# Patient Record
Sex: FEMALE | Race: WHITE | Employment: OTHER | URBAN - METROPOLITAN AREA
[De-identification: names, ages, dates, MRNs, and addresses within clinical notes are randomized per-mention and may not be internally consistent; named-entity substitution may affect disease eponyms.]

---

## 2017-01-05 RX ORDER — AMLODIPINE BESYLATE 5 MG/1
TABLET ORAL
Qty: 90 TAB | Refills: 0 | Status: SHIPPED | OUTPATIENT
Start: 2017-01-05 | End: 2017-01-12 | Stop reason: SDUPTHER

## 2017-01-05 RX ORDER — LEVOTHYROXINE SODIUM 88 UG/1
TABLET ORAL
Qty: 1 TAB | Refills: 0 | Status: SHIPPED | OUTPATIENT
Start: 2017-01-05 | End: 2017-01-12 | Stop reason: SDUPTHER

## 2017-01-05 RX ORDER — METOPROLOL SUCCINATE 25 MG/1
TABLET, EXTENDED RELEASE ORAL
Qty: 90 TAB | Refills: 0 | Status: SHIPPED | OUTPATIENT
Start: 2017-01-05 | End: 2017-01-12 | Stop reason: SDUPTHER

## 2017-01-12 ENCOUNTER — OFFICE VISIT (OUTPATIENT)
Dept: FAMILY MEDICINE CLINIC | Age: 82
End: 2017-01-12

## 2017-01-12 ENCOUNTER — HOSPITAL ENCOUNTER (OUTPATIENT)
Dept: LAB | Age: 82
Discharge: HOME | End: 2017-01-12
Payer: MEDICARE

## 2017-01-12 VITALS
DIASTOLIC BLOOD PRESSURE: 91 MMHG | HEIGHT: 62 IN | BODY MASS INDEX: 26.02 KG/M2 | OXYGEN SATURATION: 94 % | SYSTOLIC BLOOD PRESSURE: 169 MMHG | HEART RATE: 72 BPM | WEIGHT: 141.38 LBS | RESPIRATION RATE: 18 BRPM | TEMPERATURE: 98.5 F

## 2017-01-12 DIAGNOSIS — E03.2 HYPOTHYROIDISM, IATROGENIC: ICD-10-CM

## 2017-01-12 DIAGNOSIS — K92.2 GASTROINTESTINAL HEMORRHAGE, UNSPECIFIED GASTROINTESTINAL HEMORRHAGE TYPE: ICD-10-CM

## 2017-01-12 DIAGNOSIS — Z79.4 TYPE 2 DIABETES MELLITUS WITHOUT COMPLICATION, WITH LONG-TERM CURRENT USE OF INSULIN (HCC): ICD-10-CM

## 2017-01-12 DIAGNOSIS — Z00.00 ROUTINE GENERAL MEDICAL EXAMINATION AT A HEALTH CARE FACILITY: Primary | ICD-10-CM

## 2017-01-12 DIAGNOSIS — I25.10 CORONARY ARTERY DISEASE INVOLVING NATIVE CORONARY ARTERY OF NATIVE HEART WITHOUT ANGINA PECTORIS: ICD-10-CM

## 2017-01-12 DIAGNOSIS — E11.9 TYPE 2 DIABETES MELLITUS WITHOUT COMPLICATION, WITH LONG-TERM CURRENT USE OF INSULIN (HCC): ICD-10-CM

## 2017-01-12 DIAGNOSIS — E78.00 HYPERCHOLESTEREMIA: ICD-10-CM

## 2017-01-12 DIAGNOSIS — Z71.89 ADVANCE CARE PLANNING: ICD-10-CM

## 2017-01-12 PROCEDURE — 82043 UR ALBUMIN QUANTITATIVE: CPT

## 2017-01-12 PROCEDURE — 85025 COMPLETE CBC W/AUTO DIFF WBC: CPT

## 2017-01-12 PROCEDURE — 83036 HEMOGLOBIN GLYCOSYLATED A1C: CPT

## 2017-01-12 PROCEDURE — 80061 LIPID PANEL: CPT

## 2017-01-12 PROCEDURE — 84443 ASSAY THYROID STIM HORMONE: CPT

## 2017-01-12 PROCEDURE — 80053 COMPREHEN METABOLIC PANEL: CPT

## 2017-01-12 RX ORDER — LOSARTAN POTASSIUM 25 MG/1
25 TABLET ORAL
Qty: 90 TAB | Refills: 3 | Status: SHIPPED | OUTPATIENT
Start: 2017-01-12 | End: 2017-03-27 | Stop reason: SDUPTHER

## 2017-01-12 RX ORDER — METOPROLOL SUCCINATE 25 MG/1
TABLET, EXTENDED RELEASE ORAL
Qty: 90 TAB | Refills: 0 | Status: SHIPPED | OUTPATIENT
Start: 2017-01-12 | End: 2017-03-22 | Stop reason: SDUPTHER

## 2017-01-12 RX ORDER — LEVOTHYROXINE SODIUM 88 UG/1
TABLET ORAL
Qty: 1 TAB | Refills: 0 | Status: SHIPPED | OUTPATIENT
Start: 2017-01-12 | End: 2017-01-23 | Stop reason: SDUPTHER

## 2017-01-12 RX ORDER — AMLODIPINE BESYLATE 5 MG/1
TABLET ORAL
Qty: 90 TAB | Refills: 0 | Status: SHIPPED | OUTPATIENT
Start: 2017-01-12 | End: 2017-03-27 | Stop reason: SDUPTHER

## 2017-01-12 RX ORDER — OMEPRAZOLE 20 MG/1
20 CAPSULE, DELAYED RELEASE ORAL 2 TIMES DAILY
Qty: 90 CAP | Refills: 3 | Status: SHIPPED | OUTPATIENT
Start: 2017-01-12 | End: 2017-03-27 | Stop reason: SDUPTHER

## 2017-01-12 RX ORDER — ROSUVASTATIN CALCIUM 10 MG/1
10 TABLET, COATED ORAL
Qty: 90 TAB | Refills: 3 | Status: SHIPPED | OUTPATIENT
Start: 2017-01-12 | End: 2017-03-27 | Stop reason: SDUPTHER

## 2017-01-12 NOTE — PROGRESS NOTES
1. Have you been to the ER, urgent care clinic since your last visit? Hospitalized since your last visit? No    2. Have you seen or consulted any other health care providers outside of the [address] [place] [address] since your last visit? Include any pap smears or colon screening. No   Chief Complaint   Patient presents with    Diabetes     3 mos fuv for diabetes and hypertension  this am   [facility]     labs       Lavon Lynn  1/12/2017  Provider:   Jm:  Diabetes Report Card   1) Have you seen the eye doctor in past year?yes    2) How would you  rate your Diabetic Diet?fair     3) How well do you take care of your feet? yes   4) Do you keep your Primary Care Follow Up Appts? yes    5) Do you know your A1C goal?yes    6) Do you take your medications daily? yes    7) Do you check your blood sugars? yes    8) Have you gained weight?no       9) Do you follow an exercise program?no    10) Can you do better?yes      Lab Results   Component Value Date/Time    Cholesterol, total 158 08/12/2016 09:29 AM    HDL Cholesterol 71 08/12/2016 09:29 AM    LDL, calculated 70 08/12/2016 09:29 AM    Triglyceride 87 08/12/2016 09:29 AM     Lab Results   Component Value Date/Time    Hemoglobin A1c 5.7 09/06/2016 03:51 PM    Hemoglobin A1c 6.2 08/12/2016 09:29 AM    Hemoglobin A1c 5.5 05/17/2016 10:59 AM    Glucose 126 09/06/2016 03:51 PM    LDL, calculated 70 08/12/2016 09:29 AM    Creatinine 0.50 09/06/2016 03:51 PM        Due for med cpx  Chief Complaint   Patient presents with    Diabetes     3 mos fuv for diabetes and hypertension  this am    Labs     labs     she is a 80y.o. year old female who presents for evalution. Reviewed PmHx, RxHx, FmHx, SocHx, AllgHx and updated and dated in the chart.     Patient Active Problem List    Diagnosis    Advance care planning    DDD (degenerative disc disease), lumbar    Gastric ulcer    Coronary artery disease involving native coronary artery of native heart without angina pectoris    GI bleeding     workup in [address] [place] [address] 12/15 was unremarkable (Dr. Kyung Charles) - says she had EGD, colonoscopy, and small bowel capsule endoscopy      HTN (hypertension)    Diabetes ([address]    Hypothyroidism, iatrogenic     radioiodine      Hypercholesteremia       Review of Systems - negative except as listed above in the HPI    Objective:     Vitals:    01/12/17 1051   BP: (!) 169/91   Pulse: 72   Resp: 18   Temp: 98.5 °F (36.9 °C)   TempSrc: Oral   SpO2: 94%   Weight: 141 lb 6 oz (64.1 kg)   Height: 5' 2\" (1.575 m)     Physical Examination: General appearance - alert, well appearing, and in no distress  Chest - clear to auscultation, no wheezes, rales or rhonchi, symmetric air entry  Heart - normal rate, regular rhythm, normal S1, S2, no murmurs, rubs, clicks or gallops  Abdomen - soft, nontender, nondistended, no masses or organomegaly    Assessment/ Plan:   Kim Cordero was seen today for diabetes and labs. Diagnoses and all orders for this visit:    Routine general medical examination at a health care facility  -see below    Type 2 diabetes mellitus without complication, with long-term current use of insulin (HCC)  -     rosuvastatin (CRESTOR) 10 mg tablet; Take 1 Tab by mouth nightly. -     losartan (COZAAR) 25 mg tablet; Take 1 Tab by mouth nightly. -     LIPID PANEL  -     METABOLIC PANEL, COMPREHENSIVE  -     CBC WITH AUTOMATED DIFF  -     MICROALBUMIN, UR, RAND W/ MICROALBUMIN/CREA RATIO  -     HEMOGLOBIN A1C WITH EAG  -  Lab Results   Component Value Date/Time    Hemoglobin A1c 5.7 09/06/2016 03:51 PM       Hypercholesteremia  -     rosuvastatin (CRESTOR) 10 mg tablet; Take 1 Tab by mouth nightly. -     losartan (COZAAR) 25 mg tablet; Take 1 Tab by mouth nightly.     Hypothyroidism, iatrogenic  -     TSH 3RD GENERATION    Coronary artery disease involving native coronary artery of native heart without angina pectoris    Gastrointestinal hemorrhage, unspecified gastrointestinal hemorrhage type  -no more sxs  -check CBC  -feels better    Advance care planning  -has LW    Other orders  -     levothyroxine (SYNTHROID) 88 mcg tablet; TAKE 1 TABLET DAILY BEFORE BREAKFAST  -     metoprolol succinate (TOPROL-XL) 25 mg XL tablet; TAKE 1 TABLET DAILY  -     amLODIPine (NORVASC) 5 mg tablet; TAKE 1 TABLET DAILY  -     omeprazole (PRILOSEC) 20 mg capsule; Take 1 Cap by mouth two (2) times a day. -Patient is in good health  -Discussed with patient cancer risk factors and screens needed  -Patient needs a colonoscopy no  -Labs from previous visits were discussed with patient yes  -Discussed with patient diet and exercise=yes  -Discussed with patient testicular (male)/breast self exam (female)= yes  Follow-up Disposition:  Return in about 3 months (around 4/12/2017). I have discussed the diagnosis with the patient and the intended plan as seen in the above orders. The patient understands and agrees with the plan. The patient has received an after-visit summary and questions were answered concerning future plans. Medication Side Effects and Warnings were discussed with patient  Patient Labs were reviewed and or requested  Patient Past Records were reviewed and or requested     There are no Patient Instructions on file for this visit.         Santa Ramos M.D.              ,ed

## 2017-01-12 NOTE — MR AVS SNAPSHOT
Visit Information Date & Time Provider Department Dept. Phone Encounter #  
 1/12/2017 10:30 AM Benson Contreras MD [address] [facility] [phone] [id number] Follow-up Instructions Return in about 3 months (around 4/12/2017). Your Appointments 2/22/2017 11:40 AM  
ESTABLISHED PATIENT with Eva Ramos MD  
[facility] ([address]) Appt Note: 6 mo fu appt  [address] [id number] [phone]  
  
   
 [address] [address] Upcoming Health Maintenance Date Due  
 FOOT EXAM Q1 2/15/1945 MICROALBUMIN Q1 2/15/1945 EYE EXAM RETINAL OR DILATED Q1 2/15/1945 DTaP/Tdap/Td series (1 - Tdap) 2/15/1956 ZOSTER VACCINE AGE 60> 2/15/1995 GLAUCOMA SCREENING Q2Y 2/15/2000 Pneumococcal 65+ Low/Medium Risk (1 of 2 - PCV13) 2/15/2000 MEDICARE YEARLY EXAM 2/15/2000 HEMOGLOBIN A1C Q6M 3/6/2017 LIPID PANEL Q1 8/12/2017 Allergies as of 1/12/2017  Review Complete On: 1/12/2017 By: Benson Contreras MD  
  
 Severity Noted Reaction Type Reactions Celebrex [Celecoxib]  07/13/2015    Hives Current Immunizations  Reviewed on 10/13/2016 Name Date Influenza High Dose Vaccine PF 10/13/2016 Not reviewed this visit You Were Diagnosed With   
  
 Codes Comments Routine general medical examination at a health care facility    -  Primary ICD-10-CM: Z00.00 ICD-9-CM: V70.0 Type 2 diabetes mellitus without complication, with long-term current use of insulin (HCC)     ICD-10-CM: E11.9, Z79.4 ICD-9-CM: 250.00, V58.67 Hypercholesteremia     ICD-10-CM: E78.00 ICD-9-CM: 272.0 Hypothyroidism, iatrogenic     ICD-10-CM: E03.2 ICD-9-CM: 514. 3 Coronary artery disease involving native coronary artery of native heart without angina pectoris     ICD-10-CM: I25.10 ICD-9-CM: 414.01  Gastrointestinal hemorrhage, unspecified gastrointestinal hemorrhage type ICD-10-CM: K92.2 ICD-9-CM: 578.9 Advance care planning     ICD-10-CM: Z71.89 ICD-9-CM: V65.49 Vitals BP Pulse Temp Resp Height(growth percentile) Weight(growth percentile) (!) 169/91 (BP 1 Location: Left arm, BP Patient Position: Sitting) 72 98.5 °F (36.9 °C) (Oral) 18 5' 2\" (1.575 m) 141 lb 6 oz (64.1 kg) SpO2 BMI OB Status Smoking Status 94% 25.86 kg/m2 Menopause Never Smoker Vitals History BMI and BSA Data Body Mass Index Body Surface Area  
 25.86 kg/m 2 1.67 m 2 Preferred Pharmacy Pharmacy Name Phone [address] [facility] [phone] Your Updated Medication List  
  
   
This list is accurate as of: 1/12/17 11:02 AM.  Always use your most recent med list. amLODIPine 5 mg tablet Commonly known as:  Greg Cezar TAKE 1 TABLET DAILY  
  
 aspirin 81 mg chewable tablet Take 1 Tab by mouth daily. iron polysaccharides 150 mg iron capsule Commonly known as:  Roselind Spruce Take 1 Cap by mouth two (2) times a day. KRILL OIL (OMEGA 3 AND 6) 1,500-165-67.5 mg Cap Generic drug:  krill-om3-dha-epa-om6-lip-astx Take  by mouth.  
  
 levothyroxine 88 mcg tablet Commonly known as:  SYNTHROID  
TAKE 1 TABLET DAILY BEFORE BREAKFAST  
  
 losartan 25 mg tablet Commonly known as:  COZAAR Take 1 Tab by mouth nightly. metoprolol succinate 25 mg XL tablet Commonly known as:  TOPROL-XL  
TAKE 1 TABLET DAILY MULTIPLE VITAMIN PO Take  by mouth. naproxen sodium 220 mg Cap Take  by mouth every other day. omeprazole 20 mg capsule Commonly known as:  PRILOSEC Take 1 Cap by mouth two (2) times a day. rosuvastatin 10 mg tablet Commonly known as:  CRESTOR Take 1 Tab by mouth nightly. VITAMIN D3 2,000 unit Tab Generic drug:  cholecalciferol (vitamin D3) Take  by mouth. Prescriptions Sent to Pharmacy Refills levothyroxine (SYNTHROID) 88 mcg tablet 0 Sig: TAKE 1 TABLET DAILY BEFORE BREAKFAST Class: Normal  
 Pharmacy: [address], [address] Ph #: [phone]  
 metoprolol succinate (TOPROL-XL) 25 mg XL tablet 0 Sig: TAKE 1 TABLET DAILY Class: Normal  
 Pharmacy: [address], [address] Ph #: [phone]  
 amLODIPine (NORVASC) 5 mg tablet 0 Sig: TAKE 1 TABLET DAILY Class: Normal  
 Pharmacy: [address], [address] Ph #: [phone]  
 rosuvastatin (CRESTOR) 10 mg tablet 3 Sig: Take 1 Tab by mouth nightly. Class: Normal  
 Pharmacy: [address], [address] Ph #: [phone] Route: Oral  
 losartan (COZAAR) 25 mg tablet 3 Sig: Take 1 Tab by mouth nightly. Class: Normal  
 Pharmacy: [address], [address] Ph #: [phone] Route: Oral  
 omeprazole (PRILOSEC) 20 mg capsule 3 Sig: Take 1 Cap by mouth two (2) times a day. Class: Normal  
 Pharmacy: [address], [address] Ph #: [phone] Route: Oral  
  
We Performed the Following CBC WITH AUTOMATED DIFF [94100 CPT(R)] HEMOGLOBIN A1C WITH EAG [41138 CPT(R)] LIPID PANEL [03051 CPT(R)] METABOLIC PANEL, COMPREHENSIVE [01337 CPT(R)] MICROALBUMIN, UR, RAND W/ MICROALBUMIN/CREA RATIO A1048042 CPT(R)] TSH 3RD GENERATION [14261 CPT(R)] Follow-up Instructions Return in about 3 months (around 4/12/2017). Introducing [facility] & HEALTH SERVICES! [employer] introduces [employer] patient portal. Now you can access parts of your medical record, email your doctor's office, and request medication refills online. 1. In your internet browser, go to [email or web address] 2. Click on the First Time User? Click Here link in the Sign In box. You will see the New Member Sign Up page. 3. Enter your [employer] Access Code exactly as it appears below. You will not need to use this code after youve completed the sign-up process. If you do not sign up before the expiration date, you must request a new code. · [employer] Access Code: [email or web address] Expires: 4/12/2017 11:02 AM 
 
4. Enter the last four digits of your Social Security Number (xxxx) and Date of Birth (mm/dd/yyyy) as indicated and click Submit. You will be taken to the next sign-up page. 5. Create a [employer] ID. This will be your [employer] login ID and cannot be changed, so think of one that is secure and easy to remember. 6. Create a [employer] password. You can change your password at any time. 7. Enter your Password Reset Question and Answer. This can be used at a later time if you forget your password. 8. Enter your e-mail address. You will receive e-mail notification when new information is available in [address] 9. Click Sign Up. You can now view and download portions of your medical record. 10. Click the Download Summary menu link to download a portable copy of your medical information. If you have questions, please visit the Frequently Asked Questions section of the [employer] website. Remember, [employer] is NOT to be used for urgent needs. For medical emergencies, dial 911. Now available from your iPhone and Android! Please provide this summary of care documentation to your next provider. Your primary care clinician is listed as EUSEBIO FERRERA. If you have any questions after today's visit, please call [phone].

## 2017-01-13 LAB
ALBUMIN SERPL-MCNC: 4.2 G/DL (ref 3.5–4.7)
ALBUMIN/CREAT UR: 30.1 MG/G CREAT (ref 0–30)
ALBUMIN/GLOB SERPL: 1.9 {RATIO} (ref 1.1–2.5)
ALP SERPL-CCNC: 87 IU/L (ref 39–117)
ALT SERPL-CCNC: 12 IU/L (ref 0–32)
AST SERPL-CCNC: 18 IU/L (ref 0–40)
BASOPHILS # BLD AUTO: 0 X10E3/UL (ref 0–0.2)
BASOPHILS NFR BLD AUTO: 1 %
BILIRUB SERPL-MCNC: 0.3 MG/DL (ref 0–1.2)
BUN SERPL-MCNC: 12 MG/DL (ref 8–27)
BUN/CREAT SERPL: 21 (ref 11–26)
CALCIUM SERPL-MCNC: 9.3 MG/DL (ref 8.7–10.3)
CHLORIDE SERPL-SCNC: 97 MMOL/L (ref 96–106)
CHOLEST SERPL-MCNC: 262 MG/DL (ref 100–199)
CO2 SERPL-SCNC: 22 MMOL/L (ref 18–29)
CREAT SERPL-MCNC: 0.57 MG/DL (ref 0.57–1)
CREAT UR-MCNC: 67.4 MG/DL
EOSINOPHIL # BLD AUTO: 0.1 X10E3/UL (ref 0–0.4)
EOSINOPHIL NFR BLD AUTO: 3 %
ERYTHROCYTE [DISTWIDTH] IN BLOOD BY AUTOMATED COUNT: 17.5 % (ref 12.3–15.4)
EST. AVERAGE GLUCOSE BLD GHB EST-MCNC: 128 MG/DL
GLOBULIN SER CALC-MCNC: 2.2 G/DL (ref 1.5–4.5)
GLUCOSE SERPL-MCNC: 122 MG/DL (ref 65–99)
HBA1C MFR BLD: 6.1 % (ref 4.8–5.6)
HCT VFR BLD AUTO: 38.1 % (ref 34–46.6)
HDLC SERPL-MCNC: 70 MG/DL
HGB BLD-MCNC: 12.5 G/DL (ref 11.1–15.9)
IMM GRANULOCYTES # BLD: 0 X10E3/UL (ref 0–0.1)
IMM GRANULOCYTES NFR BLD: 0 %
INTERPRETATION, 910389: NORMAL
LDLC SERPL CALC-MCNC: 169 MG/DL (ref 0–99)
LYMPHOCYTES # BLD AUTO: 0.8 X10E3/UL (ref 0.7–3.1)
LYMPHOCYTES NFR BLD AUTO: 20 %
MCH RBC QN AUTO: 30.6 PG (ref 26.6–33)
MCHC RBC AUTO-ENTMCNC: 32.8 G/DL (ref 31.5–35.7)
MCV RBC AUTO: 93 FL (ref 79–97)
MICROALBUMIN UR-MCNC: 20.3 UG/ML
MONOCYTES # BLD AUTO: 0.6 X10E3/UL (ref 0.1–0.9)
MONOCYTES NFR BLD AUTO: 15 %
NEUTROPHILS # BLD AUTO: 2.3 X10E3/UL (ref 1.4–7)
NEUTROPHILS NFR BLD AUTO: 61 %
PLATELET # BLD AUTO: 404 X10E3/UL (ref 150–379)
POTASSIUM SERPL-SCNC: 3.9 MMOL/L (ref 3.5–5.2)
PROT SERPL-MCNC: 6.4 G/DL (ref 6–8.5)
RBC # BLD AUTO: 4.08 X10E6/UL (ref 3.77–5.28)
SODIUM SERPL-SCNC: 138 MMOL/L (ref 134–144)
TRIGL SERPL-MCNC: 116 MG/DL (ref 0–149)
TSH SERPL DL<=0.005 MIU/L-ACNC: 4.34 UIU/ML (ref 0.45–4.5)
VLDLC SERPL CALC-MCNC: 23 MG/DL (ref 5–40)
WBC # BLD AUTO: 3.8 X10E3/UL (ref 3.4–10.8)

## 2017-01-16 NOTE — PROGRESS NOTES
I spoke to patient. She states she expected her cholesterol to be elevated because she ran out of crestor 2-3 weeks before her appt. Dr. Piter Hutchison sent crestor to [facility] and she will restart it as soon as she gets it.

## 2017-01-23 RX ORDER — LEVOTHYROXINE SODIUM 88 UG/1
TABLET ORAL
Qty: 1 TAB | Refills: 0 | Status: SHIPPED | OUTPATIENT
Start: 2017-01-23 | End: 2017-01-25 | Stop reason: SDUPTHER

## 2017-01-25 RX ORDER — LEVOTHYROXINE SODIUM 88 UG/1
TABLET ORAL
Qty: 90 TAB | Refills: 1 | Status: SHIPPED | OUTPATIENT
Start: 2017-01-25 | End: 2017-03-27 | Stop reason: SDUPTHER

## 2017-03-22 ENCOUNTER — OFFICE VISIT (OUTPATIENT)
Dept: CARDIOLOGY CLINIC | Age: 82
End: 2017-03-22

## 2017-03-22 VITALS
HEIGHT: 62 IN | HEART RATE: 75 BPM | RESPIRATION RATE: 19 BRPM | DIASTOLIC BLOOD PRESSURE: 88 MMHG | WEIGHT: 141.4 LBS | OXYGEN SATURATION: 97 % | SYSTOLIC BLOOD PRESSURE: 178 MMHG | BODY MASS INDEX: 26.02 KG/M2

## 2017-03-22 DIAGNOSIS — I25.10 CORONARY ARTERY DISEASE INVOLVING NATIVE CORONARY ARTERY OF NATIVE HEART WITHOUT ANGINA PECTORIS: ICD-10-CM

## 2017-03-22 DIAGNOSIS — I10 ESSENTIAL HYPERTENSION: Primary | ICD-10-CM

## 2017-03-22 DIAGNOSIS — K92.2 GASTROINTESTINAL HEMORRHAGE, UNSPECIFIED GASTROINTESTINAL HEMORRHAGE TYPE: ICD-10-CM

## 2017-03-22 DIAGNOSIS — E78.00 HYPERCHOLESTEREMIA: ICD-10-CM

## 2017-03-22 RX ORDER — METOPROLOL SUCCINATE 50 MG/1
50 TABLET, EXTENDED RELEASE ORAL DAILY
Qty: 90 TAB | Refills: 3 | Status: SHIPPED | OUTPATIENT
Start: 2017-03-22 | End: 2017-03-27 | Stop reason: SDUPTHER

## 2017-03-22 NOTE — MR AVS SNAPSHOT
Visit Information Date & Time Provider Department Dept. Phone Encounter #  
 3/22/2017  1:00 PM Stefano Severance, MD [facility] Sue Mooney [phone] [id number] Your Appointments 4/12/2017 10:20 AM  
ESTABLISHED PATIENT with Chaz Iqbal MD  
[address] [facility]) Appt Note: 3mo fuv  
 [address] [id number] [phone]  
  
   
 [address] [address] Upcoming Health Maintenance Date Due  
 FOOT EXAM Q1 2/15/1945 EYE EXAM RETINAL OR DILATED Q1 2/15/1945 DTaP/Tdap/Td series (1 - Tdap) 2/15/1956 ZOSTER VACCINE AGE 60> 2/15/1995 GLAUCOMA SCREENING Q2Y 2/15/2000 Pneumococcal 65+ Low/Medium Risk (1 of 2 - PCV13) 2/15/2000 HEMOGLOBIN A1C Q6M 7/12/2017 MICROALBUMIN Q1 1/12/2018 LIPID PANEL Q1 1/12/2018 MEDICARE YEARLY EXAM 1/13/2018 Allergies as of 3/22/2017  Review Complete On: 3/22/2017 By: Stefano Severance, MD  
  
 Severity Noted Reaction Type Reactions Celebrex [Celecoxib]  07/13/2015    Hives Current Immunizations  Reviewed on 10/13/2016 Name Date Influenza High Dose Vaccine PF 10/13/2016 Not reviewed this visit Vitals BP Pulse Resp Height(growth percentile) Weight(growth percentile) SpO2  
 178/88 (BP 1 Location: Right arm, BP Patient Position: Sitting) 75 19 5' 2\" (1.575 m) 141 lb 6.4 oz (64.1 kg) 97% BMI OB Status Smoking Status 25.86 kg/m2 Menopause Never Smoker Vitals History BMI and BSA Data Body Mass Index Body Surface Area  
 25.86 kg/m 2 1.67 m 2 Preferred Pharmacy Pharmacy Name Phone San[facility] Otto [facility] [phone] Your Updated Medication List  
  
   
This list is accurate as of: 3/22/17  1:42 PM.  Always use your most recent med list. amLODIPine 5 mg tablet Commonly known as:  Gemini Blade TAKE 1 TABLET DAILY aspirin 81 mg chewable tablet Take 1 Tab by mouth daily. iron polysaccharides 150 mg iron capsule Commonly known as:  Audreycandice Arambulau Take 1 Cap by mouth two (2) times a day. KRILL OIL (OMEGA 3 AND 6) 1,500-165-67.5 mg Cap Generic drug:  krill-om3-dha-epa-om6-lip-astx Take  by mouth.  
  
 levothyroxine 88 mcg tablet Commonly known as:  SYNTHROID  
TAKE 1 TABLET DAILY BEFORE BREAKFAST  
  
 losartan 25 mg tablet Commonly known as:  COZAAR Take 1 Tab by mouth nightly. metoprolol succinate 50 mg XL tablet Commonly known as:  TOPROL-XL Take 1 Tab by mouth daily. MULTIPLE VITAMIN PO Take  by mouth. naproxen sodium 220 mg Cap Take  by mouth every other day. omeprazole 20 mg capsule Commonly known as:  PRILOSEC Take 1 Cap by mouth two (2) times a day. rosuvastatin 10 mg tablet Commonly known as:  CRESTOR Take 1 Tab by mouth nightly. VITAMIN D3 2,000 unit Tab Generic drug:  cholecalciferol (vitamin D3) Take  by mouth. Prescriptions Sent to Pharmacy Refills  
 metoprolol succinate (TOPROL-XL) 50 mg XL tablet 3 Sig: Take 1 Tab by mouth daily. Class: Normal  
 Pharmacy: [address], [address] [place] [address] #: [phone] Route: Oral  
  
Introducing [facility] & HEALTH SERVICES! [employer] introduces [employer] patient portal. Now you can access parts of your medical record, email your doctor's office, and request medication refills online. 1. In your internet browser, go to [email or web address] 2. Click on the First Time User? Click Here link in the Sign In box. You will see the New Member Sign Up page. 3. Enter your [employer] Access Code exactly as it appears below. You will not need to use this code after youve completed the sign-up process. If you do not sign up before the expiration date, you must request a new code.  
 
· [employer] Access Code: [email or web address] 
 Expires: 4/12/2017 12:02 PM 
 
4. Enter the last four digits of your Social Security Number (xxxx) and Date of Birth (mm/dd/yyyy) as indicated and click Submit. You will be taken to the next sign-up page. 5. Create a [employer] ID. This will be your [employer] login ID and cannot be changed, so think of one that is secure and easy to remember. 6. Create a [employer] password. You can change your password at any time. 7. Enter your Password Reset Question and Answer. This can be used at a later time if you forget your password. 8. Enter your e-mail address. You will receive e-mail notification when new information is available in [address] 9. Click Sign Up. You can now view and download portions of your medical record. 10. Click the Download Summary menu link to download a portable copy of your medical information. If you have questions, please visit the Frequently Asked Questions section of the [employer] website. Remember, [employer] is NOT to be used for urgent needs. For medical emergencies, dial 911. Now available from your iPhone and Android! Please provide this summary of care documentation to your next provider. Your primary care clinician is listed as EUSEBIO FERRERA. If you have any questions after today's visit, please call [phone].

## 2017-03-22 NOTE — PROGRESS NOTES
Trista Jesus MD. [facility]              Patient: Lavon Lynn  : 1935      Today's Date: 3/22/2017            HISTORY OF PRESENT ILLNESS:     History of Present Illness:  Ms. Atkinson is here for follow-up. She says she was in the hospital in [place] for dizziness, vomiting and anemia. She is always tired. Sleeps poorly. Is exercising regularly. BP high at home (-170's)            PAST MEDICAL HISTORY:     Past Medical History:   Diagnosis Date    Diabetes ([address]     GI bleeding     workup in [address] [place] [address] 12/15 was unremarkable (Dr. Supa Ayon) - says she had EGD, colonoscopy, and small bowel capsule endoscopy    HTN (hypertension)     Hypercholesteremia     Hypothyroidism, iatrogenic     radioiodine    MI (myocardial infarction) ([address]     During surgery in  - she's had multiple caths and stress tests             MEDICATIONS:     Current Outpatient Prescriptions   Medication Sig Dispense Refill    levothyroxine (SYNTHROID) 88 mcg tablet TAKE 1 TABLET DAILY BEFORE BREAKFAST 90 Tab 1    metoprolol succinate (TOPROL-XL) 25 mg XL tablet TAKE 1 TABLET DAILY 90 Tab 0    amLODIPine (NORVASC) 5 mg tablet TAKE 1 TABLET DAILY 90 Tab 0    rosuvastatin (CRESTOR) 10 mg tablet Take 1 Tab by mouth nightly. 90 Tab 3    losartan (COZAAR) 25 mg tablet Take 1 Tab by mouth nightly. 90 Tab 3    omeprazole (PRILOSEC) 20 mg capsule Take 1 Cap by mouth two (2) times a day. 90 Cap 3    iron polysaccharides (NU-IRON) 150 mg iron capsule Take 1 Cap by mouth two (2) times a day. 60 Cap 3    naproxen sodium 220 mg cap Take  by mouth every other day.  cholecalciferol, vitamin D3, (VITAMIN D3) 2,000 unit tab Take  by mouth.  krill-om3-dha-epa-om6-lip-astx (KRILL OIL, OMEGA 3 & 6,) 1,500-165-67.5 mg cap Take  by mouth.  MULTIVITAMIN (MULTIPLE VITAMIN PO) Take  by mouth.  aspirin 81 mg chewable tablet Take 1 Tab by mouth daily.  30 Tab 0       Allergies   Allergen Reactions    Celebrex [Celecoxib] Hives             SOCIAL HISTORY:     Social History   Substance Use Topics    Smoking status: Never Smoker    Smokeless tobacco: Never Used    Alcohol use 0.0 oz/week     0 Standard drinks or equivalent per week           FAMILY HISTORY:     Family History   Problem Relation Age of Onset    Heart Failure Mother             REVIEW OF SYMPTOMS:          Review of Symptoms:  Constitutional: Negative for fever, chills  HEENT: Negative for vision changes.    Respiratory: occ cough  Cardiovascular: Negative for orthopnea, syncope, and PND.    Gastrointestinal: Negative for abdominal pain, diarrhea, or melena  Genitourinary: Negative for dysuria  Musculoskeletal: Negative for myalgias.    Skin: Negative for rash  Heme: History of GI bleeding (12/15)    Neurological: Negative for speech change and focal weakness.    + constipation              PHYSICAL EXAM:         Physical Exam:  Visit Vitals    /88 (BP 1 Location: Right arm, BP Patient Position: Sitting)    Pulse 75    Resp 19    Ht 5' 2\" (1.575 m)    Wt 141 lb 6.4 oz (64.1 kg)    SpO2 97%    BMI 25.86 kg/m2       Patient appears generally well, mood and affect are appropriate and pleasant. HEENT:  Hearing intact, non-icteric, normocephalic, atraumatic.    Neck Exam: Supple, No JVD or carotid bruits.    Lung Exam: Clear to auscultation, even breath sounds.    Cardiac Exam: Regular rate and rhythm with no sig murmur  Abdomen: Soft, non-tender  Extremities: Moves all ext well. No lower extremity edema.   Psych: Appropriate affect  Neuro - Grossly intact              LABS / OTHER STUDIES:          Labs 7/9/15 - K 3.8, glc 131, Cr 0.6, Na 139, Hgb 13.3, plt 371, A1c 6.3, TSH 5.6, Vit D 27, chol 191, , HDL 64, LDL 94, LFT's OK  labs 12/10/15 - Hgb 6.3, plt 492, Cr 0.6, K 4.0, TSH 2.7, chol 210, HDL 72, , TG 75  Hgb 11.1 in March 2016.    Labs 8/13/16 - CMP OK (glc 113), CBC OK (Hgb 11.4), chol 158, TG 87, HDL 71, LDL 70, A1c 6.2, TSH 1.7    Lab Results   Component Value Date/Time    Sodium 138 01/12/2017 11:05 AM    Potassium 3.9 01/12/2017 11:05 AM    Chloride 97 01/12/2017 11:05 AM    CO2 22 01/12/2017 11:05 AM    Glucose 122 01/12/2017 11:05 AM    BUN 12 01/12/2017 11:05 AM    Creatinine 0.57 01/12/2017 11:05 AM    BUN/Creatinine ratio 21 01/12/2017 11:05 AM    GFR est  01/12/2017 11:05 AM    GFR est non-AA 87 01/12/2017 11:05 AM    Calcium 9.3 01/12/2017 11:05 AM    Bilirubin, total 0.3 01/12/2017 11:05 AM    AST (SGOT) 18 01/12/2017 11:05 AM    Alk. phosphatase 87 01/12/2017 11:05 AM    Protein, total 6.4 01/12/2017 11:05 AM    Albumin 4.2 01/12/2017 11:05 AM    A-G Ratio 1.9 01/12/2017 11:05 AM    ALT (SGPT) 12 01/12/2017 11:05 AM       Lab Results   Component Value Date/Time    WBC 3.8 01/12/2017 11:05 AM    HGB 12.5 01/12/2017 11:05 AM    HCT 38.1 01/12/2017 11:05 AM    PLATELET 243 65/39/9405 11:05 AM    MCV 93 01/12/2017 11:05 AM       Lab Results   Component Value Date/Time    Cholesterol, total 262 01/12/2017 11:05 AM    HDL Cholesterol 70 01/12/2017 11:05 AM    LDL, calculated 169 01/12/2017 11:05 AM    VLDL, calculated 23 01/12/2017 11:05 AM    Triglyceride 116 01/12/2017 11:05 AM              CARDIAC DIAGNOSTICS:          Cardiac Evaluation Includes:  Exercise Cardiolite 7/23/15 - walked 4:39 (6.3 METS), normal stress EKG. Normal MPI. LVEF 71%. Echo 7/23/15 - LVEF 55-60%, grade 1 diastology      EKG 7/9/15 - NSR, 1st degree AVB, non-specific T wave abn           ASSESSMENT AND PLAN:          Assessment and Plan:  1) Atypical chest pain and history of CAD  - Exercise Cardiolite 7/23/15 - walked 4:39 (6.3 METS), normal stress EKG. Normal MPI. LVEF 71%.   - Echo 7/23/15 - LVEF 55-60%, grade 1 diastology  - Will continue ASA 81 mg for now as long as bleeding issues resolved, however if she has more problems with GI bleeding, then would have to stop the ASA      2) Dizziness and fatigue  - She had dizziness lying down and she does not have typical orthostatic complaints - her orthostatic vitals were normal 7/13/15  - We cut back losartan and dizziness was better some.    - She says BP at home runs ~ 130/80 and sometimes lower   - continue current meds   - Due to leg weakness, I held Crestor 4 weeks but this did not improve her symptoms any     - she is exercising and feeling better       3) HTN   - BP high at home (-170's)  - Will increase Toprol XL to 50 mg and see how she does with that. May increase norvasc next.       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 Liya Massey note  - Will continue ASA 81 mg for now as long as bleeding issues resolved, however if she has more problems with GI bleeding, then would have to stop the ASA  - She sees Dr. Juanita Alanis and gets a CBC regularly   - labs 12/10/15 - Hgb 6.3  - Hgb is stable on iron. Hgb 12.5 in Jan 2017  - if more significant GI bleeding, then will stop ASA      6) See me back in 2 months. Patient expressed understanding of the plan - questions were answered. She is going to see sister in [place] soon.   Janes Castro MD, [address], [address]  [address] [place] [address] [address], [address]  Ph: [phone] Ph [phone]

## 2017-03-22 NOTE — PROGRESS NOTES
Visit Vitals    /88 (BP 1 Location: Right arm, BP Patient Position: Sitting)    Pulse 75    Resp 19    Ht 5' 2\" (1.575 m)    Wt 141 lb 6.4 oz (64.1 kg)    SpO2 97%    BMI 25.86 kg/m2

## 2017-03-27 ENCOUNTER — TELEPHONE (OUTPATIENT)
Dept: FAMILY MEDICINE CLINIC | Age: 82
End: 2017-03-27

## 2017-03-27 DIAGNOSIS — E78.00 HYPERCHOLESTEREMIA: ICD-10-CM

## 2017-03-27 DIAGNOSIS — E11.9 TYPE 2 DIABETES MELLITUS WITHOUT COMPLICATION, WITH LONG-TERM CURRENT USE OF INSULIN (HCC): ICD-10-CM

## 2017-03-27 DIAGNOSIS — Z79.4 TYPE 2 DIABETES MELLITUS WITHOUT COMPLICATION, WITH LONG-TERM CURRENT USE OF INSULIN (HCC): ICD-10-CM

## 2017-03-27 RX ORDER — LOSARTAN POTASSIUM 25 MG/1
25 TABLET ORAL
Qty: 90 TAB | Refills: 3 | Status: SHIPPED | OUTPATIENT
Start: 2017-03-27 | End: 2017-10-24 | Stop reason: SDUPTHER

## 2017-03-27 RX ORDER — ROSUVASTATIN CALCIUM 10 MG/1
10 TABLET, COATED ORAL
Qty: 90 TAB | Refills: 3 | Status: SHIPPED | OUTPATIENT
Start: 2017-03-27 | End: 2017-10-24 | Stop reason: SDUPTHER

## 2017-03-27 RX ORDER — AMLODIPINE BESYLATE 5 MG/1
TABLET ORAL
Qty: 90 TAB | Refills: 0 | Status: SHIPPED | OUTPATIENT
Start: 2017-03-27 | End: 2017-09-09 | Stop reason: SDUPTHER

## 2017-03-27 RX ORDER — METOPROLOL SUCCINATE 50 MG/1
50 TABLET, EXTENDED RELEASE ORAL DAILY
Qty: 90 TAB | Refills: 3 | Status: SHIPPED | OUTPATIENT
Start: 2017-03-27 | End: 2017-10-24 | Stop reason: SDUPTHER

## 2017-03-27 RX ORDER — OMEPRAZOLE 20 MG/1
20 CAPSULE, DELAYED RELEASE ORAL 2 TIMES DAILY
Qty: 90 CAP | Refills: 3 | Status: SHIPPED | OUTPATIENT
Start: 2017-03-27 | End: 2017-10-24 | Stop reason: SDUPTHER

## 2017-03-27 RX ORDER — LEVOTHYROXINE SODIUM 88 UG/1
TABLET ORAL
Qty: 90 TAB | Refills: 1 | Status: SHIPPED | OUTPATIENT
Start: 2017-03-27 | End: 2017-09-29 | Stop reason: SDUPTHER

## 2017-03-27 NOTE — TELEPHONE ENCOUNTER
Patient came into the office requesting refills:    Crestor, 10 mg  Synthroid 88 mcg  Norvasc 5 mg  Cozaar 25 mg  Prilosec 20 mg  Valtrex       [facility]

## 2017-04-24 ENCOUNTER — HOSPITAL ENCOUNTER (OUTPATIENT)
Dept: LAB | Age: 82
Discharge: HOME | End: 2017-04-24
Payer: MEDICARE

## 2017-04-24 ENCOUNTER — OFFICE VISIT (OUTPATIENT)
Dept: FAMILY MEDICINE CLINIC | Age: 82
End: 2017-04-24

## 2017-04-24 VITALS
RESPIRATION RATE: 16 BRPM | HEIGHT: 62 IN | WEIGHT: 141 LBS | HEART RATE: 66 BPM | DIASTOLIC BLOOD PRESSURE: 90 MMHG | OXYGEN SATURATION: 98 % | TEMPERATURE: 98 F | BODY MASS INDEX: 25.95 KG/M2 | SYSTOLIC BLOOD PRESSURE: 170 MMHG

## 2017-04-24 DIAGNOSIS — E03.2 HYPOTHYROIDISM, IATROGENIC: ICD-10-CM

## 2017-04-24 DIAGNOSIS — E78.00 HYPERCHOLESTEREMIA: ICD-10-CM

## 2017-04-24 DIAGNOSIS — E11.9 TYPE 2 DIABETES MELLITUS WITHOUT COMPLICATION, WITHOUT LONG-TERM CURRENT USE OF INSULIN (HCC): Primary | ICD-10-CM

## 2017-04-24 DIAGNOSIS — I10 ESSENTIAL HYPERTENSION: ICD-10-CM

## 2017-04-24 PROCEDURE — 84443 ASSAY THYROID STIM HORMONE: CPT

## 2017-04-24 PROCEDURE — 83036 HEMOGLOBIN GLYCOSYLATED A1C: CPT

## 2017-04-24 PROCEDURE — 80061 LIPID PANEL: CPT

## 2017-04-24 PROCEDURE — 80053 COMPREHEN METABOLIC PANEL: CPT

## 2017-04-24 RX ORDER — METOPROLOL SUCCINATE 25 MG/1
TABLET, EXTENDED RELEASE ORAL
COMMUNITY
Start: 2017-03-13 | End: 2018-04-24 | Stop reason: ALTCHOICE

## 2017-04-24 RX ORDER — VALACYCLOVIR HYDROCHLORIDE 1 G/1
TABLET, FILM COATED ORAL
COMMUNITY
Start: 2017-04-17 | End: 2018-04-24 | Stop reason: ALTCHOICE

## 2017-04-24 RX ORDER — LATANOPROST 50 UG/ML
SOLUTION/ DROPS OPHTHALMIC
COMMUNITY
Start: 2017-03-17 | End: 2022-01-01

## 2017-04-24 NOTE — MR AVS SNAPSHOT
Visit Information Date & Time Provider Department Dept. Phone Encounter #  
 4/24/2017  9:50 AM Brandy Crook MD [address] [facility] [phone] [id number] Follow-up Instructions Return in about 6 months (around 10/24/2017) for DM. Your Appointments 5/10/2017 11:20 AM  
ESTABLISHED PATIENT with Pedro Saunders MD  
[facility] (Arcadio Cunningham) Appt Note: 2 mo fu appt [address] [address]  
[phone]  
  
   
 [address]  [address] [address] Upcoming Health Maintenance Date Due  
 FOOT EXAM Q1 2/15/1945 EYE EXAM RETINAL OR DILATED Q1 2/15/1945 DTaP/Tdap/Td series (1 - Tdap) 2/15/1956 ZOSTER VACCINE AGE 60> 2/15/1995 GLAUCOMA SCREENING Q2Y 2/15/2000 Pneumococcal 65+ Low/Medium Risk (1 of 2 - PCV13) 2/15/2000 HEMOGLOBIN A1C Q6M 7/12/2017 MICROALBUMIN Q1 1/12/2018 LIPID PANEL Q1 1/12/2018 MEDICARE YEARLY EXAM 1/13/2018 Allergies as of 4/24/2017  Review Complete On: 4/24/2017 By: Brandy Crook MD  
  
 Severity Noted Reaction Type Reactions Celebrex [Celecoxib]  07/13/2015    Hives Current Immunizations  Reviewed on 10/13/2016 Name Date Influenza High Dose Vaccine PF 10/13/2016 Not reviewed this visit You Were Diagnosed With   
  
 Codes Comments Type 2 diabetes mellitus without complication, without long-term current use of insulin (HCC)    -  Primary ICD-10-CM: E11.9 ICD-9-CM: 250.00 Essential hypertension     ICD-10-CM: I10 
ICD-9-CM: 401.9 Hypercholesteremia     ICD-10-CM: E78.00 ICD-9-CM: 272.0 Hypothyroidism, iatrogenic     ICD-10-CM: E03.2 ICD-9-CM: 774. 3 Vitals BP Pulse Temp Resp Height(growth percentile) Weight(growth percentile) 170/90 (BP 1 Location: Right arm, BP Patient Position: Sitting) 66 98 °F (36.7 °C) (Oral) 16 5' 2\" (1.575 m) 141 lb (64 kg) SpO2 BMI OB Status Smoking Status 98% 25.79 kg/m2 Menopause Never Smoker BMI and BSA Data Body Mass Index Body Surface Area 25.79 kg/m 2 1.67 m 2 Preferred Pharmacy Pharmacy Name Phone [address] [facility] [phone] Your Updated Medication List  
  
   
This list is accurate as of: 4/24/17 10:31 AM.  Always use your most recent med list. amLODIPine 5 mg tablet Commonly known as:  Ping Million TAKE 1 TABLET DAILY  
  
 aspirin 81 mg chewable tablet Take 1 Tab by mouth daily. iron polysaccharides 150 mg iron capsule Commonly known as:  Elroy Thorne Take 1 Cap by mouth two (2) times a day. KRILL OIL (OMEGA 3 AND 6) 1,500-165-67.5 mg Cap Generic drug:  krill-om3-dha-epa-om6-lip-astx Take  by mouth.  
  
 latanoprost 0.005 % ophthalmic solution Commonly known as:  XALATAN  
  
 levothyroxine 88 mcg tablet Commonly known as:  SYNTHROID  
TAKE 1 TABLET DAILY BEFORE BREAKFAST  
  
 losartan 25 mg tablet Commonly known as:  COZAAR Take 1 Tab by mouth nightly. * TOPROL XL 25 mg XL tablet Generic drug:  metoprolol succinate * metoprolol succinate 50 mg XL tablet Commonly known as:  TOPROL-XL Take 1 Tab by mouth daily. MULTIPLE VITAMIN PO Take  by mouth. naproxen sodium 220 mg Cap Take  by mouth every other day. omeprazole 20 mg capsule Commonly known as:  PRILOSEC Take 1 Cap by mouth two (2) times a day. rosuvastatin 10 mg tablet Commonly known as:  CRESTOR Take 1 Tab by mouth nightly. valACYclovir 1 gram tablet Commonly known as:  VALTREX  
  
 VITAMIN D3 2,000 unit Tab Generic drug:  cholecalciferol (vitamin D3) Take  by mouth. * Notice: This list has 2 medication(s) that are the same as other medications prescribed for you. Read the directions carefully, and ask your doctor or other care provider to review them with you. We Performed the Following HEMOGLOBIN A1C WITH EAG [04945 CPT(R)]  DIABETES FOOT EXAM [HM7 Custom] LIPID PANEL [21583 CPT(R)] METABOLIC PANEL, COMPREHENSIVE [85854 CPT(R)] REFERRAL TO OPHTHALMOLOGY [REF57 Custom] TSH 3RD GENERATION [62432 CPT(R)] Follow-up Instructions Return in about 6 months (around 10/24/2017) for DM. Referral Information Referral ID Referred By Referred To  
  
 [id number] EUSEBIO FERRERA Not Available Visits Status Start Date End Date 1 New Request 4/24/17 4/24/18 If your referral has a status of pending review or denied, additional information will be sent to support the outcome of this decision. Introducing [facility]! [employer] introduces [employer] patient portal. Now you can access parts of your medical record, email your doctor's office, and request medication refills online. 1. In your internet browser, go to [email or web address] 2. Click on the First Time User? Click Here link in the Sign In box. You will see the New Member Sign Up page. 3. Enter your [employer] Access Code exactly as it appears below. You will not need to use this code after youve completed the sign-up process. If you do not sign up before the expiration date, you must request a new code. · [employer] Access Code: [email or web address] Expires: 7/23/2017 10:31 AM 
 
4. Enter the last four digits of your Social Security Number (xxxx) and Date of Birth (mm/dd/yyyy) as indicated and click Submit. You will be taken to the next sign-up page. 5. Create a [employer] ID. This will be your [employer] login ID and cannot be changed, so think of one that is secure and easy to remember. 6. Create a [employer] password. You can change your password at any time. 7. Enter your Password Reset Question and Answer. This can be used at a later time if you forget your password. 8. Enter your e-mail address.  You will receive e-mail notification when new information is available in [employer]. 9. Click Sign Up. You can now view and download portions of your medical record. 10. Click the Download Summary menu link to download a portable copy of your medical information. If you have questions, please visit the Frequently Asked Questions section of the [employer] website. Remember, [employer] is NOT to be used for urgent needs. For medical emergencies, dial 911. Now available from your iPhone and Android! Please provide this summary of care documentation to your next provider. Your primary care clinician is listed as EUSEBIO FERRERA. If you have any questions after today's visit, please call [phone].

## 2017-04-24 NOTE — PROGRESS NOTES
1. Have you been to the ER, urgent care clinic since your last visit? Hospitalized since your last visit? No    2. Have you seen or consulted any other health care providers outside of the [address] [place] [address] since your last visit? Include any pap smears or colon screening. No   Chief Complaint   Patient presents with    Follow-up     3 month    Rash     right shoulder     Pt present to the office for 3 mon f/u, rash - right shoulder, DM, HTN      Lab Results   Component Value Date/Time    Microalb/Creat ratio (ug/mg creat.) 30.1 01/12/2017 11:05 AM      Chief Complaint   Patient presents with    Follow-up     3 month    Rash     right shoulder     she is a 80y.o. year old female who presents for evaluation. See Diabetic Report Card listed above. Patient Active Problem List    Diagnosis    Type 2 diabetes mellitus without complication, without long-term current use of insulin ([facility]    Advance care planning    DDD (degenerative disc disease), lumbar    Gastric ulcer    Coronary artery disease involving native coronary artery of native heart without angina pectoris    GI bleeding     workup in [address] [place] [address] 12/15 was unremarkable (Dr. Brisa Smith) - says she had EGD, colonoscopy, and small bowel capsule endoscopy      HTN (hypertension)    Hypothyroidism, iatrogenic     radioiodine      Hypercholesteremia       Nurses notes were copied in to this note. Reviewed PmHx, RxHx, FmHx, SocHx, AllgHx--dated and updated in the chart.     Review of Systems - negative except as listed above in the HPI    Objective:     Vitals:    04/24/17 1001   BP: 170/90   Pulse: 66   Resp: 16   Temp: 98 °F (36.7 °C)   TempSrc: Oral   SpO2: 98%   Weight: 141 lb (64 kg)   Height: 5' 2\" (1.575 m)     Physical Examination: General appearance - alert, well appearing, and in no distress  Neck - supple, no significant adenopathy  Chest - clear to auscultation, no wheezes, rales or rhonchi, symmetric air entry  Heart - normal rate, regular rhythm, normal S1, S2, no murmurs, rubs, clicks or gallops  Abdomen - soft, nontender, nondistended, no masses or organomegaly  Extremities - peripheral pulses normal, no pedal edema, no clubbing or cyanosis      Assessment/ Plan:   Jesusita Kovacs was seen today for follow-up and rash. Diagnoses and all orders for this visit:    Type 2 diabetes mellitus without complication, without long-term current use of insulin ([facility]  -     REFERRAL TO OPHTHALMOLOGY  -     LIPID PANEL  -     METABOLIC PANEL, COMPREHENSIVE  -     HEMOGLOBIN A1C WITH [facility]  -      DIABETES FOOT EXAM  -at goal    Essential hypertension  -     LIPID PANEL  -     METABOLIC PANEL, COMPREHENSIVE  -not at goal  -inc rx to 100mg Toprol    Hypercholesteremia  -     LIPID PANEL  -     METABOLIC PANEL, COMPREHENSIVE    Hypothyroidism, iatrogenic  -     TSH 3RD GENERATION       Follow-up Disposition:  Return in about 6 months (around 10/24/2017) for DM. Lab Results   Component Value Date/Time    Cholesterol, total 262 01/12/2017 11:05 AM    HDL Cholesterol 70 01/12/2017 11:05 AM    LDL, calculated 169 01/12/2017 11:05 AM    Triglyceride 116 01/12/2017 11:05 AM     Lab Results   Component Value Date/Time    Hemoglobin A1c 6.1 01/12/2017 11:05 AM    Hemoglobin A1c 5.7 09/06/2016 03:51 PM    Hemoglobin A1c 6.2 08/12/2016 09:29 AM    Microalb/Creat ratio (ug/mg creat.) 30.1 01/12/2017 11:05 AM    LDL, calculated 169 01/12/2017 11:05 AM    Creatinine 0.57 01/12/2017 11:05 AM          Discussed with patient goal of Diabetes to include:  HgA1C <7, LDL cholesterol <70, Blood pressure <130/80. Discussed with patient diet and weight management and to get regular exercise. Recommend yearly eye exams and daily foot care. The patient understands and agrees with the plan. I have discussed the diagnosis with the patient and the intended plan as seen in the above orders.   The patient has received an after-visit summary and questions were answered concerning future plans. Medication Side Effects and Warnings were discussed with patient  Patient Labs were reviewed and or requested  Patient Past Records were reviewed and or requested    Ashley Box M.D. [facility] [facility]    There are no Patient Instructions on file for this visit.

## 2017-04-24 NOTE — LETTER
4/25/2017 4:50 PM 
 
Ms. Benton 83 [address] [facility] [address] [place] [address] Dear Vinay Duggan: Please find your most recent results below. Resulted Orders LIPID PANEL Result Value Ref Range Cholesterol, total 167 100 - 199 mg/dL Triglyceride 98 0 - 149 mg/dL HDL Cholesterol 71 >39 mg/dL VLDL, calculated 20 5 - 40 mg/dL LDL, calculated 76 0 - 99 mg/dL Narrative Performed at:  [address]  [id number] : Ilsa Cuevas MD, Phone:  [phone] METABOLIC PANEL, COMPREHENSIVE Result Value Ref Range Glucose 137 (H) 65 - 99 mg/dL BUN 12 8 - 27 mg/dL Creatinine 0.52 (L) 0.57 - 1.00 mg/dL GFR est non-AA 89 >59 mL/min/1.73 GFR est  >59 mL/min/1.73  
 BUN/Creatinine ratio 23 12 - 28 Sodium 137 134 - 144 mmol/L Potassium 3.9 3.5 - 5.2 mmol/L Chloride 97 96 - 106 mmol/L  
 CO2 23 18 - 29 mmol/L Calcium 9.3 8.7 - 10.3 mg/dL Protein, total 6.6 6.0 - 8.5 g/dL Albumin 4.3 3.5 - 4.7 g/dL GLOBULIN, TOTAL 2.3 1.5 - 4.5 g/dL A-G Ratio 1.9 1.2 - 2.2 Bilirubin, total 0.3 0.0 - 1.2 mg/dL Alk. phosphatase 88 39 - 117 IU/L  
 AST (SGOT) 21 0 - 40 IU/L  
 ALT (SGPT) 20 0 - 32 IU/L Narrative Performed at:  [address]  [id number] : Ilsa Cuevas MD, Phone:  [phone] HEMOGLOBIN A1C WITH EAG Result Value Ref Range Hemoglobin A1c 6.2 (H) 4.8 - 5.6 % Comment:  
            Pre-diabetes: 5.7 - 6.4 Diabetes: >6.4 Glycemic control for adults with diabetes: <7.0 Estimated average glucose 131 mg/dL Narrative Performed at:  [address]  [id number] : Ilsa Cuevas MD, Phone:  [phone] TSH 3RD GENERATION Result Value Ref Range TSH 2.870 0.450 - 4.500 uIU/mL Narrative Performed at:  [place] [address] [address]  [id number] : Edyta Khan MD, Phone:  [phone] CVD REPORT Result Value Ref Range INTERPRETATION Note Comment:  
   Supplement report is available. Narrative Performed at:  [address] [place] [address]  [id number] : Aime Hallman PhD, Phone:  [phone] DIABETES PATIENT EDUCATION Result Value Ref Range PDF Image Not applicable Narrative Performed at:  [address] [place] [address]  [id number] : Aime Hallman PhD, Phone:  [phone] RECOMMENDATIONS: 
After reviewing your medical history and your lab results, they appear at goal for you!    
 
Let us make 2017 a great year! Dr. Danny Donato M.D. Good Help to those in Need! !!  
    
   
 
 
 
Please call me if you have any questions: [phone] Sincerely, Shi Nick MD

## 2017-04-25 LAB
ALBUMIN SERPL-MCNC: 4.3 G/DL (ref 3.5–4.7)
ALBUMIN/GLOB SERPL: 1.9 {RATIO} (ref 1.2–2.2)
ALP SERPL-CCNC: 88 IU/L (ref 39–117)
ALT SERPL-CCNC: 20 IU/L (ref 0–32)
AST SERPL-CCNC: 21 IU/L (ref 0–40)
BILIRUB SERPL-MCNC: 0.3 MG/DL (ref 0–1.2)
BUN SERPL-MCNC: 12 MG/DL (ref 8–27)
BUN/CREAT SERPL: 23 (ref 12–28)
CALCIUM SERPL-MCNC: 9.3 MG/DL (ref 8.7–10.3)
CHLORIDE SERPL-SCNC: 97 MMOL/L (ref 96–106)
CHOLEST SERPL-MCNC: 167 MG/DL (ref 100–199)
CO2 SERPL-SCNC: 23 MMOL/L (ref 18–29)
CREAT SERPL-MCNC: 0.52 MG/DL (ref 0.57–1)
EST. AVERAGE GLUCOSE BLD GHB EST-MCNC: 131 MG/DL
GLOBULIN SER CALC-MCNC: 2.3 G/DL (ref 1.5–4.5)
GLUCOSE SERPL-MCNC: 137 MG/DL (ref 65–99)
HBA1C MFR BLD: 6.2 % (ref 4.8–5.6)
HDLC SERPL-MCNC: 71 MG/DL
INTERPRETATION, 910389: NORMAL
LDLC SERPL CALC-MCNC: 76 MG/DL (ref 0–99)
Lab: NORMAL
POTASSIUM SERPL-SCNC: 3.9 MMOL/L (ref 3.5–5.2)
PROT SERPL-MCNC: 6.6 G/DL (ref 6–8.5)
SODIUM SERPL-SCNC: 137 MMOL/L (ref 134–144)
TRIGL SERPL-MCNC: 98 MG/DL (ref 0–149)
TSH SERPL DL<=0.005 MIU/L-ACNC: 2.87 UIU/ML (ref 0.45–4.5)
VLDLC SERPL CALC-MCNC: 20 MG/DL (ref 5–40)

## 2017-04-25 NOTE — PROGRESS NOTES
A letter was sent, to the address on file, with lab results and Dr. Dickey Session recommendations for the pt.

## 2017-04-25 NOTE — PROGRESS NOTES
After reviewing your medical history and your lab results, they appear at goal for you! Let us make 2017 a great year! Dr. Yuli Ramos M.D.       Good Help to those in Need!!!

## 2017-06-27 ENCOUNTER — TELEPHONE (OUTPATIENT)
Dept: FAMILY MEDICINE CLINIC | Age: 82
End: 2017-06-27

## 2017-06-27 NOTE — TELEPHONE ENCOUNTER
Pt walked in. [facility] only sent metoprolol and amlodipine to her. They did not send her crestor 10mg, levothyroxine 88mcg, losartan 25mg, nor omeprazole 20mg. Pt called [facility] today and she could hardly hear them and the person from customer service got irritated and hung up on her. Pt states that she goes out of town soon and she needs her meds. Pt only has 4-5 days of meds left. Please call pt at [phone] once [facility] has been called.

## 2017-06-28 NOTE — TELEPHONE ENCOUNTER
[facility]ed [facility] Crestor & Losartan ship on 7/3 and Levothyroxine & Omeprazole on 7/1- pt informed voiced understanding.

## 2017-09-10 RX ORDER — AMLODIPINE BESYLATE 5 MG/1
TABLET ORAL
Qty: 90 TAB | Refills: 0 | Status: SHIPPED | OUTPATIENT
Start: 2017-09-10 | End: 2017-10-24 | Stop reason: SDUPTHER

## 2017-09-29 RX ORDER — LEVOTHYROXINE SODIUM 88 UG/1
TABLET ORAL
Qty: 90 TAB | Refills: 1 | Status: SHIPPED | OUTPATIENT
Start: 2017-09-29 | End: 2017-10-24 | Stop reason: SDUPTHER

## 2017-10-24 ENCOUNTER — OFFICE VISIT (OUTPATIENT)
Dept: FAMILY MEDICINE CLINIC | Age: 82
End: 2017-10-24

## 2017-10-24 ENCOUNTER — HOSPITAL ENCOUNTER (OUTPATIENT)
Dept: LAB | Age: 82
Discharge: HOME | End: 2017-10-24
Payer: MEDICARE

## 2017-10-24 VITALS
WEIGHT: 140 LBS | RESPIRATION RATE: 18 BRPM | HEIGHT: 62 IN | SYSTOLIC BLOOD PRESSURE: 178 MMHG | BODY MASS INDEX: 25.76 KG/M2 | DIASTOLIC BLOOD PRESSURE: 83 MMHG | TEMPERATURE: 98.9 F | OXYGEN SATURATION: 98 % | HEART RATE: 67 BPM

## 2017-10-24 DIAGNOSIS — E03.2 HYPOTHYROIDISM, IATROGENIC: ICD-10-CM

## 2017-10-24 DIAGNOSIS — E78.00 HYPERCHOLESTEREMIA: ICD-10-CM

## 2017-10-24 DIAGNOSIS — Z23 ENCOUNTER FOR IMMUNIZATION: ICD-10-CM

## 2017-10-24 DIAGNOSIS — E11.9 TYPE 2 DIABETES MELLITUS WITHOUT COMPLICATION, WITH LONG-TERM CURRENT USE OF INSULIN (HCC): Primary | ICD-10-CM

## 2017-10-24 DIAGNOSIS — K92.2 GASTROINTESTINAL HEMORRHAGE, UNSPECIFIED GASTROINTESTINAL HEMORRHAGE TYPE: ICD-10-CM

## 2017-10-24 DIAGNOSIS — I10 ESSENTIAL HYPERTENSION: ICD-10-CM

## 2017-10-24 DIAGNOSIS — Z79.4 TYPE 2 DIABETES MELLITUS WITHOUT COMPLICATION, WITH LONG-TERM CURRENT USE OF INSULIN (HCC): Primary | ICD-10-CM

## 2017-10-24 PROCEDURE — 80053 COMPREHEN METABOLIC PANEL: CPT

## 2017-10-24 PROCEDURE — 84443 ASSAY THYROID STIM HORMONE: CPT

## 2017-10-24 PROCEDURE — 83036 HEMOGLOBIN GLYCOSYLATED A1C: CPT

## 2017-10-24 PROCEDURE — 80061 LIPID PANEL: CPT

## 2017-10-24 PROCEDURE — 85025 COMPLETE CBC W/AUTO DIFF WBC: CPT

## 2017-10-24 RX ORDER — ROSUVASTATIN CALCIUM 10 MG/1
10 TABLET, COATED ORAL
Qty: 90 TAB | Refills: 3 | Status: SHIPPED | OUTPATIENT
Start: 2017-10-24 | End: 2018-05-29

## 2017-10-24 RX ORDER — AMLODIPINE BESYLATE 5 MG/1
TABLET ORAL
Qty: 90 TAB | Refills: 1 | Status: SHIPPED | OUTPATIENT
Start: 2017-10-24 | End: 2018-04-24 | Stop reason: SDUPTHER

## 2017-10-24 RX ORDER — OMEPRAZOLE 20 MG/1
20 CAPSULE, DELAYED RELEASE ORAL 2 TIMES DAILY
Qty: 90 CAP | Refills: 3 | Status: SHIPPED | OUTPATIENT
Start: 2017-10-24 | End: 2017-11-08

## 2017-10-24 RX ORDER — LEVOTHYROXINE SODIUM 88 UG/1
TABLET ORAL
Qty: 90 TAB | Refills: 1 | Status: SHIPPED | OUTPATIENT
Start: 2017-10-24 | End: 2018-04-24 | Stop reason: SDUPTHER

## 2017-10-24 RX ORDER — LOSARTAN POTASSIUM 25 MG/1
25 TABLET ORAL
Qty: 90 TAB | Refills: 3 | Status: SHIPPED | OUTPATIENT
Start: 2017-10-24 | End: 2018-04-24 | Stop reason: SDUPTHER

## 2017-10-24 RX ORDER — METOPROLOL SUCCINATE 50 MG/1
50 TABLET, EXTENDED RELEASE ORAL DAILY
Qty: 90 TAB | Refills: 3 | Status: SHIPPED | OUTPATIENT
Start: 2017-10-24 | End: 2018-04-24 | Stop reason: SDUPTHER

## 2017-10-24 NOTE — PROGRESS NOTES
Chief Complaint   Patient presents with    Follow-up    Arthritis     Pt presents to the office for f/u labs - arthritis, med refill    1. Have you been to the ER, urgent care clinic since your last visit? Hospitalized since your last visit? No    2. Have you seen or consulted any other health care providers outside of the [address] [place] [address] since your last visit? Include any pap smears or colon screening. No      Pain level is 8/10 in low back and legs and knees for months and getting worse    Wants flu shot    Has DDD and two surgeries    Wants a walker to help her walk, cannot take pain rx and does not want to take it    Lab Results   Component Value Date/Time    Microalb/Creat ratio (ug/mg creat.) 30.1 01/12/2017 11:05 AM      Chief Complaint   Patient presents with    Follow-up    Arthritis     she is a 80y.o. year old female who presents for evaluation. See Diabetic Report Card listed above. Patient Active Problem List    Diagnosis    Type 2 diabetes mellitus without complication, without long-term current use of insulin ([facility] [address]    Advance care planning    DDD (degenerative disc disease), lumbar    Gastric ulcer    Coronary artery disease involving native coronary artery of native heart without angina pectoris    GI bleeding     workup in [address] [place] [address] 12/15 was unremarkable (Dr. Chau Del Cid) - says she had EGD, colonoscopy, and small bowel capsule endoscopy      HTN (hypertension)    Hypothyroidism, iatrogenic     radioiodine      Hypercholesteremia       Reviewed PmHx, RxHx, FmHx, SocHx, AllgHx--dated and updated in the chart.     Review of Systems - negative except as listed above in the HPI    Objective:     Vitals:    10/24/17 0956   BP: 178/83   Pulse: 67   Resp: 18   Temp: 98.9 °F (37.2 °C)   TempSrc: Oral   SpO2: 98%   Weight: 140 lb (63.5 kg)   Height: 5' 2\" (1.575 m)     Physical Examination: General appearance - alert, well appearing, and in no distress  Neck - supple, no significant adenopathy  Chest - clear to auscultation, no wheezes, rales or rhonchi, symmetric air entry  Heart - normal rate, regular rhythm, normal S1, S2, no murmurs, rubs, clicks or gallops  Abdomen - soft, nontender, nondistended, no masses or organomegaly  Foot Exam:  Feet were examined, no sensory or circulatory issues, no ulcers noted  Assessment/ Plan:   Diagnoses and all orders for this visit:    1. Type 2 diabetes mellitus without complication, with long-term current use of insulin (HCC)  -     losartan (COZAAR) 25 mg tablet; Take 1 Tab by mouth nightly. -     rosuvastatin (CRESTOR) 10 mg tablet; Take 1 Tab by mouth nightly. -     LIPID PANEL  -     METABOLIC PANEL, COMPREHENSIVE  -     HEMOGLOBIN A1C WITH EAG  -     REFERRAL TO OPHTHALMOLOGY  -at goal    2. Hypercholesteremia  -     losartan (COZAAR) 25 mg tablet; Take 1 Tab by mouth nightly. -     rosuvastatin (CRESTOR) 10 mg tablet; Take 1 Tab by mouth nightly. -     LIPID PANEL  -     METABOLIC PANEL, COMPREHENSIVE  -hold chol rx to see if better    3. Hypothyroidism, iatrogenic  -     TSH 3RD GENERATION    4. Essential hypertension  -     LIPID PANEL  -     METABOLIC PANEL, COMPREHENSIVE  -ok for age and pt    11. Gastrointestinal hemorrhage, unspecified gastrointestinal hemorrhage type  -     CBC WITH AUTOMATED DIFF    6. Encounter for immunization  -     Influenza virus vaccine ([address]) 72 years and older ([address])  -     Administration fee () for Medicare insured patients    Other orders  -     amLODIPine (NORVASC) 5 mg tablet; TAKE 1 TABLET DAILY  -     levothyroxine (SYNTHROID) 88 mcg tablet; TAKE 1 TABLET DAILY BEFORE BREAKFAST  -     metoprolol succinate (TOPROL-XL) 50 mg XL tablet; Take 1 Tab by mouth daily. -     omeprazole (PRILOSEC) 20 mg capsule; Take 1 Cap by mouth two (2) times a day. -     OTHER; Walker with seat  DX: DDD lumbar       Follow-up Disposition:  Return in about 6 months (around 4/24/2018).   Lab Results   Component Value Date/Time    Cholesterol, total 167 04/24/2017 10:30 AM    HDL Cholesterol 71 04/24/2017 10:30 AM    LDL, calculated 76 04/24/2017 10:30 AM    Triglyceride 98 04/24/2017 10:30 AM     Lab Results   Component Value Date/Time    Hemoglobin A1c 6.2 04/24/2017 10:30 AM    Hemoglobin A1c 6.1 01/12/2017 11:05 AM    Hemoglobin A1c 5.7 09/06/2016 03:51 PM    Microalb/Creat ratio (ug/mg creat.) 30.1 01/12/2017 11:05 AM    LDL, calculated 76 04/24/2017 10:30 AM    Creatinine 0.52 04/24/2017 10:30 AM          Discussed with patient goal of Diabetes to include:  HgA1C <7, LDL cholesterol <70, Blood pressure <130/80. Discussed with patient diet and weight management and to get regular exercise. Recommend yearly eye exams and daily foot care. The patient understands and agrees with the plan. I have discussed the diagnosis with the patient and the intended plan as seen in the above orders. The patient has received an after-visit summary and questions were answered concerning future plans. Medication Side Effects and Warnings were discussed with patient  Patient Labs were reviewed and or requested  Patient Past Records were reviewed and or requested    Katey Ramos M.D. [facility]    There are no Patient Instructions on file for this visit.

## 2017-10-24 NOTE — LETTER
10/25/2017 11:25 AM 
 
Ms. Benton 83 [address] [address] Dear Allegra Fairview: Please find your most recent results below. Resulted Orders LIPID PANEL Result Value Ref Range Cholesterol, total 163 100 - 199 mg/dL Triglyceride 105 0 - 149 mg/dL HDL Cholesterol 74 >39 mg/dL VLDL, calculated 21 5 - 40 mg/dL LDL, calculated 68 0 - 99 mg/dL Narrative Performed at:  [address]  [id number] : Lin Mcmahan MD, Phone:  [phone] METABOLIC PANEL, COMPREHENSIVE Result Value Ref Range Glucose 146 (H) 65 - 99 mg/dL BUN 9 8 - 27 mg/dL Creatinine 0.53 (L) 0.57 - 1.00 mg/dL GFR est non-AA 89 >59 mL/min/1.73 GFR est  >59 mL/min/1.73  
 BUN/Creatinine ratio 17 12 - 28 Sodium 140 134 - 144 mmol/L Potassium 3.9 3.5 - 5.2 mmol/L Chloride 101 96 - 106 mmol/L  
 CO2 23 18 - 29 mmol/L Calcium 9.5 8.7 - 10.3 mg/dL Protein, total 6.7 6.0 - 8.5 g/dL Albumin 4.1 3.5 - 4.7 g/dL GLOBULIN, TOTAL 2.6 1.5 - 4.5 g/dL A-G Ratio 1.6 1.2 - 2.2 Bilirubin, total 0.3 0.0 - 1.2 mg/dL Alk. phosphatase 88 39 - 117 IU/L  
 AST (SGOT) 19 0 - 40 IU/L  
 ALT (SGPT) 16 0 - 32 IU/L Narrative Performed at:  [address]  [id number] : Lin Mcmahan MD, Phone:  [phone] HEMOGLOBIN A1C WITH EAG Result Value Ref Range Hemoglobin A1c 5.6 4.8 - 5.6 % Comment:  
            Pre-diabetes: 5.7 - 6.4 Diabetes: >6.4 Glycemic control for adults with diabetes: <7.0 Estimated average glucose 114 mg/dL Narrative Performed at:  [address]  [id number] : Lin Mcmahan MD, Phone:  [phone] TSH 3RD GENERATION Result Value Ref Range TSH 3.530 0.450 - 4.500 uIU/mL Narrative Performed at:  [place] [address] [address]  [id number] : Bladimir Serrato MD, Phone:  [phone] CBC WITH AUTOMATED DIFF Result Value Ref Range WBC 3.3 (L) 3.4 - 10.8 x10E3/uL  
 RBC 3.77 3.77 - 5.28 x10E6/uL HGB 11.6 11.1 - 15.9 g/dL HCT 35.3 34.0 - 46.6 % MCV 94 79 - 97 fL  
 MCH 30.8 26.6 - 33.0 pg  
 MCHC 32.9 31.5 - 35.7 g/dL  
 RDW 13.1 12.3 - 15.4 % PLATELET 946 (H) 576 - 379 x10E3/uL NEUTROPHILS 58 Not Estab. % Lymphocytes 23 Not Estab. % MONOCYTES 13 Not Estab. % EOSINOPHILS 5 Not Estab. % BASOPHILS 1 Not Estab. %  
 ABS. NEUTROPHILS 1.9 1.4 - 7.0 x10E3/uL Abs Lymphocytes 0.7 0.7 - 3.1 x10E3/uL  
 ABS. MONOCYTES 0.4 0.1 - 0.9 x10E3/uL  
 ABS. EOSINOPHILS 0.2 0.0 - 0.4 x10E3/uL  
 ABS. BASOPHILS 0.0 0.0 - 0.2 x10E3/uL IMMATURE GRANULOCYTES 0 Not Estab. %  
 ABS. IMM. GRANS. 0.0 0.0 - 0.1 x10E3/uL Narrative Performed at:  [address]  [id number] : Bladimir Serrato MD, Phone:  [phone] CVD REPORT Result Value Ref Range INTERPRETATION Note Comment:  
   Supplemental report is available. Narrative Performed at:  [facility] [place] [address]  [id number] : Lydia Mckee PhD, Phone:  [phone] DIABETES PATIENT EDUCATION Result Value Ref Range PDF Image Not applicable Narrative Performed at:  [address] [place] [address]  [id number] : Lydia Mckee PhD, Phone:  [phone] RECOMMENDATIONS: 
   
    
  After reviewing your medical history and your lab results, they appear at goal for you!    
 
Let us make 2017 a great year! Dr. Kristin Rodriguez M.D. Good Help to those in Need Please call me if you have any questions: [phone] Sincerely, Maximo Nesbitt MD

## 2017-10-24 NOTE — MR AVS SNAPSHOT
Visit Information Date & Time Provider Department Dept. Phone Encounter #  
 10/24/2017  9:50 AM Asuncion Bush MD [facility] [phone] [id number] Follow-up Instructions Return in about 6 months (around 4/24/2018). Upcoming Health Maintenance Date Due  
 EYE EXAM RETINAL OR DILATED Q1 2/15/1945 DTaP/Tdap/Td series (1 - Tdap) 2/15/1956 ZOSTER VACCINE AGE 60> 12/15/1994 GLAUCOMA SCREENING Q2Y 2/15/2000 Pneumococcal 65+ Low/Medium Risk (1 of 2 - PCV13) 2/15/2000 INFLUENZA AGE 9 TO ADULT 8/1/2017 HEMOGLOBIN A1C Q6M 10/24/2017 MICROALBUMIN Q1 1/12/2018 MEDICARE YEARLY EXAM 1/13/2018 FOOT EXAM Q1 4/24/2018 LIPID PANEL Q1 4/24/2018 Allergies as of 10/24/2017  Review Complete On: 10/24/2017 By: Asuncion Bush MD  
  
 Severity Noted Reaction Type Reactions Celebrex [Celecoxib]  07/13/2015    Hives Current Immunizations  Reviewed on 10/13/2016 Name Date Influenza High Dose Vaccine PF  Incomplete, 10/13/2016 Not reviewed this visit You Were Diagnosed With   
  
 Codes Comments Type 2 diabetes mellitus without complication, with long-term current use of insulin (HCC)    -  Primary ICD-10-CM: E11.9, Z79.4 ICD-9-CM: 250.00, V58.67 Hypercholesteremia     ICD-10-CM: E78.00 ICD-9-CM: 272.0 Hypothyroidism, iatrogenic     ICD-10-CM: E03.2 ICD-9-CM: 244.3 Essential hypertension     ICD-10-CM: I10 
ICD-9-CM: 401.9 Gastrointestinal hemorrhage, unspecified gastrointestinal hemorrhage type     ICD-10-CM: K92.2 ICD-9-CM: 578.9 Encounter for immunization     ICD-10-CM: E32 ICD-9-CM: V03.89 Vitals BP Pulse Temp Resp Height(growth percentile) Weight(growth percentile) 178/83 67 98.9 °F (37.2 °C) (Oral) 18 5' 2\" (1.575 m) 140 lb (63.5 kg) SpO2 BMI OB Status Smoking Status 98% 25.61 kg/m2 Menopause Never Smoker BMI and BSA Data Body Mass Index Body Surface Area 25.61 kg/m 2 1.67 m 2 Preferred Pharmacy Pharmacy Name Phone [address] [facility] [phone] Your Updated Medication List  
  
   
This list is accurate as of: 10/24/17 10:17 AM.  Always use your most recent med list. amLODIPine 5 mg tablet Commonly known as:  Juan Pablo Mcclain TAKE 1 TABLET DAILY  
  
 aspirin 81 mg chewable tablet Take 1 Tab by mouth daily. iron polysaccharides 150 mg iron capsule Commonly known as:  Sherlalito Bard Take 1 Cap by mouth two (2) times a day. KRILL OIL (OMEGA 3 AND 6) 1,500-165-67.5 mg Cap Generic drug:  krill-om3-dha-epa-om6-lip-astx Take  by mouth.  
  
 latanoprost 0.005 % ophthalmic solution Commonly known as:  XALATAN  
  
 levothyroxine 88 mcg tablet Commonly known as:  SYNTHROID  
TAKE 1 TABLET DAILY BEFORE BREAKFAST  
  
 losartan 25 mg tablet Commonly known as:  COZAAR Take 1 Tab by mouth nightly. MULTIPLE VITAMIN PO Take  by mouth. naproxen sodium 220 mg Cap Take  by mouth every other day. omeprazole 20 mg capsule Commonly known as:  PRILOSEC Take 1 Cap by mouth two (2) times a day. OTHER Chuy Euceda with seat DX: DDD lumbar  
  
 rosuvastatin 10 mg tablet Commonly known as:  CRESTOR Take 1 Tab by mouth nightly. * TOPROL XL 25 mg XL tablet Generic drug:  metoprolol succinate * metoprolol succinate 50 mg XL tablet Commonly known as:  TOPROL-XL Take 1 Tab by mouth daily. valACYclovir 1 gram tablet Commonly known as:  VALTREX  
  
 VITAMIN D3 2,000 unit Tab Generic drug:  cholecalciferol (vitamin D3) Take  by mouth. * Notice: This list has 2 medication(s) that are the same as other medications prescribed for you. Read the directions carefully, and ask your doctor or other care provider to review them with you. Prescriptions Printed Refills OTHER 0 Sig: Chuy Euceda with seat DX: DDD lumbar Class: Print Prescriptions Sent to Pharmacy Refills  
 amLODIPine (NORVASC) 5 mg tablet 1 Sig: TAKE 1 TABLET DAILY Class: Normal  
 Pharmacy: [address], [address] Ph #: [phone]  
 levothyroxine (SYNTHROID) 88 mcg tablet 1 Sig: TAKE 1 TABLET DAILY BEFORE BREAKFAST Class: Normal  
 Pharmacy: [address], [address] Ph #: [phone]  
 losartan (COZAAR) 25 mg tablet 3 Sig: Take 1 Tab by mouth nightly. Class: Normal  
 Pharmacy: [address], [address] Ph #: [phone] Route: Oral  
 metoprolol succinate (TOPROL-XL) 50 mg XL tablet 3 Sig: Take 1 Tab by mouth daily. Class: Normal  
 Pharmacy: [address], [address] Ph #: [phone] Route: Oral  
 omeprazole (PRILOSEC) 20 mg capsule 3 Sig: Take 1 Cap by mouth two (2) times a day. Class: Normal  
 Pharmacy: [address], [address] Ph #: [phone] Route: Oral  
 rosuvastatin (CRESTOR) 10 mg tablet 3 Sig: Take 1 Tab by mouth nightly. Class: Normal  
 Pharmacy: [address], [address] Ph #: [phone] Route: Oral  
  
We Performed the Following ADMIN INFLUENZA VIRUS VAC [ HCP] CBC WITH AUTOMATED DIFF [26863 CPT(R)] HEMOGLOBIN A1C WITH EAG [83403 CPT(R)] INFLUENZA VIRUS VACCINE, HIGH DOSE SEASONAL, PRESERVATIVE FREE [57011 CPT(R)] LIPID PANEL [67974 CPT(R)] METABOLIC PANEL, COMPREHENSIVE [13408 CPT(R)] REFERRAL TO OPHTHALMOLOGY [REF57 Custom] TSH 3RD GENERATION [19458 CPT(R)] Follow-up Instructions Return in about 6 months (around 4/24/2018). Referral Information Referral ID Referred By Referred To  
  
 [id number] EUSEBIO FERRERA Not Available Visits Status Start Date End Date 1 New Request 10/24/17 10/24/18 If your referral has a status of pending review or denied, additional information will be sent to support the outcome of this decision. Introducing Raphael Monae! Srinivasan Flores introduces RunAlong patient portal. Now you can access parts of your medical record, email your doctor's office, and request medication refills online. 1. In your internet browser, go to [email or web address] 2. Click on the First Time User? Click Here link in the Sign In box. You will see the New Member Sign Up page. 3. Enter your RunAlong Access Code exactly as it appears below. You will not need to use this code after youve completed the sign-up process. If you do not sign up before the expiration date, you must request a new code. · RunAlong Access Code: [email or web address] Expires: 1/22/2018 10:17 AM 
 
4. Enter the last four digits of your Social Security Number (xxxx) and Date of Birth (mm/dd/yyyy) as indicated and click Submit. You will be taken to the next sign-up page. 5. Create a RunAlong ID. This will be your RunAlong login ID and cannot be changed, so think of one that is secure and easy to remember. 6. Create a RunAlong password. You can change your password at any time. 7. Enter your Password Reset Question and Answer. This can be used at a later time if you forget your password. 8. Enter your e-mail address. You will receive e-mail notification when new information is available in [address] 9. Click Sign Up. You can now view and download portions of your medical record. 10. Click the Download Summary menu link to download a portable copy of your medical information. If you have questions, please visit the Frequently Asked Questions section of the RunAlong website. Remember, RunAlong is NOT to be used for urgent needs. For medical emergencies, dial 911. Now available from your iPhone and Android! Please provide this summary of care documentation to your next provider. Your primary care clinician is listed as EUSEBIO FERRERA. If you have any questions after today's visit, please call [phone].

## 2017-10-25 LAB
ALBUMIN SERPL-MCNC: 4.1 G/DL (ref 3.5–4.7)
ALBUMIN/GLOB SERPL: 1.6 {RATIO} (ref 1.2–2.2)
ALP SERPL-CCNC: 88 IU/L (ref 39–117)
ALT SERPL-CCNC: 16 IU/L (ref 0–32)
AST SERPL-CCNC: 19 IU/L (ref 0–40)
BASOPHILS # BLD AUTO: 0 X10E3/UL (ref 0–0.2)
BASOPHILS NFR BLD AUTO: 1 %
BILIRUB SERPL-MCNC: 0.3 MG/DL (ref 0–1.2)
BUN SERPL-MCNC: 9 MG/DL (ref 8–27)
BUN/CREAT SERPL: 17 (ref 12–28)
CALCIUM SERPL-MCNC: 9.5 MG/DL (ref 8.7–10.3)
CHLORIDE SERPL-SCNC: 101 MMOL/L (ref 96–106)
CHOLEST SERPL-MCNC: 163 MG/DL (ref 100–199)
CO2 SERPL-SCNC: 23 MMOL/L (ref 18–29)
CREAT SERPL-MCNC: 0.53 MG/DL (ref 0.57–1)
EOSINOPHIL # BLD AUTO: 0.2 X10E3/UL (ref 0–0.4)
EOSINOPHIL NFR BLD AUTO: 5 %
ERYTHROCYTE [DISTWIDTH] IN BLOOD BY AUTOMATED COUNT: 13.1 % (ref 12.3–15.4)
EST. AVERAGE GLUCOSE BLD GHB EST-MCNC: 114 MG/DL
GFR SERPLBLD CREATININE-BSD FMLA CKD-EPI: 102 ML/MIN/1.73
GFR SERPLBLD CREATININE-BSD FMLA CKD-EPI: 89 ML/MIN/1.73
GLOBULIN SER CALC-MCNC: 2.6 G/DL (ref 1.5–4.5)
GLUCOSE SERPL-MCNC: 146 MG/DL (ref 65–99)
HBA1C MFR BLD: 5.6 % (ref 4.8–5.6)
HCT VFR BLD AUTO: 35.3 % (ref 34–46.6)
HDLC SERPL-MCNC: 74 MG/DL
HGB BLD-MCNC: 11.6 G/DL (ref 11.1–15.9)
IMM GRANULOCYTES # BLD: 0 X10E3/UL (ref 0–0.1)
IMM GRANULOCYTES NFR BLD: 0 %
INTERPRETATION, 910389: NORMAL
LDLC SERPL CALC-MCNC: 68 MG/DL (ref 0–99)
LYMPHOCYTES # BLD AUTO: 0.7 X10E3/UL (ref 0.7–3.1)
LYMPHOCYTES NFR BLD AUTO: 23 %
Lab: NORMAL
MCH RBC QN AUTO: 30.8 PG (ref 26.6–33)
MCHC RBC AUTO-ENTMCNC: 32.9 G/DL (ref 31.5–35.7)
MCV RBC AUTO: 94 FL (ref 79–97)
MONOCYTES # BLD AUTO: 0.4 X10E3/UL (ref 0.1–0.9)
MONOCYTES NFR BLD AUTO: 13 %
NEUTROPHILS # BLD AUTO: 1.9 X10E3/UL (ref 1.4–7)
NEUTROPHILS NFR BLD AUTO: 58 %
PLATELET # BLD AUTO: 397 X10E3/UL (ref 150–379)
POTASSIUM SERPL-SCNC: 3.9 MMOL/L (ref 3.5–5.2)
PROT SERPL-MCNC: 6.7 G/DL (ref 6–8.5)
RBC # BLD AUTO: 3.77 X10E6/UL (ref 3.77–5.28)
SODIUM SERPL-SCNC: 140 MMOL/L (ref 134–144)
TRIGL SERPL-MCNC: 105 MG/DL (ref 0–149)
TSH SERPL DL<=0.005 MIU/L-ACNC: 3.53 UIU/ML (ref 0.45–4.5)
VLDLC SERPL CALC-MCNC: 21 MG/DL (ref 5–40)
WBC # BLD AUTO: 3.3 X10E3/UL (ref 3.4–10.8)

## 2017-10-25 NOTE — PROGRESS NOTES
A letter was sent to the patient at the address on file, with lab results and Dr. Alexey Oviedo recommendations for the patient.

## 2017-10-25 NOTE — PROGRESS NOTES
After reviewing your medical history and your lab results, they appear at goal for you! Let us make 2017 a great year! Dr. Christina Narayanan M.D.       Good Help to those in Need!!!

## 2017-10-30 ENCOUNTER — DOCUMENTATION ONLY (OUTPATIENT)
Dept: FAMILY MEDICINE CLINIC | Age: 82
End: 2017-10-30

## 2017-11-08 ENCOUNTER — OFFICE VISIT (OUTPATIENT)
Dept: FAMILY MEDICINE CLINIC | Age: 82
End: 2017-11-08

## 2017-11-08 DIAGNOSIS — I10 ESSENTIAL HYPERTENSION: ICD-10-CM

## 2017-11-08 DIAGNOSIS — R29.898 WEAKNESS OF BOTH LEGS: Primary | ICD-10-CM

## 2017-11-08 RX ORDER — PANTOPRAZOLE SODIUM 40 MG/1
40 TABLET, DELAYED RELEASE ORAL DAILY
Qty: 90 TAB | Refills: 3 | Status: SHIPPED | OUTPATIENT
Start: 2017-11-08 | End: 2018-04-24 | Stop reason: SDUPTHER

## 2017-11-08 NOTE — MR AVS SNAPSHOT
Visit Information Date & Time Provider Department Dept. Phone Encounter #  
 11/8/2017  1:45 PM Boston Sauceda MD [address] [facility] [phone] [id number] Follow-up Instructions Return if symptoms worsen or fail to improve. Your Appointments 4/24/2018 10:20 AM  
ESTABLISHED PATIENT with Boston Sauceda MD  
[address] [facility] Amanda Grajeda) Appt Note: [address] [address]  
[phone]  
  
   
 [address] [address] Upcoming Health Maintenance Date Due  
 EYE EXAM RETINAL OR DILATED Q1 2/15/1945 DTaP/Tdap/Td series (1 - Tdap) 2/15/1956 ZOSTER VACCINE AGE 60> 12/15/1994 GLAUCOMA SCREENING Q2Y 2/15/2000 Pneumococcal 65+ Low/Medium Risk (1 of 2 - PCV13) 2/15/2000 MICROALBUMIN Q1 1/12/2018 MEDICARE YEARLY EXAM 1/13/2018 FOOT EXAM Q1 4/24/2018 HEMOGLOBIN A1C Q6M 4/24/2018 LIPID PANEL Q1 10/24/2018 Allergies as of 11/8/2017  Review Complete On: 11/8/2017 By: Boston Sauceda MD  
  
 Severity Noted Reaction Type Reactions Celebrex [Celecoxib]  07/13/2015    Hives Current Immunizations  Reviewed on 10/13/2016 Name Date Influenza High Dose Vaccine PF 10/24/2017, 10/13/2016 Not reviewed this visit You Were Diagnosed With   
  
 Codes Comments Weakness of both legs    -  Primary ICD-10-CM: R29.898 ICD-9-CM: 729.89 Essential hypertension     ICD-10-CM: I10 
ICD-9-CM: 401.9 Vitals OB Status Smoking Status Menopause Never Smoker Preferred Pharmacy Pharmacy Name Phone [address] [facility] [phone] Your Updated Medication List  
  
   
This list is accurate as of: 11/8/17  2:22 PM.  Always use your most recent med list. amLODIPine 5 mg tablet Commonly known as:  Nuria Citron TAKE 1 TABLET DAILY  
  
 aspirin 81 mg chewable tablet Take 1 Tab by mouth daily. iron polysaccharides 150 mg iron capsule Commonly known as:  Mahad Moors Take 1 Cap by mouth two (2) times a day. KRILL OIL (OMEGA 3 AND 6) 1,500-165-67.5 mg Cap Generic drug:  krill-om3-dha-epa-om6-lip-astx Take  by mouth.  
  
 latanoprost 0.005 % ophthalmic solution Commonly known as:  XALATAN  
  
 levothyroxine 88 mcg tablet Commonly known as:  SYNTHROID  
TAKE 1 TABLET DAILY BEFORE BREAKFAST  
  
 losartan 25 mg tablet Commonly known as:  COZAAR Take 1 Tab by mouth nightly. MULTIPLE VITAMIN PO Take  by mouth. naproxen sodium 220 mg Cap Take  by mouth every other day. OTHER Walker with seat DX: DDD lumbar  
  
 pantoprazole 40 mg tablet Commonly known as:  PROTONIX Take 1 Tab by mouth daily. rosuvastatin 10 mg tablet Commonly known as:  CRESTOR Take 1 Tab by mouth nightly. * TOPROL XL 25 mg XL tablet Generic drug:  metoprolol succinate * metoprolol succinate 50 mg XL tablet Commonly known as:  TOPROL-XL Take 1 Tab by mouth daily. valACYclovir 1 gram tablet Commonly known as:  VALTREX  
  
 VITAMIN D3 2,000 unit Tab Generic drug:  cholecalciferol (vitamin D3) Take  by mouth. * Notice: This list has 2 medication(s) that are the same as other medications prescribed for you. Read the directions carefully, and ask your doctor or other care provider to review them with you. Prescriptions Sent to Pharmacy Refills  
 pantoprazole (PROTONIX) 40 mg tablet 3 Sig: Take 1 Tab by mouth daily. Class: Normal  
 Pharmacy: [address], [address] [place] [address] #: [phone] Route: Oral  
  
Follow-up Instructions Return if symptoms worsen or fail to improve. Introducing [facility]!    
 Fernanda Nesbitt introduces [employer] patient portal. Now you can access parts of your medical record, email your doctor's office, and request medication refills online. 1. In your internet browser, go to [email or web address] 2. Click on the First Time User? Click Here link in the Sign In box. You will see the New Member Sign Up page. 3. Enter your [employer] Access Code exactly as it appears below. You will not need to use this code after youve completed the sign-up process. If you do not sign up before the expiration date, you must request a new code. · [employer] Access Code: [email or web address] Expires: 1/22/2018  9:17 AM 
 
4. Enter the last four digits of your Social Security Number (xxxx) and Date of Birth (mm/dd/yyyy) as indicated and click Submit. You will be taken to the next sign-up page. 5. Create a [employer] ID. This will be your [employer] login ID and cannot be changed, so think of one that is secure and easy to remember. 6. Create a [employer] password. You can change your password at any time. 7. Enter your Password Reset Question and Answer. This can be used at a later time if you forget your password. 8. Enter your e-mail address. You will receive e-mail notification when new information is available in [address] 9. Click Sign Up. You can now view and download portions of your medical record. 10. Click the Download Summary menu link to download a portable copy of your medical information. If you have questions, please visit the Frequently Asked Questions section of the [employer] website. Remember, [employer] is NOT to be used for urgent needs. For medical emergencies, dial 911. Now available from your iPhone and Android! Please provide this summary of care documentation to your next provider. Your primary care clinician is listed as EUSEBIO FERRERA. If you have any questions after today's visit, please call [phone].

## 2017-11-08 NOTE — PROGRESS NOTES
Chief Complaint   Patient presents with    Other     face to face for walker     Pt presents to the office for Face to face for a walker. Pt has weak lower extremities and has fallen a few times with injury. 1. Have you been to the ER, urgent care clinic since your last visit? Hospitalized since your last visit? No    2. Have you seen or consulted any other health care providers outside of the [address] [place] [address] since your last visit? Include any pap smears or colon screening. No      Falls and feels better with walker      Chief Complaint   Patient presents with    Other     face to face for walker     She is a 80 y.o. female who presents for evalution. Reviewed PmHx, RxHx, FmHx, SocHx, AllgHx and updated and dated in the chart. Patient Active Problem List    Diagnosis    Type 2 diabetes mellitus without complication, without long-term current use of insulin ([facility] [address]    Advance care planning    DDD (degenerative disc disease), lumbar    Gastric ulcer    Coronary artery disease involving native coronary artery of native heart without angina pectoris    GI bleeding     workup in [address] [place] [address] 12/15 was unremarkable (Dr. Juanita Alanis) - says she had EGD, colonoscopy, and small bowel capsule endoscopy      HTN (hypertension)    Hypothyroidism, iatrogenic     radioiodine      Hypercholesteremia       Review of Systems - negative except as listed above in the HPI    Objective: There were no vitals filed for this visit. Physical Examination: General appearance - alert, well appearing, and in no distress    Assessment/ Plan:   Diagnoses and all orders for this visit:    1. Weakness of both legs  -walker working    2. Essential hypertension  -at goal at home      Other orders  -     pantoprazole (PROTONIX) 40 mg tablet; Take 1 Tab by mouth daily. Follow-up Disposition:  Return if symptoms worsen or fail to improve.     I have discussed the diagnosis with the patient and the intended plan as seen in the above orders. The patient understands and agrees with the plan. The patient has received an after-visit summary and questions were answered concerning future plans. Medication Side Effects and Warnings were discussed with patient  Patient Labs were reviewed and or requested:  Patient Past Records were reviewed and or requested    Donald Marquez M.D. There are no Patient Instructions on file for this visit.

## 2018-04-24 ENCOUNTER — OFFICE VISIT (OUTPATIENT)
Dept: FAMILY MEDICINE CLINIC | Age: 83
End: 2018-04-24

## 2018-04-24 ENCOUNTER — HOSPITAL ENCOUNTER (OUTPATIENT)
Dept: LAB | Age: 83
Discharge: HOME | End: 2018-04-24
Payer: MEDICARE

## 2018-04-24 VITALS
HEART RATE: 70 BPM | HEIGHT: 62 IN | BODY MASS INDEX: 24.48 KG/M2 | RESPIRATION RATE: 18 BRPM | OXYGEN SATURATION: 98 % | TEMPERATURE: 98.2 F | WEIGHT: 133 LBS | SYSTOLIC BLOOD PRESSURE: 127 MMHG | DIASTOLIC BLOOD PRESSURE: 77 MMHG

## 2018-04-24 DIAGNOSIS — Z00.00 ROUTINE GENERAL MEDICAL EXAMINATION AT A HEALTH CARE FACILITY: Primary | ICD-10-CM

## 2018-04-24 DIAGNOSIS — Z71.89 ADVANCE CARE PLANNING: ICD-10-CM

## 2018-04-24 DIAGNOSIS — E11.9 TYPE 2 DIABETES MELLITUS WITHOUT COMPLICATION, WITH LONG-TERM CURRENT USE OF INSULIN (HCC): ICD-10-CM

## 2018-04-24 DIAGNOSIS — Z79.4 TYPE 2 DIABETES MELLITUS WITHOUT COMPLICATION, WITH LONG-TERM CURRENT USE OF INSULIN (HCC): ICD-10-CM

## 2018-04-24 DIAGNOSIS — K92.2 GASTROINTESTINAL HEMORRHAGE, UNSPECIFIED GASTROINTESTINAL HEMORRHAGE TYPE: ICD-10-CM

## 2018-04-24 DIAGNOSIS — E03.2 HYPOTHYROIDISM, IATROGENIC: ICD-10-CM

## 2018-04-24 DIAGNOSIS — E78.00 HYPERCHOLESTEREMIA: ICD-10-CM

## 2018-04-24 DIAGNOSIS — I10 ESSENTIAL HYPERTENSION: ICD-10-CM

## 2018-04-24 PROCEDURE — 85025 COMPLETE CBC W/AUTO DIFF WBC: CPT

## 2018-04-24 PROCEDURE — 83550 IRON BINDING TEST: CPT

## 2018-04-24 PROCEDURE — 84443 ASSAY THYROID STIM HORMONE: CPT

## 2018-04-24 PROCEDURE — 80061 LIPID PANEL: CPT

## 2018-04-24 PROCEDURE — 82043 UR ALBUMIN QUANTITATIVE: CPT

## 2018-04-24 PROCEDURE — 80053 COMPREHEN METABOLIC PANEL: CPT

## 2018-04-24 PROCEDURE — 83036 HEMOGLOBIN GLYCOSYLATED A1C: CPT

## 2018-04-24 RX ORDER — PANTOPRAZOLE SODIUM 40 MG/1
40 TABLET, DELAYED RELEASE ORAL DAILY
Qty: 90 TAB | Refills: 3 | Status: SHIPPED | OUTPATIENT
Start: 2018-04-24 | End: 2022-01-01

## 2018-04-24 RX ORDER — LOSARTAN POTASSIUM 25 MG/1
25 TABLET ORAL
Qty: 90 TAB | Refills: 3 | Status: SHIPPED | OUTPATIENT
Start: 2018-04-24 | End: 2019-05-27 | Stop reason: SDUPTHER

## 2018-04-24 RX ORDER — METOPROLOL SUCCINATE 50 MG/1
50 TABLET, EXTENDED RELEASE ORAL DAILY
Qty: 90 TAB | Refills: 3 | Status: SHIPPED | OUTPATIENT
Start: 2018-04-24 | End: 2019-05-12 | Stop reason: SDUPTHER

## 2018-04-24 RX ORDER — AMLODIPINE BESYLATE 5 MG/1
TABLET ORAL
Qty: 90 TAB | Refills: 3 | Status: SHIPPED | OUTPATIENT
Start: 2018-04-24 | End: 2019-05-12 | Stop reason: SDUPTHER

## 2018-04-24 RX ORDER — LEVOTHYROXINE SODIUM 88 UG/1
TABLET ORAL
Qty: 90 TAB | Refills: 3 | Status: SHIPPED | OUTPATIENT
Start: 2018-04-24 | End: 2018-09-06 | Stop reason: SDUPTHER

## 2018-04-24 NOTE — MR AVS SNAPSHOT
[address] [address] 
[phone] Patient: Ericka Flores MRN: [id number] Visit Information Date & Time Provider Department Dept. Phone Encounter #  
 4/24/2018 10:20 AM Chantale Etienne MD [facility] [phone] [id number] Follow-up Instructions Return in about 6 months (around 10/24/2018) for dm. Upcoming Health Maintenance Date Due  
 EYE EXAM RETINAL OR DILATED Q1 2/15/1945 DTaP/Tdap/Td series (1 - Tdap) 2/15/1956 ZOSTER VACCINE AGE 60> 12/15/1994 GLAUCOMA SCREENING Q2Y 2/15/2000 Pneumococcal 65+ Low/Medium Risk (1 of 2 - PCV13) 2/15/2000 MICROALBUMIN Q1 1/12/2018 MEDICARE YEARLY EXAM 3/14/2018 FOOT EXAM Q1 4/24/2018 HEMOGLOBIN A1C Q6M 4/24/2018 LIPID PANEL Q1 10/24/2018 Allergies as of 4/24/2018  Review Complete On: 4/24/2018 By: Chantale Etienne MD  
  
 Severity Noted Reaction Type Reactions Celebrex [Celecoxib]  07/13/2015    Hives Current Immunizations  Reviewed on 10/13/2016 Name Date Influenza High Dose Vaccine PF 10/24/2017, 10/13/2016 Not reviewed this visit You Were Diagnosed With   
  
 Codes Comments Routine general medical examination at a health care facility    -  Primary ICD-10-CM: Z00.00 ICD-9-CM: V70.0 Type 2 diabetes mellitus without complication, with long-term current use of insulin (HCC)     ICD-10-CM: E11.9, Z79.4 ICD-9-CM: 250.00, V58.67 Hypercholesteremia     ICD-10-CM: E78.00 ICD-9-CM: 272.0 Essential hypertension     ICD-10-CM: I10 
ICD-9-CM: 401.9 Gastrointestinal hemorrhage, unspecified gastrointestinal hemorrhage type     ICD-10-CM: K92.2 ICD-9-CM: 578.9 Hypothyroidism, iatrogenic     ICD-10-CM: E03.2 ICD-9-CM: 249. 3 Advance care planning     ICD-10-CM: Z71.89 ICD-9-CM: V65.49 Vitals BP Pulse Temp Resp Height(growth percentile) Weight(growth percentile) 127/77 70 98.2 °F (36.8 °C) 18 5' 2\" (1.575 m) 133 lb (60.3 kg) SpO2 BMI OB Status Smoking Status 98% 24.33 kg/m2 Menopause Never Smoker Vitals History BMI and BSA Data Body Mass Index Body Surface Area  
 24.33 kg/m 2 1.62 m 2 Preferred Pharmacy Pharmacy Name Phone Rashad Walh, [facility] [phone] Your Updated Medication List  
  
   
This list is accurate as of 4/24/18 10:54 AM.  Always use your most recent med list. amLODIPine 5 mg tablet Commonly known as:  Matthieu Inks TAKE 1 TABLET DAILY  
  
 aspirin 81 mg chewable tablet Take 1 Tab by mouth daily. iron polysaccharides 150 mg iron capsule Commonly known as:  Pawel Diane Take 1 Cap by mouth two (2) times a day. KRILL OIL (OMEGA 3 AND 6) 1,500-165-67.5 mg Cap Generic drug:  krill-om3-dha-epa-om6-lip-astx Take  by mouth.  
  
 latanoprost 0.005 % ophthalmic solution Commonly known as:  XALATAN  
  
 levothyroxine 88 mcg tablet Commonly known as:  SYNTHROID  
TAKE 1 TABLET DAILY BEFORE BREAKFAST  
  
 losartan 25 mg tablet Commonly known as:  COZAAR Take 1 Tab by mouth nightly. metoprolol succinate 50 mg XL tablet Commonly known as:  TOPROL-XL Take 1 Tab by mouth daily. MULTIPLE VITAMIN PO Take  by mouth. naproxen sodium 220 mg Cap Take  by mouth every other day. OTHER Walker with seat DX: DDD lumbar  
  
 pantoprazole 40 mg tablet Commonly known as:  PROTONIX Take 1 Tab by mouth daily. rosuvastatin 10 mg tablet Commonly known as:  CRESTOR Take 1 Tab by mouth nightly. VITAMIN D3 2,000 unit Tab Generic drug:  cholecalciferol (vitamin D3) Take  by mouth. Prescriptions Sent to Pharmacy Refills  
 pantoprazole (PROTONIX) 40 mg tablet 3 Sig: Take 1 Tab by mouth daily.   
 Class: Normal  
 Pharmacy: [address], [address] Ph #: [phone] Route: Oral  
 amLODIPine (NORVASC) 5 mg tablet 3 Sig: TAKE 1 TABLET DAILY Class: Normal  
 Pharmacy: [address] Ph #: [phone]  
 levothyroxine (SYNTHROID) 88 mcg tablet 3 Sig: TAKE 1 TABLET DAILY BEFORE BREAKFAST Class: Normal  
 Pharmacy: [address] Ph #: [phone]  
 losartan (COZAAR) 25 mg tablet 3 Sig: Take 1 Tab by mouth nightly. Class: Normal  
 Pharmacy: [address], [address] Ph #: [phone] Route: Oral  
 metoprolol succinate (TOPROL-XL) 50 mg XL tablet 3 Sig: Take 1 Tab by mouth daily. Class: Normal  
 Pharmacy: [address], [address] Ph #: [phone] Route: Oral  
  
We Performed the Following CBC WITH AUTOMATED DIFF [14879 CPT(R)] HEMOGLOBIN A1C WITH EAG [15011 CPT(R)] LIPID PANEL [96389 CPT(R)] METABOLIC PANEL, COMPREHENSIVE [96334 CPT(R)] MICROALBUMIN, UR, RAND W/ MICROALB/CREAT RATIO [id number] CPT(R)] TSH 3RD GENERATION [19753 CPT(R)] Follow-up Instructions Return in about 6 months (around 10/24/2018) for dm. Introducing [facility]! [employer] introduces [employer] patient portal. Now you can access parts of your medical record, email your doctor's office, and request medication refills online. 1. In your internet browser, go to [email or web address] 2. Click on the First Time User? Click Here link in the Sign In box. You will see the New Member Sign Up page. 3. Enter your [employer] Access Code exactly as it appears below. You will not need to use this code after youve completed the sign-up process. If you do not sign up before the expiration date, you must request a new code. · [employer] Access Code: [email or web address] Expires: 7/23/2018 10:54 AM 
 
4. Enter the last four digits of your Social Security Number (xxxx) and Date of Birth (mm/dd/yyyy) as indicated and click Submit. You will be taken to the next sign-up page. 5. Create a [employer] ID. This will be your [employer] login ID and cannot be changed, so think of one that is secure and easy to remember. 6. Create a [employer] password. You can change your password at any time. 7. Enter your Password Reset Question and Answer. This can be used at a later time if you forget your password. 8. Enter your e-mail address. You will receive e-mail notification when new information is available in [address] 9. Click Sign Up. You can now view and download portions of your medical record. 10. Click the Download Summary menu link to download a portable copy of your medical information. If you have questions, please visit the Frequently Asked Questions section of the [employer] website. Remember, [employer] is NOT to be used for urgent needs. For medical emergencies, dial 911. Now available from your iPhone and Android! Please provide this summary of care documentation to your next provider. Your primary care clinician is listed as EUSEBIO FERRERA. If you have any questions after today's visit, please call [phone].

## 2018-04-24 NOTE — PROGRESS NOTES
Patient here for 6 month f/u, fasting labs. She states Dr. Felicita Bailey took her off crestor. She is fasting for labs. She is fatigue all the time, and used to take iron pills. She stopped iron due to constipation. 1. Have you been to the ER, urgent care clinic since your last visit? Hospitalized since your last visit? No    2. Have you seen or consulted any other health care providers outside of the [address] [place] [address] since your last visit? Include any pap smears or colon screening. No       Medicare Wellness Exam:    Chief Complaint   Patient presents with    Annual Wellness Visit    Labs     6 month labns. off cholesterol meds.  Fatigue     stopped takimng iron a while ago due to constipation. she is a 80y.o. year old female who presents for evaluation for their Medicare Wellness Visit. [place] is completed and assessed=yes  Depression Screen is completed and assessed=yes  Medication list reviewed and adjusted for accuracy=yes  Immunizations reviewed and updated=yes  Health/Preventative Screenings reviewed and updated=yes  ADL Functions reviewed=yes    Patient Active Problem List    Diagnosis    Type 2 diabetes mellitus without complication, without long-term current use of insulin ([facility] [address]    Advance care planning    DDD (degenerative disc disease), lumbar    Gastric ulcer    Coronary artery disease involving native coronary artery of native heart without angina pectoris    GI bleeding     workup in [address] [place] [address] 12/15 was unremarkable (Dr. Marbella García) - says she had EGD, colonoscopy, and small bowel capsule endoscopy      HTN (hypertension)    Hypothyroidism, iatrogenic     radioiodine      Hypercholesteremia       Reviewed PmHx, RxHx, FmHx, SocHx, AllgHx and updated and dated in the chart.     Review of Systems - negative except as listed above in the HPI    Objective:     Vitals:    04/24/18 1039   BP: 127/77   Pulse: 70   Resp: 18   Temp: 98.2 °F (36.8 °C)   SpO2: 98%   Weight: 133 lb (60.3 kg) Height: 5' 2\" (1.575 m)     Physical Examination: General appearance - alert, well appearing, and in no distress  Neck - supple, no significant adenopathy  Chest - clear to auscultation, no wheezes, rales or rhonchi, symmetric air entry  Heart - normal rate, regular rhythm, normal S1, S2, no murmurs, rubs, clicks or gallops  Abdomen - soft, nontender, nondistended, no masses or organomegaly    Assessment/ Plan:   Diagnoses and all orders for this visit:    1. Routine general medical examination at a health care facility  -see below    2. Type 2 diabetes mellitus without complication, with long-term current use of insulin (HCC)  -     losartan (COZAAR) 25 mg tablet; Take 1 Tab by mouth nightly. -     LIPID PANEL  -     METABOLIC PANEL, COMPREHENSIVE  -     HEMOGLOBIN A1C WITH EAG  -     CBC WITH AUTOMATED DIFF  -     MICROALBUMIN, UR, RAND W/ MICROALB/CREAT RATIO  Lab Results   Component Value Date/Time    Hemoglobin A1c 5.6 10/24/2017 10:25 AM     -at goal    3. Hypercholesteremia  -     LIPID PANEL  -     METABOLIC PANEL, COMPREHENSIVE    4. Essential hypertension  -     amLODIPine (NORVASC) 5 mg tablet; TAKE 1 TABLET DAILY  -     losartan (COZAAR) 25 mg tablet; Take 1 Tab by mouth nightly. -     metoprolol succinate (TOPROL-XL) 50 mg XL tablet; Take 1 Tab by mouth daily.  -     LIPID PANEL  -     METABOLIC PANEL, COMPREHENSIVE  -at goal    5. Gastrointestinal hemorrhage, unspecified gastrointestinal hemorrhage type  -check CBC  -off iron tabs  -no sxs    6. Hypothyroidism, iatrogenic  -     TSH 3RD GENERATION    7. Advance care planning  I discussed with patient living will and gave a legal form for patient to fill out and bring back to the office. Other orders  -     pantoprazole (PROTONIX) 40 mg tablet; Take 1 Tab by mouth daily.   -     levothyroxine (SYNTHROID) 88 mcg tablet; TAKE 1 TABLET DAILY BEFORE BREAKFAST         -Pain evaluation performed in office  -Cognitive Screen performed in office  -Depression Screen, Fall risks (by up and go test)  and ADL functionality were addressed  -Medication list updated and reviewed for any changes   -A comprehensive review of medical issues and a plan was formulated  -End of life planning was addressed with pt   -Health Screenings for preventions were addressed and a plan was formulated  -Shingles Vaccine was recommended  -Discussed with patient cancer risk factors and appropriate screenings for age  -Patient evaluated for colonoscopy and referred if needed per screeing criteria  -Labs from previous visits were discussed with patient   -Discussed with patient diet and exercise and formulated a plan as needed  -An Advanced care plan was developed with the patient.  -Alcohol screening performed and was negative    -Follow-up Disposition:  Return in about 6 months (around 10/24/2018) for dm. I have discussed the diagnosis with the patient and the intended plan as seen in the above orders. The patient understands and agrees with the plan. The patient has received an after-visit summary and questions were answered concerning future plans. Medication Side Effects and Warnings were discussed with patien  Patient Labs were reviewed and or requested  Patient Past Records were reviewed and or requested    There are no Patient Instructions on file for this visit.       Dalila Kelly M.D.

## 2018-04-25 LAB
ALBUMIN SERPL-MCNC: 4.1 G/DL (ref 3.5–4.7)
ALBUMIN/CREAT UR: 40.1 MG/G CREAT (ref 0–30)
ALBUMIN/GLOB SERPL: 1.7 {RATIO} (ref 1.2–2.2)
ALP SERPL-CCNC: 68 IU/L (ref 39–117)
ALT SERPL-CCNC: 13 IU/L (ref 0–32)
AST SERPL-CCNC: 20 IU/L (ref 0–40)
BASOPHILS # BLD AUTO: 0.1 X10E3/UL (ref 0–0.2)
BASOPHILS NFR BLD AUTO: 1 %
BILIRUB SERPL-MCNC: <0.2 MG/DL (ref 0–1.2)
BUN SERPL-MCNC: 14 MG/DL (ref 8–27)
BUN/CREAT SERPL: 25 (ref 12–28)
CALCIUM SERPL-MCNC: 9.5 MG/DL (ref 8.7–10.3)
CHLORIDE SERPL-SCNC: 98 MMOL/L (ref 96–106)
CHOLEST SERPL-MCNC: 232 MG/DL (ref 100–199)
CO2 SERPL-SCNC: 23 MMOL/L (ref 18–29)
CREAT SERPL-MCNC: 0.56 MG/DL (ref 0.57–1)
CREAT UR-MCNC: 190.5 MG/DL
EOSINOPHIL # BLD AUTO: 0.1 X10E3/UL (ref 0–0.4)
EOSINOPHIL NFR BLD AUTO: 3 %
ERYTHROCYTE [DISTWIDTH] IN BLOOD BY AUTOMATED COUNT: 14.5 % (ref 12.3–15.4)
EST. AVERAGE GLUCOSE BLD GHB EST-MCNC: 111 MG/DL
GFR SERPLBLD CREATININE-BSD FMLA CKD-EPI: 100 ML/MIN/1.73
GFR SERPLBLD CREATININE-BSD FMLA CKD-EPI: 87 ML/MIN/1.73
GLOBULIN SER CALC-MCNC: 2.4 G/DL (ref 1.5–4.5)
GLUCOSE SERPL-MCNC: 106 MG/DL (ref 65–99)
HBA1C MFR BLD: 5.5 % (ref 4.8–5.6)
HCT VFR BLD AUTO: 30.1 % (ref 34–46.6)
HDLC SERPL-MCNC: 67 MG/DL
HGB BLD-MCNC: 8.8 G/DL (ref 11.1–15.9)
IMM GRANULOCYTES # BLD: 0 X10E3/UL (ref 0–0.1)
IMM GRANULOCYTES NFR BLD: 0 %
INTERPRETATION, 910389: NORMAL
IRON SATN MFR SERPL: 5 % (ref 15–55)
IRON SERPL-MCNC: 19 UG/DL (ref 27–139)
LDLC SERPL CALC-MCNC: 143 MG/DL (ref 0–99)
LYMPHOCYTES # BLD AUTO: 0.8 X10E3/UL (ref 0.7–3.1)
LYMPHOCYTES NFR BLD AUTO: 22 %
Lab: NORMAL
MCH RBC QN AUTO: 24.5 PG (ref 26.6–33)
MCHC RBC AUTO-ENTMCNC: 29.2 G/DL (ref 31.5–35.7)
MCV RBC AUTO: 84 FL (ref 79–97)
MICROALBUMIN UR-MCNC: 76.3 UG/ML
MONOCYTES # BLD AUTO: 0.4 X10E3/UL (ref 0.1–0.9)
MONOCYTES NFR BLD AUTO: 12 %
NEUTROPHILS # BLD AUTO: 2.3 X10E3/UL (ref 1.4–7)
NEUTROPHILS NFR BLD AUTO: 62 %
PLATELET # BLD AUTO: 502 X10E3/UL (ref 150–379)
POTASSIUM SERPL-SCNC: 4.3 MMOL/L (ref 3.5–5.2)
PROT SERPL-MCNC: 6.5 G/DL (ref 6–8.5)
RBC # BLD AUTO: 3.59 X10E6/UL (ref 3.77–5.28)
SODIUM SERPL-SCNC: 138 MMOL/L (ref 134–144)
TIBC SERPL-MCNC: 409 UG/DL (ref 250–450)
TRIGL SERPL-MCNC: 112 MG/DL (ref 0–149)
TSH SERPL DL<=0.005 MIU/L-ACNC: 2.02 UIU/ML (ref 0.45–4.5)
UIBC SERPL-MCNC: 390 UG/DL (ref 118–369)
VLDLC SERPL CALC-MCNC: 22 MG/DL (ref 5–40)
WBC # BLD AUTO: 3.6 X10E3/UL (ref 3.4–10.8)

## 2018-04-26 NOTE — PROGRESS NOTES
Patient called and notified of lab results. Informed her to start taking iron again and sched appt in 1 month.  Appt made for 5/29/2018

## 2018-05-29 ENCOUNTER — HOSPITAL ENCOUNTER (OUTPATIENT)
Dept: LAB | Age: 83
Discharge: HOME | End: 2018-05-29
Payer: MEDICARE

## 2018-05-29 ENCOUNTER — OFFICE VISIT (OUTPATIENT)
Dept: FAMILY MEDICINE CLINIC | Age: 83
End: 2018-05-29

## 2018-05-29 VITALS
OXYGEN SATURATION: 98 % | RESPIRATION RATE: 17 BRPM | SYSTOLIC BLOOD PRESSURE: 152 MMHG | TEMPERATURE: 98.3 F | HEIGHT: 62 IN | HEART RATE: 65 BPM | BODY MASS INDEX: 24.61 KG/M2 | WEIGHT: 133.7 LBS | DIASTOLIC BLOOD PRESSURE: 73 MMHG

## 2018-05-29 DIAGNOSIS — E78.00 HYPERCHOLESTEREMIA: ICD-10-CM

## 2018-05-29 DIAGNOSIS — D50.8 OTHER IRON DEFICIENCY ANEMIA: Primary | ICD-10-CM

## 2018-05-29 DIAGNOSIS — I10 ESSENTIAL HYPERTENSION: ICD-10-CM

## 2018-05-29 DIAGNOSIS — E11.9 TYPE 2 DIABETES MELLITUS WITHOUT COMPLICATION, WITHOUT LONG-TERM CURRENT USE OF INSULIN (HCC): ICD-10-CM

## 2018-05-29 DIAGNOSIS — I25.10 CORONARY ARTERY DISEASE INVOLVING NATIVE CORONARY ARTERY OF NATIVE HEART WITHOUT ANGINA PECTORIS: ICD-10-CM

## 2018-05-29 DIAGNOSIS — E03.2 HYPOTHYROIDISM, IATROGENIC: ICD-10-CM

## 2018-05-29 PROCEDURE — 83550 IRON BINDING TEST: CPT

## 2018-05-29 PROCEDURE — 80053 COMPREHEN METABOLIC PANEL: CPT

## 2018-05-29 PROCEDURE — 80061 LIPID PANEL: CPT

## 2018-05-29 PROCEDURE — 82728 ASSAY OF FERRITIN: CPT

## 2018-05-29 PROCEDURE — 85025 COMPLETE CBC W/AUTO DIFF WBC: CPT

## 2018-05-29 NOTE — MR AVS SNAPSHOT
[address] [address] 
[phone] Patient: Lilibeth Portillo MRN: [id number] Visit Information Date & Time Provider Department Dept. Phone Encounter #  
 5/29/2018 10:00 AM Perez Eaton MD [address] [place] [address] [phone] [id number] Follow-up Instructions Return in about 3 months (around 8/29/2018). Your Appointments 10/24/2018 10:20 AM  
ESTABLISHED PATIENT with Perez Eaton MD  
[facility]) Appt Note: 6mo fuv  
 [address] [place] [address] [address] [address]  
[phone]  
  
   
 [address] [place] [address] [address] [address] Upcoming Health Maintenance Date Due  
 EYE EXAM RETINAL OR DILATED Q1 2/15/1945 DTaP/Tdap/Td series (1 - Tdap) 2/15/1956 ZOSTER VACCINE AGE 60> 12/15/1994 GLAUCOMA SCREENING Q2Y 2/15/2000 Pneumococcal 65+ Low/Medium Risk (1 of 2 - PCV13) 2/15/2000 FOOT EXAM Q1 4/24/2018 Influenza Age 5 to Adult 8/1/2018 HEMOGLOBIN A1C Q6M 10/24/2018 MICROALBUMIN Q1 4/24/2019 LIPID PANEL Q1 4/24/2019 MEDICARE YEARLY EXAM 4/25/2019 Allergies as of 5/29/2018  Review Complete On: 5/29/2018 By: Perez Eaton MD  
  
 Severity Noted Reaction Type Reactions Celebrex [Celecoxib]  07/13/2015    Hives Crestor [Rosuvastatin]  05/29/2018    Myalgia Current Immunizations  Reviewed on 10/13/2016 Name Date Influenza High Dose Vaccine PF 10/24/2017, 10/13/2016 Not reviewed this visit You Were Diagnosed With   
  
 Codes Comments Other iron deficiency anemia    -  Primary ICD-10-CM: D50.8 ICD-9-CM: 280.8 Essential hypertension     ICD-10-CM: I10 
ICD-9-CM: 401.9 Hypercholesteremia     ICD-10-CM: E78.00 ICD-9-CM: 272.0 Coronary artery disease involving native coronary artery of native heart without angina pectoris     ICD-10-CM: I25.10 ICD-9-CM: 414.01   
 Type 2 diabetes mellitus without complication, without long-term current use of insulin (HCC)     ICD-10-CM: E11.9 ICD-9-CM: 250.00 Hypothyroidism, iatrogenic     ICD-10-CM: E03.2 ICD-9-CM: 777. 3 Vitals BP Pulse Temp Resp Height(growth percentile) Weight(growth percentile) 152/73 65 98.3 °F (36.8 °C) (Oral) 17 5' 2\" (1.575 m) 133 lb 11.2 oz (60.6 kg) SpO2 BMI OB Status Smoking Status 98% 24.45 kg/m2 Menopause Never Smoker Vitals History BMI and BSA Data Body Mass Index Body Surface Area  
 24.45 kg/m 2 1.63 m 2 Preferred Pharmacy Pharmacy Name Phone Rashad Wahl, [facility] [phone] Your Updated Medication List  
  
   
This list is accurate as of 5/29/18 10:30 AM.  Always use your most recent med list. amLODIPine 5 mg tablet Commonly known as:  Skip Newcomer TAKE 1 TABLET DAILY  
  
 aspirin 81 mg chewable tablet Take 1 Tab by mouth daily. iron polysaccharides 150 mg iron capsule Commonly known as:  Yolanda Michelle Take 1 Cap by mouth two (2) times a day. KRILL OIL (OMEGA 3 AND 6) 1,500-165-67.5 mg Cap Generic drug:  krill-om3-dha-epa-om6-lip-astx Take  by mouth.  
  
 latanoprost 0.005 % ophthalmic solution Commonly known as:  XALATAN  
  
 levothyroxine 88 mcg tablet Commonly known as:  SYNTHROID  
TAKE 1 TABLET DAILY BEFORE BREAKFAST  
  
 losartan 25 mg tablet Commonly known as:  COZAAR Take 1 Tab by mouth nightly. metoprolol succinate 50 mg XL tablet Commonly known as:  TOPROL-XL Take 1 Tab by mouth daily. MULTIPLE VITAMIN PO Take  by mouth. naproxen sodium 220 mg Cap Take  by mouth every other day. OTHER Walker with seat DX: DDD lumbar  
  
 pantoprazole 40 mg tablet Commonly known as:  PROTONIX Take 1 Tab by mouth daily. PRESERVISION AREDS 2 PO Take  by mouth. VITAMIN D3 2,000 unit Tab Generic drug:  cholecalciferol (vitamin D3) Take  by mouth. We Performed the Following CBC WITH AUTOMATED DIFF [15301 CPT(R)] FERRITIN [95030 CPT(R)] IRON PROFILE [id number] CPT(R)] LIPID PANEL [03407 CPT(R)] METABOLIC PANEL, COMPREHENSIVE [26577 CPT(R)] Follow-up Instructions Return in about 3 months (around 8/29/2018). Introducing [facility]! [employer] introduces [employer] patient portal. Now you can access parts of your medical record, email your doctor's office, and request medication refills online. 1. In your internet browser, go to [email or web address] 2. Click on the First Time User? Click Here link in the Sign In box. You will see the New Member Sign Up page. 3. Enter your [employer] Access Code exactly as it appears below. You will not need to use this code after youve completed the sign-up process. If you do not sign up before the expiration date, you must request a new code. · [employer] Access Code: [email or web address] Expires: 7/23/2018 10:54 AM 
 
4. Enter the last four digits of your Social Security Number (xxxx) and Date of Birth (mm/dd/yyyy) as indicated and click Submit. You will be taken to the next sign-up page. 5. Create a [employer] ID. This will be your [employer] login ID and cannot be changed, so think of one that is secure and easy to remember. 6. Create a [employer] password. You can change your password at any time. 7. Enter your Password Reset Question and Answer. This can be used at a later time if you forget your password. 8. Enter your e-mail address. You will receive e-mail notification when new information is available in [address] 9. Click Sign Up. You can now view and download portions of your medical record. 10. Click the Download Summary menu link to download a portable copy of your medical information.  
 
If you have questions, please visit the Frequently Asked Questions section of the [employer]. Remember, [employer] is NOT to be used for urgent needs. For medical emergencies, dial 911. Now available from your iPhone and Android! Please provide this summary of care documentation to your next provider. Your primary care clinician is listed as EUSEBIO FERRERA. If you have any questions after today's visit, please call [phone].

## 2018-05-29 NOTE — PROGRESS NOTES
Chief Complaint   Patient presents with    Anemia     Taking Iron     Labs     Fasting today- meds only    Hypertension     152/73    Thyroid Problem     1. Have you been to the ER, urgent care clinic since your last visit? Hospitalized since your last visit? No    2. Have you seen or consulted any other health care providers outside of the [address] [place] [address] since your last visit? Include any pap smears or colon screening. No   Results for orders placed or performed in visit on 04/24/18   LIPID PANEL   Result Value Ref Range    Cholesterol, total 232 (H) 100 - 199 mg/dL    Triglyceride 112 0 - 149 mg/dL    HDL Cholesterol 67 >39 mg/dL    VLDL, calculated 22 5 - 40 mg/dL    LDL, calculated 143 (H) 0 - 99 mg/dL   METABOLIC PANEL, COMPREHENSIVE   Result Value Ref Range    Glucose 106 (H) 65 - 99 mg/dL    BUN 14 8 - 27 mg/dL    Creatinine 0.56 (L) 0.57 - 1.00 mg/dL    GFR est non-AA 87 >59 mL/min/1.73    GFR est  >59 mL/min/1.73    BUN/Creatinine ratio 25 12 - 28    Sodium 138 134 - 144 mmol/L    Potassium 4.3 3.5 - 5.2 mmol/L    Chloride 98 96 - 106 mmol/L    CO2 23 18 - 29 mmol/L    Calcium 9.5 8.7 - 10.3 mg/dL    Protein, total 6.5 6.0 - 8.5 g/dL    Albumin 4.1 3.5 - 4.7 g/dL    GLOBULIN, TOTAL 2.4 1.5 - 4.5 g/dL    A-G Ratio 1.7 1.2 - 2.2    Bilirubin, total <0.2 0.0 - 1.2 mg/dL    Alk.  phosphatase 68 39 - 117 IU/L    AST (SGOT) 20 0 - 40 IU/L    ALT (SGPT) 13 0 - 32 IU/L   HEMOGLOBIN A1C WITH EAG   Result Value Ref Range    Hemoglobin A1c 5.5 4.8 - 5.6 %    Estimated average glucose 111 mg/dL   CBC WITH AUTOMATED DIFF   Result Value Ref Range    WBC 3.6 3.4 - 10.8 x10E3/uL    RBC 3.59 (L) 3.77 - 5.28 x10E6/uL    HGB 8.8 (L) 11.1 - 15.9 g/dL    HCT 30.1 (L) 34.0 - 46.6 %    MCV 84 79 - 97 fL    MCH 24.5 (L) 26.6 - 33.0 pg    MCHC 29.2 (L) 31.5 - 35.7 g/dL    RDW 14.5 12.3 - 15.4 %    PLATELET 361 (H) 596 - 379 x10E3/uL    NEUTROPHILS 62 Not Estab. %    Lymphocytes 22 Not Estab. %    MONOCYTES 12 Not Estab. %    EOSINOPHILS 3 Not Estab. %    BASOPHILS 1 Not Estab. %    ABS. NEUTROPHILS 2.3 1.4 - 7.0 x10E3/uL    Abs Lymphocytes 0.8 0.7 - 3.1 x10E3/uL    ABS. MONOCYTES 0.4 0.1 - 0.9 x10E3/uL    ABS. EOSINOPHILS 0.1 0.0 - 0.4 x10E3/uL    ABS. BASOPHILS 0.1 0.0 - 0.2 x10E3/uL    IMMATURE GRANULOCYTES 0 Not Estab. %    ABS. IMM. GRANS. 0.0 0.0 - 0.1 x10E3/uL   TSH 3RD GENERATION   Result Value Ref Range    TSH 2.020 0.450 - 4.500 uIU/mL   MICROALBUMIN, UR, RAND W/ MICROALB/CREAT RATIO   Result Value Ref Range    Creatinine, urine 190.5 Not Estab. mg/dL    Microalbumin, urine 76.3 Not Estab. ug/mL    Microalb/Creat ratio (ug/mg creat.) 40.1 (H) 0.0 - 30.0 mg/g creat   IRON PROFILE   Result Value Ref Range    TIBC 409 250 - 450 ug/dL    UIBC 390 (H) 118 - 369 ug/dL    Iron 19 (L) 27 - 139 ug/dL    Iron % saturation 5 (LL) 15 - 55 %   CVD REPORT   Result Value Ref Range    INTERPRETATION Note    DIABETES PATIENT EDUCATION   Result Value Ref Range    PDF Image Not applicable          Chief Complaint   Patient presents with    Anemia     Taking Iron     Labs     Fasting today- meds only    Hypertension     152/73    Thyroid Problem     She is a 80 y.o. female who presents for evalution. Reviewed PmHx, RxHx, FmHx, SocHx, AllgHx and updated and dated in the chart.     Patient Active Problem List    Diagnosis    Type 2 diabetes mellitus without complication, without long-term current use of insulin ([facility] [address]    Advance care planning    DDD (degenerative disc disease), lumbar    Gastric ulcer    Coronary artery disease involving native coronary artery of native heart without angina pectoris    GI bleeding     workup in [address] [place] [address] 12/15 was unremarkable (Dr. Blanca Garland) - says she had EGD, colonoscopy, and small bowel capsule endoscopy      HTN (hypertension)    Hypothyroidism, iatrogenic     radioiodine      Hypercholesteremia       Review of Systems - negative except as listed above in the HPI    Objective:     Vitals: 05/29/18 1001   BP: 152/73   Pulse: 65   Resp: 17   Temp: 98.3 °F (36.8 °C)   TempSrc: Oral   SpO2: 98%   Weight: 133 lb 11.2 oz (60.6 kg)   Height: 5' 2\" (1.575 m)     Physical Examination: General appearance - alert, well appearing, and in no distress  Chest - clear to auscultation, no wheezes, rales or rhonchi, symmetric air entry  Heart - normal rate, regular rhythm, normal S1, S2, no murmurs, rubs, clicks or gallops  Abdomen - soft, nontender, nondistended, no masses or organomegaly    Assessment/ Plan:   Diagnoses and all orders for this visit:    1. Other iron deficiency anemia  -     CBC WITH AUTOMATED DIFF  -     IRON PROFILE  -     FERRITIN  -taking bid  -feels sl better    2. Essential hypertension  -     LIPID PANEL  -     METABOLIC PANEL, COMPREHENSIVE  -at goal for age    1. Hypercholesteremia  -     LIPID PANEL  -     METABOLIC PANEL, COMPREHENSIVE  -off chol rx due to myalgias    4. Coronary artery disease involving native coronary artery of native heart without angina pectoris  -     LIPID PANEL  -     METABOLIC PANEL, COMPREHENSIVE    5. Type 2 diabetes mellitus without complication, without long-term current use of insulin (HCC)  Lab Results   Component Value Date/Time    Hemoglobin A1c 5.5 04/24/2018 10:57 AM     -great    6. Hypothyroidism, iatrogenic  Lab Results   Component Value Date/Time    TSH 2.020 04/24/2018 10:57 AM     -at goal     Follow-up Disposition:  Return in about 3 months (around 8/29/2018). I have discussed the diagnosis with the patient and the intended plan as seen in the above orders. The patient understands and agrees with the plan. The patient has received an after-visit summary and questions were answered concerning future plans. Medication Side Effects and Warnings were discussed with patient  Patient Labs were reviewed and or requested:  Patient Past Records were reviewed and or requested    Reji Madsen M.D.     There are no Patient Instructions on file for this visit.

## 2018-05-30 LAB
ALBUMIN SERPL-MCNC: 4.2 G/DL (ref 3.5–4.7)
ALBUMIN/GLOB SERPL: 2 {RATIO} (ref 1.2–2.2)
ALP SERPL-CCNC: 72 IU/L (ref 39–117)
ALT SERPL-CCNC: 11 IU/L (ref 0–32)
AST SERPL-CCNC: 19 IU/L (ref 0–40)
BASOPHILS # BLD AUTO: 0 X10E3/UL (ref 0–0.2)
BASOPHILS NFR BLD AUTO: 1 %
BILIRUB SERPL-MCNC: 0.3 MG/DL (ref 0–1.2)
BUN SERPL-MCNC: 14 MG/DL (ref 8–27)
BUN/CREAT SERPL: 30 (ref 12–28)
CALCIUM SERPL-MCNC: 9.4 MG/DL (ref 8.7–10.3)
CHLORIDE SERPL-SCNC: 101 MMOL/L (ref 96–106)
CHOLEST SERPL-MCNC: 235 MG/DL (ref 100–199)
CO2 SERPL-SCNC: 24 MMOL/L (ref 18–29)
CREAT SERPL-MCNC: 0.47 MG/DL (ref 0.57–1)
EOSINOPHIL # BLD AUTO: 0.2 X10E3/UL (ref 0–0.4)
EOSINOPHIL NFR BLD AUTO: 6 %
FERRITIN SERPL-MCNC: 25 NG/ML (ref 15–150)
GFR SERPLBLD CREATININE-BSD FMLA CKD-EPI: 106 ML/MIN/1.73
GFR SERPLBLD CREATININE-BSD FMLA CKD-EPI: 92 ML/MIN/1.73
GLOBULIN SER CALC-MCNC: 2.1 G/DL (ref 1.5–4.5)
GLUCOSE SERPL-MCNC: 115 MG/DL (ref 65–99)
HCT VFR BLD AUTO: 34.1 % (ref 34–46.6)
HDLC SERPL-MCNC: 66 MG/DL
HGB BLD-MCNC: 10.6 G/DL (ref 11.1–15.9)
IMM GRANULOCYTES # BLD: 0 X10E3/UL (ref 0–0.1)
IMM GRANULOCYTES NFR BLD: 0 %
INTERPRETATION, 910389: NORMAL
IRON SATN MFR SERPL: 67 % (ref 15–55)
IRON SERPL-MCNC: 235 UG/DL (ref 27–139)
LDLC SERPL CALC-MCNC: 146 MG/DL (ref 0–99)
LYMPHOCYTES # BLD AUTO: 0.6 X10E3/UL (ref 0.7–3.1)
LYMPHOCYTES NFR BLD AUTO: 20 %
MCH RBC QN AUTO: 28.3 PG (ref 26.6–33)
MCHC RBC AUTO-ENTMCNC: 31.1 G/DL (ref 31.5–35.7)
MCV RBC AUTO: 91 FL (ref 79–97)
MONOCYTES # BLD AUTO: 0.5 X10E3/UL (ref 0.1–0.9)
MONOCYTES NFR BLD AUTO: 15 %
MORPHOLOGY BLD-IMP: ABNORMAL
NEUTROPHILS # BLD AUTO: 1.9 X10E3/UL (ref 1.4–7)
NEUTROPHILS NFR BLD AUTO: 58 %
PLATELET # BLD AUTO: 379 X10E3/UL (ref 150–379)
POTASSIUM SERPL-SCNC: 4.4 MMOL/L (ref 3.5–5.2)
PROT SERPL-MCNC: 6.3 G/DL (ref 6–8.5)
RBC # BLD AUTO: 3.75 X10E6/UL (ref 3.77–5.28)
SODIUM SERPL-SCNC: 139 MMOL/L (ref 134–144)
TIBC SERPL-MCNC: 353 UG/DL (ref 250–450)
TRIGL SERPL-MCNC: 114 MG/DL (ref 0–149)
UIBC SERPL-MCNC: 118 UG/DL (ref 118–369)
VLDLC SERPL CALC-MCNC: 23 MG/DL (ref 5–40)
WBC # BLD AUTO: 3.3 X10E3/UL (ref 3.4–10.8)

## 2018-06-01 RX ORDER — ROSUVASTATIN CALCIUM 10 MG/1
10 TABLET, COATED ORAL
COMMUNITY
End: 2018-08-29 | Stop reason: SDUPTHER

## 2018-06-01 NOTE — PROGRESS NOTES
Patient called and verified x 3. Reviewed lab results. She agrees to restarting statin again. She has a brand new bottle of crestor 10 mg tabs 90 day supply from last refill before Dr. Saqib Hanson took her off of it, She will restart that if its ok with Dr. Saqib Hanson.

## 2018-08-29 ENCOUNTER — OFFICE VISIT (OUTPATIENT)
Dept: FAMILY MEDICINE CLINIC | Age: 83
End: 2018-08-29

## 2018-08-29 ENCOUNTER — HOSPITAL ENCOUNTER (OUTPATIENT)
Dept: LAB | Age: 83
Discharge: HOME | End: 2018-08-29
Payer: MEDICARE

## 2018-08-29 VITALS
RESPIRATION RATE: 15 BRPM | DIASTOLIC BLOOD PRESSURE: 84 MMHG | BODY MASS INDEX: 24.29 KG/M2 | WEIGHT: 132 LBS | OXYGEN SATURATION: 98 % | SYSTOLIC BLOOD PRESSURE: 144 MMHG | HEART RATE: 67 BPM | TEMPERATURE: 97.9 F | HEIGHT: 62 IN

## 2018-08-29 DIAGNOSIS — E11.9 TYPE 2 DIABETES MELLITUS WITHOUT COMPLICATION, WITHOUT LONG-TERM CURRENT USE OF INSULIN (HCC): Primary | ICD-10-CM

## 2018-08-29 DIAGNOSIS — E78.00 HYPERCHOLESTEREMIA: ICD-10-CM

## 2018-08-29 DIAGNOSIS — D64.9 ANEMIA, UNSPECIFIED TYPE: ICD-10-CM

## 2018-08-29 DIAGNOSIS — I10 ESSENTIAL HYPERTENSION: ICD-10-CM

## 2018-08-29 DIAGNOSIS — E03.2 HYPOTHYROIDISM, IATROGENIC: ICD-10-CM

## 2018-08-29 PROCEDURE — 85025 COMPLETE CBC W/AUTO DIFF WBC: CPT

## 2018-08-29 PROCEDURE — 83036 HEMOGLOBIN GLYCOSYLATED A1C: CPT

## 2018-08-29 PROCEDURE — 84443 ASSAY THYROID STIM HORMONE: CPT

## 2018-08-29 PROCEDURE — 80061 LIPID PANEL: CPT

## 2018-08-29 PROCEDURE — 80053 COMPREHEN METABOLIC PANEL: CPT

## 2018-08-29 RX ORDER — VALACYCLOVIR HYDROCHLORIDE 1 G/1
1000 TABLET, FILM COATED ORAL DAILY
Qty: 30 TAB | Refills: 3 | Status: SHIPPED | OUTPATIENT
Start: 2018-08-29 | End: 2019-08-05 | Stop reason: SDUPTHER

## 2018-08-29 RX ORDER — VALACYCLOVIR HYDROCHLORIDE 1 G/1
TABLET, FILM COATED ORAL
COMMUNITY
End: 2018-08-29 | Stop reason: SDUPTHER

## 2018-08-29 RX ORDER — ROSUVASTATIN CALCIUM 10 MG/1
10 TABLET, COATED ORAL
Qty: 30 TAB | Refills: 5 | Status: SHIPPED | OUTPATIENT
Start: 2018-08-29 | End: 2019-09-16 | Stop reason: SDUPTHER

## 2018-08-29 RX ORDER — IRON POLYSACCHARIDE COMPLEX 150 MG
150 CAPSULE ORAL 2 TIMES DAILY
Qty: 60 CAP | Refills: 5 | Status: SHIPPED | OUTPATIENT
Start: 2018-08-29 | End: 2019-05-31 | Stop reason: SDUPTHER

## 2018-08-29 NOTE — PROGRESS NOTES
Chief Complaint   Patient presents with    Hypertension     BP:156/91 Recheck:144/84    Medication Refill     valacyclovir 60 tabs    Results     1. Have you been to the ER, urgent care clinic since your last visit? Hospitalized since your last visit? Yes Where: [facility]: Ear cleanout    2. Have you seen or consulted any other health care providers outside of the [address] [place] [address] since your last visit? Include any pap smears or colon screening. No      Lab Results   Component Value Date/Time    Microalb/Creat ratio (ug/mg creat.) 40.1 (H) 04/24/2018 10:57 AM      Chief Complaint   Patient presents with    Hypertension     BP:156/91 Recheck:144/84    Medication Refill     valacyclovir 60 tabs    Results     she is a 80y.o. year old female who presents for evaluation. See Diabetic Report Card listed above. Patient Active Problem List    Diagnosis    Anemia    Type 2 diabetes mellitus without complication, without long-term current use of insulin ([facility] [address]    Advance care planning    DDD (degenerative disc disease), lumbar    Gastric ulcer    Coronary artery disease involving native coronary artery of native heart without angina pectoris    GI bleeding     workup in [address] [place] [address] 12/15 was unremarkable (Dr. Taryn Welsh) - says she had EGD, colonoscopy, and small bowel capsule endoscopy      HTN (hypertension)    Hypothyroidism, iatrogenic     radioiodine      Hypercholesteremia       Reviewed PmHx, RxHx, FmHx, SocHx, AllgHx--dated and updated in the chart.     Review of Systems - negative except as listed above in the HPI    Objective:     Vitals:    08/29/18 1011 08/29/18 1022   BP: (!) 156/91 144/84   Pulse: 62 67   Resp: 15    Temp: 97.9 °F (36.6 °C)    TempSrc: Oral    SpO2: 98% 98%   Weight: 132 lb (59.9 kg)    Height: 5' 2\" (1.575 m)      Physical Examination: General appearance - alert, well appearing, and in no distress  Ears - bilateral TM's and external ear canals normal  Neck - supple, no significant adenopathy  Chest - clear to auscultation, no wheezes, rales or rhonchi, symmetric air entry  Heart - normal rate, regular rhythm, normal S1, S2, no murmurs, rubs, clicks or gallops    Assessment/ Plan:   Diagnoses and all orders for this visit:    1. Type 2 diabetes mellitus without complication, without long-term current use of insulin (HCC)  -     LIPID PANEL  -     METABOLIC PANEL, COMPREHENSIVE  -     HEMOGLOBIN A1C WITH EAG  -at goal    2. Essential hypertension  -     LIPID PANEL  -     METABOLIC PANEL, COMPREHENSIVE  -at goal for age    1. Hypothyroidism, iatrogenic  -     TSH 3RD GENERATION    4. Hypercholesteremia  -     LIPID PANEL  -     METABOLIC PANEL, COMPREHENSIVE    5. Anemia, unspecified type  -     CBC WITH AUTOMATED DIFF    Other orders  -     valACYclovir (VALTREX) 1 gram tablet; Take 1 Tab by mouth daily. -     rosuvastatin (CRESTOR) 10 mg tablet; Take 1 Tab by mouth nightly. -     iron polysaccharides (NU-IRON) 150 mg iron capsule; Take 1 Cap by mouth two (2) times a day. Follow-up Disposition:  Return in about 6 months (around 2/28/2019). Lab Results   Component Value Date/Time    Cholesterol, total 235 (H) 05/29/2018 10:33 AM    HDL Cholesterol 66 05/29/2018 10:33 AM    LDL, calculated 146 (H) 05/29/2018 10:33 AM    Triglyceride 114 05/29/2018 10:33 AM     Lab Results   Component Value Date/Time    Hemoglobin A1c 5.5 04/24/2018 10:57 AM    Hemoglobin A1c 5.6 10/24/2017 10:25 AM    Hemoglobin A1c 6.2 (H) 04/24/2017 10:30 AM    Microalb/Creat ratio (ug/mg creat.) 40.1 (H) 04/24/2018 10:57 AM    LDL, calculated 146 (H) 05/29/2018 10:33 AM    Creatinine 0.47 (L) 05/29/2018 10:33 AM          Discussed with patient goal of Diabetes to include:  HgA1C <7, LDL cholesterol <100, Blood pressure <140/80. Discussed with patient diet and weight management and to get regular exercise. Recommend yearly eye exams and daily foot care.   The patient understands and agrees with the plan.    I have discussed the diagnosis with the patient and the intended plan as seen in the above orders. The patient has received an after-visit summary and questions were answered concerning future plans. Medication Side Effects and Warnings were discussed with patient  Patient Labs were reviewed and or requested  Patient Past Records were reviewed and or requested    Kavon Araujo M.D. [address] [facility]    There are no Patient Instructions on file for this visit.

## 2018-08-29 NOTE — MR AVS SNAPSHOT
[address] [address] 
[phone] Patient: Nubia García MRN: [id number] FRP:[id number] Visit Information Date & Time Provider Department Dept. Phone Encounter #  
 8/29/2018 10:00 AM Melania Weiner MD [address] [facility] [phone] [id number] Follow-up Instructions Return in about 6 months (around 2/28/2019). Upcoming Health Maintenance Date Due DTaP/Tdap/Td series (1 - Tdap) 2/15/1956 ZOSTER VACCINE AGE 60> 12/15/1994 Pneumococcal 65+ Low/Medium Risk (1 of 2 - PCV13) 2/15/2000 FOOT EXAM Q1 4/24/2018 Influenza Age 5 to Adult 8/1/2018 HEMOGLOBIN A1C Q6M 10/24/2018 MICROALBUMIN Q1 4/24/2019 MEDICARE YEARLY EXAM 4/25/2019 LIPID PANEL Q1 5/29/2019 EYE EXAM RETINAL OR DILATED Q1 7/9/2019 GLAUCOMA SCREENING Q2Y 7/9/2020 Allergies as of 8/29/2018  Review Complete On: 8/29/2018 By: Melania Weiner MD  
  
 Severity Noted Reaction Type Reactions Celebrex [Celecoxib]  07/13/2015    Hives Crestor [Rosuvastatin]  05/29/2018    Myalgia Current Immunizations  Reviewed on 10/13/2016 Name Date Influenza High Dose Vaccine PF 10/24/2017, 10/13/2016 Not reviewed this visit You Were Diagnosed With   
  
 Codes Comments Type 2 diabetes mellitus without complication, without long-term current use of insulin (HCC)    -  Primary ICD-10-CM: E11.9 ICD-9-CM: 250.00 Essential hypertension     ICD-10-CM: I10 
ICD-9-CM: 401.9 Hypothyroidism, iatrogenic     ICD-10-CM: E03.2 ICD-9-CM: 244.3 Hypercholesteremia     ICD-10-CM: E78.00 ICD-9-CM: 272.0 Anemia, unspecified type     ICD-10-CM: D64.9 ICD-9-CM: 904. 9 Vitals BP Pulse Temp Resp Height(growth percentile) Weight(growth percentile) 144/84 (BP 1 Location: Right arm, BP Patient Position: Sitting) 67 97.9 °F (36.6 °C) (Oral) 15 5' 2\" (1.575 m) 132 lb (59.9 kg) SpO2 BMI OB Status Smoking Status 98% 24.14 kg/m2 Menopause Never Smoker Vitals History BMI and BSA Data Body Mass Index Body Surface Area  
 24.14 kg/m 2 1.62 m 2 Preferred Pharmacy Pharmacy Name Phone Rashad Wahl, [facility] [phone] Your Updated Medication List  
  
   
This list is accurate as of 8/29/18 10:32 AM.  Always use your most recent med list. amLODIPine 5 mg tablet Commonly known as:  Jass Chafe TAKE 1 TABLET DAILY  
  
 aspirin 81 mg chewable tablet Take 1 Tab by mouth daily. iron polysaccharides 150 mg iron capsule Commonly known as:  Stephen Sands Take 1 Cap by mouth two (2) times a day. KRILL OIL (OMEGA 3 AND 6) 1,500-165-67.5 mg Cap Generic drug:  [id number]-om6-lip-astx Take  by mouth.  
  
 latanoprost 0.005 % ophthalmic solution Commonly known as:  XALATAN  
  
 levothyroxine 88 mcg tablet Commonly known as:  SYNTHROID  
TAKE 1 TABLET DAILY BEFORE BREAKFAST  
  
 losartan 25 mg tablet Commonly known as:  COZAAR Take 1 Tab by mouth nightly. metoprolol succinate 50 mg XL tablet Commonly known as:  TOPROL-XL Take 1 Tab by mouth daily. MULTIPLE VITAMIN PO Take  by mouth. naproxen sodium 220 mg Cap Take  by mouth every other day. OTHER Walker with seat DX: DDD lumbar  
  
 pantoprazole 40 mg tablet Commonly known as:  PROTONIX Take 1 Tab by mouth daily. PRESERVISION AREDS 2 PO Take  by mouth. rosuvastatin 10 mg tablet Commonly known as:  CRESTOR Take 1 Tab by mouth nightly. valACYclovir 1 gram tablet Commonly known as:  VALTREX Take 1 Tab by mouth daily. VITAMIN D3 2,000 unit Tab Generic drug:  cholecalciferol (vitamin D3) Take  by mouth. Prescriptions Sent to Pharmacy Refills  
 valACYclovir (VALTREX) 1 gram tablet 3 Sig: Take 1 Tab by mouth daily. Class: Normal  
 Pharmacy: [address] [facility] Ph #: [phone] Route: Oral  
 rosuvastatin (CRESTOR) 10 mg tablet 5 Sig: Take 1 Tab by mouth nightly. Class: Normal  
 Pharmacy: [address] [facility] Ph #: [phone] Route: Oral  
 iron polysaccharides (NU-IRON) 150 mg iron capsule 5 Sig: Take 1 Cap by mouth two (2) times a day. Class: Normal  
 Pharmacy: [address], [place] [facility] Ph #: [phone] Route: Oral  
  
We Performed the Following CBC WITH AUTOMATED DIFF [26749 CPT(R)] HEMOGLOBIN A1C WITH EAG [55735 CPT(R)] LIPID PANEL [65982 CPT(R)] METABOLIC PANEL, COMPREHENSIVE [59306 CPT(R)] TSH 3RD GENERATION [89198 CPT(R)] Follow-up Instructions Return in about 6 months (around 2/28/2019). Introducing [facility]! Esther Encarnacion introduces [employer] patient portal. Now you can access parts of your medical record, email your doctor's office, and request medication refills online. 1. In your internet browser, go to [email or web address] 2. Click on the First Time User? Click Here link in the Sign In box. You will see the New Member Sign Up page. 3. Enter your Loudcaster Access Code exactly as it appears below. You will not need to use this code after youve completed the sign-up process. If you do not sign up before the expiration date, you must request a new code. · Loudcaster Access Code: [email or web address] Expires: 11/27/2018 10:32 AM 
 
4. Enter the last four digits of your Social Security Number (xxxx) and Date of Birth (mm/dd/yyyy) as indicated and click Submit. You will be taken to the next sign-up page. 5. Create a Tangoet ID. This will be your Tangoet login ID and cannot be changed, so think of one that is secure and easy to remember. 6. Create a ENDOTRONIX password. You can change your password at any time. 7. Enter your Password Reset Question and Answer. This can be used at a later time if you forget your password. 8. Enter your e-mail address. You will receive e-mail notification when new information is available in [address] 9. Click Sign Up. You can now view and download portions of your medical record. 10. Click the Download Summary menu link to download a portable copy of your medical information. If you have questions, please visit the Frequently Asked Questions section of the ENDOTRONIX website. Remember, ENDOTRONIX is NOT to be used for urgent needs. For medical emergencies, dial 911. Now available from your iPhone and Android! Please provide this summary of care documentation to your next provider. Your primary care clinician is listed as EUSEBIO FERRERA. If you have any questions after today's visit, please call [phone].

## 2018-08-30 LAB
ALBUMIN SERPL-MCNC: 4.4 G/DL (ref 3.5–4.7)
ALBUMIN/GLOB SERPL: 1.6 {RATIO} (ref 1.2–2.2)
ALP SERPL-CCNC: 78 IU/L (ref 39–117)
ALT SERPL-CCNC: 16 IU/L (ref 0–32)
AST SERPL-CCNC: 24 IU/L (ref 0–40)
BASOPHILS # BLD AUTO: 0 X10E3/UL (ref 0–0.2)
BASOPHILS NFR BLD AUTO: 1 %
BILIRUB SERPL-MCNC: 0.5 MG/DL (ref 0–1.2)
BUN SERPL-MCNC: 10 MG/DL (ref 8–27)
BUN/CREAT SERPL: 18 (ref 12–28)
CALCIUM SERPL-MCNC: 9.7 MG/DL (ref 8.7–10.3)
CHLORIDE SERPL-SCNC: 98 MMOL/L (ref 96–106)
CHOLEST SERPL-MCNC: 155 MG/DL (ref 100–199)
CO2 SERPL-SCNC: 24 MMOL/L (ref 20–29)
CREAT SERPL-MCNC: 0.55 MG/DL (ref 0.57–1)
EOSINOPHIL # BLD AUTO: 0.1 X10E3/UL (ref 0–0.4)
EOSINOPHIL NFR BLD AUTO: 4 %
ERYTHROCYTE [DISTWIDTH] IN BLOOD BY AUTOMATED COUNT: 14 % (ref 12.3–15.4)
EST. AVERAGE GLUCOSE BLD GHB EST-MCNC: 123 MG/DL
GLOBULIN SER CALC-MCNC: 2.8 G/DL (ref 1.5–4.5)
GLUCOSE SERPL-MCNC: 120 MG/DL (ref 65–99)
HBA1C MFR BLD: 5.9 % (ref 4.8–5.6)
HCT VFR BLD AUTO: 39 % (ref 34–46.6)
HDLC SERPL-MCNC: 74 MG/DL
HGB BLD-MCNC: 12.8 G/DL (ref 11.1–15.9)
IMM GRANULOCYTES # BLD: 0 X10E3/UL (ref 0–0.1)
IMM GRANULOCYTES NFR BLD: 0 %
INTERPRETATION, 910389: NORMAL
LDLC SERPL CALC-MCNC: 67 MG/DL (ref 0–99)
LYMPHOCYTES # BLD AUTO: 0.8 X10E3/UL (ref 0.7–3.1)
LYMPHOCYTES NFR BLD AUTO: 24 %
Lab: NORMAL
MCH RBC QN AUTO: 31.7 PG (ref 26.6–33)
MCHC RBC AUTO-ENTMCNC: 32.8 G/DL (ref 31.5–35.7)
MCV RBC AUTO: 97 FL (ref 79–97)
MONOCYTES # BLD AUTO: 0.5 X10E3/UL (ref 0.1–0.9)
MONOCYTES NFR BLD AUTO: 15 %
NEUTROPHILS # BLD AUTO: 1.8 X10E3/UL (ref 1.4–7)
NEUTROPHILS NFR BLD AUTO: 56 %
PLATELET # BLD AUTO: 370 X10E3/UL (ref 150–379)
POTASSIUM SERPL-SCNC: 4.5 MMOL/L (ref 3.5–5.2)
PROT SERPL-MCNC: 7.2 G/DL (ref 6–8.5)
RBC # BLD AUTO: 4.04 X10E6/UL (ref 3.77–5.28)
SODIUM SERPL-SCNC: 140 MMOL/L (ref 134–144)
TRIGL SERPL-MCNC: 70 MG/DL (ref 0–149)
TSH SERPL DL<=0.005 MIU/L-ACNC: 7.57 UIU/ML (ref 0.45–4.5)
VLDLC SERPL CALC-MCNC: 14 MG/DL (ref 5–40)
WBC # BLD AUTO: 3.2 X10E3/UL (ref 3.4–10.8)

## 2018-08-30 NOTE — PROGRESS NOTES
Called and LVM for pt requesting call back to office to discuss labs and confirm compliance with taking her Levothyroxine 88 mcg. Nikky Joyce

## 2018-09-06 RX ORDER — LEVOTHYROXINE SODIUM 100 UG/1
TABLET ORAL
Qty: 90 TAB | Refills: 1 | Status: SHIPPED | OUTPATIENT
Start: 2018-09-06 | End: 2019-02-19 | Stop reason: SDUPTHER

## 2019-02-19 RX ORDER — LEVOTHYROXINE SODIUM 100 UG/1
TABLET ORAL
Qty: 90 TAB | Refills: 0 | Status: SHIPPED | OUTPATIENT
Start: 2019-02-19 | End: 2019-05-20 | Stop reason: SDUPTHER

## 2019-03-05 ENCOUNTER — TELEPHONE (OUTPATIENT)
Dept: FAMILY MEDICINE CLINIC | Age: 84
End: 2019-03-05

## 2019-03-05 NOTE — TELEPHONE ENCOUNTER
Lindwood Paget, Occupational Therapist, with [facility] by [facility] called stated that she will be seeing patient one time a week for four weeks for occupational therapy.     She may be reached at [phone]

## 2019-03-08 ENCOUNTER — DOCUMENTATION ONLY (OUTPATIENT)
Dept: FAMILY MEDICINE CLINIC | Age: 84
End: 2019-03-08

## 2019-03-11 ENCOUNTER — DOCUMENTATION ONLY (OUTPATIENT)
Dept: FAMILY MEDICINE CLINIC | Age: 84
End: 2019-03-11

## 2019-03-11 NOTE — PROGRESS NOTES
[facility] forms signed & faxed to [phone],, Copy placed in scan folder to be scanned to chart.

## 2019-03-14 ENCOUNTER — TELEPHONE (OUTPATIENT)
Dept: FAMILY MEDICINE CLINIC | Age: 84
End: 2019-03-14

## 2019-03-14 NOTE — TELEPHONE ENCOUNTER
Received call from [facility]. Olga Lidia Hoang states patient's BP elevated during home visit today. BP:173/89 P:62 R;18,O2 98%  Took BP meds this 8:00 am per Pt. No distress noted. Olga Lidia Hoang states she will remind patient of upcomming appointment next week with Dr Cira Mars and recheck BP prior to leaving today.

## 2019-03-18 ENCOUNTER — HOSPITAL ENCOUNTER (OUTPATIENT)
Dept: LAB | Age: 84
Discharge: HOME | End: 2019-03-18
Payer: MEDICARE

## 2019-03-18 ENCOUNTER — OFFICE VISIT (OUTPATIENT)
Dept: FAMILY MEDICINE CLINIC | Age: 84
End: 2019-03-18

## 2019-03-18 VITALS
OXYGEN SATURATION: 99 % | HEIGHT: 62 IN | TEMPERATURE: 97.6 F | DIASTOLIC BLOOD PRESSURE: 82 MMHG | BODY MASS INDEX: 22.21 KG/M2 | RESPIRATION RATE: 16 BRPM | HEART RATE: 60 BPM | SYSTOLIC BLOOD PRESSURE: 172 MMHG | WEIGHT: 120.7 LBS

## 2019-03-18 DIAGNOSIS — D64.9 ANEMIA, UNSPECIFIED TYPE: ICD-10-CM

## 2019-03-18 DIAGNOSIS — E78.00 HYPERCHOLESTEREMIA: ICD-10-CM

## 2019-03-18 DIAGNOSIS — E03.2 HYPOTHYROIDISM, IATROGENIC: ICD-10-CM

## 2019-03-18 DIAGNOSIS — I10 ESSENTIAL HYPERTENSION: ICD-10-CM

## 2019-03-18 DIAGNOSIS — E11.9 TYPE 2 DIABETES MELLITUS WITHOUT COMPLICATION, WITHOUT LONG-TERM CURRENT USE OF INSULIN (HCC): Primary | ICD-10-CM

## 2019-03-18 PROCEDURE — 80061 LIPID PANEL: CPT

## 2019-03-18 PROCEDURE — 83036 HEMOGLOBIN GLYCOSYLATED A1C: CPT

## 2019-03-18 PROCEDURE — 80053 COMPREHEN METABOLIC PANEL: CPT

## 2019-03-18 PROCEDURE — 85025 COMPLETE CBC W/AUTO DIFF WBC: CPT

## 2019-03-18 PROCEDURE — 84443 ASSAY THYROID STIM HORMONE: CPT

## 2019-03-18 NOTE — PROGRESS NOTES
Chief Complaint   Patient presents with   [facility] Follow Up     hernia surgery - [facility] ED 2/27/19    Labs     1. Have you been to the ER, urgent care clinic since your last visit? Hospitalized since your last visit? Yes Where: hernia surgery - [facility] ED 2/27/19    2. Have you seen or consulted any other health care providers outside of the [address] [place] [address] since your last visit? Include any pap smears or colon screening. No     Lab Results   Component Value Date/Time    Microalb/Creat ratio (ug/mg creat.) 40.1 (H) 04/24/2018 10:57 AM      Chief Complaint   Patient presents with   [facility] Follow Up     hernia surgery - [facility] ED 2/27/19    Labs     she is a 80y.o. year old female who presents for evaluation. See Diabetic Report Card listed above. Patient Active Problem List    Diagnosis    Anemia    Type 2 diabetes mellitus without complication, without long-term current use of insulin ([facility] [address]    Advance care planning    DDD (degenerative disc disease), lumbar    Gastric ulcer    Coronary artery disease involving native coronary artery of native heart without angina pectoris    GI bleeding     workup in [address] [place] [address] 12/15 was unremarkable (Dr. Laura Cole) - says she had EGD, colonoscopy, and small bowel capsule endoscopy      HTN (hypertension)    Hypothyroidism, iatrogenic     radioiodine      Hypercholesteremia       Reviewed PmHx, RxHx, FmHx, SocHx, AllgHx--dated and updated in the chart.     Review of Systems - negative except as listed above in the HPI    Objective:     Vitals:    03/18/19 1010 03/18/19 1024   BP: 180/77 172/82   Pulse: 60    Resp: 16    Temp: 97.6 °F (36.4 °C)    TempSrc: Oral    SpO2: 97% 99%   Weight: 120 lb 11.2 oz (54.7 kg)    Height: 5' 2\" (1.575 m)      Physical Examination: General appearance - alert, well appearing, and in no distress  Mouth - mucous membranes moist, pharynx normal without lesions  Neck - supple, no significant adenopathy  Chest - clear to auscultation, no wheezes, rales or rhonchi, symmetric air entry  Heart - normal rate, regular rhythm, normal S1, S2, no murmurs, rubs, clicks or gallops  Abdomen - soft, nontender, nondistended, no masses or organomegaly  Extremities - peripheral pulses normal, no pedal edema, no clubbing or cyanosis      Assessment/ Plan:   Diagnoses and all orders for this visit:    1. Type 2 diabetes mellitus without complication, without long-term current use of insulin (HCC)  -     METABOLIC PANEL, COMPREHENSIVE  -     CBC WITH AUTOMATED DIFF  -     TSH 3RD GENERATION  -     HEMOGLOBIN A1C WITH EAG  -     LIPID PANEL  -at goal  -after surgery    2. Essential hypertension  -     METABOLIC PANEL, COMPREHENSIVE  -     LIPID PANEL  -better at home    3. Anemia, unspecified type  -     CBC WITH AUTOMATED DIFF    4. Hypothyroidism, iatrogenic  -     TSH 3RD GENERATION    5. Hypercholesteremia  -     METABOLIC PANEL, COMPREHENSIVE  -     LIPID PANEL       Follow-up Disposition:  Return in about 6 months (around 9/18/2019). Lab Results   Component Value Date/Time    Cholesterol, total 155 08/29/2018 10:37 AM    HDL Cholesterol 74 08/29/2018 10:37 AM    LDL, calculated 67 08/29/2018 10:37 AM    Triglyceride 70 08/29/2018 10:37 AM     Lab Results   Component Value Date/Time    Hemoglobin A1c 5.9 (H) 08/29/2018 10:37 AM    Hemoglobin A1c 5.5 04/24/2018 10:57 AM    Hemoglobin A1c 5.6 10/24/2017 10:25 AM    Microalb/Creat ratio (ug/mg creat.) 40.1 (H) 04/24/2018 10:57 AM    LDL, calculated 67 08/29/2018 10:37 AM    Creatinine 0.55 (L) 08/29/2018 10:37 AM          Discussed with patient goal of Diabetes to include:  HgA1C <7, LDL cholesterol <100, Blood pressure <140/80. Discussed with patient diet and weight management and to get regular exercise. Recommend yearly eye exams and daily foot care. The patient understands and agrees with the plan.     I have discussed the diagnosis with the patient and the intended plan as seen in the above orders. The patient has received an after-visit summary and questions were answered concerning future plans. Medication Side Effects and Warnings were discussed with patient  Patient Labs were reviewed and or requested  Patient Past Records were reviewed and or requested    Polly Helm M.D. [facility]    There are no Patient Instructions on file for this visit.

## 2019-03-19 LAB
ALBUMIN SERPL-MCNC: 4.3 G/DL (ref 3.5–4.7)
ALBUMIN/GLOB SERPL: 2 {RATIO} (ref 1.2–2.2)
ALP SERPL-CCNC: 68 IU/L (ref 39–117)
ALT SERPL-CCNC: 17 IU/L (ref 0–32)
AST SERPL-CCNC: 22 IU/L (ref 0–40)
BASOPHILS # BLD AUTO: 0 X10E3/UL (ref 0–0.2)
BASOPHILS NFR BLD AUTO: 1 %
BILIRUB SERPL-MCNC: 0.3 MG/DL (ref 0–1.2)
BUN SERPL-MCNC: 10 MG/DL (ref 8–27)
BUN/CREAT SERPL: 23 (ref 12–28)
CALCIUM SERPL-MCNC: 9.3 MG/DL (ref 8.7–10.3)
CHLORIDE SERPL-SCNC: 100 MMOL/L (ref 96–106)
CHOLEST SERPL-MCNC: 133 MG/DL (ref 100–199)
CO2 SERPL-SCNC: 23 MMOL/L (ref 20–29)
CREAT SERPL-MCNC: 0.43 MG/DL (ref 0.57–1)
EOSINOPHIL # BLD AUTO: 0.2 X10E3/UL (ref 0–0.4)
EOSINOPHIL NFR BLD AUTO: 4 %
ERYTHROCYTE [DISTWIDTH] IN BLOOD BY AUTOMATED COUNT: 14 % (ref 12.3–15.4)
EST. AVERAGE GLUCOSE BLD GHB EST-MCNC: 114 MG/DL
GLOBULIN SER CALC-MCNC: 2.2 G/DL (ref 1.5–4.5)
GLUCOSE SERPL-MCNC: 115 MG/DL (ref 65–99)
HBA1C MFR BLD: 5.6 % (ref 4.8–5.6)
HCT VFR BLD AUTO: 38.5 % (ref 34–46.6)
HDLC SERPL-MCNC: 58 MG/DL
HGB BLD-MCNC: 12.5 G/DL (ref 11.1–15.9)
IMM GRANULOCYTES # BLD AUTO: 0 X10E3/UL (ref 0–0.1)
IMM GRANULOCYTES NFR BLD AUTO: 0 %
INTERPRETATION, 910389: NORMAL
LDLC SERPL CALC-MCNC: 60 MG/DL (ref 0–99)
LYMPHOCYTES # BLD AUTO: 0.7 X10E3/UL (ref 0.7–3.1)
LYMPHOCYTES NFR BLD AUTO: 19 %
Lab: NORMAL
MCH RBC QN AUTO: 32.4 PG (ref 26.6–33)
MCHC RBC AUTO-ENTMCNC: 32.5 G/DL (ref 31.5–35.7)
MCV RBC AUTO: 100 FL (ref 79–97)
MONOCYTES # BLD AUTO: 0.4 X10E3/UL (ref 0.1–0.9)
MONOCYTES NFR BLD AUTO: 10 %
NEUTROPHILS # BLD AUTO: 2.5 X10E3/UL (ref 1.4–7)
NEUTROPHILS NFR BLD AUTO: 66 %
PLATELET # BLD AUTO: 420 X10E3/UL (ref 150–379)
POTASSIUM SERPL-SCNC: 3.7 MMOL/L (ref 3.5–5.2)
PROT SERPL-MCNC: 6.5 G/DL (ref 6–8.5)
RBC # BLD AUTO: 3.86 X10E6/UL (ref 3.77–5.28)
SODIUM SERPL-SCNC: 140 MMOL/L (ref 134–144)
TRIGL SERPL-MCNC: 74 MG/DL (ref 0–149)
TSH SERPL DL<=0.005 MIU/L-ACNC: 0.43 UIU/ML (ref 0.45–4.5)
VLDLC SERPL CALC-MCNC: 15 MG/DL (ref 5–40)
WBC # BLD AUTO: 3.8 X10E3/UL (ref 3.4–10.8)

## 2019-03-19 NOTE — PROGRESS NOTES
After reviewing your labs, I believe they are within normal 
limits for your age and let us stay with our current plan of action. If you have any questions, feel free to email thru [facility], or give us  
a call back. Perry Smith M.D. Good Help to Those in Need \"You maybe whatever you resolve to be\"

## 2019-03-19 NOTE — PROGRESS NOTES
A letter was sent to the patient at the address on file, with lab results and Dr. Debbie Lara recommendations for the patient.

## 2019-04-26 ENCOUNTER — DOCUMENTATION ONLY (OUTPATIENT)
Dept: FAMILY MEDICINE CLINIC | Age: 84
End: 2019-04-26

## 2019-04-26 NOTE — PROGRESS NOTES
[facility] dropped off medical form to be completed please. Left in Demetra's bin up front. Thanks.

## 2019-04-29 ENCOUNTER — DOCUMENTATION ONLY (OUTPATIENT)
Dept: FAMILY MEDICINE CLINIC | Age: 84
End: 2019-04-29

## 2019-04-29 NOTE — PROGRESS NOTES
[address] [facility] dropped off medical form to be completed please. Left in Demetra's bin up front. Thanks.

## 2019-04-29 NOTE — PROGRESS NOTES
[address] [facility] [address] orders completed by Dr. Adriana Goodwin and put up front in Return bin.

## 2019-05-12 DIAGNOSIS — I10 ESSENTIAL HYPERTENSION: ICD-10-CM

## 2019-05-13 RX ORDER — METOPROLOL SUCCINATE 50 MG/1
TABLET, EXTENDED RELEASE ORAL
Qty: 90 TAB | Refills: 3 | Status: SHIPPED | OUTPATIENT
Start: 2019-05-13 | End: 2020-05-08

## 2019-05-13 RX ORDER — AMLODIPINE BESYLATE 5 MG/1
TABLET ORAL
Qty: 90 TAB | Refills: 3 | Status: SHIPPED | OUTPATIENT
Start: 2019-05-13 | End: 2020-07-28 | Stop reason: SDUPTHER

## 2019-05-20 RX ORDER — LEVOTHYROXINE SODIUM 100 UG/1
TABLET ORAL
Qty: 90 TAB | Refills: 0 | Status: SHIPPED | OUTPATIENT
Start: 2019-05-20 | End: 2019-08-18 | Stop reason: SDUPTHER

## 2019-05-27 DIAGNOSIS — Z79.4 TYPE 2 DIABETES MELLITUS WITHOUT COMPLICATION, WITH LONG-TERM CURRENT USE OF INSULIN (HCC): ICD-10-CM

## 2019-05-27 DIAGNOSIS — I10 ESSENTIAL HYPERTENSION: ICD-10-CM

## 2019-05-27 DIAGNOSIS — E11.9 TYPE 2 DIABETES MELLITUS WITHOUT COMPLICATION, WITH LONG-TERM CURRENT USE OF INSULIN (HCC): ICD-10-CM

## 2019-05-27 RX ORDER — LOSARTAN POTASSIUM 25 MG/1
TABLET ORAL
Qty: 90 TAB | Refills: 3 | Status: SHIPPED | OUTPATIENT
Start: 2019-05-27 | End: 2020-07-28 | Stop reason: SDUPTHER

## 2019-06-03 RX ORDER — IRON POLYSACCHARIDE COMPLEX 150 MG
CAPSULE ORAL
Qty: 60 CAP | Refills: 5 | Status: SHIPPED | OUTPATIENT
Start: 2019-06-03 | End: 2022-01-01 | Stop reason: SDUPTHER

## 2019-08-05 RX ORDER — VALACYCLOVIR HYDROCHLORIDE 1 G/1
TABLET, FILM COATED ORAL
Qty: 30 TAB | Refills: 3 | Status: SHIPPED | OUTPATIENT
Start: 2019-08-05 | End: 2020-12-21 | Stop reason: SDUPTHER

## 2019-08-19 RX ORDER — LEVOTHYROXINE SODIUM 100 UG/1
TABLET ORAL
Qty: 90 TAB | Refills: 4 | Status: SHIPPED | OUTPATIENT
Start: 2019-08-19 | End: 2020-07-28 | Stop reason: SDUPTHER

## 2019-09-16 ENCOUNTER — OFFICE VISIT (OUTPATIENT)
Dept: FAMILY MEDICINE CLINIC | Age: 84
End: 2019-09-16

## 2019-09-16 ENCOUNTER — HOSPITAL ENCOUNTER (OUTPATIENT)
Dept: LAB | Age: 84
Discharge: HOME | End: 2019-09-16
Payer: MEDICARE

## 2019-09-16 VITALS
HEIGHT: 62 IN | HEART RATE: 64 BPM | WEIGHT: 128 LBS | SYSTOLIC BLOOD PRESSURE: 144 MMHG | RESPIRATION RATE: 18 BRPM | TEMPERATURE: 98.9 F | OXYGEN SATURATION: 97 % | BODY MASS INDEX: 23.55 KG/M2 | DIASTOLIC BLOOD PRESSURE: 88 MMHG

## 2019-09-16 DIAGNOSIS — E03.2 HYPOTHYROIDISM, IATROGENIC: ICD-10-CM

## 2019-09-16 DIAGNOSIS — I10 ESSENTIAL HYPERTENSION: ICD-10-CM

## 2019-09-16 DIAGNOSIS — E78.00 HYPERCHOLESTEREMIA: ICD-10-CM

## 2019-09-16 DIAGNOSIS — D64.9 ANEMIA, UNSPECIFIED TYPE: ICD-10-CM

## 2019-09-16 DIAGNOSIS — E11.9 TYPE 2 DIABETES MELLITUS WITHOUT COMPLICATION, WITHOUT LONG-TERM CURRENT USE OF INSULIN (HCC): Primary | ICD-10-CM

## 2019-09-16 DIAGNOSIS — K92.2 GASTROINTESTINAL HEMORRHAGE, UNSPECIFIED GASTROINTESTINAL HEMORRHAGE TYPE: ICD-10-CM

## 2019-09-16 PROCEDURE — 83036 HEMOGLOBIN GLYCOSYLATED A1C: CPT

## 2019-09-16 PROCEDURE — 80061 LIPID PANEL: CPT

## 2019-09-16 PROCEDURE — 85025 COMPLETE CBC W/AUTO DIFF WBC: CPT

## 2019-09-16 PROCEDURE — 80053 COMPREHEN METABOLIC PANEL: CPT

## 2019-09-16 PROCEDURE — 84443 ASSAY THYROID STIM HORMONE: CPT

## 2019-09-16 RX ORDER — ROSUVASTATIN CALCIUM 10 MG/1
10 TABLET, COATED ORAL
Qty: 90 TAB | Refills: 3 | Status: SHIPPED | OUTPATIENT
Start: 2019-09-16 | End: 2020-07-28 | Stop reason: SDUPTHER

## 2019-09-16 NOTE — PROGRESS NOTES
Patient here for med refill and fasting labs. 1. Have you been to the ER, urgent care clinic since your last visit? Hospitalized since your last visit? No    2. Have you seen or consulted any other health care providers outside of the [address] [place] [address] since your last visit? Include any pap smears or colon screening. No       Lab Results   Component Value Date/Time    Microalb/Creat ratio (ug/mg creat.) 40.1 (H) 04/24/2018 10:57 AM      Chief Complaint   Patient presents with    Labs     6 month fasting labs     she is a 80y.o. year old female who presents for evaluation. See Diabetic Report Card listed above. Patient Active Problem List    Diagnosis    Anemia    Type 2 diabetes mellitus without complication, without long-term current use of insulin ([facility]    Advance care planning    DDD (degenerative disc disease), lumbar    Gastric ulcer    Coronary artery disease involving native coronary artery of native heart without angina pectoris    GI bleeding     workup in [address] [place] [address] 12/15 was unremarkable (Dr. Sven Collet) - says she had EGD, colonoscopy, and small bowel capsule endoscopy      HTN (hypertension)    Hypothyroidism, iatrogenic     radioiodine      Hypercholesteremia       Reviewed PmHx, RxHx, FmHx, SocHx, AllgHx--dated and updated in the chart.     Review of Systems - negative except as listed above in the HPI    Objective:     Vitals:    09/16/19 1017 09/16/19 1046   BP: 200/88 144/88   Pulse: 64    Resp: 18    Temp: 98.9 °F (37.2 °C)    SpO2: 97%    Weight: 128 lb (58.1 kg)    Height: 5' 2\" (1.575 m)      Physical Examination: General appearance - alert, well appearing, and in no distress  Neck - supple, no significant adenopathy  Chest - clear to auscultation, no wheezes, rales or rhonchi, symmetric air entry  Heart - normal rate, regular rhythm, normal S1, S2, no murmurs, rubs, clicks or gallops  Abdomen - soft, nontender, nondistended, no masses or organomegaly  Extremities - peripheral pulses normal, no pedal edema, no clubbing or cyanosis    Assessment/ Plan:   Diagnoses and all orders for this visit:    1. Type 2 diabetes mellitus without complication, without long-term current use of insulin (HCC)  -     LIPID PANEL  -     METABOLIC PANEL, COMPREHENSIVE  -     CBC WITH AUTOMATED DIFF  -     HEMOGLOBIN A1C WITH EAG  -at goal    2. Essential hypertension  -     LIPID PANEL  -     METABOLIC PANEL, COMPREHENSIVE  -at goal for age    1. Hypercholesteremia  -     rosuvastatin (CRESTOR) 10 mg tablet; Take 1 Tab by mouth nightly. -     LIPID PANEL  -     METABOLIC PANEL, COMPREHENSIVE    4. Anemia, unspecified type  -     CBC WITH AUTOMATED DIFF    5. Hypothyroidism, iatrogenic  -     TSH 3RD GENERATION    6. Gastrointestinal hemorrhage, unspecified gastrointestinal hemorrhage type  -     CBC WITH AUTOMATED DIFF       Follow-up and Dispositions    · Return in about 6 months (around 3/16/2020). Lab Results   Component Value Date/Time    Cholesterol, total 133 03/18/2019 10:46 AM    HDL Cholesterol 58 03/18/2019 10:46 AM    LDL, calculated 60 03/18/2019 10:46 AM    Triglyceride 74 03/18/2019 10:46 AM     Lab Results   Component Value Date/Time    Hemoglobin A1c 5.6 03/18/2019 10:46 AM    Hemoglobin A1c 5.9 (H) 08/29/2018 10:37 AM    Hemoglobin A1c 5.5 04/24/2018 10:57 AM    Microalb/Creat ratio (ug/mg creat.) 40.1 (H) 04/24/2018 10:57 AM    LDL, calculated 60 03/18/2019 10:46 AM    Creatinine 0.43 (L) 03/18/2019 10:46 AM          Discussed with patient goal of Diabetes to include:  HgA1C <7, LDL cholesterol <100, Blood pressure <140/80. Discussed with patient diet and weight management and to get regular exercise. Recommend yearly eye exams and daily foot care. The patient understands and agrees with the plan. I have discussed the diagnosis with the patient and the intended plan as seen in the above orders.   The patient has received an after-visit summary and questions were answered concerning future plans. Medication Side Effects and Warnings were discussed with patient  Patient Labs were reviewed and or requested  Patient Past Records were reviewed and or requested    Sheela Rizo M.D. [facility]    There are no Patient Instructions on file for this visit.

## 2019-09-17 LAB
ALBUMIN SERPL-MCNC: 4.5 G/DL (ref 3.5–4.7)
ALBUMIN/GLOB SERPL: 2 {RATIO} (ref 1.2–2.2)
ALP SERPL-CCNC: 94 IU/L (ref 39–117)
ALT SERPL-CCNC: 15 IU/L (ref 0–32)
AST SERPL-CCNC: 21 IU/L (ref 0–40)
BASOPHILS # BLD AUTO: 0 X10E3/UL (ref 0–0.2)
BASOPHILS NFR BLD AUTO: 1 %
BILIRUB SERPL-MCNC: 0.5 MG/DL (ref 0–1.2)
BUN SERPL-MCNC: 8 MG/DL (ref 8–27)
BUN/CREAT SERPL: 20 (ref 12–28)
CALCIUM SERPL-MCNC: 9.8 MG/DL (ref 8.7–10.3)
CHLORIDE SERPL-SCNC: 96 MMOL/L (ref 96–106)
CHOLEST SERPL-MCNC: 144 MG/DL (ref 100–199)
CO2 SERPL-SCNC: 23 MMOL/L (ref 20–29)
CREAT SERPL-MCNC: 0.41 MG/DL (ref 0.57–1)
EOSINOPHIL # BLD AUTO: 0.1 X10E3/UL (ref 0–0.4)
EOSINOPHIL NFR BLD AUTO: 2 %
ERYTHROCYTE [DISTWIDTH] IN BLOOD BY AUTOMATED COUNT: 12.2 % (ref 12.3–15.4)
EST. AVERAGE GLUCOSE BLD GHB EST-MCNC: 117 MG/DL
GLOBULIN SER CALC-MCNC: 2.3 G/DL (ref 1.5–4.5)
GLUCOSE SERPL-MCNC: 119 MG/DL (ref 65–99)
HBA1C MFR BLD: 5.7 % (ref 4.8–5.6)
HCT VFR BLD AUTO: 38.7 % (ref 34–46.6)
HDLC SERPL-MCNC: 67 MG/DL
HGB BLD-MCNC: 12.9 G/DL (ref 11.1–15.9)
IMM GRANULOCYTES # BLD AUTO: 0 X10E3/UL (ref 0–0.1)
IMM GRANULOCYTES NFR BLD AUTO: 0 %
INTERPRETATION, 910389: NORMAL
LDLC SERPL CALC-MCNC: 64 MG/DL (ref 0–99)
LYMPHOCYTES # BLD AUTO: 0.7 X10E3/UL (ref 0.7–3.1)
LYMPHOCYTES NFR BLD AUTO: 18 %
Lab: NORMAL
MCH RBC QN AUTO: 32 PG (ref 26.6–33)
MCHC RBC AUTO-ENTMCNC: 33.3 G/DL (ref 31.5–35.7)
MCV RBC AUTO: 96 FL (ref 79–97)
MONOCYTES # BLD AUTO: 0.5 X10E3/UL (ref 0.1–0.9)
MONOCYTES NFR BLD AUTO: 13 %
NEUTROPHILS # BLD AUTO: 2.4 X10E3/UL (ref 1.4–7)
NEUTROPHILS NFR BLD AUTO: 66 %
PLATELET # BLD AUTO: 383 X10E3/UL (ref 150–450)
POTASSIUM SERPL-SCNC: 4.3 MMOL/L (ref 3.5–5.2)
PROT SERPL-MCNC: 6.8 G/DL (ref 6–8.5)
RBC # BLD AUTO: 4.03 X10E6/UL (ref 3.77–5.28)
SODIUM SERPL-SCNC: 137 MMOL/L (ref 134–144)
TRIGL SERPL-MCNC: 63 MG/DL (ref 0–149)
TSH SERPL DL<=0.005 MIU/L-ACNC: 0.54 UIU/ML (ref 0.45–4.5)
VLDLC SERPL CALC-MCNC: 13 MG/DL (ref 5–40)
WBC # BLD AUTO: 3.7 X10E3/UL (ref 3.4–10.8)

## 2019-09-17 NOTE — PROGRESS NOTES
After reviewing your labs, I believe they are within normal  limits for your age. Keep working hard on diet and taking your medications that are prescribed. If you have any acute care needs and are having trouble getting an appointment. .. please send me a   [facility] message or have the  send me a message. Have a blessed day and  be kind  to others! If you have any questions, feel free to email thru [facility], or give us   a call back at [phone]. Rose Yanes M.D.   Good Help to Those in Need  \"You maybe whatever you resolve to be\"

## 2020-05-07 DIAGNOSIS — I10 ESSENTIAL HYPERTENSION: ICD-10-CM

## 2020-05-08 RX ORDER — METOPROLOL SUCCINATE 50 MG/1
TABLET, EXTENDED RELEASE ORAL
Qty: 90 TAB | Refills: 3 | Status: SHIPPED | OUTPATIENT
Start: 2020-05-08 | End: 2020-07-28 | Stop reason: SDUPTHER

## 2020-06-12 ENCOUNTER — HOSPITAL ENCOUNTER (EMERGENCY)
Age: 85
Discharge: LEFT BEFORE DISCHARGE | End: 2020-06-12
Payer: MEDICARE

## 2020-06-12 ENCOUNTER — TELEPHONE (OUTPATIENT)
Dept: FAMILY MEDICINE CLINIC | Age: 85
End: 2020-06-12

## 2020-06-12 VITALS
HEART RATE: 89 BPM | BODY MASS INDEX: 23.92 KG/M2 | RESPIRATION RATE: 16 BRPM | DIASTOLIC BLOOD PRESSURE: 80 MMHG | SYSTOLIC BLOOD PRESSURE: 187 MMHG | HEIGHT: 62 IN | WEIGHT: 130 LBS | OXYGEN SATURATION: 97 % | TEMPERATURE: 98.5 F

## 2020-06-12 LAB
GLUCOSE BLD STRIP.AUTO-MCNC: 111 MG/DL (ref 65–100)
SERVICE CMNT-IMP: ABNORMAL

## 2020-06-12 PROCEDURE — 82962 GLUCOSE BLOOD TEST: CPT

## 2020-06-12 PROCEDURE — 75810000275 HC EMERGENCY DEPT VISIT NO LEVEL OF CARE

## 2020-06-12 NOTE — ED NOTES
Patients family/caregiver reports the patient feels better so she is going to take her home. Patient reports she will return if she develops any worsening symptoms and that she will follow up with her PCP.

## 2020-06-12 NOTE — TELEPHONE ENCOUNTER
Patient is saying sugar is running high. She is having night sweats but doesn't have a fever.  Patient's daughter's/Amalia phone: [phone]

## 2020-06-12 NOTE — ED TRIAGE NOTES
Patient reports she checked her blood sugar last night and got a reading of 212. Reports she checked it again this morning and it was 180. Patient reports she has never had a glucose reading that high and she was worried so she presents to the ED for evaluation. Patient is well appearing and in no distress.

## 2020-06-12 NOTE — TELEPHONE ENCOUNTER
PT daughter called again- states she is not in [place], but her mother states her BS is high & her BP is 180/100's- advised to have some take pt to [facility]- voiced understanding & states will try to get pt sister to take pt.

## 2020-07-28 ENCOUNTER — HOSPITAL ENCOUNTER (OUTPATIENT)
Dept: LAB | Age: 85
Discharge: HOME | End: 2020-07-28

## 2020-07-28 ENCOUNTER — OFFICE VISIT (OUTPATIENT)
Dept: FAMILY MEDICINE CLINIC | Age: 85
End: 2020-07-28

## 2020-07-28 VITALS
HEART RATE: 69 BPM | TEMPERATURE: 98.7 F | DIASTOLIC BLOOD PRESSURE: 98 MMHG | RESPIRATION RATE: 16 BRPM | WEIGHT: 124.2 LBS | OXYGEN SATURATION: 96 % | BODY MASS INDEX: 22.86 KG/M2 | HEIGHT: 62 IN | SYSTOLIC BLOOD PRESSURE: 191 MMHG

## 2020-07-28 DIAGNOSIS — E11.9 TYPE 2 DIABETES MELLITUS WITHOUT COMPLICATION, WITHOUT LONG-TERM CURRENT USE OF INSULIN (HCC): ICD-10-CM

## 2020-07-28 DIAGNOSIS — D64.9 ANEMIA, UNSPECIFIED TYPE: ICD-10-CM

## 2020-07-28 DIAGNOSIS — H40.9 GLAUCOMA OF BOTH EYES, UNSPECIFIED GLAUCOMA TYPE: ICD-10-CM

## 2020-07-28 DIAGNOSIS — Z71.89 ADVANCED DIRECTIVES, COUNSELING/DISCUSSION: ICD-10-CM

## 2020-07-28 DIAGNOSIS — E03.2 HYPOTHYROIDISM, IATROGENIC: ICD-10-CM

## 2020-07-28 DIAGNOSIS — E78.00 HYPERCHOLESTEREMIA: ICD-10-CM

## 2020-07-28 DIAGNOSIS — Z00.00 MEDICARE ANNUAL WELLNESS VISIT, SUBSEQUENT: Primary | ICD-10-CM

## 2020-07-28 DIAGNOSIS — I10 ESSENTIAL HYPERTENSION: ICD-10-CM

## 2020-07-28 DIAGNOSIS — Z13.31 SCREENING FOR DEPRESSION: ICD-10-CM

## 2020-07-28 DIAGNOSIS — Z13.39 SCREENING FOR ALCOHOLISM: ICD-10-CM

## 2020-07-28 LAB
ALBUMIN SERPL-MCNC: 3.9 G/DL (ref 3.5–5)
ALBUMIN/GLOB SERPL: 1.2 {RATIO} (ref 1.1–2.2)
ALP SERPL-CCNC: 85 U/L (ref 45–117)
ALT SERPL-CCNC: 21 U/L (ref 12–78)
ANION GAP SERPL CALC-SCNC: 8 MMOL/L (ref 5–15)
AST SERPL-CCNC: 17 U/L (ref 15–37)
BILIRUB SERPL-MCNC: 0.4 MG/DL (ref 0.2–1)
BUN SERPL-MCNC: 11 MG/DL (ref 6–20)
BUN/CREAT SERPL: 24 (ref 12–20)
CALCIUM SERPL-MCNC: 9 MG/DL (ref 8.5–10.1)
CHLORIDE SERPL-SCNC: 105 MMOL/L (ref 97–108)
CHOLEST SERPL-MCNC: 153 MG/DL
CO2 SERPL-SCNC: 25 MMOL/L (ref 21–32)
CREAT SERPL-MCNC: 0.46 MG/DL (ref 0.55–1.02)
CREAT UR-MCNC: 69.6 MG/DL
ERYTHROCYTE [DISTWIDTH] IN BLOOD BY AUTOMATED COUNT: 14.4 % (ref 11.5–14.5)
EST. AVERAGE GLUCOSE BLD GHB EST-MCNC: 120 MG/DL
GLOBULIN SER CALC-MCNC: 3.2 G/DL (ref 2–4)
GLUCOSE SERPL-MCNC: 113 MG/DL (ref 65–100)
HBA1C MFR BLD: 5.8 % (ref 4–5.6)
HCT VFR BLD AUTO: 38.7 % (ref 35–47)
HDLC SERPL-MCNC: 72 MG/DL
HDLC SERPL: 2.1 {RATIO} (ref 0–5)
HGB BLD-MCNC: 12 G/DL (ref 11.5–16)
LDLC SERPL CALC-MCNC: 67 MG/DL (ref 0–100)
LIPID PROFILE,FLP: NORMAL
MCH RBC QN AUTO: 31.1 PG (ref 26–34)
MCHC RBC AUTO-ENTMCNC: 31 G/DL (ref 30–36.5)
MCV RBC AUTO: 100.3 FL (ref 80–99)
MICROALBUMIN UR-MCNC: 6.18 MG/DL
MICROALBUMIN/CREAT UR-RTO: 89 MG/G (ref 0–30)
NRBC # BLD: 0 K/UL (ref 0–0.01)
NRBC BLD-RTO: 0 PER 100 WBC
PLATELET # BLD AUTO: 328 K/UL (ref 150–400)
PMV BLD AUTO: 9.5 FL (ref 8.9–12.9)
POTASSIUM SERPL-SCNC: 3.8 MMOL/L (ref 3.5–5.1)
PROT SERPL-MCNC: 7.1 G/DL (ref 6.4–8.2)
RBC # BLD AUTO: 3.86 M/UL (ref 3.8–5.2)
SODIUM SERPL-SCNC: 138 MMOL/L (ref 136–145)
TRIGL SERPL-MCNC: 70 MG/DL (ref ?–150)
TSH SERPL DL<=0.05 MIU/L-ACNC: 0.24 UIU/ML (ref 0.36–3.74)
VLDLC SERPL CALC-MCNC: 14 MG/DL
WBC # BLD AUTO: 4.5 K/UL (ref 3.6–11)

## 2020-07-28 RX ORDER — METOPROLOL SUCCINATE 50 MG/1
50 TABLET, EXTENDED RELEASE ORAL DAILY
Qty: 30 TAB | Refills: 1 | Status: SHIPPED | OUTPATIENT
Start: 2020-07-28 | End: 2020-10-30

## 2020-07-28 RX ORDER — LEVOTHYROXINE SODIUM 100 UG/1
100 TABLET ORAL
Qty: 90 TAB | Refills: 1 | Status: SHIPPED | OUTPATIENT
Start: 2020-07-28 | End: 2020-09-06

## 2020-07-28 RX ORDER — AMLODIPINE BESYLATE 5 MG/1
5 TABLET ORAL DAILY
Qty: 90 TAB | Refills: 1 | Status: SHIPPED | OUTPATIENT
Start: 2020-07-28 | End: 2020-07-28 | Stop reason: SDUPTHER

## 2020-07-28 RX ORDER — METOPROLOL SUCCINATE 50 MG/1
50 TABLET, EXTENDED RELEASE ORAL DAILY
Qty: 90 TAB | Refills: 1 | Status: SHIPPED | OUTPATIENT
Start: 2020-07-28 | End: 2020-07-28 | Stop reason: SDUPTHER

## 2020-07-28 RX ORDER — AMLODIPINE BESYLATE 5 MG/1
5 TABLET ORAL DAILY
Qty: 30 TAB | Refills: 1 | Status: SHIPPED | OUTPATIENT
Start: 2020-07-28 | End: 2020-10-30

## 2020-07-28 RX ORDER — LOSARTAN POTASSIUM 25 MG/1
25 TABLET ORAL DAILY
Qty: 90 TAB | Refills: 1 | Status: SHIPPED | OUTPATIENT
Start: 2020-07-28 | End: 2020-07-28 | Stop reason: SDUPTHER

## 2020-07-28 RX ORDER — LOSARTAN POTASSIUM 25 MG/1
25 TABLET ORAL DAILY
Qty: 30 TAB | Refills: 1 | Status: SHIPPED | OUTPATIENT
Start: 2020-07-28 | End: 2020-10-30

## 2020-07-28 RX ORDER — ROSUVASTATIN CALCIUM 10 MG/1
10 TABLET, COATED ORAL
Qty: 90 TAB | Refills: 1 | Status: SHIPPED | OUTPATIENT
Start: 2020-07-28 | End: 2020-12-01 | Stop reason: SDUPTHER

## 2020-07-28 NOTE — ACP (ADVANCE CARE PLANNING)
Advance Care Planning     Advance Care Planning (ACP) Provider Conversation Snapshot     Date of ACP Conversation:   Persons included in Conversation:  patient  Length of ACP Conversation in minutes:  <16 minutes (Non-Billable)     Authorized Decision Maker (if patient is incapable of making informed decisions): This person is: Other Legally Authorized Decision Maker (e.g. Next of Kin)                                                       For Patients with Decision Making Capacity:   Values/Goals: Exploration of values, goals, and preferences if recovery is not expected, even with continued medical treatment in the event of:  Imminent death  Severe, permanent brain injury  \"In these circumstances, what matters most to you? \"  Care focused more on comfort or quality of life. Conversation Outcomes / Follow-Up Plan:   Recommended completion of Advance Directive form after review of ACP materials and conversation with prospective healthcare agent   Recommended communicating the plan and making copies for the healthcare agent, personal physician, and others as appropriate (e.g., health system)  Recommended review of completed ACP document annually or upon change in health status      Information and blank forms given for review and completion. Will f/u on status at next visit.     Cas Arora NP  07/28/20

## 2020-07-28 NOTE — PROGRESS NOTES
Subjective: Jonas Urias is a 80 y.o. female seen for follow up of diabetes, hyperlipidemia, hypertension, and hypothyroidism. Accompanied by daughter, history obtained from patient and daughter. Diabetic Review of Systems - diabetic diet compliance: compliant all of the time, home glucose monitoring: is not performed, further diabetic ROS: no polyuria or polydipsia, no chest pain, dyspnea or TIA's, no numbness, tingling or pain in extremities, no unusual visual symptoms, weight has decreased, acute symptoms are none. Cardiovascular risk analysis - LDL goal is under 70  diabetic  hypertension  hyperlipidemia. Compliance with treatment thus far has been good. ROS: taking medications as instructed, no medication side effects noted. She has run out of her blood pressure medications, no medication for the past 5 days. Diet and Lifestyle: generally follows a low fat low cholesterol diet, generally follows a low sodium diet, sedentary, nonsmoker  Home BP Monitoring: is not measured at home    Cardiovascular ROS: taking medications as instructed, not taking medications regularly as instructed, no medication side effects noted, no TIA's, no chest pain on exertion, no dyspnea on exertion, no swelling of ankles, no orthostatic dizziness or lightheadedness, no orthopnea or paroxysmal nocturnal dyspnea, no palpitations, no intermittent claudication symptoms. Good compliance with levothyroxine, no new symptoms, tolerating treatment well, no apparent side effects. Other symptoms and concerns: current being treated for bilateral glaucoma with drops, had ophtho f/u last week.     Patient Active Problem List   Diagnosis Code    HTN (hypertension) I10    Hypothyroidism, iatrogenic E03.2    Hypercholesteremia E78.00    GI bleeding K92.2    Coronary artery disease involving native coronary artery of native heart without angina pectoris I25.10    DDD (degenerative disc disease), lumbar M51.36  Gastric ulcer K25.9    Advance care planning Z71.89    Type 2 diabetes mellitus without complication, without long-term current use of insulin (HCC) E11.9    Anemia D64.9     Allergies   Allergen Reactions    Celebrex [Celecoxib] Hives    Crestor [Rosuvastatin] Myalgia     Past Medical History:   Diagnosis Date    Diabetes ([facility]     GI bleeding     workup in [address] [place] [address] 12/15 was unremarkable (Dr. Fernando Beltrán) - says she had EGD, colonoscopy, and small bowel capsule endoscopy    HTN (hypertension)     Hypercholesteremia     Hypothyroidism, iatrogenic     radioiodine    MI (myocardial infarction) ([facility]     During surgery in 1970's - she's had multiple caths and stress tests     History reviewed. No pertinent surgical history. Family History   Problem Relation Age of Onset    Heart Failure Mother      Social History     Tobacco Use    Smoking status: Never Smoker    Smokeless tobacco: Never Used   Substance Use Topics    Alcohol use: Yes     Alcohol/week: 0.0 standard drinks        Lab Results   Component Value Date/Time    WBC 3.7 09/16/2019 10:48 AM    HGB 12.9 09/16/2019 10:48 AM    HCT 38.7 09/16/2019 10:48 AM    PLATELET 543 04/26/1608 10:48 AM    MCV 96 09/16/2019 10:48 AM     Lab Results   Component Value Date/Time    Hemoglobin A1c 5.7 (H) 09/16/2019 10:48 AM    Hemoglobin A1c 5.6 03/18/2019 10:46 AM    Hemoglobin A1c 5.9 (H) 08/29/2018 10:37 AM    Glucose 119 (H) 09/16/2019 10:48 AM    Glucose (POC) 111 (H) 06/12/2020 04:23 PM    Microalb/Creat ratio (ug/mg creat.) 40.1 (H) 04/24/2018 10:57 AM    LDL, calculated 64 09/16/2019 10:48 AM    Creatinine 0.41 (L) 09/16/2019 10:48 AM      Lab Results   Component Value Date/Time    Cholesterol, total 144 09/16/2019 10:48 AM    HDL Cholesterol 67 09/16/2019 10:48 AM    LDL, calculated 64 09/16/2019 10:48 AM    Triglyceride 63 09/16/2019 10:48 AM     Lab Results   Component Value Date/Time    ALT (SGPT) 15 09/16/2019 10:48 AM    Alk.  phosphatase 94 09/16/2019 10:48 AM    Bilirubin, total 0.5 09/16/2019 10:48 AM    Albumin 4.5 09/16/2019 10:48 AM    Protein, total 6.8 09/16/2019 10:48 AM    PLATELET 895 53/90/3030 10:48 AM     Lab Results   Component Value Date/Time    GFR est non-AA 95 09/16/2019 10:48 AM    GFR est  09/16/2019 10:48 AM    Creatinine 0.41 (L) 09/16/2019 10:48 AM    BUN 8 09/16/2019 10:48 AM    Sodium 137 09/16/2019 10:48 AM    Potassium 4.3 09/16/2019 10:48 AM    Chloride 96 09/16/2019 10:48 AM    CO2 23 09/16/2019 10:48 AM     Lab Results   Component Value Date/Time    TSH 0.538 09/16/2019 10:48 AM         Review of Systems  A comprehensive review of systems was negative except for that written in the HPI.     Objective:     Visit Vitals  BP (!) 191/98 (BP 1 Location: Right arm, BP Patient Position: Sitting)   Pulse 69   Temp 98.7 °F (37.1 °C) (Oral)   Resp 16   Ht 5' 2\" (1.575 m)   Wt 124 lb 3.2 oz (56.3 kg)   SpO2 96%   BMI 22.72 kg/m²     Physical Examination: General appearance - alert, well appearing, and in no distress, oriented to person, place, and time, normal appearing weight and well hydrated  Mental status - normal mood, behavior, speech, dress, motor activity, and thought processes  Eyes - sclera anicteric, eyes appear slightly red and cloudy  Neck - supple, no significant adenopathy, thyroid exam: thyroid is normal in size without nodules or tenderness  Chest - clear to auscultation, no wheezes, rales or rhonchi, symmetric air entry  Heart - normal rate, regular rhythm, normal S1, S2, no murmurs, rubs, clicks or gallops, normal bilateral carotid upstroke without bruits, no JVD  Abdomen - soft, nontender, nondistended, no masses or organomegaly  bowel sounds normal  Neurological - alert, oriented, normal speech, no focal findings or movement disorder noted  Extremities - no pedal edema noted, intact peripheral pulses  Skin - normal coloration and turgor, no rashes, no suspicious skin lesions noted    Diabetic foot exam:     Left Foot:   Visual Exam: normal    Pulse DP: 2+ (normal)   Filament test: reduced sensation        Right Foot:   Visual Exam: normal    Pulse DP: 2+ (normal)   Filament test: reduced sensation        Assessment/Plan:     Diabetic issues reviewed with her: low cholesterol diet, weight control and daily exercise discussed, all medications, side effects and compliance discussed carefully, foot care discussed and Podiatry visits discussed, annual eye examinations at Ophthalmology discussed, glycohemoglobin and other lab monitoring discussed and long term diabetic complications discussed. Diagnoses and all orders for this visit:    1. Essential hypertension  Well above goal but out of meds  asymptomatic  Resume meds  Low sodium diet  F/u in 4 weeks for repeat BP  -     amLODIPine (NORVASC) 5 mg tablet; Take 1 Tab by mouth daily. -     metoprolol succinate (TOPROL-XL) 50 mg XL tablet; Take 1 Tab by mouth daily. -     losartan (COZAAR) 25 mg tablet; Take 1 Tab by mouth daily. 2. Type 2 diabetes mellitus without complication, without long-term current use of insulin ([address]  At goal at last check  Encouraged continued compliance with diet recommendations  rpeat a1c today  Diabetic foot exam completed today  Obtain record of diabetic eye exam  -     HEMOGLOBIN A1C WITH EAG; Future  -     MICROALBUMIN, UR, RAND W/ MICROALB/CREAT RATIO; Future  -      DIABETES FOOT EXAM    3. Hypercholesteremia  At goal  TLC Diet:   Saturated fat <7% of calories, cholesterol <200 mg/day   Consider increased viscous (soluble) fiber (10-25 g/day) and plant stanols/sterols  (2g/day) as therapeutic options to enhance LDL lowering  Weight management  Increased physical activity. Continue rosuvastatin  Repeat fasting lipids  -     METABOLIC PANEL, COMPREHENSIVE; Future  -     LIPID PANEL; Future  -     rosuvastatin (Crestor) 10 mg tablet; Take 1 Tab by mouth nightly.     4. Anemia, unspecified type  Chronic  Continue iron  Repeat CBC  -     CBC W/O DIFF; Future    5. Hypothyroidism, iatrogenic  No new symptoms  Check TSH  Continue current dose levothyroxine pending results  -     TSH 3RD GENERATION; Future  -     levothyroxine (SYNTHROID) 100 mcg tablet; Take 1 Tab by mouth Daily (before breakfast). 6. Glaucoma of both eyes, unspecified glaucoma type  F/u with specialist as scheduled  Encouraged improved compliance with prescribed gtts    Follow-up and Dispositions    · Return in about 4 weeks (around 8/25/2020) for blood pressure. I have discussed the diagnosis with the patient and the intended plan as seen in the above orders. The patient has received an after-visit summary and questions were answered concerning future plans. Patient conveyed understanding of the plan at the time of the visit.     Juan C Campos NP  07/28/20

## 2020-07-28 NOTE — PROGRESS NOTES
This is the Subsequent Medicare Annual Wellness Exam, performed 12 months or more after the Initial AWV or the last Subsequent AWV    I have reviewed the patient's medical history in detail and updated the computerized patient record. History     Accompanied by daughter today. Patient Active Problem List   Diagnosis Code    HTN (hypertension) I10    Hypothyroidism, iatrogenic E03.2    Hypercholesteremia E78.00    GI bleeding K92.2    Coronary artery disease involving native coronary artery of native heart without angina pectoris I25.10    DDD (degenerative disc disease), lumbar M51.36    Gastric ulcer K25.9    Advance care planning Z71.89    Type 2 diabetes mellitus without complication, without long-term current use of insulin (HCC) E11.9    Anemia D64.9     Past Medical History:   Diagnosis Date    Diabetes ([facility]     GI bleeding     workup in [address] [place] [address] 12/15 was unremarkable (Dr. Supa Cobb) - says she had EGD, colonoscopy, and small bowel capsule endoscopy    HTN (hypertension)     Hypercholesteremia     Hypothyroidism, iatrogenic     radioiodine    MI (myocardial infarction) ([facility]     During surgery in 1970's - she's had multiple caths and stress tests      History reviewed. No pertinent surgical history. Current Outpatient Medications   Medication Sig Dispense Refill    metoprolol succinate (TOPROL-XL) 50 mg XL tablet TAKE 1 TABLET DAILY 90 Tab 3    rosuvastatin (CRESTOR) 10 mg tablet Take 1 Tab by mouth nightly.  90 Tab 3    levothyroxine (SYNTHROID) 100 mcg tablet TAKE 1 TABLET DAILY BEFORE BREAKFAST 90 Tab 4    valACYclovir (VALTREX) 1 gram tablet TAKE 1 TABLET DAILY (Patient taking differently: daily as needed.) 30 Tab 3    NU-IRON 150 mg iron capsule TAKE 1 CAPSULE TWICE A DAY 60 Cap 5    losartan (COZAAR) 25 mg tablet TAKE 1 TABLET NIGHTLY 90 Tab 3    amLODIPine (NORVASC) 5 mg tablet TAKE 1 TABLET DAILY 90 Tab 3    vit C/E/Zn/coppr/lutein/zeaxan (PRESERVISION AREDS 2 PO) Take  by mouth.      OTHER Walker with seat  DX: DDD lumbar 1 Each 0    latanoprost (XALATAN) 0.005 % ophthalmic solution       naproxen sodium 220 mg cap Take  by mouth every other day.  cholecalciferol, vitamin D3, (VITAMIN D3) 2,000 unit tab Take  by mouth.  krill-om3-dha-epa-om6-lip-astx (KRILL OIL, OMEGA 3 & 6,) 1,500-165-67.5 mg cap Take  by mouth.  MULTIVITAMIN (MULTIPLE VITAMIN PO) Take  by mouth.  aspirin 81 mg chewable tablet Take 1 Tab by mouth daily. 30 Tab 0    pantoprazole (PROTONIX) 40 mg tablet Take 1 Tab by mouth daily. 90 Tab 3     Allergies   Allergen Reactions    Celebrex [Celecoxib] Hives    Crestor [Rosuvastatin] Myalgia       Family History   Problem Relation Age of Onset    Heart Failure Mother      Social History     Tobacco Use    Smoking status: Never Smoker    Smokeless tobacco: Never Used   Substance Use Topics    Alcohol use: Yes     Alcohol/week: 0.0 standard drinks       Depression Risk Factor Screening:     3 most recent PHQ Screens 7/28/2020   Little interest or pleasure in doing things Not at all   Feeling down, depressed, irritable, or hopeless Not at all   Total Score PHQ 2 0       Alcohol Risk Factor Screening:   Do you average 1 drink per night or more than 7 drinks a week:  No    On any one occasion in the past three months have you have had more than 3 drinks containing alcohol:  No      Functional Ability and Level of Safety:   Hearing: The patient wears hearing aids. Activities of Daily Living: The home contains: handrails  Patient does total self care   patient lives alone. Daughter lives in [place] and gives assistance as needed. Ambulation: with no difficulty     Fall Risk:  Fall Risk Assessment, last 12 mths 7/28/2020   Able to walk? Yes   Fall in past 12 months?  No   Fall with injury? -   Number of falls in past 12 months -   Fall Risk Score -     Abuse Screen:  Patient is not abused       Cognitive Screening   Has your family/caregiver stated any concerns about your memory: no         Patient Care Team   Patient Care Team:  Edmond Mckeon MD as PCP - General (Family Medicine)  Edmond Mckeon MD as PCP - [facility] Empaneled Provider  Gerald Zamudio MD (Cardiology)  Crow Chin MD (Gastroenterology)    Assessment/Plan   Education and counseling provided:  Are appropriate based on today's review and evaluation  End-of-Life planning (with patient's consent)  Influenza Vaccine  Tdap, Shingrix    Diagnoses and all orders for this visit:    1. Medicare annual wellness visit, subsequent  Recommend flu vaccine in October  Patient believes she had pneumonia vaccine about 5 years ago with previous PCP- will obtain records. 2. Advanced directives, counseling/discussion  See acp note    3. Screening for alcoholism  Negative screening  -     [place] ANNUAL ALCOHOL SCREEN 15 MIN    4. Screening for depression  Negative screening  -     [place] Maintenance Due   Topic Date Due    DTaP/Tdap/Td series (1 - Tdap) 02/15/1956    Shingrix Vaccine Age 50> (1 of 2) 02/15/1985    Pneumococcal 65+ years (1 of 1 - PPSV23) 02/15/2000    Foot Exam Q1  04/24/2018    MICROALBUMIN Q1  04/24/2019    Medicare Yearly Exam  04/25/2019    Eye Exam Retinal or Dilated  07/09/2020     Follow up: next AWV recommended in 12 months. I have discussed the diagnosis with the patient and the intended plan as seen in the above orders. The patient has received an after-visit summary and questions were answered concerning future plans. Patient conveyed understanding of the plan at the time of the visit.     Rashmi Chow NP  07/28/20

## 2020-07-28 NOTE — PATIENT INSTRUCTIONS
Medicare Wellness Visit, Female The best way to live healthy is to have a lifestyle where you eat a well-balanced diet, exercise regularly, limit alcohol use, and quit all forms of tobacco/nicotine, if applicable. Regular preventive services are another way to keep healthy. Preventive services (vaccines, screening tests, monitoring & exams) can help personalize your care plan, which helps you manage your own care. Screening tests can find health problems at the earliest stages, when they are easiest to treat. [facility] follows the current, evidence-based guidelines published by the [facility] ([facility]) when recommending preventive services for our patients. Because we follow these guidelines, sometimes recommendations change over time as research supports it. (For example, mammograms used to be recommended annually. Even though Medicare will still pay for an annual mammogram, the newer guidelines recommend a mammogram every two years for women of average risk). Of course, you and your doctor may decide to screen more often for some diseases, based on your risk and your co-morbidities (chronic disease you are already diagnosed with). Preventive services for you include: - Medicare offers their members a free annual wellness visit, which is time for you and your primary care provider to discuss and plan for your preventive service needs. Take advantage of this benefit every year! 
-All adults over the age of 72 should receive the recommended pneumonia vaccines. Current USPSTF guidelines recommend a series of two vaccines for the best pneumonia protection.  
-All adults should have a flu vaccine yearly and a tetanus vaccine every 10 years.  
-All adults age 48 and older should receive the shingles vaccines (series of two vaccines). -All adults age 38-68 who are overweight should have a diabetes screening test once every three years. -All adults born between [birth year] and 1965 should be screened once for Hepatitis C. 
-Other screening tests and preventive services for persons with diabetes include: an eye exam to screen for diabetic retinopathy, a kidney function test, a foot exam, and stricter control over your cholesterol.  
-Cardiovascular screening for adults with routine risk involves an electrocardiogram (ECG) at intervals determined by your doctor.  
-Colorectal cancer screenings should be done for adults age 54-65 with no increased risk factors for colorectal cancer. There are a number of acceptable methods of screening for this type of cancer. Each test has its own benefits and drawbacks. Discuss with your doctor what is most appropriate for you during your annual wellness visit. The different tests include: colonoscopy (considered the best screening method), a fecal occult blood test, a fecal DNA test, and sigmoidoscopy. 
 
-A bone mass density test is recommended when a woman turns 65 to screen for osteoporosis. This test is only recommended one time, as a screening. Some providers will use this same test as a disease monitoring tool if you already have osteoporosis. -Breast cancer screenings are recommended every other year for women of normal risk, age 54-69. 
-Cervical cancer screenings for women over age 72 are only recommended with certain risk factors. Here is a list of your current Health Maintenance items (your personalized list of preventive services) with a due date: 
Health Maintenance Due Topic Date Due  
 DTaP/Tdap/Td  (1 - Tdap) 02/[birth year]/1956  Shingles Vaccine (1 of 2) 02/15/1985  Pneumococcal Vaccine (1 of 1 - PPSV23) 02/15/2000 [address] [place] [address] Diabetic Foot Care  04/24/2018  Albumin Urine Test  04/24/2019 [address] [place] [address] Annual Well Visit  04/25/2019 [address] [place] [address] Eye Exam  07/09/2020 Nutrition Tips for Diabetes: After Your Visit Your Care Instructions A healthy diet is important to manage diabetes.  It helps you lose weight (if you need to) and keep it off. It gives you the nutrition and energy your body needs and helps prevent heart disease. But a diet for diabetes does not mean that you have to eat special foods. You can eat what your family eats, including occasional sweets and other favorites. But you do have to pay attention to how often you eat and how much you eat of certain foods. The right plan for you will give you meals that help you keep your blood sugar at healthy levels. Try to eat a variety of foods and to spread carbohydrate throughout the day. Carbohydrate raises blood sugar higher and more quickly than any other nutrient does. Carbohydrate is found in sugar, breads and cereals, fruit, starchy vegetables such as potatoes and corn, and milk and yogurt. You may want to work with a dietitian or diabetes educator to help you plan meals and snacks. A dietitian or diabetes educator also can help you lose weight if that is one of your goals. The following tips can help you enjoy your meals and stay healthy. Follow-up care is a key part of your treatment and safety. Be sure to make and go to all appointments, and call your doctor if you are having problems. Its also a good idea to know your test results and keep a list of the medicines you take. How can you care for yourself at home? · Learn which foods have carbohydrate and how much carbohydrate to eat. A dietitian or diabetes educator can help you learn to keep track of how much carbohydrate you eat. · Spread carbohydrate throughout the day. Eat some carbohydrate at all meals, but do not eat too much at any one time. · Plan meals to include food from all the food groups. These are the food groups and some example portion sizes: ¨ Grains: 1 slice of bread (1 ounce), ½ cup of cooked cereal, and 1/3 cup of cooked pasta or rice. These have about 15 grams of carbohydrate in a serving.  Choose whole grains such as whole wheat bread or crackers, oatmeal, and brown rice more often than refined grains. ¨ Fruit: 1 small fresh fruit, such as an apple or orange; ½ of a banana; ½ cup of chopped, cooked, or canned fruit; ½ cup of fruit juice; 1 cup of melon or raspberries; and 2 tablespoons of dried fruit. These have about 15 grams of carbohydrate in a serving. ¨ Dairy: 1 cup of nonfat or low-fat milk and 2/3 cup of plain yogurt. These have about 15 grams of carbohydrate in a serving. ¨ Protein foods: Beef, chicken, turkey, fish, eggs, tofu, cheese, cottage cheese, and peanut butter. A serving size of meat is 3 ounces, which is about the size of a deck of cards. Examples of meat substitute serving sizes (equal to 1 ounce of meat) are 1/4 cup of cottage cheese, 1 egg, 1 tablespoon of peanut butter, and ½ cup of tofu. These have very little or no carbohydrate per serving. ¨ Vegetables: Starchy vegetables such as ½ cup of cooked dried beans, peas, potatoes, or corn have about 15 grams of carbohydrate. Nonstarchy vegetables have very little carbohydrate, such as 1 cup of raw leafy vegetables (such as spinach), ½ cup of other vegetables (cooked or chopped), and 3/4 cup of vegetable juice. · Use the plate format to plan meals. It is a good, quick way to make sure that you have a balanced meal. It also helps you spread carbohydrate throughout the day. You divide your plate by types of foods. Put vegetables on half the plate, meat or meat substitutes on one-quarter of the plate, and a grain or starchy vegetable (such as brown rice or a potato) in the final quarter of the plate. To this you can add a small piece of fruit and 1 cup of milk or yogurt, depending on how much carbohydrate you are supposed to eat at a meal. 
· Talk to your dietitian or diabetes educator about ways to add limited amounts of sweets into your meal plan. You can eat these foods now and then, as long as you include the amount of carbohydrate they have in your daily carbohydrate allowance. · If you drink alcohol, limit it to no more than 1 drink a day for women and 2 drinks a day for men. If you are pregnant, no amount of alcohol is known to be safe. · Protein, fat, and fiber do not raise blood sugar as much as carbohydrate does. If you eat a lot of these nutrients in a meal, your blood sugar will rise more slowly than it would otherwise. · Limit saturated fats, such as those from meat and dairy products. Try to replace it with monounsaturated fat, such as olive oil. This is a healthier choice because people who have diabetes are at higher-than-average risk of heart disease. But use a modest amount of olive oil. A tablespoon of olive oil has 14 grams of fat and 120 calories. · Exercise lowers blood sugar. If you take insulin by shots or pump, you can use less than you would if you were not exercising. Keep in mind that timing matters. If you exercise within 1 hour after a meal, your body may need less insulin for that meal than it would if you exercised 3 hours after the meal. Test your blood sugar to find out how exercise affects your need for insulin. · Exercise on most days of the week. Aim for at least 30 minutes. Exercise helps you stay at a healthy weight and helps your body use insulin. Walking is an easy way to get exercise. Gradually increase the amount you walk every day. You also may want to swim, bike, or do other activities. When you eat out · Learn to estimate the serving sizes of foods that have carbohydrate. If you measure food at home, it will be easier to estimate the amount in a serving of restaurant food. · If the meal you order has too much carbohydrate (such as potatoes, corn, or baked beans), ask to have a low-carbohydrate food instead. Ask for a salad or green vegetables. · If you use insulin, check your blood sugar before and after eating out to help you plan how much to eat in the future.  
· If you eat more carbohydrate at a meal than you had planned, take a walk or do other exercise. This will help lower your blood sugar. Where can you learn more? Go to [employer] Enter L245 in the search box to learn more about \"Nutrition Tips for Diabetes: After Your Visit. \"  
© 5564-1856 Healthwise, Incorporated. Care instructions adapted under license by [facility] (which disclaims liability or warranty for this information). This care instruction is for use with your licensed healthcare professional. If you have questions about a medical condition or this instruction, always ask your healthcare professional. [address] any warranty or liability for your use of this information. Content Version: 35.2.942848; Current as of: June 4, 2014

## 2020-08-02 NOTE — PROGRESS NOTES
Urine protein screening is normal, repeat in 12 months. a1c is at goal, continue to follow diabetic diet, repeat in 6 months. TSH indicates your dose of levothyroxine may be too high. Recommend repeating this test when you return to the office for BP f/u in 4 weeks. Cholesterol is at goal, continue rosuvastatin at current dose.    Blood counts normal.  Electrolytes, liver and kidney function normal.

## 2020-08-03 NOTE — PROGRESS NOTES
Attempted to reach pt. Spoke to pt's daughter (HIPPA Verified). Patient x2 id verified. Notified result, verbalized understanding.

## 2020-09-02 ENCOUNTER — OFFICE VISIT (OUTPATIENT)
Dept: FAMILY MEDICINE CLINIC | Age: 85
End: 2020-09-02
Payer: MEDICARE

## 2020-09-02 VITALS
BODY MASS INDEX: 22.93 KG/M2 | HEIGHT: 62 IN | HEART RATE: 66 BPM | SYSTOLIC BLOOD PRESSURE: 159 MMHG | OXYGEN SATURATION: 97 % | DIASTOLIC BLOOD PRESSURE: 79 MMHG | RESPIRATION RATE: 16 BRPM | TEMPERATURE: 98.3 F | WEIGHT: 124.6 LBS

## 2020-09-02 DIAGNOSIS — E78.00 HYPERCHOLESTEREMIA: ICD-10-CM

## 2020-09-02 DIAGNOSIS — E03.2 HYPOTHYROIDISM, IATROGENIC: ICD-10-CM

## 2020-09-02 DIAGNOSIS — E11.9 TYPE 2 DIABETES MELLITUS WITHOUT COMPLICATION, WITHOUT LONG-TERM CURRENT USE OF INSULIN (HCC): ICD-10-CM

## 2020-09-02 DIAGNOSIS — I10 ESSENTIAL HYPERTENSION: Primary | ICD-10-CM

## 2020-09-02 DIAGNOSIS — R19.09 RIGHT GROIN MASS: ICD-10-CM

## 2020-09-02 LAB — TSH SERPL DL<=0.05 MIU/L-ACNC: 0.14 UIU/ML (ref 0.36–3.74)

## 2020-09-02 PROCEDURE — 1090F PRES/ABSN URINE INCON ASSESS: CPT | Performed by: NURSE PRACTITIONER

## 2020-09-02 PROCEDURE — G0463 HOSPITAL OUTPT CLINIC VISIT: HCPCS | Performed by: NURSE PRACTITIONER

## 2020-09-02 PROCEDURE — G8420 CALC BMI NORM PARAMETERS: HCPCS | Performed by: NURSE PRACTITIONER

## 2020-09-02 PROCEDURE — G8753 SYS BP > OR = 140: HCPCS | Performed by: NURSE PRACTITIONER

## 2020-09-02 PROCEDURE — G8536 NO DOC ELDER MAL SCRN: HCPCS | Performed by: NURSE PRACTITIONER

## 2020-09-02 PROCEDURE — G8432 DEP SCR NOT DOC, RNG: HCPCS | Performed by: NURSE PRACTITIONER

## 2020-09-02 PROCEDURE — 1101F PT FALLS ASSESS-DOCD LE1/YR: CPT | Performed by: NURSE PRACTITIONER

## 2020-09-02 PROCEDURE — G8427 DOCREV CUR MEDS BY ELIG CLIN: HCPCS | Performed by: NURSE PRACTITIONER

## 2020-09-02 PROCEDURE — 99214 OFFICE O/P EST MOD 30 MIN: CPT | Performed by: NURSE PRACTITIONER

## 2020-09-02 PROCEDURE — G8399 PT W/DXA RESULTS DOCUMENT: HCPCS | Performed by: NURSE PRACTITIONER

## 2020-09-02 PROCEDURE — G8754 DIAS BP LESS 90: HCPCS | Performed by: NURSE PRACTITIONER

## 2020-09-02 NOTE — PATIENT INSTRUCTIONS
Learning About High Blood Pressure What is high blood pressure? Blood pressure is a measure of how hard the blood pushes against the walls of your arteries. It's normal for blood pressure to go up and down throughout the day. But if it stays up, you have high blood pressure. Another name for high blood pressure is hypertension. Two numbers tell you your blood pressure. The first number is the systolic pressure (top number). It shows how hard the blood pushes when your heart is pumping. The second number is the diastolic pressure (bottom number). It shows how hard the blood pushes between heartbeats, when your heart is relaxed and filling with blood. Your doctor will give you a goal for your blood pressure based on your health and your age. High blood pressure (hypertension) means that the top number stays high, or the bottom number stays high, or both. High blood pressure increases the risk of stroke, heart attack, and other problems. What happens when you have high blood pressure? · Blood flows through your arteries with too much force. Over time, this damages the walls of your arteries. But you can't feel it. High blood pressure usually doesn't cause symptoms. · Fat and calcium start to build up in your arteries. This buildup is called plaque. Plaque makes your arteries narrower and stiffer. Blood can't flow through them as easily. · This lack of good blood flow starts to damage some of the organs in your body. This can lead to problems such as coronary artery disease and heart attack, heart failure, stroke, kidney failure, and eye damage. How can you prevent high blood pressure? · Stay at a healthy weight. · Try to limit how much sodium you eat to less than 2,300 milligrams (mg) a day. If you limit your sodium to 1,500 mg a day, you can lower your blood pressure even more. ? Buy foods that are labeled \"unsalted,\" \"sodium-free,\" or \"low-sodium. \" Foods labeled \"reduced-sodium\" and \"light sodium\" may still have too much sodium. ? Flavor your food with garlic, lemon juice, onion, vinegar, herbs, and spices instead of salt. Do not use soy sauce, steak sauce, onion salt, garlic salt, mustard, or ketchup on your food. ? Use less salt (or none) when recipes call for it. You can often use half the salt a recipe calls for without losing flavor. · Be physically active. Get at least 30 minutes of exercise on most days of the week. Walking is a good choice. You also may want to do other activities, such as running, swimming, cycling, or playing tennis or team sports. · Limit alcohol to 2 drinks a day for men and 1 drink a day for women. · Eat plenty of fruits, vegetables, and low-fat dairy products. Eat less saturated and total fats. How is high blood pressure treated? · Your doctor will suggest making lifestyle changes to help your heart. For example, your doctor may ask you to eat healthy foods, quit smoking, lose extra weight, and be more active. · If lifestyle changes don't help enough, your doctor may recommend that you take medicine. · When blood pressure is very high, medicines are needed to lower it. Follow-up care is a key part of your treatment and safety. Be sure to make and go to all appointments, and call your doctor if you are having problems. It's also a good idea to know your test results and keep a list of the medicines you take. Where can you learn more? Go to http://magal[employer]-jordan.info/ Enter P501 in the search box to learn more about \"Learning About High Blood Pressure. \" Current as of: December 16, 2019               Content Version: 12.5 © 4454-1797 Healthwise, Incorporated. Care instructions adapted under license by [facility] (which disclaims liability or warranty for this information).  If you have questions about a medical condition or this instruction, always ask your healthcare professional. [address] any warranty or liability for your use of this information.

## 2020-09-06 RX ORDER — LEVOTHYROXINE SODIUM 88 UG/1
88 TABLET ORAL
Qty: 90 TAB | Refills: 0 | Status: SHIPPED | OUTPATIENT
Start: 2020-09-06 | End: 2020-10-26

## 2020-09-06 NOTE — PROGRESS NOTES
TSH lower than before, we need to dcrease your levothyroxine dose. New rx for 88 mcg sent to pharmacy.  Schedule office follow up appt in 8 weeks for oBP check and repeat TSH

## 2020-09-06 NOTE — PROGRESS NOTES
Subjective: Alexa Lefort is a 80 y.o. female who presents for follow up of hypertension, hyperlipidemia, diabetes, and hypothyroidism, and evaluation of right groin mass. Accompanied by her daughter today. Diet and Lifestyle: generally follows a low fat low cholesterol diet, generally follows a low sodium diet, sedentary, nonsmoker  Home BP Monitoring: is not measured at home    Cardiovascular risk analysis - LDL goal is under 70  diabetic  hypertension  hyperlipidemia  sedentary lifestyle. Compliance with treatment thus far has been good. ROS: taking medications as instructed, no medication side effects noted. Cardiovascular ROS: taking medications as instructed, no medication side effects noted, no TIA's, no chest pain on exertion, no dyspnea on exertion, no swelling of ankles, no orthostatic dizziness or lightheadedness, no orthopnea or paroxysmal nocturnal dyspnea, no palpitations, no intermittent claudication symptoms. Diabetes: At goal at last check, good compliance with diet recommendations, currently not on any prescribed antidiabetic medication. Does not monitor blood glucose at home. No acute symptoms. Hypothyroidism: TSH below goal range at last check, elected to continue current dose and repeat in 4 weeks, due today. No new symptoms. Reports good compliance with medication, tolerating well without apparent side effects. New concerns: c/o 1 week hx of right groin mass, soft, nontender. Patient was very anxious about the finding so [facility] She states they were advised this is a hernia and referred to surgeon. Patient prefers to be referred to a provider at [facility]. Mass has not changed in size since she first noticed it. No pain at site. No fever or chills. No bowel difficulties.     Patient Active Problem List   Diagnosis Code    HTN (hypertension) I10    Hypothyroidism, iatrogenic E03.2    Hypercholesteremia E78.00    GI bleeding K92.2    Coronary artery disease involving native coronary artery of native heart without angina pectoris I25.10    DDD (degenerative disc disease), lumbar M51.36    Gastric ulcer K25.9    Advance care planning Z71.89    Type 2 diabetes mellitus without complication, without long-term current use of insulin (HCC) E11.9    Anemia D64.9     Allergies   Allergen Reactions    Celebrex [Celecoxib] Hives    Crestor [Rosuvastatin] Myalgia     Past Medical History:   Diagnosis Date    Diabetes ([facility] [address]     GI bleeding     workup in [address] [place] [address] 12/15 was unremarkable (Dr. Michael Flannery) - says she had EGD, colonoscopy, and small bowel capsule endoscopy    HTN (hypertension)     Hypercholesteremia     Hypothyroidism, iatrogenic     radioiodine    MI (myocardial infarction) ([facility] [address]     During surgery in 1970's - she's had multiple caths and stress tests     History reviewed. No pertinent surgical history. Family History   Problem Relation Age of Onset    Heart Failure Mother      Social History     Tobacco Use    Smoking status: Never Smoker    Smokeless tobacco: Never Used   Substance Use Topics    Alcohol use: Yes     Alcohol/week: 0.0 standard drinks            Review of Systems, additional:  A comprehensive review of systems was negative except for that written in the HPI.      Lab Results   Component Value Date/Time    TSH 0.14 (L) 09/02/2020 08:10 PM     Lab Results   Component Value Date/Time    Hemoglobin A1c 5.8 (H) 07/28/2020 10:21 AM     Lab Results   Component Value Date/Time    Cholesterol, total 153 07/28/2020 10:21 AM    HDL Cholesterol 72 07/28/2020 10:21 AM    LDL, calculated 67 07/28/2020 10:21 AM    VLDL, calculated 14 07/28/2020 10:21 AM    Triglyceride 70 07/28/2020 10:21 AM    CHOL/HDL Ratio 2.1 07/28/2020 10:21 AM     Lab Results   Component Value Date/Time    Sodium 138 07/28/2020 10:21 AM    Potassium 3.8 07/28/2020 10:21 AM    Chloride 105 07/28/2020 10:21 AM    CO2 25 07/28/2020 10:21 AM    Anion gap 8 07/28/2020 10:21 AM    Glucose 113 (H) 07/28/2020 10:21 AM    BUN 11 07/28/2020 10:21 AM    Creatinine 0.46 (L) 07/28/2020 10:21 AM    BUN/Creatinine ratio 24 (H) 07/28/2020 10:21 AM    GFR est AA >60 07/28/2020 10:21 AM    GFR est non-AA >60 07/28/2020 10:21 AM    Calcium 9.0 07/28/2020 10:21 AM    Bilirubin, total 0.4 07/28/2020 10:21 AM    Alk.  phosphatase 85 07/28/2020 10:21 AM    Protein, total 7.1 07/28/2020 10:21 AM    Albumin 3.9 07/28/2020 10:21 AM    Globulin 3.2 07/28/2020 10:21 AM    A-G Ratio 1.2 07/28/2020 10:21 AM    ALT (SGPT) 21 07/28/2020 10:21 AM    AST (SGOT) 17 07/28/2020 10:21 AM         Objective:     Visit Vitals  /79 (BP 1 Location: Right arm, BP Patient Position: Sitting)   Pulse 66   Temp 98.3 °F (36.8 °C) (Oral)   Resp 16   Ht 5' 2\" (1.575 m)   Wt 124 lb 9.6 oz (56.5 kg)   SpO2 97%   BMI 22.79 kg/m²     Physical Examination: General appearance - alert, well appearing, and in no distress, oriented to person, place, and time, normal appearing weight and well hydrated  Mental status - normal mood, behavior, speech, dress, motor activity, and thought processes  Eyes - sclera anicteric  Neck - supple, no significant adenopathy, thyroid exam: thyroid is normal in size without nodules or tenderness  Chest - clear to auscultation, no wheezes, rales or rhonchi, symmetric air entry  Heart - normal rate, regular rhythm, normal S1, S2, no murmurs, rubs, clicks or gallops, normal bilateral carotid upstroke without bruits, no JVD  Abdomen - soft, nontender, nondistended, no organomegaly  Soft mass right groin, nontender, partially reducible  bowel sounds normal  Neurological - alert, oriented, normal speech, no focal findings or movement disorder noted  Musculoskeletal - no joint tenderness, deformity or swelling, no muscular tenderness noted, full range of motion without pain  Extremities - no pedal edema noted, intact peripheral pulses  Skin - normal coloration and turgor, no rashes, no suspicious skin lesions noted      Assessment/Plan:     reviewed diet, exercise and weight control  copy of written low fat low cholesterol diet provided and reviewed  cardiovascular risk and specific lipid/LDL goals reviewed  reviewed medications and side effects in detail  reviewed potential future medication changes and side effects. Diagnoses and all orders for this visit:    1. Essential hypertension  above goal, TSH indicates she is currently hyperthyroid, we are reducing levothyroxine dose  Will continue current meds (amlodipine, toprol, losartan), repeat BP in 2 months at f/u  If remains above goal, will need to make adjustments to antihypertensives at f/u  Low sodium diet  150 minutes of moderate intensity exercise weekly    2. Type 2 diabetes mellitus without complication, without long-term current use of insulin ([address]  At goal  Continue diabetic diet  next a1c due in January    3. Hypercholesteremia  At goal  TLC Diet:   Saturated fat <7% of calories, cholesterol <200 mg/day   Consider increased viscous (soluble) fiber (10-25 g/day) and plant stanols/sterols  (2g/day) as therapeutic options to enhance LDL lowering  Weight management  Increased physical activity. Continue rosuvastatin    4. Hypothyroidism, iatrogenic  TSH below goal  Reduce levothyroxine to 88 mcg daily, repeat TSH in 8 weeks  -     TSH 3RD GENERATION; Future  -     levothyroxine (SYNTHROID) 88 mcg tablet; Take 1 Tab by mouth Daily (before breakfast). 5. Right groin mass  Likely hernia  Refer to surgeon for further eval  -     REFERRAL TO GENERAL SURGERY      Follow-up and Dispositions    · Return in about 6 weeks (around 10/14/2020) for blood pressure. I have discussed the diagnosis with the patient and the intended plan as seen in the above orders. The patient has received an after-visit summary and questions were answered concerning future plans. Patient conveyed understanding of the plan at the time of the visit.     Cas Arora, NP

## 2020-09-08 ENCOUNTER — OFFICE VISIT (OUTPATIENT)
Dept: SURGERY | Age: 85
End: 2020-09-08
Payer: MEDICARE

## 2020-09-08 VITALS
SYSTOLIC BLOOD PRESSURE: 177 MMHG | BODY MASS INDEX: 23 KG/M2 | DIASTOLIC BLOOD PRESSURE: 80 MMHG | TEMPERATURE: 98.5 F | HEIGHT: 62 IN | RESPIRATION RATE: 18 BRPM | WEIGHT: 125 LBS

## 2020-09-08 DIAGNOSIS — E04.1 NODULE OF LEFT LOBE OF THYROID GLAND: Primary | ICD-10-CM

## 2020-09-08 DIAGNOSIS — K40.90 NON-RECURRENT INGUINAL HERNIA OF RIGHT SIDE WITHOUT OBSTRUCTION OR GANGRENE: ICD-10-CM

## 2020-09-08 PROCEDURE — G8536 NO DOC ELDER MAL SCRN: HCPCS | Performed by: SURGERY

## 2020-09-08 PROCEDURE — G8754 DIAS BP LESS 90: HCPCS | Performed by: SURGERY

## 2020-09-08 PROCEDURE — 1090F PRES/ABSN URINE INCON ASSESS: CPT | Performed by: SURGERY

## 2020-09-08 PROCEDURE — G8510 SCR DEP NEG, NO PLAN REQD: HCPCS | Performed by: SURGERY

## 2020-09-08 PROCEDURE — 99203 OFFICE O/P NEW LOW 30 MIN: CPT | Performed by: SURGERY

## 2020-09-08 PROCEDURE — 1101F PT FALLS ASSESS-DOCD LE1/YR: CPT | Performed by: SURGERY

## 2020-09-08 PROCEDURE — G8753 SYS BP > OR = 140: HCPCS | Performed by: SURGERY

## 2020-09-08 PROCEDURE — G8427 DOCREV CUR MEDS BY ELIG CLIN: HCPCS | Performed by: SURGERY

## 2020-09-08 PROCEDURE — G8420 CALC BMI NORM PARAMETERS: HCPCS | Performed by: SURGERY

## 2020-09-08 PROCEDURE — G8399 PT W/DXA RESULTS DOCUMENT: HCPCS | Performed by: SURGERY

## 2020-09-08 NOTE — PROGRESS NOTES
1. Have you been to the ER, urgent care clinic since your last visit? Hospitalized since your last visit? Yes When: 8/24/20 [facility] inguinal hernia. 2. Have you seen or consulted any other health care providers outside of the [address] [place] [address] since your last visit? Include any pap smears or colon screening. Yes When: 8/24/20 [facility]. seen by DR. Gage Simmons

## 2020-09-08 NOTE — H&P (VIEW-ONLY)
Surgery History and Physical 
 
Subjective: Woodrow Kim is a 80 y.o. white female who presents for evaluation of a right inguinal hernia. For the past month, Mrs. Indra Canela has had a bulge in her right groin. The bulge is small in the morning and increases in size during the course of the day. She denies any pain. She has not noticed a bulge on the left side. She is eating fine and moving her bowels and urinating normally. She denies any previous hernia repairs. Her abdominal procedures include a , BRIAN/BSO, and ex lap with hernia repair. Of note, she has a nodule in her left thyroid lobe. She denies any pain, dysphagia, difficulty breathing, or change in her voice. She has no previous radiation to her neck or family h/o thyroid cancer. She has had radioactive iodine treatment and takes thyroid hormone replacement therapy presently. Past Medical History:  
Diagnosis Date  Diabetes ([address]  GI bleeding   
 workup in [address] [place] [address] 12/15 was unremarkable (Dr. Aleida Huber) - says she had EGD, colonoscopy, and small bowel capsule endoscopy  HTN (hypertension)  Hypercholesteremia  Hypothyroidism, iatrogenic   
 radioiodine  MI (myocardial infarction) ([address] During surgery in 's - she's had multiple caths and stress tests Past Surgical History:  
Procedure Laterality Date  HX  SECTION    
 HX HERNIA REPAIR  2018 Open umbilical incisional herniorrhaphy [facility]).  HX ORTHOPAEDIC Back surgery x 2.  
 HX BRIAN AND BSO Family History Problem Relation Age of Onset  Heart Failure Mother  Heart Disease Mother  Hypertension Mother Social History Tobacco Use  Smoking status: Never Smoker  Smokeless tobacco: Never Used Substance Use Topics  Alcohol use: Yes Alcohol/week: 0.0 standard drinks Prior to Admission medications Medication Sig Start Date End Date Taking? Authorizing Provider levothyroxine (SYNTHROID) 88 mcg tablet Take 1 Tab by mouth Daily (before breakfast). 9/6/20  Yes Tanner Seo, NP  
rosuvastatin (Crestor) 10 mg tablet Take 1 Tab by mouth nightly. 7/28/20  Yes Tanner Seo Q, NP  
amLODIPine (NORVASC) 5 mg tablet Take 1 Tab by mouth daily. 7/28/20  Yes Tanner Seo, NP  
metoprolol succinate (TOPROL-XL) 50 mg XL tablet Take 1 Tab by mouth daily. 7/28/20  Yes Tanner Seo, NP  
losartan (COZAAR) 25 mg tablet Take 1 Tab by mouth daily. 7/28/20  Yes Tanner Seo, NP  
NU-IRON 150 mg iron capsule TAKE 1 CAPSULE TWICE A DAY 6/3/19  Yes Óscar Wise MD  
vit C/E/Zn/coppr/lutein/zeaxan (PRESERVISION AREDS 2 PO) Take  by mouth. Yes Provider, Historical  
OTHER Walker with seat DX: DDD lumbar 10/24/17  Yes Óscar Wise MD  
latanoprost (XALATAN) 0.005 % ophthalmic solution  3/17/17  Yes Provider, Historical  
naproxen sodium 220 mg cap Take  by mouth every other day. Yes Provider, Historical  
cholecalciferol, vitamin D3, (VITAMIN D3) 2,000 unit tab Take  by mouth. Yes Provider, Historical  
krill-om3-dha-epa-om6-lip-astx (KRILL OIL, OMEGA 3 & 6,) 1,500-165-67.5 mg cap Take  by mouth. Yes Provider, Historical  
MULTIVITAMIN (MULTIPLE VITAMIN PO) Take  by mouth. Yes Provider, Historical  
aspirin 81 mg chewable tablet Take 1 Tab by mouth daily. 9/23/15  Yes Deborah Espinosa MD  
valACYclovir (VALTREX) 1 gram tablet TAKE 1 TABLET DAILY Patient taking differently: daily as needed. 8/5/19   Kamala Ludwig NP  
pantoprazole (PROTONIX) 40 mg tablet Take 1 Tab by mouth daily. 4/24/18   Óscar Wise MD  
  
Allergies Allergen Reactions  Celebrex [Celecoxib] Hives  Crestor [Rosuvastatin] Myalgia Review of Systems: A comprehensive review of systems was negative except for that written in the History of Present Illness.  
 
Objective:  
 
 Physical Exam: 
GENERAL: alert, cooperative, no distress, appears stated age, EYE: negative findings: anicteric sclera, LYMPHATIC: Cervical, supraclavicular, and axillary nodes normal. , THROAT & NECK: The trachea is midline. There is an approximately 3 cm mobile, NT, well-circumscribed nodule in the left thyroid lobe. There is no nodule in the right lobe.,  LUNG: clear to auscultation bilaterally, HEART: regular rate and rhythm, ABDOMEN: Soft, NT, ND. There are healed lower midline and Pfannenstiel surgical scars. , GROIN: On the left, there is no hernia. On the right, there is a reducible inguinal hernia., EXTREMITIES:  no edema, SKIN: Normal., NEUROLOGIC: [place], slow unsteady gait., PSYCHIATRIC: non focal 
 
Assessment: 1. Right inguinal hernia without gangrene or obstruction. 2. Nodule of the left lobe of the thyroid. Plan: 1. Mrs. Capo Whipple would like to have her hernia repaired soon and is fine with next week or the week after. I discussed the risks of the procedure including bleeding, infection, wound healing problems, recurrent hernia, mesh complications, injury to the cutaneous nerves, and reaction to the prep or local and general anesthetic. She and her daughter understand the risks; any and all questions were answered to their satisfaction. Mrs. Capo Whipple will be scheduled for an elective outpatient right inguinal herniorrhaphy with mesh under MAC with local or general with LMA. 2.  I will order a thyroid US to evaluate the thyroid nodule. I will call Mrs. Capo Whipple with the results. Most likely, she will require an US-guided FNA of the nodule. The patient was counseled at length about the risks of vince Covid-19 during their perioperative period and any recovery window from their procedure. The patient was made aware that vince Covid-19  may worsen their prognosis for recovering from their procedure and lend to a higher morbidity and/or mortality risk.   All material risks, benefits, and reasonable alternatives including postponing the procedure were discussed. The patient does wish to proceed with the procedure at this time.

## 2020-09-08 NOTE — PROGRESS NOTES
Surgery History and Physical    Subjective: Philip Epley is a 80 y.o. white female who presents for evaluation of a right inguinal hernia. For the past month, Mrs. Dominic Tariq has had a bulge in her right groin. The bulge is small in the morning and increases in size during the course of the day. She denies any pain. She has not noticed a bulge on the left side. She is eating fine and moving her bowels and urinating normally. She denies any previous hernia repairs. Her abdominal procedures include a , BRIAN/BSO, and ex lap with hernia repair. Of note, she has a nodule in her left thyroid lobe. She denies any pain, dysphagia, difficulty breathing, or change in her voice. She has no previous radiation to her neck or family h/o thyroid cancer. She has had radioactive iodine treatment and takes thyroid hormone replacement therapy presently. Past Medical History:   Diagnosis Date    Diabetes ([address]     GI bleeding     workup in [address] [place] [address] 12/15 was unremarkable (Dr. Makeda Durbin) - says she had EGD, colonoscopy, and small bowel capsule endoscopy    HTN (hypertension)     Hypercholesteremia     Hypothyroidism, iatrogenic     radioiodine    MI (myocardial infarction) ([address]     During surgery in 's - she's had multiple caths and stress tests     Past Surgical History:   Procedure Laterality Date    HX  SECTION      HX HERNIA REPAIR      Open umbilical incisional herniorrhaphy [facility]).  HX ORTHOPAEDIC      Back surgery x 2.    HX BRIAN AND BSO        Family History   Problem Relation Age of Onset    Heart Failure Mother     Heart Disease Mother     Hypertension Mother      Social History     Tobacco Use    Smoking status: Never Smoker    Smokeless tobacco: Never Used   Substance Use Topics    Alcohol use: Yes     Alcohol/week: 0.0 standard drinks      Prior to Admission medications    Medication Sig Start Date End Date Taking?  Authorizing Provider   levothyroxine (SYNTHROID) 88 mcg tablet Take 1 Tab by mouth Daily (before breakfast). 9/6/20  Yes Tanner Seo, NP   rosuvastatin (Crestor) 10 mg tablet Take 1 Tab by mouth nightly. 7/28/20  Yes Tanner Seo, NP   amLODIPine (NORVASC) 5 mg tablet Take 1 Tab by mouth daily. 7/28/20  Yes Tanner Seo, NP   metoprolol succinate (TOPROL-XL) 50 mg XL tablet Take 1 Tab by mouth daily. 7/28/20  Yes Tanner Seo, NP   losartan (COZAAR) 25 mg tablet Take 1 Tab by mouth daily. 7/28/20  Yes Tanner Seo, NP   NU-IRON 150 mg iron capsule TAKE 1 CAPSULE TWICE A DAY 6/3/19  Yes Erwin Vick MD   vit C/E/Zn/coppr/lutein/zeaxan (PRESERVISION AREDS 2 PO) Take  by mouth. Yes Provider, Historical   OTHER Walker with seat  DX: DDD lumbar 10/24/17  Yes Erwin Vick MD   latanoprost (XALATAN) 0.005 % ophthalmic solution  3/17/17  Yes Provider, Historical   naproxen sodium 220 mg cap Take  by mouth every other day. Yes Provider, Historical   cholecalciferol, vitamin D3, (VITAMIN D3) 2,000 unit tab Take  by mouth. Yes Provider, Historical   krill-om3-dha-epa-om6-lip-astx (KRILL OIL, OMEGA 3 & 6,) 1,500-165-67.5 mg cap Take  by mouth. Yes Provider, Historical   MULTIVITAMIN (MULTIPLE VITAMIN PO) Take  by mouth. Yes Provider, Historical   aspirin 81 mg chewable tablet Take 1 Tab by mouth daily. 9/23/15  Yes Marilyn Quintero MD   valACYclovir (VALTREX) 1 gram tablet TAKE 1 TABLET DAILY  Patient taking differently: daily as needed. 8/5/19   Romelia Burns NP   pantoprazole (PROTONIX) 40 mg tablet Take 1 Tab by mouth daily. 4/24/18   Erwin Vick MD      Allergies   Allergen Reactions    Celebrex [Celecoxib] Hives    Crestor [Rosuvastatin] Myalgia       Review of Systems:  A comprehensive review of systems was negative except for that written in the History of Present Illness.     Objective:      Physical Exam:  GENERAL: alert, cooperative, no distress, appears stated age, EYE: negative findings: anicteric sclera, LYMPHATIC: Cervical, supraclavicular, and axillary nodes normal. , THROAT & NECK: The trachea is midline. There is an approximately 3 cm mobile, NT, well-circumscribed nodule in the left thyroid lobe. There is no nodule in the right lobe.,  LUNG: clear to auscultation bilaterally, HEART: regular rate and rhythm, ABDOMEN: Soft, NT, ND. There are healed lower midline and Pfannenstiel surgical scars. , GROIN: On the left, there is no hernia. On the right, there is a reducible inguinal hernia., EXTREMITIES:  no edema, SKIN: Normal., NEUROLOGIC: [place], slow unsteady gait., PSYCHIATRIC: non focal    Assessment:     1. Right inguinal hernia without gangrene or obstruction. 2. Nodule of the left lobe of the thyroid. Plan:     1. Mrs. Chanda Price would like to have her hernia repaired soon and is fine with next week or the week after. I discussed the risks of the procedure including bleeding, infection, wound healing problems, recurrent hernia, mesh complications, injury to the cutaneous nerves, and reaction to the prep or local and general anesthetic. She and her daughter understand the risks; any and all questions were answered to their satisfaction. Mrs. Chanda Price will be scheduled for an elective outpatient right inguinal herniorrhaphy with mesh under MAC with local or general with LMA. 2.  I will order a thyroid US to evaluate the thyroid nodule. I will call Mrs. Chanda Price with the results. Most likely, she will require an US-guided FNA of the nodule. The patient was counseled at length about the risks of vince Covid-19 during their perioperative period and any recovery window from their procedure. The patient was made aware that vince Covid-19  may worsen their prognosis for recovering from their procedure and lend to a higher morbidity and/or mortality risk. All material risks, benefits, and reasonable alternatives including postponing the procedure were discussed.  The patient does wish to proceed with the procedure at this time.

## 2020-09-09 NOTE — PROGRESS NOTES
Pt's daughter returned call (HIPPA verified). Patient x2 id verified. Notified result. She stated that Dr. Julia Lind ordered thyroid ultra sound for pt as he saw growth on her thyroid. Daughter would like to know if it's okay to wait until that being done to make changes in the medication or that doesn't affect at all?

## 2020-09-10 ENCOUNTER — TELEPHONE (OUTPATIENT)
Dept: SURGERY | Age: 85
End: 2020-09-10

## 2020-09-10 NOTE — TELEPHONE ENCOUNTER
Confirmed surgery information / details with patient; patient verbalizes understanding. Letter sent.

## 2020-09-11 NOTE — PROGRESS NOTES
Attempted to reach pts daughter (hippa verified). Patient x2 id verified. Notified message, verbalized understanding.

## 2020-09-14 ENCOUNTER — TELEPHONE (OUTPATIENT)
Dept: SURGERY | Age: 85
End: 2020-09-14

## 2020-09-14 NOTE — TELEPHONE ENCOUNTER
Spoke with patient's daughter regarding ultrasound. She was given number to Coordination of care [phone].

## 2020-09-16 ENCOUNTER — DOCUMENTATION ONLY (OUTPATIENT)
Dept: SURGERY | Age: 85
End: 2020-09-16

## 2020-09-16 ENCOUNTER — HOSPITAL ENCOUNTER (OUTPATIENT)
Dept: ULTRASOUND IMAGING | Age: 85
Discharge: HOME | End: 2020-09-16
Attending: SURGERY
Payer: MEDICARE

## 2020-09-16 DIAGNOSIS — E04.1 NODULE OF LEFT LOBE OF THYROID GLAND: Primary | ICD-10-CM

## 2020-09-16 DIAGNOSIS — E04.1 NODULE OF LEFT LOBE OF THYROID GLAND: ICD-10-CM

## 2020-09-16 PROCEDURE — 76536 US EXAM OF HEAD AND NECK: CPT

## 2020-09-16 NOTE — PROGRESS NOTES
9/16/20 - 113 PM    I spoke to Mrs. Kenyon's daughters, Alpa Nesbitt and Miguel Angel solano, because she did not answer her cell phone. I reviewed the US findings with her and let her know that a biopsy of the nodule in the left thyroid lobe is the next step. I have ordered the FNA for after her hernia repair next week.

## 2020-09-19 ENCOUNTER — HOSPITAL ENCOUNTER (OUTPATIENT)
Dept: PREADMISSION TESTING | Age: 85
Discharge: HOME | End: 2020-09-19
Payer: MEDICARE

## 2020-09-19 PROCEDURE — 87635 SARS-COV-2 COVID-19 AMP PRB: CPT

## 2020-09-21 LAB — SARS-COV-2, COV2NT: NOT DETECTED

## 2020-09-22 ENCOUNTER — ANESTHESIA EVENT (OUTPATIENT)
Dept: SURGERY | Age: 85
End: 2020-09-22
Payer: MEDICARE

## 2020-09-22 RX ORDER — HYDROMORPHONE HYDROCHLORIDE 1 MG/ML
.25-1 INJECTION, SOLUTION INTRAMUSCULAR; INTRAVENOUS; SUBCUTANEOUS
Status: CANCELLED | OUTPATIENT
Start: 2020-09-22

## 2020-09-22 RX ORDER — SODIUM CHLORIDE, SODIUM LACTATE, POTASSIUM CHLORIDE, CALCIUM CHLORIDE 600; 310; 30; 20 MG/100ML; MG/100ML; MG/100ML; MG/100ML
125 INJECTION, SOLUTION INTRAVENOUS CONTINUOUS
Status: CANCELLED | OUTPATIENT
Start: 2020-09-22

## 2020-09-22 RX ORDER — DIPHENHYDRAMINE HYDROCHLORIDE 50 MG/ML
12.5 INJECTION, SOLUTION INTRAMUSCULAR; INTRAVENOUS AS NEEDED
Status: CANCELLED | OUTPATIENT
Start: 2020-09-22 | End: 2020-09-22

## 2020-09-23 ENCOUNTER — HOSPITAL ENCOUNTER (OUTPATIENT)
Age: 85
Setting detail: OUTPATIENT SURGERY
Discharge: HOME | End: 2020-09-23
Attending: SURGERY | Admitting: SURGERY
Payer: MEDICARE

## 2020-09-23 ENCOUNTER — ANESTHESIA (OUTPATIENT)
Dept: SURGERY | Age: 85
End: 2020-09-23
Payer: MEDICARE

## 2020-09-23 VITALS
BODY MASS INDEX: 22.96 KG/M2 | TEMPERATURE: 97.5 F | DIASTOLIC BLOOD PRESSURE: 71 MMHG | HEIGHT: 62 IN | OXYGEN SATURATION: 89 % | WEIGHT: 124.78 LBS | HEART RATE: 56 BPM | SYSTOLIC BLOOD PRESSURE: 134 MMHG | RESPIRATION RATE: 19 BRPM

## 2020-09-23 DIAGNOSIS — K40.90 NON-RECURRENT INGUINAL HERNIA OF RIGHT SIDE WITHOUT OBSTRUCTION OR GANGRENE: ICD-10-CM

## 2020-09-23 DIAGNOSIS — Z98.890 S/P RIGHT INGUINAL HERNIORRHAPHY: Primary | ICD-10-CM

## 2020-09-23 DIAGNOSIS — Z87.19 S/P RIGHT INGUINAL HERNIORRHAPHY: Primary | ICD-10-CM

## 2020-09-23 LAB
ATRIAL RATE: 74 BPM
CALCULATED P AXIS, ECG09: 86 DEGREES
CALCULATED R AXIS, ECG10: -23 DEGREES
CALCULATED T AXIS, ECG11: 54 DEGREES
DIAGNOSIS, 93000: NORMAL
GLUCOSE BLD STRIP.AUTO-MCNC: 103 MG/DL (ref 65–100)
GLUCOSE BLD STRIP.AUTO-MCNC: 116 MG/DL (ref 65–100)
P-R INTERVAL, ECG05: 216 MS
Q-T INTERVAL, ECG07: 400 MS
QRS DURATION, ECG06: 108 MS
QTC CALCULATION (BEZET), ECG08: 444 MS
SERVICE CMNT-IMP: ABNORMAL
SERVICE CMNT-IMP: ABNORMAL
VENTRICULAR RATE, ECG03: 74 BPM

## 2020-09-23 PROCEDURE — 76210000026 HC REC RM PH II 1 TO 1.5 HR: Performed by: SURGERY

## 2020-09-23 PROCEDURE — 74011000250 HC RX REV CODE- 250: Performed by: SURGERY

## 2020-09-23 PROCEDURE — 77030002933 HC SUT MCRYL J&J -A: Performed by: SURGERY

## 2020-09-23 PROCEDURE — 76060000033 HC ANESTHESIA 1 TO 1.5 HR: Performed by: SURGERY

## 2020-09-23 PROCEDURE — 76010000149 HC OR TIME 1 TO 1.5 HR: Performed by: SURGERY

## 2020-09-23 PROCEDURE — 74011000250 HC RX REV CODE- 250: Performed by: NURSE ANESTHETIST, CERTIFIED REGISTERED

## 2020-09-23 PROCEDURE — 49505 PRP I/HERN INIT REDUC >5 YR: CPT | Performed by: SURGERY

## 2020-09-23 PROCEDURE — 74011250636 HC RX REV CODE- 250/636: Performed by: ANESTHESIOLOGY

## 2020-09-23 PROCEDURE — 77030031139 HC SUT VCRL2 J&J -A: Performed by: SURGERY

## 2020-09-23 PROCEDURE — 2709999900 HC NON-CHARGEABLE SUPPLY: Performed by: SURGERY

## 2020-09-23 PROCEDURE — 77030002996 HC SUT SLK J&J -A: Performed by: SURGERY

## 2020-09-23 PROCEDURE — 82962 GLUCOSE BLOOD TEST: CPT

## 2020-09-23 PROCEDURE — 74011250636 HC RX REV CODE- 250/636: Performed by: NURSE ANESTHETIST, CERTIFIED REGISTERED

## 2020-09-23 PROCEDURE — 93005 ELECTROCARDIOGRAM TRACING: CPT

## 2020-09-23 PROCEDURE — 77030018836 HC SOL IRR NACL ICUM -A: Performed by: SURGERY

## 2020-09-23 PROCEDURE — 74011250637 HC RX REV CODE- 250/637: Performed by: SURGERY

## 2020-09-23 PROCEDURE — C1781 MESH (IMPLANTABLE): HCPCS | Performed by: SURGERY

## 2020-09-23 PROCEDURE — 77030040361 HC SLV COMPR DVT MDII -B

## 2020-09-23 PROCEDURE — 74011250636 HC RX REV CODE- 250/636: Performed by: SURGERY

## 2020-09-23 PROCEDURE — 77030040922 HC BLNKT HYPOTHRM STRY -A

## 2020-09-23 DEVICE — PERFIX PLUG, 1.3" X 1.55" (3.3 CM X 3.9 CM), MEDIUM (CONTENTS: 2)
Type: IMPLANTABLE DEVICE | Site: INGUINAL | Status: FUNCTIONAL
Brand: PERFIX

## 2020-09-23 RX ORDER — NALOXONE HYDROCHLORIDE 0.4 MG/ML
0.2 INJECTION, SOLUTION INTRAMUSCULAR; INTRAVENOUS; SUBCUTANEOUS
Status: DISCONTINUED | OUTPATIENT
Start: 2020-09-23 | End: 2020-09-23 | Stop reason: HOSPADM

## 2020-09-23 RX ORDER — FLUMAZENIL 0.1 MG/ML
0.2 INJECTION INTRAVENOUS
Status: DISCONTINUED | OUTPATIENT
Start: 2020-09-23 | End: 2020-09-23 | Stop reason: HOSPADM

## 2020-09-23 RX ORDER — LIDOCAINE HYDROCHLORIDE 20 MG/ML
INJECTION, SOLUTION EPIDURAL; INFILTRATION; INTRACAUDAL; PERINEURAL AS NEEDED
Status: DISCONTINUED | OUTPATIENT
Start: 2020-09-23 | End: 2020-09-23 | Stop reason: HOSPADM

## 2020-09-23 RX ORDER — PROPOFOL 10 MG/ML
INJECTION, EMULSION INTRAVENOUS
Status: DISCONTINUED | OUTPATIENT
Start: 2020-09-23 | End: 2020-09-23 | Stop reason: HOSPADM

## 2020-09-23 RX ORDER — FENTANYL CITRATE 50 UG/ML
INJECTION, SOLUTION INTRAMUSCULAR; INTRAVENOUS AS NEEDED
Status: DISCONTINUED | OUTPATIENT
Start: 2020-09-23 | End: 2020-09-23 | Stop reason: HOSPADM

## 2020-09-23 RX ORDER — LIDOCAINE HYDROCHLORIDE 10 MG/ML
0.1 INJECTION, SOLUTION EPIDURAL; INFILTRATION; INTRACAUDAL; PERINEURAL AS NEEDED
Status: DISCONTINUED | OUTPATIENT
Start: 2020-09-23 | End: 2020-09-23 | Stop reason: HOSPADM

## 2020-09-23 RX ORDER — SODIUM CHLORIDE, SODIUM LACTATE, POTASSIUM CHLORIDE, CALCIUM CHLORIDE 600; 310; 30; 20 MG/100ML; MG/100ML; MG/100ML; MG/100ML
125 INJECTION, SOLUTION INTRAVENOUS CONTINUOUS
Status: DISCONTINUED | OUTPATIENT
Start: 2020-09-23 | End: 2020-09-23 | Stop reason: HOSPADM

## 2020-09-23 RX ORDER — METOPROLOL TARTRATE 50 MG/1
50 TABLET ORAL
Status: COMPLETED | OUTPATIENT
Start: 2020-09-23 | End: 2020-09-23

## 2020-09-23 RX ORDER — PROPOFOL 10 MG/ML
INJECTION, EMULSION INTRAVENOUS AS NEEDED
Status: DISCONTINUED | OUTPATIENT
Start: 2020-09-23 | End: 2020-09-23 | Stop reason: HOSPADM

## 2020-09-23 RX ORDER — HYDROCODONE BITARTRATE AND ACETAMINOPHEN 5; 325 MG/1; MG/1
1 TABLET ORAL
Qty: 20 TAB | Refills: 0 | Status: SHIPPED | OUTPATIENT
Start: 2020-09-23 | End: 2020-09-26

## 2020-09-23 RX ADMIN — PROPOFOL 50 MCG/KG/MIN: 10 INJECTION, EMULSION INTRAVENOUS at 07:50

## 2020-09-23 RX ADMIN — LIDOCAINE HYDROCHLORIDE 40 MG: 20 INJECTION, SOLUTION EPIDURAL; INFILTRATION; INTRACAUDAL; PERINEURAL at 07:50

## 2020-09-23 RX ADMIN — METOPROLOL TARTRATE 50 MG: 50 TABLET, FILM COATED ORAL at 07:34

## 2020-09-23 RX ADMIN — CEFAZOLIN SODIUM 2 G: 1 POWDER, FOR SOLUTION INTRAMUSCULAR; INTRAVENOUS at 07:55

## 2020-09-23 RX ADMIN — SODIUM CHLORIDE, SODIUM LACTATE, POTASSIUM CHLORIDE, AND CALCIUM CHLORIDE: 600; 310; 30; 20 INJECTION, SOLUTION INTRAVENOUS at 08:47

## 2020-09-23 RX ADMIN — SODIUM CHLORIDE, SODIUM LACTATE, POTASSIUM CHLORIDE, AND CALCIUM CHLORIDE 125 ML/HR: 600; 310; 30; 20 INJECTION, SOLUTION INTRAVENOUS at 07:10

## 2020-09-23 RX ADMIN — PROPOFOL 30 MG: 10 INJECTION, EMULSION INTRAVENOUS at 07:50

## 2020-09-23 RX ADMIN — FENTANYL CITRATE 25 MCG: 0.05 INJECTION, SOLUTION INTRAMUSCULAR; INTRAVENOUS at 08:02

## 2020-09-23 RX ADMIN — FENTANYL CITRATE 25 MCG: 0.05 INJECTION, SOLUTION INTRAMUSCULAR; INTRAVENOUS at 09:01

## 2020-09-23 RX ADMIN — FENTANYL CITRATE 25 MCG: 0.05 INJECTION, SOLUTION INTRAMUSCULAR; INTRAVENOUS at 07:50

## 2020-09-23 RX ADMIN — FENTANYL CITRATE 25 MCG: 0.05 INJECTION, SOLUTION INTRAMUSCULAR; INTRAVENOUS at 08:08

## 2020-09-23 NOTE — OP NOTES
Cora Ho    MRN:  [id number]    Date of procedure:  9/23/2020    Surgeon:  Korey Vera MD.    Assistants:  1. Rudi Montaño. 2. Rey Soler. 3. Jose Alfredo Ardon. Anesthesia:  1. MAC.  2. 1% Xylocaine with Epinephrine and 0.5% Marcaine. Preoperative Diagnosis:  Right inguinal hernia. Postoperative Diagnosis:  Direct right inguinal hernia. Procedure:  Right inguinal herniorrhaphy with Bard Perfix Plug mesh (Medium). Indication:  Cora Ho is a 81 yo white female with a bulge in her right groin which is getting bigger. She would like to have it repaired. Procedure Details: The patient was seen preoperatively in the holding area. The right side was confirmed by the patient and marked. The risks, benefits, and expected outcomes of the procedure were discussed with the patient, and all questions were answered satisfactorily. The patient concurred with the proposed plan, giving informed consent. The patient was taken to the operating room. The patient was identified as Cora Ho and the procedure verified as Right Inguinal Herniorrhaphy with Mesh. The patient was placed on the OR table in the supine position. Prior to induction, antibiotic prophylaxis was administered. MAC anesthesia was administered and tolerated well. The right groin was shaved and then prepped with Chloraprep and draped in the usual sterile fashion. A Time Out was performed, and the above information was confirmed. Using the anterior superior iliac spine and pubic tubercle as natural landmarks, the line of incision was marked. The skin and subcutaneous tissue were infiltrated with the local anesthetic. An incision was made in the natural crease of the skin. Dissection was taken down through Berry's fascia to the level of the external oblique aponeurosis. The aponeurosis was cleaned off superficially.   Using a 15 blade, an incision was made in the aponeurosis in the direction of its fibers. The incision was extended superiorly and then inferiorly to include the external inguinal ring. The ilioinguinal nerve was identified and carefully preserved. The spermatic cord was encircled with a Penrose drain at the level of the pubic tubercle. Attention was turned to the anteromedial aspect of the spermatic cord. The round ligament was minimal.  An indirect hernia sac was not discovered. The round was clamped, divided, and ligated with 2-0 [place]k. The floor of the inguinal canal was weak, and a direct hernia sac was present. The hernia sac was easily reducible. A medium Bard Perfix Plug mesh was chosen for the repair. The plug was placed into the floor after reducing the sac and secured with interrupted 3-0 Vicryl. The patch was carefully tailored to the floor of the canal.  It was then secured to the pubic tubercle inferiorly, the conjoined tendon medially, the shelving margin of the inguinal ligament laterally, and around the spermatic cord to the internal oblique muscle superiorly with interrupted 3-0 Vicryl. The wound was irrigated with saline. The external oblique was closed with a running 2-0 Vicryl, recreating the external inguinal ring. The wound was once again irrigated. Berry's was approximated with interrupted 3-0 Vicryl. The skin was closed with 4-0 Monocryl in the usual running subcuticular fashion. The wound was cleaned and dried, and steri-strips and a sterile dressing were applied. Anesthesial was then reversed. Estimated Blood Loss:  Less than 25 ml. Denis Corrales Specimen:  * No specimens in log *    Implant:  Implant Name Type Inv. Item Serial No.  Lot No. LRB No. Used Action   MESH PLUG PERFIX 1.3X1.55IN - SME Mesh MESH PLUG PERFIX 1.3X1.55IN [facility] DAVOL_WD [id number] Right 1 Implanted       Findings:  A direct right inguinal hernia. Counts:   All sponge, needle, and instruments were correct x 2.    Complications:  None. Disposition:  The patient was transferred to the recovery room in stable condition, having tolerated the procedure and anesthesia well.       Signed by:  Callie Mirza MD           September 23, 2020        Marcela Briceno MD

## 2020-09-23 NOTE — DISCHARGE INSTRUCTIONS
Patient Education        Hernia Repair: What to Expect at [address] are likely to have pain for the next few days. You may also feel like you have the flu, and you may have a low fever and feel tired and sick to your stomach. This is common. You should feel better after a few days and will probably feel much better in 7 days. For several weeks you may feel twinges or pulling in the hernia repair when you move. You may have some bruising on the scrotum and along the labia. This is normal.  [employer] bicycle shorts may provide good support. This care sheet gives you a general idea about how long it will take for you to recover, but each person recovers at a different pace. Follow the steps below to get better as quickly as possible. How can you care for yourself at home? Activity    · Rest when you feel tired. Getting enough sleep will help you recover.     · Try to walk each day. Start by walking a little more than you did the day before. Bit by bit, increase the amount you walk. Walking boosts blood flow and helps prevent pneumonia and constipation.     · Avoid strenuous activities, such as biking, jogging, weight lifting, or aerobic exercise, until your doctor says it is okay.     · Avoid lifting anything over 30 pounds or that would make you strain for 6 weeks! This may include heavy grocery bags and milk containers, a heavy briefcase or backpack, cat litter or dog food bags, a vacuum , or a child.     · You may drive after 1 week and when you are no longer taking narcotic pain medicine and can quickly move your foot from the gas pedal to the brake! You must also be able to sit comfortably for a long period of time, even if you do not plan to go far because you might get caught in traffic.     · Most people are able to return to work within 1 to 2 weeks after surgery.     · You may shower 24 hours after surgery. Pat the cut (incision) dry.   Do not take a bath for the first 2 weeks, and until after seen by your doctor.     ·    Diet    · You can eat your normal diet. If your stomach is upset, try bland, low-fat foods like plain rice, broiled chicken, toast, and yogurt.     · Drink plenty of fluids (unless your doctor tells you not to).     · You may notice that your bowel movements are not regular right after your surgery. This is common. Avoid constipation and straining with bowel movements. You may want to take a fiber supplement every day. If you have not had a bowel movement by Thursday, 9/24, take Miralax, Milk of Magnesia, prune juice, or other laxative until your bowel movements are regular! Medicines    · You can restart your medicines. Your doctor will also give you instructions about taking any new medicines.     ·      · Be safe with medicines. Take pain medicines exactly as directed. ? If the doctor gave you a prescription medicine for pain, take it as prescribed. ? If you are not taking a prescription pain medicine, take an over-the-counter medicine such as acetaminophen (Tylenol), ibuprofen (Advil, Motrin), or naproxen (Aleve). Read and follow all instructions on the label. ? Do not take two or more pain medicines at the same time unless the doctor told you to. Many pain medicines have acetaminophen, which is Tylenol. Too much acetaminophen (Tylenol) can be harmful.     · If your doctor prescribed antibiotics, take them as directed. Do not stop taking them just because you feel better. You need to take the full course of antibiotics.     · If you think your pain medicine is making you sick to your stomach:  ? Take your medicine after meals (unless your doctor has told you not to). ? Ask your doctor for a different pain medicine. Incision care    · Remove the bandage on Friday, 9/25. You may leave your incision uncovered.   · If you have strips of tape on the cut (incision) the doctor made, leave the tape on for a week and then remove them on Wednesday, 9/30!     · Keep the incision clean and dry. · If there is any drainage, then cover the incision with a dry bandage. Change the bandage daily as needed.     · Wash the area daily with warm, soapy water and pat it dry. · You may apply an ice pack to your groin for the first 48 hours. Every hour for 10 minutes. Wrap the ice pack in a towel. Do not apply directly to your skin! Follow-up care is a key part of your treatment and safety. Be sure to make and go to all appointments, and call your doctor if you are having problems. It's also a good idea to know your test results and keep a list of the medicines you take. When should you call for help? Call 911 anytime you think you may need emergency care. For example, call if:    · You passed out (lost consciousness).     · You are short of breath. Call your doctor now or seek immediate medical care if:    · You have abdominal or groin pain that does not get better after you take pain medicine.     · You are sick to your stomach and cannot keep fluids down.     · You have signs of a blood clot in your leg (called a deep vein thrombosis), such as:  ? Pain in your calf, back of the knee, thigh, or groin. ? Redness and swelling in your leg or groin.     · You cannot pass stool, gas, or urine.     · Bright red blood has soaked through the bandage over your incision.     · You have loose stitches, or your incision comes open.     · You have signs of infection, such as:  ? Increased pain, swelling, warmth, or redness. ? Red streaks leading from the incision. ? Pus draining from the incision. ? A fever > 101. Watch closely for any changes in your health, and be sure to contact your doctor if you have any problems. Where can you learn more? Go to http://magalys-jordan.info/  Enter M517 in the search box to learn more about \"Hernia Repair: What to Expect at Home. \"  Current as of: April 15, 2020               Content Version: 12.6  © 7472-6622 Healthwise, [facility]. Care instructions adapted under license by [employer] (which disclaims liability or warranty for this information). If you have questions about a medical condition or this instruction, always ask your healthcare professional. [place] [address] any warranty or liability for your use of this information. Naseem Rankin MD, [address] [facility] [place] [address], [facility], [address]  [phone]  Fax [phone]    DISCHARGE SUMMARY from your Nurse    The following personal items collected during your admission are returned to you:   Dental Appliance: Dental Appliances: Uppers  Vision:    Hearing Aid:    Jewelry: Jewelry: Ring(unable to remove gold band left ring finger)  Clothing: Clothing: (street clothes to preop locker)  Other Valuables:    Valuables sent to safe:      PATIENT INSTRUCTIONS:    After general anesthesia or intravenous sedation, for 24 hours or while taking prescription Narcotics:  · Limit your activities  · Do not drive and operate hazardous machinery  · Do not make important personal or business decisions  · Do  not drink alcoholic beverages  · If you have not urinated within 8 hours after discharge, please contact your surgeon on call. Report the following to your surgeon:  · Excessive pain, swelling, redness or odor of or around the surgical area  · Temperature over 100.5  · Nausea and vomiting lasting longer than 4 hours or if unable to take medications  · Any signs of decreased circulation or nerve impairment to extremity: change in color, persistent  numbness, tingling, coldness or increase pain  · Any questions    COUGH AND DEEP BREATHE    Breathing deep and coughing are very important exercises to do after surgery. Deep breathing and coughing open the little air tubes and air sacks in your lungs. You take deep breaths every day.   You may not even notice - it is just something you do when you sigh or yawn. It is a natural exercise you do to keep these air passages open. After surgery, take deep breaths and cough, on purpose. Coughing and deep breathing help prevent bronchitis and pneumonia after surgery. If you had chest or belly surgery, use a pillow as a \"hug jerri\" and hold it tightly to your chest or belly when you cough. DIRECTIONS:  · Take 10 to 15 slow deep breaths every hour while awake. · Breathe in deeply, and hold it for 2 seconds. · Exhale slowly through puckered lips, like blowing up a balloon. · After every 4th or 5th deep breath, hug your pillow to your chest or belly and give a hard, deep cough. Yes, it will probably hurt. But doing this exercise is very important part of healing after surgery. Take your pain medicine to help you do this exercise without too much pain. IF YOU HAVE BEEN DIAGNOSED WITH SLEEP APNEA, PLEASE USE YOUR SLEEP APNEA DEVICE OR CPAP MACHINE WHEN YOU INTEND TO NAP AFTER TAKING PAIN MEDICATION. Ankle Pumps    Ankle pumps increase the circulation of oxygenated blood to your lower extremities and decrease your risk for circulation problems such as blood clots. They also stretch the muscles, tendons and ligaments in your foot and ankle, and prevent joint contracture in the ankle and foot, especially after surgeries on the legs. It is important to do ankle pump exercises regularly after surgery because immobility increases your risk for developing a blood clot. Your doctor may also have you take an Aspirin for the next few days as well. If your doctor did not ask you to take an Aspirin, consult with him before starting Aspirin therapy on your own. Slowly point your foot forward, feeling the muscles on the top of your lower leg stretch, and hold this position for 5 seconds.                   Next, pull your foot back toward you as far as possible, stretching the calf muscles, and hold that position for 5 seconds. Repeat with the other foot. Perform 10 repetitions every hour while awake for both ankles if possible (down and then up with the foot once is one repetition). You should feel gentle stretching of the muscles in your lower leg when doing this exercise. If you feel pain, or your range of motion is limited, don't  Push too hard. Only go the limit your joint and muscles will let you go. If you have increasing pain, progressively worsening leg warmth or swelling, STOP the exercise and call your doctor. Below is information about the medications your doctor is prescribing after your visit:    Other information in your discharge envelope:  []     PRESCRIPTIONS  []     SCHOOL/WORK NOTE  []     INFECTION PREVENTION  []     [address]  []     REGIONAL NERVE BLOCK ON QUE PAMPHLET   []     EXPAREL  []     HANDICAP APPLICATION         These are general instructions for a healthy lifestyle:    *  Please give a list of your current medications to your Primary Care Provider. *  Please update this list whenever your medications are discontinued, doses are      changed, or new medications (including over-the-counter products) are added. *  Please carry medication information at all times in case of emergency situations. Obesity, smoking, and sedentary lifestyle greatly increases your risk for illness and disease. A healthy diet, regular physical exercise & weight monitoring are important for maintaining a healthy lifestyle. Congestive Heart Failure  You may be retaining fluid if you have a history of heart failure or if you experience any of the following symptoms:  Weight gain of 3 pounds or more overnight or 5 pounds in a week, increased swelling in our hands or feet or shortness of breath while lying flat in bed.   Please call your doctor as soon as you notice any of these symptoms; do not wait until your next office visit. Recognize signs and symptoms of STROKE:  F - face looks uneven  A - arms unable to move or move even  S - speech slurred or non-existent  T - time-call 911 as soon as signs and symptoms begin-DO NOT go         Back to bed or wait to see if you get better-TIME IS BRAIN. Warning signs of HEART ATTACK  Call 911 if you have these symptoms    · Chest discomfort. Most heart attacks involve discomfort in the center of the chest that lasts more than a few minutes, or that goes away and comes back. It can feel like uncomfortable pressure, squeezing, fullness, or pain. · Discomfort in other areas of the upper body. Symptoms can include pain or discomfort in one or both        Arms, the back, neck, jaw, or stomach. ·  Shortness of breath with or without chest discomfort. · Other signs may include breaking out in a cold sweat, nausea, or lightheadedness    Don't wait more than five minutes to call 911 - MINUTES MATTER! Fast action can save your life. Calling 911 is almost always the fastest way to get lifesaving treatment. Emergency Medical Services staff can begin treatment when they arrive - up to an hour sooner than if someone gets to the hospital by car. JIM [facility]EVIN MEDICATION AND SIDE EFFECT GUIDE    The [facility] MEDICATION AND SIDE EFFECT GUIDE was provided to the PATIENT AND CARE PROVIDER.   Information provided includes instruction about drug purpose and common side effects for the following medications:    ·

## 2020-09-23 NOTE — INTERVAL H&P NOTE
Update History & Physical 
 
The Patient's History and Physical of September 8, The plan was reviewed with the patient and I examined the patient. There was no change. The surgical site was confirmed by the patient and me. Plan:  The risk, benefits, expected outcome, and alternative to the recommended procedure have been discussed with the patient. Patient understands and wants to proceed with the procedure.  
 
Electronically signed by Maribel Lopez MD on 9/23/2020 at 7:45 AM

## 2020-09-23 NOTE — ANESTHESIA POSTPROCEDURE EVALUATION
Procedure(s):  RIGHT INGUINAL HERNIORRHAPHY WITH MESH. MAC    Anesthesia Post Evaluation      Multimodal analgesia: multimodal analgesia not used between 6 hours prior to anesthesia start to PACU discharge  Patient location during evaluation: PACU  Patient participation: complete - patient participated  Level of consciousness: awake and alert  Pain score: 2  Pain management: satisfactory to patient  Airway patency: patent  Anesthetic complications: no  Cardiovascular status: acceptable  Respiratory status: acceptable  Hydration status: acceptable  Post anesthesia nausea and vomiting:  none  Final Post Anesthesia Temperature Assessment:  Normothermia (36.0-37.5 degrees C)      INITIAL Post-op Vital signs: No vitals data found for the desired time range.

## 2020-09-23 NOTE — ANESTHESIA PREPROCEDURE EVALUATION
Relevant Problems   No relevant active problems       Anesthetic History   No history of anesthetic complications            Review of Systems / Medical History  Patient summary reviewed and pertinent labs reviewed    Pulmonary  Within defined limits                 Neuro/Psych   Within defined limits           Cardiovascular    Hypertension          CAD         GI/Hepatic/Renal           PUD     Endo/Other    Diabetes: well controlled, type 2  Hypothyroidism  Arthritis     Other Findings              Physical Exam    Airway  Mallampati: II  TM Distance: 4 - 6 cm  Neck ROM: normal range of motion   Mouth opening: Normal     Cardiovascular    Rhythm: regular  Rate: normal         Dental    Dentition: Caps/crowns and Implants     Pulmonary  Breath sounds clear to auscultation               Abdominal  GI exam deferred       Other Findings            Anesthetic Plan    ASA: 2  Anesthesia type: MAC          Induction: Intravenous  Anesthetic plan and risks discussed with: Patient

## 2020-09-25 ENCOUNTER — TELEPHONE (OUTPATIENT)
Dept: SURGERY | Age: 85
End: 2020-09-25

## 2020-09-25 NOTE — TELEPHONE ENCOUNTER
How are you doing:    Are you having any pain: Yes __x____           No ______  If yes level: 4    Are you taking pain meds:no       If yes- recommended any OTC constipation treatment if needed    Have you had any nausea or vomiting: Yes _______           No ___x____    How is your appetite (eating & drinking):okay    Have you moved your bowels since surgery: Yes _x____       No ______    Any issues with urination: Yes _____        No _x___    Pt notified to take dressing off after 48 hrs:   Yes ___x____        No ______    Do they have a drain:  Yes ______        No __x____    Are they keeping track of output: Yes ______        No ___xDid they review their discharge instructions:  Yes __x____         No ______    Any other concerns: no    Your follow up office appt is:10/7/20 @ 1:00 pm.

## 2020-10-02 ENCOUNTER — HOSPITAL ENCOUNTER (OUTPATIENT)
Dept: ULTRASOUND IMAGING | Age: 85
Discharge: HOME | End: 2020-10-02
Attending: SURGERY
Payer: MEDICARE

## 2020-10-02 DIAGNOSIS — E04.1 NODULE OF LEFT LOBE OF THYROID GLAND: ICD-10-CM

## 2020-10-02 PROCEDURE — 10005 FNA BX W/US GDN 1ST LES: CPT

## 2020-10-02 PROCEDURE — 77030014115

## 2020-10-02 PROCEDURE — 88173 CYTOPATH EVAL FNA REPORT: CPT

## 2020-10-02 PROCEDURE — 74011000250 HC RX REV CODE- 250: Performed by: RADIOLOGY

## 2020-10-02 PROCEDURE — 88172 CYTP DX EVAL FNA 1ST EA SITE: CPT

## 2020-10-02 RX ORDER — LIDOCAINE HYDROCHLORIDE 10 MG/ML
10 INJECTION, SOLUTION EPIDURAL; INFILTRATION; INTRACAUDAL; PERINEURAL
Status: COMPLETED | OUTPATIENT
Start: 2020-10-02 | End: 2020-10-02

## 2020-10-02 RX ADMIN — LIDOCAINE HYDROCHLORIDE 10 ML: 10 INJECTION, SOLUTION EPIDURAL; INFILTRATION; INTRACAUDAL; PERINEURAL at 09:00

## 2020-10-07 ENCOUNTER — OFFICE VISIT (OUTPATIENT)
Dept: SURGERY | Age: 85
End: 2020-10-07
Payer: MEDICARE

## 2020-10-07 VITALS
HEIGHT: 62 IN | RESPIRATION RATE: 18 BRPM | OXYGEN SATURATION: 99 % | WEIGHT: 126.5 LBS | DIASTOLIC BLOOD PRESSURE: 84 MMHG | BODY MASS INDEX: 23.28 KG/M2 | SYSTOLIC BLOOD PRESSURE: 167 MMHG | HEART RATE: 71 BPM | TEMPERATURE: 99.2 F

## 2020-10-07 DIAGNOSIS — Z87.19 S/P RIGHT INGUINAL HERNIORRHAPHY: ICD-10-CM

## 2020-10-07 DIAGNOSIS — Z98.890 S/P RIGHT INGUINAL HERNIORRHAPHY: ICD-10-CM

## 2020-10-07 PROBLEM — K40.90 NON-RECURRENT INGUINAL HERNIA OF RIGHT SIDE WITHOUT OBSTRUCTION OR GANGRENE: Status: RESOLVED | Noted: 2020-09-08 | Resolved: 2020-10-07

## 2020-10-07 PROCEDURE — 99024 POSTOP FOLLOW-UP VISIT: CPT | Performed by: SURGERY

## 2020-10-07 NOTE — PROGRESS NOTES
1. Have you been to the ER, urgent care clinic since your last visit? Hospitalized since your last visit? No     2. Have you seen or consulted any other health care providers outside of the [address] [place] [address] since your last visit? Include any pap smears or colon screening.  No

## 2020-10-07 NOTE — PROGRESS NOTES
Subjective: Alireza Carlson is a 80 y.o. white female presents for postop care 2 weeks following a right inguinal herniorrhaphy. Mrs. Yariel Jarrett is doing fine. Her appetite is good, and she is eating a regular diet without difficulty. Her bowel movements are constipated although she is taking Miralax sometimes. She is voiding without difficulty. She has mild pain and some swelling along her incision. Objective:     Visit Vitals  BP (!) 167/84 (BP 1 Location: Left arm, BP Patient Position: Sitting) Comment: patient states she is nervous   Pulse 71   Temp 99.2 °F (37.3 °C) (Oral)   Resp 18   Ht 5' 2\" (1.575 m)   Wt 126 lb 8 oz (57.4 kg)   SpO2 99%   BMI 23.14 kg/m²       General:  alert, cooperative, no distress   Abdomen:  Soft, NT, ND. Right groin:   The incision is clean, dry, and intact with no erythema. There is a moderate-sized seroma, but no hernia. Assessment:     1.  1st POV, s/p right inguinal herniorrhaphy with mesh. 2.  Nodule of left thyroid lobe. Plan:     1. Mrs. Yariel Jarrett is doing fine. She has been instructed to continue with light activity for another 4 weeks and then gradually resume normal activity. She can f/u prn.    2.  The pathology report was discussed with Mrs. Yariel Jarrett and her daughter. The specimen is being sent for further testing. I will call back with the final results. Further management will be based on the findings.

## 2020-10-29 DIAGNOSIS — I10 ESSENTIAL HYPERTENSION: ICD-10-CM

## 2020-10-30 RX ORDER — LOSARTAN POTASSIUM 25 MG/1
TABLET ORAL
Qty: 30 TAB | Refills: 1 | Status: SHIPPED | OUTPATIENT
Start: 2020-10-30 | End: 2021-02-17

## 2020-10-30 RX ORDER — AMLODIPINE BESYLATE 5 MG/1
TABLET ORAL
Qty: 30 TAB | Refills: 1 | Status: SHIPPED | OUTPATIENT
Start: 2020-10-30 | End: 2020-12-01 | Stop reason: SDUPTHER

## 2020-10-30 RX ORDER — METOPROLOL SUCCINATE 50 MG/1
TABLET, EXTENDED RELEASE ORAL
Qty: 30 TAB | Refills: 1 | Status: SHIPPED | OUTPATIENT
Start: 2020-10-30 | End: 2020-12-01 | Stop reason: SDUPTHER

## 2020-11-20 DIAGNOSIS — E03.2 HYPOTHYROIDISM, IATROGENIC: ICD-10-CM

## 2020-11-22 RX ORDER — LEVOTHYROXINE SODIUM 88 UG/1
TABLET ORAL
Qty: 30 TAB | Refills: 0 | Status: SHIPPED | OUTPATIENT
Start: 2020-11-22 | End: 2020-12-24

## 2020-11-30 ENCOUNTER — DOCUMENTATION ONLY (OUTPATIENT)
Dept: SURGERY | Age: 85
End: 2020-11-30

## 2020-11-30 NOTE — PROGRESS NOTES
11/30/20 - 1010 AM    I spoke to Mrs. Kenyon's daughter, Froilan Ruiz, and informed her that the Afirma test for the biopsy of her thyroid nodule was low risk for a malignancy so no surgical intervention is required at this time. Should Mrs. Yazmin Sheppard develop any compressive symptoms, then a lobectomy can be performed. Mrs. Smith expressed understanding.

## 2020-12-01 ENCOUNTER — OFFICE VISIT (OUTPATIENT)
Dept: FAMILY MEDICINE CLINIC | Age: 85
End: 2020-12-01
Payer: MEDICARE

## 2020-12-01 VITALS
OXYGEN SATURATION: 99 % | HEART RATE: 63 BPM | RESPIRATION RATE: 17 BRPM | HEIGHT: 62 IN | DIASTOLIC BLOOD PRESSURE: 81 MMHG | SYSTOLIC BLOOD PRESSURE: 201 MMHG | WEIGHT: 126 LBS | TEMPERATURE: 97.9 F | BODY MASS INDEX: 23.19 KG/M2

## 2020-12-01 DIAGNOSIS — E11.21 TYPE 2 DIABETES WITH NEPHROPATHY (HCC): ICD-10-CM

## 2020-12-01 DIAGNOSIS — I25.10 CORONARY ARTERY DISEASE INVOLVING NATIVE CORONARY ARTERY OF NATIVE HEART WITHOUT ANGINA PECTORIS: ICD-10-CM

## 2020-12-01 DIAGNOSIS — E03.2 HYPOTHYROIDISM, IATROGENIC: ICD-10-CM

## 2020-12-01 DIAGNOSIS — E11.9 TYPE 2 DIABETES MELLITUS WITHOUT COMPLICATION, WITHOUT LONG-TERM CURRENT USE OF INSULIN (HCC): ICD-10-CM

## 2020-12-01 DIAGNOSIS — Z71.89 ADVANCE CARE PLANNING: ICD-10-CM

## 2020-12-01 DIAGNOSIS — Z00.00 ROUTINE GENERAL MEDICAL EXAMINATION AT A HEALTH CARE FACILITY: Primary | ICD-10-CM

## 2020-12-01 DIAGNOSIS — D64.9 ANEMIA, UNSPECIFIED TYPE: ICD-10-CM

## 2020-12-01 DIAGNOSIS — E78.00 HYPERCHOLESTEREMIA: ICD-10-CM

## 2020-12-01 DIAGNOSIS — I10 ESSENTIAL HYPERTENSION: ICD-10-CM

## 2020-12-01 LAB
ALBUMIN SERPL-MCNC: 4 G/DL (ref 3.5–5)
ALBUMIN/GLOB SERPL: 1.2 {RATIO} (ref 1.1–2.2)
ALP SERPL-CCNC: 89 U/L (ref 45–117)
ALT SERPL-CCNC: 23 U/L (ref 12–78)
ANION GAP SERPL CALC-SCNC: 6 MMOL/L (ref 5–15)
AST SERPL-CCNC: 20 U/L (ref 15–37)
BASOPHILS # BLD: 0 K/UL (ref 0–0.1)
BASOPHILS NFR BLD: 1 % (ref 0–1)
BILIRUB SERPL-MCNC: 0.4 MG/DL (ref 0.2–1)
BUN SERPL-MCNC: 14 MG/DL (ref 6–20)
BUN/CREAT SERPL: 29 (ref 12–20)
CALCIUM SERPL-MCNC: 9.2 MG/DL (ref 8.5–10.1)
CHLORIDE SERPL-SCNC: 104 MMOL/L (ref 97–108)
CHOLEST SERPL-MCNC: 146 MG/DL
CO2 SERPL-SCNC: 29 MMOL/L (ref 21–32)
CREAT SERPL-MCNC: 0.49 MG/DL (ref 0.55–1.02)
DIFFERENTIAL METHOD BLD: ABNORMAL
EOSINOPHIL # BLD: 0.1 K/UL (ref 0–0.4)
EOSINOPHIL NFR BLD: 2 % (ref 0–7)
ERYTHROCYTE [DISTWIDTH] IN BLOOD BY AUTOMATED COUNT: 13.7 % (ref 11.5–14.5)
EST. AVERAGE GLUCOSE BLD GHB EST-MCNC: 120 MG/DL
GLOBULIN SER CALC-MCNC: 3.3 G/DL (ref 2–4)
GLUCOSE SERPL-MCNC: 113 MG/DL (ref 65–100)
HBA1C MFR BLD: 5.8 % (ref 4–5.6)
HCT VFR BLD AUTO: 38.9 % (ref 35–47)
HDLC SERPL-MCNC: 58 MG/DL
HDLC SERPL: 2.5 {RATIO} (ref 0–5)
HGB BLD-MCNC: 12.5 G/DL (ref 11.5–16)
IMM GRANULOCYTES # BLD AUTO: 0 K/UL (ref 0–0.04)
IMM GRANULOCYTES NFR BLD AUTO: 0 % (ref 0–0.5)
LDLC SERPL CALC-MCNC: 72 MG/DL (ref 0–100)
LIPID PROFILE,FLP: NORMAL
LYMPHOCYTES # BLD: 0.9 K/UL (ref 0.8–3.5)
LYMPHOCYTES NFR BLD: 21 % (ref 12–49)
MCH RBC QN AUTO: 32.1 PG (ref 26–34)
MCHC RBC AUTO-ENTMCNC: 32.1 G/DL (ref 30–36.5)
MCV RBC AUTO: 99.7 FL (ref 80–99)
MONOCYTES # BLD: 0.5 K/UL (ref 0–1)
MONOCYTES NFR BLD: 13 % (ref 5–13)
NEUTS SEG # BLD: 2.6 K/UL (ref 1.8–8)
NEUTS SEG NFR BLD: 63 % (ref 32–75)
NRBC # BLD: 0 K/UL (ref 0–0.01)
NRBC BLD-RTO: 0 PER 100 WBC
PLATELET # BLD AUTO: 325 K/UL (ref 150–400)
PMV BLD AUTO: 9.9 FL (ref 8.9–12.9)
POTASSIUM SERPL-SCNC: 4 MMOL/L (ref 3.5–5.1)
PROT SERPL-MCNC: 7.3 G/DL (ref 6.4–8.2)
RBC # BLD AUTO: 3.9 M/UL (ref 3.8–5.2)
SODIUM SERPL-SCNC: 139 MMOL/L (ref 136–145)
TRIGL SERPL-MCNC: 80 MG/DL (ref ?–150)
TSH SERPL DL<=0.05 MIU/L-ACNC: 0.28 UIU/ML (ref 0.36–3.74)
VLDLC SERPL CALC-MCNC: 16 MG/DL
WBC # BLD AUTO: 4.1 K/UL (ref 3.6–11)

## 2020-12-01 PROCEDURE — 1101F PT FALLS ASSESS-DOCD LE1/YR: CPT | Performed by: FAMILY MEDICINE

## 2020-12-01 PROCEDURE — G8420 CALC BMI NORM PARAMETERS: HCPCS | Performed by: FAMILY MEDICINE

## 2020-12-01 PROCEDURE — G0439 PPPS, SUBSEQ VISIT: HCPCS | Performed by: FAMILY MEDICINE

## 2020-12-01 PROCEDURE — G8753 SYS BP > OR = 140: HCPCS | Performed by: FAMILY MEDICINE

## 2020-12-01 PROCEDURE — G8432 DEP SCR NOT DOC, RNG: HCPCS | Performed by: FAMILY MEDICINE

## 2020-12-01 PROCEDURE — G8754 DIAS BP LESS 90: HCPCS | Performed by: FAMILY MEDICINE

## 2020-12-01 PROCEDURE — G8399 PT W/DXA RESULTS DOCUMENT: HCPCS | Performed by: FAMILY MEDICINE

## 2020-12-01 PROCEDURE — 1090F PRES/ABSN URINE INCON ASSESS: CPT | Performed by: FAMILY MEDICINE

## 2020-12-01 PROCEDURE — 99214 OFFICE O/P EST MOD 30 MIN: CPT | Performed by: FAMILY MEDICINE

## 2020-12-01 PROCEDURE — G8427 DOCREV CUR MEDS BY ELIG CLIN: HCPCS | Performed by: FAMILY MEDICINE

## 2020-12-01 PROCEDURE — G8536 NO DOC ELDER MAL SCRN: HCPCS | Performed by: FAMILY MEDICINE

## 2020-12-01 PROCEDURE — G0463 HOSPITAL OUTPT CLINIC VISIT: HCPCS | Performed by: FAMILY MEDICINE

## 2020-12-01 RX ORDER — METOPROLOL SUCCINATE 50 MG/1
TABLET, EXTENDED RELEASE ORAL
Qty: 90 TAB | Refills: 1 | Status: SHIPPED | OUTPATIENT
Start: 2020-12-01 | End: 2021-02-22

## 2020-12-01 RX ORDER — AMLODIPINE BESYLATE 5 MG/1
TABLET ORAL
Qty: 90 TAB | Refills: 1 | Status: SHIPPED | OUTPATIENT
Start: 2020-12-01 | End: 2021-03-08

## 2020-12-01 RX ORDER — ROSUVASTATIN CALCIUM 10 MG/1
10 TABLET, COATED ORAL
Qty: 90 TAB | Refills: 1 | Status: SHIPPED | OUTPATIENT
Start: 2020-12-01 | End: 2021-02-09

## 2020-12-01 NOTE — PROGRESS NOTES
Chief Complaint   Patient presents with    Annual Wellness Visit    Hypertension     No Amlodipine X 2 weeks    Labs     Fasting today    Medication Refill     1. Have you been to the ER, urgent care clinic since your last visit? Hospitalized since your last visit? No    2. Have you seen or consulted any other health care providers outside of the [address] [place] [address] since your last visit? Include any pap smears or colon screening. No        Medicare Wellness Exam:    Chief Complaint   Patient presents with    Annual Wellness Visit    Hypertension     No Amlodipine X 2 weeks    Labs     Fasting today    Medication Refill     she is a 80y.o. year old female who presents for evaluation for their Medicare Wellness Visit. Grayson Glatter is completed and assessed=yes  Depression Screen is completed and assessed=yes  Medication list reviewed and adjusted for accuracy=yes  Immunizations reviewed and updated=yes  Health/Preventative Screenings reviewed and updated=yes  ADL Functions reviewed=yes    Patient Active Problem List    Diagnosis    Type 2 diabetes with nephropathy ([facility] [address]    S/P right inguinal herniorrhaphy     With mesh.  Nodule of left lobe of thyroid gland    Anemia    Type 2 diabetes mellitus without complication, without long-term current use of insulin ([facility] [address]    Advance care planning    DDD (degenerative disc disease), lumbar    Gastric ulcer    Coronary artery disease involving native coronary artery of native heart without angina pectoris    GI bleeding     workup in [address] [place] [address] 12/15 was unremarkable (Dr. Bull Fontaine) - says she had EGD, colonoscopy, and small bowel capsule endoscopy      HTN (hypertension)    Hypothyroidism, iatrogenic     radioiodine      Hypercholesteremia       Reviewed PmHx, RxHx, FmHx, SocHx, AllgHx and updated and dated in the chart.     Review of Systems - negative except as listed above in the HPI    Objective:     Vitals:    12/01/20 0912   BP: (!) 201/81   Pulse: 63   Resp: 17   Temp: 97.9 °F (36.6 °C)   TempSrc: Oral   SpO2: 99%   Weight: 126 lb (57.2 kg)   Height: 5' 2\" (1.575 m)     Physical Examination: General appearance - alert, well appearing, and in no distress  Chest - clear to auscultation, no wheezes, rales or rhonchi, symmetric air entry  Heart - normal rate, regular rhythm, normal S1, S2, no murmurs, rubs, clicks or gallops    Assessment/ Plan:   Diagnoses and all orders for this visit:    1. Routine general medical examination at a health care facility  -see below    2. Essential hypertension  -     amLODIPine (NORVASC) 5 mg tablet; TAKE 1 TABLET BY MOUTH EVERY DAY  -     metoprolol succinate (TOPROL-XL) 50 mg XL tablet; TAKE 1 TABLET BY MOUTH EVERY DAY  -     LIPID PANEL; Future  -     METABOLIC PANEL, COMPREHENSIVE; Future  -     CBC WITH AUTOMATED DIFF; Future  -     TSH 3RD GENERATION; Future  -     HEMOGLOBIN A1C WITH EAG; Future  -inc off rx  -no sxs    3. Hypercholesteremia  -     rosuvastatin (Crestor) 10 mg tablet; Take 1 Tab by mouth nightly. -     LIPID PANEL; Future  -     METABOLIC PANEL, COMPREHENSIVE; Future    4. Type 2 diabetes mellitus without complication, without long-term current use of insulin (HCC)  -     LIPID PANEL; Future  -     METABOLIC PANEL, COMPREHENSIVE; Future  -     HEMOGLOBIN A1C WITH EAG; Future  Lab Results   Component Value Date/Time    Hemoglobin A1c 5.8 (H) 07/28/2020 10:21 AM       5. Hypothyroidism, iatrogenic  -     TSH 3RD GENERATION; Future    6. Anemia, unspecified type  -     CBC WITH AUTOMATED DIFF; Future    7. Coronary artery disease involving native coronary artery of native heart without angina pectoris  -     LIPID PANEL; Future  -     METABOLIC PANEL, COMPREHENSIVE; Future    8. Advance care planning  -has lw    9. Type 2 diabetes with nephropathy (HCC)  -     LIPID PANEL; Future  -     METABOLIC PANEL, COMPREHENSIVE; Future  -     HEMOGLOBIN A1C WITH EAG;  Future         -Pain evaluation performed in office  -Cognitive Screen performed in office  -Depression Screen, Fall risks (by up and go test)  and ADL functionality were addressed  -Medication list updated and reviewed for any changes   -A comprehensive review of medical issues and a plan was formulated  -End of life planning was addressed with pt   -Health Screenings for preventions were addressed and a plan was formulated  -Shingles Vaccine was recommended  -Discussed with patient cancer risk factors and appropriate screenings for age  -Patient evaluated for colonoscopy and referred if needed per screeing criteria  -Labs from previous visits were discussed with patient   -Discussed with patient diet and exercise and formulated a plan as needed  -An Advanced care plan was developed with the patient.  -Alcohol screening performed and was negative    -    I have discussed the diagnosis with the patient and the intended plan as seen in the above orders. The patient understands and agrees with the plan. The patient has received an after-visit summary and questions were answered concerning future plans. Medication Side Effects and Warnings were discussed with patien  Patient Labs were reviewed and or requested  Patient Past Records were reviewed and or requested    There are no Patient Instructions on file for this visit.       Corinna Lala M.D.

## 2020-12-21 RX ORDER — VALACYCLOVIR HYDROCHLORIDE 1 G/1
TABLET, FILM COATED ORAL
Qty: 30 TAB | Refills: 3 | Status: SHIPPED | OUTPATIENT
Start: 2020-12-21 | End: 2021-03-30 | Stop reason: SDUPTHER

## 2020-12-23 DIAGNOSIS — E03.2 HYPOTHYROIDISM, IATROGENIC: ICD-10-CM

## 2020-12-24 RX ORDER — LEVOTHYROXINE SODIUM 88 UG/1
TABLET ORAL
Qty: 30 TAB | Refills: 0 | Status: SHIPPED | OUTPATIENT
Start: 2020-12-24 | End: 2021-01-24

## 2021-01-01 ENCOUNTER — OFFICE VISIT (OUTPATIENT)
Dept: FAMILY MEDICINE CLINIC | Age: 86
End: 2021-01-01

## 2021-01-01 VITALS
WEIGHT: 123 LBS | SYSTOLIC BLOOD PRESSURE: 143 MMHG | OXYGEN SATURATION: 98 % | TEMPERATURE: 98.7 F | BODY MASS INDEX: 22.63 KG/M2 | HEIGHT: 62 IN | RESPIRATION RATE: 16 BRPM | HEART RATE: 76 BPM | DIASTOLIC BLOOD PRESSURE: 71 MMHG

## 2021-01-01 DIAGNOSIS — E78.00 HYPERCHOLESTEREMIA: ICD-10-CM

## 2021-01-01 DIAGNOSIS — I10 PRIMARY HYPERTENSION: ICD-10-CM

## 2021-01-01 DIAGNOSIS — Z00.01 ENCOUNTER FOR ROUTINE ADULT HEALTH EXAMINATION WITH ABNORMAL FINDINGS: Primary | ICD-10-CM

## 2021-01-01 DIAGNOSIS — E03.2 HYPOTHYROIDISM, IATROGENIC: ICD-10-CM

## 2021-01-01 DIAGNOSIS — I25.10 CORONARY ARTERY DISEASE INVOLVING NATIVE CORONARY ARTERY OF NATIVE HEART WITHOUT ANGINA PECTORIS: ICD-10-CM

## 2021-01-01 DIAGNOSIS — E11.21 TYPE 2 DIABETES WITH NEPHROPATHY (HCC): ICD-10-CM

## 2021-01-01 DIAGNOSIS — R03.0 ELEVATED BLOOD PRESSURE READING: ICD-10-CM

## 2021-01-01 DIAGNOSIS — D64.9 ANEMIA, UNSPECIFIED TYPE: ICD-10-CM

## 2021-01-01 LAB
ALBUMIN SERPL-MCNC: 3.3 G/DL (ref 3.5–5)
ALBUMIN/GLOB SERPL: 0.6 {RATIO} (ref 1.1–2.2)
ALP SERPL-CCNC: 73 U/L (ref 45–117)
ALT SERPL-CCNC: 20 U/L (ref 12–78)
ANION GAP SERPL CALC-SCNC: 7 MMOL/L (ref 5–15)
AST SERPL-CCNC: 19 U/L (ref 15–37)
BASOPHILS # BLD: 0 K/UL (ref 0–0.1)
BASOPHILS NFR BLD: 1 % (ref 0–1)
BILIRUB SERPL-MCNC: 0.3 MG/DL (ref 0.2–1)
BUN SERPL-MCNC: 10 MG/DL (ref 6–20)
BUN/CREAT SERPL: 19 (ref 12–20)
CALCIUM SERPL-MCNC: 9.6 MG/DL (ref 8.5–10.1)
CHLORIDE SERPL-SCNC: 103 MMOL/L (ref 97–108)
CHOLEST SERPL-MCNC: 122 MG/DL
CO2 SERPL-SCNC: 26 MMOL/L (ref 21–32)
CREAT SERPL-MCNC: 0.53 MG/DL (ref 0.55–1.02)
DIFFERENTIAL METHOD BLD: ABNORMAL
EOSINOPHIL # BLD: 0.1 K/UL (ref 0–0.4)
EOSINOPHIL NFR BLD: 3 % (ref 0–7)
ERYTHROCYTE [DISTWIDTH] IN BLOOD BY AUTOMATED COUNT: 13.5 % (ref 11.5–14.5)
EST. AVERAGE GLUCOSE BLD GHB EST-MCNC: 105 MG/DL
FERRITIN SERPL-MCNC: 103 NG/ML (ref 26–388)
GLOBULIN SER CALC-MCNC: 5.5 G/DL (ref 2–4)
GLUCOSE SERPL-MCNC: 116 MG/DL (ref 65–100)
HBA1C MFR BLD: 5.3 % (ref 4–5.6)
HCT VFR BLD AUTO: 36 % (ref 35–47)
HDLC SERPL-MCNC: 67 MG/DL
HDLC SERPL: 1.8 {RATIO} (ref 0–5)
HGB BLD-MCNC: 11.6 G/DL (ref 11.5–16)
IMM GRANULOCYTES # BLD AUTO: 0 K/UL (ref 0–0.04)
IMM GRANULOCYTES NFR BLD AUTO: 0 % (ref 0–0.5)
IRON SATN MFR SERPL: 26 % (ref 20–50)
IRON SERPL-MCNC: 72 UG/DL (ref 35–150)
LDLC SERPL CALC-MCNC: 45.6 MG/DL (ref 0–100)
LYMPHOCYTES # BLD: 0.9 K/UL (ref 0.8–3.5)
LYMPHOCYTES NFR BLD: 22 % (ref 12–49)
MCH RBC QN AUTO: 33 PG (ref 26–34)
MCHC RBC AUTO-ENTMCNC: 32.2 G/DL (ref 30–36.5)
MCV RBC AUTO: 102.6 FL (ref 80–99)
MONOCYTES # BLD: 0.5 K/UL (ref 0–1)
MONOCYTES NFR BLD: 12 % (ref 5–13)
NEUTS SEG # BLD: 2.4 K/UL (ref 1.8–8)
NEUTS SEG NFR BLD: 62 % (ref 32–75)
NRBC # BLD: 0 K/UL (ref 0–0.01)
NRBC BLD-RTO: 0 PER 100 WBC
PLATELET # BLD AUTO: 350 K/UL (ref 150–400)
PMV BLD AUTO: 9.6 FL (ref 8.9–12.9)
POTASSIUM SERPL-SCNC: 3.8 MMOL/L (ref 3.5–5.1)
PROT SERPL-MCNC: 8.8 G/DL (ref 6.4–8.2)
RBC # BLD AUTO: 3.51 M/UL (ref 3.8–5.2)
SODIUM SERPL-SCNC: 136 MMOL/L (ref 136–145)
TIBC SERPL-MCNC: 280 UG/DL (ref 250–450)
TRIGL SERPL-MCNC: 47 MG/DL (ref ?–150)
TSH SERPL DL<=0.05 MIU/L-ACNC: 0.53 UIU/ML (ref 0.36–3.74)
VLDLC SERPL CALC-MCNC: 9.4 MG/DL
WBC # BLD AUTO: 3.9 K/UL (ref 3.6–11)

## 2021-01-01 PROCEDURE — G8510 SCR DEP NEG, NO PLAN REQD: HCPCS | Performed by: FAMILY MEDICINE

## 2021-01-01 PROCEDURE — G0439 PPPS, SUBSEQ VISIT: HCPCS | Performed by: FAMILY MEDICINE

## 2021-01-01 PROCEDURE — 1090F PRES/ABSN URINE INCON ASSESS: CPT | Performed by: FAMILY MEDICINE

## 2021-01-01 PROCEDURE — G8427 DOCREV CUR MEDS BY ELIG CLIN: HCPCS | Performed by: FAMILY MEDICINE

## 2021-01-01 PROCEDURE — 1101F PT FALLS ASSESS-DOCD LE1/YR: CPT | Performed by: FAMILY MEDICINE

## 2021-01-01 PROCEDURE — G8420 CALC BMI NORM PARAMETERS: HCPCS | Performed by: FAMILY MEDICINE

## 2021-01-01 PROCEDURE — G8536 NO DOC ELDER MAL SCRN: HCPCS | Performed by: FAMILY MEDICINE

## 2021-01-01 PROCEDURE — 99214 OFFICE O/P EST MOD 30 MIN: CPT | Performed by: FAMILY MEDICINE

## 2021-01-01 RX ORDER — ROSUVASTATIN CALCIUM 10 MG/1
TABLET, COATED ORAL
Qty: 90 TABLET | Refills: 3 | Status: SHIPPED | OUTPATIENT
Start: 2021-01-01 | End: 2021-01-01 | Stop reason: SDUPTHER

## 2021-01-01 RX ORDER — LEVOTHYROXINE SODIUM 88 UG/1
TABLET ORAL
Qty: 90 TABLET | Refills: 3 | Status: SHIPPED | OUTPATIENT
Start: 2021-01-01 | End: 2022-01-01

## 2021-01-01 RX ORDER — ROSUVASTATIN CALCIUM 10 MG/1
TABLET, COATED ORAL
Qty: 90 TABLET | Refills: 3 | Status: SHIPPED | OUTPATIENT
Start: 2021-01-01 | End: 2022-01-01

## 2021-01-01 RX ORDER — LEVOTHYROXINE SODIUM 88 UG/1
TABLET ORAL
Qty: 90 TABLET | Refills: 0 | Status: SHIPPED | OUTPATIENT
Start: 2021-01-01 | End: 2021-01-01

## 2021-02-07 DIAGNOSIS — E78.00 HYPERCHOLESTEREMIA: ICD-10-CM

## 2021-02-09 RX ORDER — ROSUVASTATIN CALCIUM 10 MG/1
TABLET, COATED ORAL
Qty: 90 TAB | Refills: 3 | Status: SHIPPED | OUTPATIENT
Start: 2021-02-09 | End: 2021-01-01 | Stop reason: SDUPTHER

## 2021-02-17 DIAGNOSIS — I10 ESSENTIAL HYPERTENSION: ICD-10-CM

## 2021-02-17 RX ORDER — LOSARTAN POTASSIUM 25 MG/1
TABLET ORAL
Qty: 90 TAB | Refills: 0 | Status: SHIPPED | OUTPATIENT
Start: 2021-02-17 | End: 2021-06-02

## 2021-02-17 NOTE — TELEPHONE ENCOUNTER
Needs follow up visit for BP, not controlled on at last visit in [facility] schedule in next 30 days.

## 2021-02-21 DIAGNOSIS — I10 ESSENTIAL HYPERTENSION: ICD-10-CM

## 2021-02-22 RX ORDER — METOPROLOL SUCCINATE 50 MG/1
TABLET, EXTENDED RELEASE ORAL
Qty: 90 TAB | Refills: 3 | Status: SHIPPED | OUTPATIENT
Start: 2021-02-22 | End: 2022-01-01

## 2021-03-08 DIAGNOSIS — I10 ESSENTIAL HYPERTENSION: ICD-10-CM

## 2021-03-08 RX ORDER — AMLODIPINE BESYLATE 5 MG/1
TABLET ORAL
Qty: 90 TAB | Refills: 3 | Status: SHIPPED | OUTPATIENT
Start: 2021-03-08 | End: 2022-01-01

## 2021-03-30 ENCOUNTER — OFFICE VISIT (OUTPATIENT)
Dept: FAMILY MEDICINE CLINIC | Age: 86
End: 2021-03-30
Payer: MEDICARE

## 2021-03-30 VITALS
DIASTOLIC BLOOD PRESSURE: 90 MMHG | BODY MASS INDEX: 23.27 KG/M2 | HEIGHT: 62 IN | SYSTOLIC BLOOD PRESSURE: 182 MMHG | WEIGHT: 126.44 LBS | TEMPERATURE: 97.2 F | OXYGEN SATURATION: 97 % | HEART RATE: 73 BPM | RESPIRATION RATE: 20 BRPM

## 2021-03-30 DIAGNOSIS — E03.2 HYPOTHYROIDISM, IATROGENIC: ICD-10-CM

## 2021-03-30 DIAGNOSIS — I10 ESSENTIAL HYPERTENSION: Primary | ICD-10-CM

## 2021-03-30 PROCEDURE — 1101F PT FALLS ASSESS-DOCD LE1/YR: CPT | Performed by: FAMILY MEDICINE

## 2021-03-30 PROCEDURE — G8420 CALC BMI NORM PARAMETERS: HCPCS | Performed by: FAMILY MEDICINE

## 2021-03-30 PROCEDURE — 1090F PRES/ABSN URINE INCON ASSESS: CPT | Performed by: FAMILY MEDICINE

## 2021-03-30 PROCEDURE — G8536 NO DOC ELDER MAL SCRN: HCPCS | Performed by: FAMILY MEDICINE

## 2021-03-30 PROCEDURE — 99213 OFFICE O/P EST LOW 20 MIN: CPT | Performed by: FAMILY MEDICINE

## 2021-03-30 PROCEDURE — G8510 SCR DEP NEG, NO PLAN REQD: HCPCS | Performed by: FAMILY MEDICINE

## 2021-03-30 PROCEDURE — G8427 DOCREV CUR MEDS BY ELIG CLIN: HCPCS | Performed by: FAMILY MEDICINE

## 2021-03-30 PROCEDURE — G0463 HOSPITAL OUTPT CLINIC VISIT: HCPCS | Performed by: FAMILY MEDICINE

## 2021-03-30 RX ORDER — VALACYCLOVIR HYDROCHLORIDE 1 G/1
TABLET, FILM COATED ORAL
Qty: 30 TAB | Refills: 3 | Status: SHIPPED | OUTPATIENT
Start: 2021-03-30 | End: 2022-01-01

## 2021-03-30 RX ORDER — POLYETHYLENE GLYCOL 3350 17 G/17G
17 POWDER, FOR SOLUTION ORAL DAILY
COMMUNITY
End: 2022-01-01

## 2021-03-30 RX ORDER — LEVOTHYROXINE SODIUM 88 UG/1
TABLET ORAL
Qty: 90 TAB | Refills: 0 | Status: SHIPPED | OUTPATIENT
Start: 2021-03-30 | End: 2021-06-06 | Stop reason: SDUPTHER

## 2021-05-04 ENCOUNTER — OFFICE VISIT (OUTPATIENT)
Dept: FAMILY MEDICINE CLINIC | Age: 86
End: 2021-05-04
Payer: MEDICARE

## 2021-05-04 VITALS
BODY MASS INDEX: 23.3 KG/M2 | DIASTOLIC BLOOD PRESSURE: 91 MMHG | HEART RATE: 65 BPM | WEIGHT: 126.6 LBS | OXYGEN SATURATION: 97 % | TEMPERATURE: 98 F | SYSTOLIC BLOOD PRESSURE: 182 MMHG | HEIGHT: 62 IN | RESPIRATION RATE: 16 BRPM

## 2021-05-04 DIAGNOSIS — D64.9 ANEMIA, UNSPECIFIED TYPE: ICD-10-CM

## 2021-05-04 DIAGNOSIS — E11.9 TYPE 2 DIABETES MELLITUS WITHOUT COMPLICATION, WITHOUT LONG-TERM CURRENT USE OF INSULIN (HCC): Primary | ICD-10-CM

## 2021-05-04 DIAGNOSIS — E03.2 HYPOTHYROIDISM, IATROGENIC: ICD-10-CM

## 2021-05-04 DIAGNOSIS — E78.00 HYPERCHOLESTEREMIA: ICD-10-CM

## 2021-05-04 DIAGNOSIS — E11.21 TYPE 2 DIABETES WITH NEPHROPATHY (HCC): ICD-10-CM

## 2021-05-04 DIAGNOSIS — I10 ESSENTIAL HYPERTENSION: ICD-10-CM

## 2021-05-04 DIAGNOSIS — Z23 ENCOUNTER FOR IMMUNIZATION: ICD-10-CM

## 2021-05-04 PROCEDURE — G8427 DOCREV CUR MEDS BY ELIG CLIN: HCPCS | Performed by: FAMILY MEDICINE

## 2021-05-04 PROCEDURE — 99214 OFFICE O/P EST MOD 30 MIN: CPT | Performed by: FAMILY MEDICINE

## 2021-05-04 PROCEDURE — 1090F PRES/ABSN URINE INCON ASSESS: CPT | Performed by: FAMILY MEDICINE

## 2021-05-04 PROCEDURE — G8536 NO DOC ELDER MAL SCRN: HCPCS | Performed by: FAMILY MEDICINE

## 2021-05-04 PROCEDURE — 1101F PT FALLS ASSESS-DOCD LE1/YR: CPT | Performed by: FAMILY MEDICINE

## 2021-05-04 PROCEDURE — G8510 SCR DEP NEG, NO PLAN REQD: HCPCS | Performed by: FAMILY MEDICINE

## 2021-05-04 PROCEDURE — G0463 HOSPITAL OUTPT CLINIC VISIT: HCPCS | Performed by: FAMILY MEDICINE

## 2021-05-04 PROCEDURE — 90732 PPSV23 VACC 2 YRS+ SUBQ/IM: CPT | Performed by: FAMILY MEDICINE

## 2021-05-04 PROCEDURE — G8420 CALC BMI NORM PARAMETERS: HCPCS | Performed by: FAMILY MEDICINE

## 2021-05-05 LAB
ALBUMIN SERPL-MCNC: 4 G/DL (ref 3.5–5)
ALBUMIN/GLOB SERPL: 1.1 {RATIO} (ref 1.1–2.2)
ALP SERPL-CCNC: 95 U/L (ref 45–117)
ALT SERPL-CCNC: 27 U/L (ref 12–78)
ANION GAP SERPL CALC-SCNC: 9 MMOL/L (ref 5–15)
AST SERPL-CCNC: 19 U/L (ref 15–37)
BASOPHILS # BLD: 0 K/UL (ref 0–0.1)
BASOPHILS NFR BLD: 1 % (ref 0–1)
BILIRUB SERPL-MCNC: 0.4 MG/DL (ref 0.2–1)
BUN SERPL-MCNC: 12 MG/DL (ref 6–20)
BUN/CREAT SERPL: 27 (ref 12–20)
CALCIUM SERPL-MCNC: 9.4 MG/DL (ref 8.5–10.1)
CHLORIDE SERPL-SCNC: 103 MMOL/L (ref 97–108)
CHOLEST SERPL-MCNC: 144 MG/DL
CO2 SERPL-SCNC: 28 MMOL/L (ref 21–32)
CREAT SERPL-MCNC: 0.45 MG/DL (ref 0.55–1.02)
DIFFERENTIAL METHOD BLD: ABNORMAL
EOSINOPHIL # BLD: 0.1 K/UL (ref 0–0.4)
EOSINOPHIL NFR BLD: 3 % (ref 0–7)
ERYTHROCYTE [DISTWIDTH] IN BLOOD BY AUTOMATED COUNT: 13.9 % (ref 11.5–14.5)
EST. AVERAGE GLUCOSE BLD GHB EST-MCNC: 117 MG/DL
GLOBULIN SER CALC-MCNC: 3.6 G/DL (ref 2–4)
GLUCOSE SERPL-MCNC: 109 MG/DL (ref 65–100)
HBA1C MFR BLD: 5.7 % (ref 4–5.6)
HCT VFR BLD AUTO: 39.8 % (ref 35–47)
HDLC SERPL-MCNC: 73 MG/DL
HDLC SERPL: 2 {RATIO} (ref 0–5)
HGB BLD-MCNC: 12.7 G/DL (ref 11.5–16)
IMM GRANULOCYTES # BLD AUTO: 0 K/UL (ref 0–0.04)
IMM GRANULOCYTES NFR BLD AUTO: 0 % (ref 0–0.5)
LDLC SERPL CALC-MCNC: 55.8 MG/DL (ref 0–100)
LIPID PROFILE,FLP: NORMAL
LYMPHOCYTES # BLD: 0.8 K/UL (ref 0.8–3.5)
LYMPHOCYTES NFR BLD: 19 % (ref 12–49)
MCH RBC QN AUTO: 32.7 PG (ref 26–34)
MCHC RBC AUTO-ENTMCNC: 31.9 G/DL (ref 30–36.5)
MCV RBC AUTO: 102.6 FL (ref 80–99)
MONOCYTES # BLD: 0.5 K/UL (ref 0–1)
MONOCYTES NFR BLD: 13 % (ref 5–13)
NEUTS SEG # BLD: 2.8 K/UL (ref 1.8–8)
NEUTS SEG NFR BLD: 64 % (ref 32–75)
NRBC # BLD: 0 K/UL (ref 0–0.01)
NRBC BLD-RTO: 0 PER 100 WBC
PLATELET # BLD AUTO: 308 K/UL (ref 150–400)
PMV BLD AUTO: 10 FL (ref 8.9–12.9)
POTASSIUM SERPL-SCNC: 3.8 MMOL/L (ref 3.5–5.1)
PROT SERPL-MCNC: 7.6 G/DL (ref 6.4–8.2)
RBC # BLD AUTO: 3.88 M/UL (ref 3.8–5.2)
SODIUM SERPL-SCNC: 140 MMOL/L (ref 136–145)
TRIGL SERPL-MCNC: 76 MG/DL (ref ?–150)
TSH SERPL DL<=0.05 MIU/L-ACNC: 0.34 UIU/ML (ref 0.36–3.74)
VLDLC SERPL CALC-MCNC: 15.2 MG/DL
WBC # BLD AUTO: 4.3 K/UL (ref 3.6–11)

## 2021-05-20 ENCOUNTER — DOCUMENTATION ONLY (OUTPATIENT)
Dept: FAMILY MEDICINE CLINIC | Age: 86
End: 2021-05-20

## 2021-05-20 NOTE — PROGRESS NOTES
Patient brought in [facility] form for completion.     Call [phone] when ready for

## 2021-05-25 ENCOUNTER — DOCUMENTATION ONLY (OUTPATIENT)
Dept: FAMILY MEDICINE CLINIC | Age: 86
End: 2021-05-25

## 2021-05-25 NOTE — PROGRESS NOTES
DMV form placed on Dr My[facility] desk for completion/review.     Patient contact # [phone]

## 2021-05-26 ENCOUNTER — DOCUMENTATION ONLY (OUTPATIENT)
Dept: FAMILY MEDICINE CLINIC | Age: 86
End: 2021-05-26

## 2021-05-26 NOTE — PROGRESS NOTES
Called and spoke to patient. Azar Obrien  Patient states understanding completed DMV forms left up front at office for p/u.

## 2021-05-31 DIAGNOSIS — I10 ESSENTIAL HYPERTENSION: ICD-10-CM

## 2021-06-02 RX ORDER — LOSARTAN POTASSIUM 25 MG/1
TABLET ORAL
Qty: 90 TABLET | Refills: 3 | Status: SHIPPED | OUTPATIENT
Start: 2021-06-02 | End: 2022-01-01

## 2021-06-06 DIAGNOSIS — E03.2 HYPOTHYROIDISM, IATROGENIC: ICD-10-CM

## 2021-12-06 NOTE — PROGRESS NOTES
Patient here for annual wellness visit,. Fasting las. 1. Have you been to the ER, urgent care clinic since your last visit? Hospitalized since your last visit? No    2. Have you seen or consulted any other health care providers outside of the [address] [place] [address] since your last visit? Include any pap smears or colon screening. No         Medicare Wellness Exam:    Chief Complaint   Patient presents with    Annual Wellness Visit     fasting     she is a 80y.o. year old female who presents for evaluation for their Medicare Wellness Visit. Godfrey Colt is completed and assessed=yes  Depression Screen is completed and assessed=yes  Medication list reviewed and adjusted for accuracy=yes  Immunizations reviewed and updated=yes  Health/Preventative Screenings reviewed and updated=yes  ADL Functions reviewed=yes    Patient Active Problem List    Diagnosis    Type 2 diabetes with nephropathy ([address]    S/P right inguinal herniorrhaphy     With mesh. 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 Lesly Davenport) - says she had EGD, colonoscopy, and small bowel capsule endoscopy      HTN (hypertension)    Hypothyroidism, iatrogenic     radioiodine      Hypercholesteremia       Reviewed PmHx, RxHx, FmHx, SocHx, AllgHx and updated and dated in the chart.     Review of Systems - negative except as listed above in the HPI    Objective:     Vitals:    12/06/21 0948 12/06/21 1007   BP: (!) 188/94 (!) 143/71   Pulse: 76    Resp: 16    Temp: 98.7 °F (37.1 °C)    SpO2: 98%    Weight: 123 lb (55.8 kg)    Height: 5' 2\" (1.575 m)        Assessment/ Plan:   Diagnoses and all orders for this visit:    1. Encounter for routine adult health examination with abnormal findings  -     LIPID PANEL; Future  -     METABOLIC PANEL, COMPREHENSIVE; Future  -     HEMOGLOBIN A1C WITH EAG; Future  -     CBC WITH AUTOMATED DIFF; Future  -     TSH 3RD GENERATION; Future  See below  2. Type 2 diabetes with nephropathy (HCC)  -     LIPID PANEL; Future  -     METABOLIC PANEL, COMPREHENSIVE; Future  -     HEMOGLOBIN A1C WITH EAG; Future  Lab Results   Component Value Date/Time    Hemoglobin A1c 5.7 (H) 05/04/2021 10:48 AM     At goal  3. Primary hypertension  -     LIPID PANEL; Future  -     METABOLIC PANEL, COMPREHENSIVE; Future  Elevated today but okay at home and no symptoms. Continue with current meds as planned  4. Elevated blood pressure reading  -     LIPID PANEL; Future  -     METABOLIC PANEL, COMPREHENSIVE; Future  See above  5. Hypothyroidism, iatrogenic  -     TSH 3RD GENERATION; Future    6. Hypercholesteremia  -     LIPID PANEL; Future  -     METABOLIC PANEL, COMPREHENSIVE; Future    7. Coronary artery disease involving native coronary artery of native heart without angina pectoris  -     LIPID PANEL; Future  -     METABOLIC PANEL, COMPREHENSIVE; Future  Asymptomatic  8. Anemia, unspecified type  -     CBC WITH AUTOMATED DIFF; Future  -     IRON PROFILE; Future  -     FERRITIN; Future  Patient is taking her iron.   She does complain of some fatigue.       -Pain evaluation performed in office  -Cognitive Screen performed in office  -Depression Screen, Fall risks (by up and go test)  and ADL functionality were addressed  -Medication list updated and reviewed for any changes   -A comprehensive review of medical issues and a plan was formulated  -End of life planning was addressed with pt   -Health Screenings for preventions were addressed and a plan was formulated  -Shingles Vaccine was recommended  -Discussed with patient cancer risk factors and appropriate screenings for age  -Patient evaluated for colonoscopy and referred if needed per screeing criteria  -Labs from previous visits were discussed with patient   -Discussed with patient diet and exercise and formulated a plan as needed  -An Advanced care plan was developed with the patient.  -Alcohol screening performed and was negative    -  Follow-up and Dispositions    · Return in about 6 months (around 6/6/2022). I have discussed the diagnosis with the patient and the intended plan as seen in the above orders. The patient understands and agrees with the plan. The patient has received an after-visit summary and questions were answered concerning future plans. Medication Side Effects and Warnings were discussed with patien  Patient Labs were reviewed and or requested  Patient Past Records were reviewed and or requested    There are no Patient Instructions on file for this visit.       Kristi Archibald M.D.

## 2022-01-01 ENCOUNTER — ANESTHESIA EVENT (OUTPATIENT)
Dept: ENDOSCOPY | Age: 87
DRG: 841 | End: 2022-01-01
Payer: MEDICARE

## 2022-01-01 ENCOUNTER — PATIENT OUTREACH (OUTPATIENT)
Dept: CASE MANAGEMENT | Age: 87
End: 2022-01-01

## 2022-01-01 ENCOUNTER — APPOINTMENT (OUTPATIENT)
Dept: CT IMAGING | Age: 87
DRG: 841 | End: 2022-01-01
Attending: EMERGENCY MEDICINE
Payer: MEDICARE

## 2022-01-01 ENCOUNTER — APPOINTMENT (OUTPATIENT)
Dept: CT IMAGING | Age: 87
DRG: 640 | End: 2022-01-01
Attending: EMERGENCY MEDICINE
Payer: MEDICARE

## 2022-01-01 ENCOUNTER — HOSPITAL ENCOUNTER (EMERGENCY)
Age: 87
Discharge: HOME | DRG: 640 | End: 2022-05-29
Attending: EMERGENCY MEDICINE
Payer: MEDICARE

## 2022-01-01 ENCOUNTER — APPOINTMENT (OUTPATIENT)
Dept: GENERAL RADIOLOGY | Age: 87
DRG: 305 | End: 2022-01-01
Payer: MEDICARE

## 2022-01-01 ENCOUNTER — HOSPITAL ENCOUNTER (INPATIENT)
Age: 87
LOS: 8 days | Discharge: HOSPICE | DRG: 640 | End: 2022-06-07
Attending: EMERGENCY MEDICINE | Admitting: FAMILY MEDICINE
Payer: MEDICARE

## 2022-01-01 ENCOUNTER — APPOINTMENT (OUTPATIENT)
Dept: GENERAL RADIOLOGY | Age: 87
DRG: 640 | End: 2022-01-01
Attending: EMERGENCY MEDICINE
Payer: MEDICARE

## 2022-01-01 ENCOUNTER — HOME CARE VISIT (OUTPATIENT)
Dept: HOSPICE | Facility: HOSPICE | Age: 87
End: 2022-01-01
Payer: MEDICARE

## 2022-01-01 ENCOUNTER — OFFICE VISIT (OUTPATIENT)
Dept: ORTHOPEDIC SURGERY | Age: 87
End: 2022-01-01
Payer: MEDICARE

## 2022-01-01 ENCOUNTER — HOSPITAL ENCOUNTER (OUTPATIENT)
Dept: INFUSION THERAPY | Age: 87
Discharge: HOME | End: 2022-05-13
Payer: MEDICARE

## 2022-01-01 ENCOUNTER — TELEPHONE (OUTPATIENT)
Dept: FAMILY MEDICINE CLINIC | Age: 87
End: 2022-01-01

## 2022-01-01 ENCOUNTER — ANESTHESIA (OUTPATIENT)
Dept: ENDOSCOPY | Age: 87
DRG: 841 | End: 2022-01-01
Payer: MEDICARE

## 2022-01-01 ENCOUNTER — APPOINTMENT (OUTPATIENT)
Dept: CT IMAGING | Age: 87
DRG: 841 | End: 2022-01-01
Attending: STUDENT IN AN ORGANIZED HEALTH CARE EDUCATION/TRAINING PROGRAM
Payer: MEDICARE

## 2022-01-01 ENCOUNTER — APPOINTMENT (OUTPATIENT)
Dept: GENERAL RADIOLOGY | Age: 87
DRG: 841 | End: 2022-01-01
Attending: INTERNAL MEDICINE
Payer: MEDICARE

## 2022-01-01 ENCOUNTER — HOME VISIT (OUTPATIENT)
Dept: FAMILY MEDICINE CLINIC | Age: 87
End: 2022-01-01
Payer: MEDICARE

## 2022-01-01 ENCOUNTER — NURSE TRIAGE (OUTPATIENT)
Dept: OTHER | Facility: CLINIC | Age: 87
End: 2022-01-01

## 2022-01-01 ENCOUNTER — TELEPHONE (OUTPATIENT)
Dept: ONCOLOGY | Age: 87
End: 2022-01-01

## 2022-01-01 ENCOUNTER — APPOINTMENT (OUTPATIENT)
Dept: GENERAL RADIOLOGY | Age: 87
DRG: 841 | End: 2022-01-01
Attending: EMERGENCY MEDICINE
Payer: MEDICARE

## 2022-01-01 ENCOUNTER — APPOINTMENT (OUTPATIENT)
Dept: NON INVASIVE DIAGNOSTICS | Age: 87
DRG: 640 | End: 2022-01-01
Payer: MEDICARE

## 2022-01-01 ENCOUNTER — OFFICE VISIT (OUTPATIENT)
Dept: FAMILY MEDICINE CLINIC | Age: 87
End: 2022-01-01
Payer: MEDICARE

## 2022-01-01 ENCOUNTER — HOSPITAL ENCOUNTER (OUTPATIENT)
Dept: INFUSION THERAPY | Age: 87
End: 2022-01-01

## 2022-01-01 ENCOUNTER — OFFICE VISIT (OUTPATIENT)
Dept: ONCOLOGY | Age: 87
End: 2022-01-01
Payer: MEDICARE

## 2022-01-01 ENCOUNTER — APPOINTMENT (OUTPATIENT)
Dept: NON INVASIVE DIAGNOSTICS | Age: 87
DRG: 305 | End: 2022-01-01
Attending: STUDENT IN AN ORGANIZED HEALTH CARE EDUCATION/TRAINING PROGRAM
Payer: MEDICARE

## 2022-01-01 ENCOUNTER — APPOINTMENT (OUTPATIENT)
Dept: CT IMAGING | Age: 87
DRG: 841 | End: 2022-01-01
Attending: NURSE PRACTITIONER
Payer: MEDICARE

## 2022-01-01 ENCOUNTER — HOSPITAL ENCOUNTER (INPATIENT)
Age: 87
LOS: 7 days | Discharge: NURSING, REHAB OR LONG-TERM CARE | DRG: 841 | End: 2022-05-05
Attending: EMERGENCY MEDICINE | Admitting: FAMILY MEDICINE
Payer: MEDICARE

## 2022-01-01 ENCOUNTER — APPOINTMENT (OUTPATIENT)
Dept: MRI IMAGING | Age: 87
DRG: 841 | End: 2022-01-01
Attending: STUDENT IN AN ORGANIZED HEALTH CARE EDUCATION/TRAINING PROGRAM
Payer: MEDICARE

## 2022-01-01 ENCOUNTER — APPOINTMENT (OUTPATIENT)
Dept: ULTRASOUND IMAGING | Age: 87
DRG: 841 | End: 2022-01-01
Attending: STUDENT IN AN ORGANIZED HEALTH CARE EDUCATION/TRAINING PROGRAM
Payer: MEDICARE

## 2022-01-01 ENCOUNTER — HOSPITAL ENCOUNTER (INPATIENT)
Age: 87
LOS: 3 days | Discharge: HOME HEALTH | DRG: 305 | End: 2022-02-27
Attending: STUDENT IN AN ORGANIZED HEALTH CARE EDUCATION/TRAINING PROGRAM | Admitting: STUDENT IN AN ORGANIZED HEALTH CARE EDUCATION/TRAINING PROGRAM
Payer: MEDICARE

## 2022-01-01 ENCOUNTER — HOSPICE ADMISSION (OUTPATIENT)
Dept: HOSPICE | Facility: HOSPICE | Age: 87
End: 2022-01-01
Payer: MEDICARE

## 2022-01-01 VITALS
SYSTOLIC BLOOD PRESSURE: 189 MMHG | OXYGEN SATURATION: 98 % | HEART RATE: 77 BPM | WEIGHT: 122 LBS | RESPIRATION RATE: 16 BRPM | BODY MASS INDEX: 23.95 KG/M2 | TEMPERATURE: 98 F | DIASTOLIC BLOOD PRESSURE: 80 MMHG | HEIGHT: 60 IN

## 2022-01-01 VITALS
HEART RATE: 73 BPM | RESPIRATION RATE: 18 BRPM | TEMPERATURE: 98.2 F | WEIGHT: 127.21 LBS | DIASTOLIC BLOOD PRESSURE: 82 MMHG | BODY MASS INDEX: 24.97 KG/M2 | SYSTOLIC BLOOD PRESSURE: 180 MMHG | OXYGEN SATURATION: 99 % | HEIGHT: 60 IN

## 2022-01-01 VITALS
DIASTOLIC BLOOD PRESSURE: 72 MMHG | SYSTOLIC BLOOD PRESSURE: 162 MMHG | HEART RATE: 61 BPM | RESPIRATION RATE: 16 BRPM | OXYGEN SATURATION: 97 %

## 2022-01-01 VITALS
TEMPERATURE: 98.5 F | RESPIRATION RATE: 16 BRPM | DIASTOLIC BLOOD PRESSURE: 48 MMHG | BODY MASS INDEX: 21.48 KG/M2 | OXYGEN SATURATION: 98 % | HEIGHT: 60 IN | HEART RATE: 57 BPM | SYSTOLIC BLOOD PRESSURE: 145 MMHG

## 2022-01-01 VITALS
DIASTOLIC BLOOD PRESSURE: 73 MMHG | SYSTOLIC BLOOD PRESSURE: 143 MMHG | BODY MASS INDEX: 25.19 KG/M2 | WEIGHT: 129 LBS | RESPIRATION RATE: 20 BRPM | HEART RATE: 71 BPM | TEMPERATURE: 98.2 F

## 2022-01-01 VITALS
DIASTOLIC BLOOD PRESSURE: 60 MMHG | RESPIRATION RATE: 18 BRPM | TEMPERATURE: 97.9 F | HEART RATE: 72 BPM | BODY MASS INDEX: 25.01 KG/M2 | SYSTOLIC BLOOD PRESSURE: 139 MMHG | HEIGHT: 60 IN | WEIGHT: 127.4 LBS | OXYGEN SATURATION: 98 %

## 2022-01-01 VITALS
HEIGHT: 60 IN | RESPIRATION RATE: 16 BRPM | WEIGHT: 114.2 LBS | DIASTOLIC BLOOD PRESSURE: 79 MMHG | BODY MASS INDEX: 22.42 KG/M2 | SYSTOLIC BLOOD PRESSURE: 158 MMHG | OXYGEN SATURATION: 94 % | HEART RATE: 84 BPM | TEMPERATURE: 98.9 F

## 2022-01-01 VITALS
SYSTOLIC BLOOD PRESSURE: 170 MMHG | RESPIRATION RATE: 16 BRPM | HEIGHT: 60 IN | BODY MASS INDEX: 21.6 KG/M2 | DIASTOLIC BLOOD PRESSURE: 66 MMHG | HEART RATE: 58 BPM | WEIGHT: 110 LBS | TEMPERATURE: 98.1 F | OXYGEN SATURATION: 98 %

## 2022-01-01 VITALS
SYSTOLIC BLOOD PRESSURE: 147 MMHG | OXYGEN SATURATION: 99 % | RESPIRATION RATE: 16 BRPM | DIASTOLIC BLOOD PRESSURE: 68 MMHG | BODY MASS INDEX: 23.2 KG/M2 | TEMPERATURE: 98.5 F | WEIGHT: 118.2 LBS | HEIGHT: 60 IN | HEART RATE: 86 BPM

## 2022-01-01 VITALS
RESPIRATION RATE: 30 BRPM | OXYGEN SATURATION: 93 % | HEART RATE: 85 BPM | SYSTOLIC BLOOD PRESSURE: 153 MMHG | TEMPERATURE: 99.1 F | DIASTOLIC BLOOD PRESSURE: 64 MMHG

## 2022-01-01 VITALS
HEIGHT: 60 IN | HEART RATE: 57 BPM | TEMPERATURE: 98.6 F | WEIGHT: 113.6 LBS | DIASTOLIC BLOOD PRESSURE: 68 MMHG | SYSTOLIC BLOOD PRESSURE: 172 MMHG | BODY MASS INDEX: 22.3 KG/M2 | OXYGEN SATURATION: 98 %

## 2022-01-01 DIAGNOSIS — E83.52 HYPERCALCEMIA: ICD-10-CM

## 2022-01-01 DIAGNOSIS — R53.1 WEAKNESS GENERALIZED: ICD-10-CM

## 2022-01-01 DIAGNOSIS — C90.00 MULTIPLE MYELOMA NOT HAVING ACHIEVED REMISSION (HCC): Primary | ICD-10-CM

## 2022-01-01 DIAGNOSIS — E87.6 HYPOKALEMIA: ICD-10-CM

## 2022-01-01 DIAGNOSIS — E03.2 HYPOTHYROIDISM, IATROGENIC: ICD-10-CM

## 2022-01-01 DIAGNOSIS — I10 PRIMARY HYPERTENSION: Primary | ICD-10-CM

## 2022-01-01 DIAGNOSIS — E83.42 HYPOMAGNESEMIA: ICD-10-CM

## 2022-01-01 DIAGNOSIS — R53.83 LETHARGY: ICD-10-CM

## 2022-01-01 DIAGNOSIS — E11.21 TYPE 2 DIABETES WITH NEPHROPATHY (HCC): ICD-10-CM

## 2022-01-01 DIAGNOSIS — D53.9 MACROCYTIC ANEMIA: ICD-10-CM

## 2022-01-01 DIAGNOSIS — D64.9 ANEMIA, UNSPECIFIED TYPE: ICD-10-CM

## 2022-01-01 DIAGNOSIS — E63.9 POOR NUTRITION: ICD-10-CM

## 2022-01-01 DIAGNOSIS — R77.8 ELEVATED TROPONIN: ICD-10-CM

## 2022-01-01 DIAGNOSIS — M25.561 PAIN IN BOTH KNEES, UNSPECIFIED CHRONICITY: Primary | ICD-10-CM

## 2022-01-01 DIAGNOSIS — I10 HYPERTENSION, UNSPECIFIED TYPE: ICD-10-CM

## 2022-01-01 DIAGNOSIS — K92.2 GASTROINTESTINAL HEMORRHAGE, UNSPECIFIED GASTROINTESTINAL HEMORRHAGE TYPE: ICD-10-CM

## 2022-01-01 DIAGNOSIS — R53.81 DEBILITY: ICD-10-CM

## 2022-01-01 DIAGNOSIS — E78.00 HYPERCHOLESTEREMIA: ICD-10-CM

## 2022-01-01 DIAGNOSIS — Z51.5 PALLIATIVE CARE ENCOUNTER: ICD-10-CM

## 2022-01-01 DIAGNOSIS — Z71.1 CONCERN ABOUT END OF LIFE: ICD-10-CM

## 2022-01-01 DIAGNOSIS — Z71.89 ADVANCED CARE PLANNING/COUNSELING DISCUSSION: ICD-10-CM

## 2022-01-01 DIAGNOSIS — E11.9 TYPE 2 DIABETES MELLITUS WITHOUT COMPLICATION, WITHOUT LONG-TERM CURRENT USE OF INSULIN (HCC): ICD-10-CM

## 2022-01-01 DIAGNOSIS — I25.10 CORONARY ARTERY DISEASE INVOLVING NATIVE CORONARY ARTERY OF NATIVE HEART WITHOUT ANGINA PECTORIS: ICD-10-CM

## 2022-01-01 DIAGNOSIS — D64.9 ANEMIA, UNSPECIFIED TYPE: Primary | ICD-10-CM

## 2022-01-01 DIAGNOSIS — I10 ESSENTIAL HYPERTENSION: ICD-10-CM

## 2022-01-01 DIAGNOSIS — N39.0 URINARY TRACT INFECTION WITHOUT HEMATURIA, SITE UNSPECIFIED: ICD-10-CM
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
## 2022-01-01 DIAGNOSIS — E87.1 HYPONATREMIA: ICD-10-CM

## 2022-01-01 DIAGNOSIS — I16.1 HYPERTENSIVE EMERGENCY: ICD-10-CM

## 2022-01-01 DIAGNOSIS — R41.82 ALTERED MENTAL STATUS, UNSPECIFIED ALTERED MENTAL STATUS TYPE: ICD-10-CM
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
## 2022-01-01 DIAGNOSIS — D64.89 ANEMIA DUE TO OTHER CAUSE, NOT CLASSIFIED: ICD-10-CM

## 2022-01-01 DIAGNOSIS — M17.0 OSTEOARTHRITIS OF BOTH KNEES, UNSPECIFIED OSTEOARTHRITIS TYPE: ICD-10-CM

## 2022-01-01 DIAGNOSIS — R77.8 ELEVATED TROPONIN: Primary | ICD-10-CM

## 2022-01-01 DIAGNOSIS — S09.90XA INJURY OF HEAD, INITIAL ENCOUNTER: ICD-10-CM

## 2022-01-01 DIAGNOSIS — I25.10 CORONARY ARTERY DISEASE INVOLVING NATIVE HEART WITHOUT ANGINA PECTORIS, UNSPECIFIED VESSEL OR LESION TYPE: ICD-10-CM

## 2022-01-01 DIAGNOSIS — N30.01 ACUTE CYSTITIS WITH HEMATURIA: ICD-10-CM

## 2022-01-01 DIAGNOSIS — N39.0 URINARY TRACT INFECTION WITH HEMATURIA, SITE UNSPECIFIED: ICD-10-CM

## 2022-01-01 DIAGNOSIS — Z71.89 ADVANCE CARE PLANNING: ICD-10-CM

## 2022-01-01 DIAGNOSIS — R31.9 URINARY TRACT INFECTION WITH HEMATURIA, SITE UNSPECIFIED: ICD-10-CM

## 2022-01-01 DIAGNOSIS — M89.8X9 BONE PAIN: ICD-10-CM

## 2022-01-01 DIAGNOSIS — R42 DIZZINESS: ICD-10-CM

## 2022-01-01 DIAGNOSIS — M25.562 PAIN IN BOTH KNEES, UNSPECIFIED CHRONICITY: Primary | ICD-10-CM

## 2022-01-01 DIAGNOSIS — R79.89 ELEVATED BRAIN NATRIURETIC PEPTIDE (BNP) LEVEL: ICD-10-CM
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
## 2022-01-01 DIAGNOSIS — I16.0 HYPERTENSIVE URGENCY: ICD-10-CM

## 2022-01-01 DIAGNOSIS — Z71.89 GOALS OF CARE, COUNSELING/DISCUSSION: ICD-10-CM

## 2022-01-01 DIAGNOSIS — G93.40 ENCEPHALOPATHY: Primary | ICD-10-CM

## 2022-01-01 DIAGNOSIS — M89.9 LYTIC BONE LESIONS ON XRAY: ICD-10-CM
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
## 2022-01-01 DIAGNOSIS — C90.00 MULTIPLE MYELOMA NOT HAVING ACHIEVED REMISSION (HCC): ICD-10-CM

## 2022-01-01 DIAGNOSIS — R77.9 ELEVATED BLOOD PROTEIN: ICD-10-CM

## 2022-01-01 DIAGNOSIS — S40.012A CONTUSION OF LEFT SHOULDER, INITIAL ENCOUNTER: ICD-10-CM

## 2022-01-01 DIAGNOSIS — I10 PRIMARY HYPERTENSION: ICD-10-CM

## 2022-01-01 DIAGNOSIS — Z79.899 HIGH RISK MEDICATION USE: ICD-10-CM

## 2022-01-01 DIAGNOSIS — D64.9 CHRONIC ANEMIA: ICD-10-CM

## 2022-01-01 LAB
1,25(OH)2D3 SERPL-MCNC: 10.4 PG/ML (ref 19.9–79.3)
1,25(OH)2D3 SERPL-MCNC: 14.9 PG/ML (ref 19.9–79.3)
25(OH)D3 SERPL-MCNC: 32 NG/ML (ref 30–100)
25(OH)D3 SERPL-MCNC: 44.6 NG/ML (ref 30–100)
ABO + RH BLD: NORMAL
ACETONE,ACETX: NEGATIVE MG/L
ALBUMIN MFR UR ELPH: 13.1 %
ALBUMIN SERPL ELPH-MCNC: 3.2 G/DL (ref 2.9–4.4)
ALBUMIN SERPL ELPH-MCNC: 3.3 G/DL (ref 2.9–4.4)
ALBUMIN SERPL-MCNC: 1.7 G/DL (ref 3.5–5)
ALBUMIN SERPL-MCNC: 1.8 G/DL (ref 3.5–5)
ALBUMIN SERPL-MCNC: 1.9 G/DL (ref 3.5–5)
ALBUMIN SERPL-MCNC: 2 G/DL (ref 3.5–5)
ALBUMIN SERPL-MCNC: 2.1 G/DL (ref 3.5–5)
ALBUMIN SERPL-MCNC: 2.2 G/DL (ref 3.5–5)
ALBUMIN SERPL-MCNC: 2.3 G/DL (ref 3.5–5)
ALBUMIN SERPL-MCNC: 2.8 G/DL (ref 3.5–5)
ALBUMIN SERPL-MCNC: 2.8 G/DL (ref 3.5–5)
ALBUMIN/GLOB SERPL: 0.2 {RATIO} (ref 1.1–2.2)
ALBUMIN/GLOB SERPL: 0.3 {RATIO} (ref 1.1–2.2)
ALBUMIN/GLOB SERPL: 0.4 {RATIO} (ref 1.1–2.2)
ALBUMIN/GLOB SERPL: 0.4 {RATIO} (ref 1.1–2.2)
ALBUMIN/GLOB SERPL: 0.5 {RATIO} (ref 0.7–1.7)
ALBUMIN/GLOB SERPL: 0.5 {RATIO} (ref 0.7–1.7)
ALP SERPL-CCNC: 35 U/L (ref 45–117)
ALP SERPL-CCNC: 35 U/L (ref 45–117)
ALP SERPL-CCNC: 36 U/L (ref 45–117)
ALP SERPL-CCNC: 39 U/L (ref 45–117)
ALP SERPL-CCNC: 40 U/L (ref 45–117)
ALP SERPL-CCNC: 40 U/L (ref 45–117)
ALP SERPL-CCNC: 41 U/L (ref 45–117)
ALP SERPL-CCNC: 42 U/L (ref 45–117)
ALP SERPL-CCNC: 42 U/L (ref 45–117)
ALP SERPL-CCNC: 43 U/L (ref 45–117)
ALP SERPL-CCNC: 44 U/L (ref 45–117)
ALP SERPL-CCNC: 45 U/L (ref 45–117)
ALP SERPL-CCNC: 46 U/L (ref 45–117)
ALP SERPL-CCNC: 47 U/L (ref 45–117)
ALP SERPL-CCNC: 52 U/L (ref 45–117)
ALP SERPL-CCNC: 52 U/L (ref 45–117)
ALP SERPL-CCNC: 59 U/L (ref 45–117)
ALP SERPL-CCNC: 70 U/L (ref 45–117)
ALPHA1 GLOB MFR UR ELPH: 2.1 %
ALPHA1 GLOB SERPL ELPH-MCNC: 0.3 G/DL (ref 0–0.4)
ALPHA1 GLOB SERPL ELPH-MCNC: 0.3 G/DL (ref 0–0.4)
ALPHA2 GLOB 24H MFR UR ELPH: 4.6 %
ALPHA2 GLOB SERPL ELPH-MCNC: 0.7 G/DL (ref 0.4–1)
ALPHA2 GLOB SERPL ELPH-MCNC: 0.8 G/DL (ref 0.4–1)
ALT SERPL-CCNC: 10 U/L (ref 12–78)
ALT SERPL-CCNC: 11 U/L (ref 12–78)
ALT SERPL-CCNC: 11 U/L (ref 12–78)
ALT SERPL-CCNC: 12 U/L (ref 12–78)
ALT SERPL-CCNC: 13 U/L (ref 12–78)
ALT SERPL-CCNC: 14 U/L (ref 12–78)
ALT SERPL-CCNC: 19 U/L (ref 12–78)
ALT SERPL-CCNC: 20 U/L (ref 12–78)
ALT SERPL-CCNC: 21 U/L (ref 12–78)
ALT SERPL-CCNC: 22 U/L (ref 12–78)
ALT SERPL-CCNC: 24 U/L (ref 12–78)
ALT SERPL-CCNC: 24 U/L (ref 12–78)
ALT SERPL-CCNC: 9 U/L (ref 12–78)
AMMONIA PLAS-SCNC: 40 UMOL/L
AMMONIA PLAS-SCNC: 43 UMOL/L
AMMONIA PLAS-SCNC: 45 UMOL/L
AMMONIA PLAS-SCNC: 46 UMOL/L
AMMONIA PLAS-SCNC: 52 UMOL/L
AMMONIA PLAS-SCNC: 57 UMOL/L
AMPHET UR QL SCN: NEGATIVE
ANION GAP SERPL CALC-SCNC: 10 MMOL/L (ref 5–15)
ANION GAP SERPL CALC-SCNC: 2 MMOL/L (ref 5–15)
ANION GAP SERPL CALC-SCNC: 3 MMOL/L (ref 5–15)
ANION GAP SERPL CALC-SCNC: 4 MMOL/L (ref 5–15)
ANION GAP SERPL CALC-SCNC: 5 MMOL/L (ref 5–15)
ANION GAP SERPL CALC-SCNC: 6 MMOL/L (ref 5–15)
ANION GAP SERPL CALC-SCNC: 7 MMOL/L (ref 5–15)
ANION GAP SERPL CALC-SCNC: 8 MMOL/L (ref 5–15)
ANION GAP SERPL CALC-SCNC: 9 MMOL/L (ref 5–15)
APPEARANCE UR: ABNORMAL
APPEARANCE UR: CLEAR
APPEARANCE UR: CLEAR
AST SERPL-CCNC: 13 U/L (ref 15–37)
AST SERPL-CCNC: 15 U/L (ref 15–37)
AST SERPL-CCNC: 18 U/L (ref 15–37)
AST SERPL-CCNC: 19 U/L (ref 15–37)
AST SERPL-CCNC: 19 U/L (ref 15–37)
AST SERPL-CCNC: 20 U/L (ref 15–37)
AST SERPL-CCNC: 21 U/L (ref 15–37)
AST SERPL-CCNC: 22 U/L (ref 15–37)
AST SERPL-CCNC: 23 U/L (ref 15–37)
AST SERPL-CCNC: 25 U/L (ref 15–37)
AST SERPL-CCNC: 25 U/L (ref 15–37)
AST SERPL-CCNC: 26 U/L (ref 15–37)
AST SERPL-CCNC: 27 U/L (ref 15–37)
AST SERPL-CCNC: 28 U/L (ref 15–37)
AST SERPL-CCNC: 28 U/L (ref 15–37)
AST SERPL-CCNC: 30 U/L (ref 15–37)
AST SERPL-CCNC: 32 U/L (ref 15–37)
AST SERPL-CCNC: 34 U/L (ref 15–37)
ATRIAL RATE: 50 BPM
ATRIAL RATE: 53 BPM
ATRIAL RATE: 61 BPM
ATRIAL RATE: 66 BPM
B-GLOBULIN 24H MFR UR ELPH: 9.1 %
B-GLOBULIN SERPL ELPH-MCNC: 0.8 G/DL (ref 0.7–1.3)
B-GLOBULIN SERPL ELPH-MCNC: 0.8 G/DL (ref 0.7–1.3)
BACTERIA SPEC CULT: ABNORMAL
BACTERIA SPEC CULT: ABNORMAL
BACTERIA SPEC CULT: NORMAL
BACTERIA URNS QL MICRO: ABNORMAL /HPF
BACTERIA URNS QL MICRO: ABNORMAL /HPF
BACTERIA URNS QL MICRO: NEGATIVE /HPF
BARBITURATES UR QL SCN: NEGATIVE
BASOPHILS # BLD: 0 K/UL (ref 0–0.1)
BASOPHILS # BLD: 0.1 K/UL (ref 0–0.1)
BASOPHILS NFR BLD: 0 % (ref 0–1)
BASOPHILS NFR BLD: 1 % (ref 0–1)
BENZODIAZ UR QL: NEGATIVE
BILIRUB SERPL-MCNC: 0.2 MG/DL (ref 0.2–1)
BILIRUB SERPL-MCNC: 0.3 MG/DL (ref 0.2–1)
BILIRUB SERPL-MCNC: 0.4 MG/DL (ref 0.2–1)
BILIRUB SERPL-MCNC: 0.5 MG/DL (ref 0.2–1)
BILIRUB SERPL-MCNC: 0.6 MG/DL (ref 0.2–1)
BILIRUB SERPL-MCNC: 0.7 MG/DL (ref 0.2–1)
BILIRUB UR QL: NEGATIVE
BLD PROD TYP BPU: NORMAL
BLD PROD TYP BPU: NORMAL
BLOOD GROUP ANTIBODIES SERPL: NORMAL
BNP SERPL-MCNC: 2833 PG/ML
BPU ID: NORMAL
BPU ID: NORMAL
BUN SERPL-MCNC: 10 MG/DL (ref 6–20)
BUN SERPL-MCNC: 11 MG/DL (ref 6–20)
BUN SERPL-MCNC: 12 MG/DL (ref 6–20)
BUN SERPL-MCNC: 13 MG/DL (ref 6–20)
BUN SERPL-MCNC: 14 MG/DL (ref 6–20)
BUN SERPL-MCNC: 17 MG/DL (ref 6–20)
BUN SERPL-MCNC: 17 MG/DL (ref 6–20)
BUN SERPL-MCNC: 19 MG/DL (ref 6–20)
BUN SERPL-MCNC: 20 MG/DL (ref 6–20)
BUN SERPL-MCNC: 22 MG/DL (ref 6–20)
BUN SERPL-MCNC: 28 MG/DL (ref 6–20)
BUN SERPL-MCNC: 30 MG/DL (ref 6–20)
BUN SERPL-MCNC: 30 MG/DL (ref 6–20)
BUN SERPL-MCNC: 31 MG/DL (ref 6–20)
BUN SERPL-MCNC: 33 MG/DL (ref 6–20)
BUN SERPL-MCNC: 34 MG/DL (ref 6–20)
BUN SERPL-MCNC: 38 MG/DL (ref 6–20)
BUN SERPL-MCNC: 43 MG/DL (ref 6–20)
BUN SERPL-MCNC: 43 MG/DL (ref 6–20)
BUN SERPL-MCNC: 45 MG/DL (ref 6–20)
BUN SERPL-MCNC: 48 MG/DL (ref 6–20)
BUN SERPL-MCNC: 7 MG/DL (ref 6–20)
BUN SERPL-MCNC: 8 MG/DL (ref 6–20)
BUN SERPL-MCNC: 9 MG/DL (ref 6–20)
BUN/CREAT SERPL: 10 (ref 12–20)
BUN/CREAT SERPL: 11 (ref 12–20)
BUN/CREAT SERPL: 12 (ref 12–20)
BUN/CREAT SERPL: 13 (ref 12–20)
BUN/CREAT SERPL: 14 (ref 12–20)
BUN/CREAT SERPL: 14 (ref 12–20)
BUN/CREAT SERPL: 16 (ref 12–20)
BUN/CREAT SERPL: 17 (ref 12–20)
BUN/CREAT SERPL: 18 (ref 12–20)
BUN/CREAT SERPL: 19 (ref 12–20)
BUN/CREAT SERPL: 20 (ref 12–20)
BUN/CREAT SERPL: 23 (ref 12–20)
BUN/CREAT SERPL: 23 (ref 12–20)
BUN/CREAT SERPL: 26 (ref 12–20)
BUN/CREAT SERPL: 27 (ref 12–20)
BUN/CREAT SERPL: 8 (ref 12–20)
CALCIUM SERPL-MCNC: 10 MG/DL (ref 8.5–10.1)
CALCIUM SERPL-MCNC: 10.1 MG/DL (ref 8.5–10.1)
CALCIUM SERPL-MCNC: 10.2 MG/DL (ref 8.5–10.1)
CALCIUM SERPL-MCNC: 10.3 MG/DL (ref 8.5–10.1)
CALCIUM SERPL-MCNC: 10.4 MG/DL (ref 8.5–10.1)
CALCIUM SERPL-MCNC: 10.5 MG/DL (ref 8.5–10.1)
CALCIUM SERPL-MCNC: 10.6 MG/DL (ref 8.5–10.1)
CALCIUM SERPL-MCNC: 11 MG/DL (ref 8.5–10.1)
CALCIUM SERPL-MCNC: 11.1 MG/DL (ref 8.5–10.1)
CALCIUM SERPL-MCNC: 11.4 MG/DL (ref 8.5–10.1)
CALCIUM SERPL-MCNC: 11.5 MG/DL (ref 8.5–10.1)
CALCIUM SERPL-MCNC: 11.8 MG/DL (ref 8.5–10.1)
CALCIUM SERPL-MCNC: 12.2 MG/DL (ref 8.5–10.1)
CALCIUM SERPL-MCNC: 12.5 MG/DL (ref 8.5–10.1)
CALCIUM SERPL-MCNC: 12.5 MG/DL (ref 8.5–10.1)
CALCIUM SERPL-MCNC: 12.7 MG/DL (ref 8.5–10.1)
CALCIUM SERPL-MCNC: 12.8 MG/DL (ref 8.5–10.1)
CALCIUM SERPL-MCNC: 13.1 MG/DL (ref 8.5–10.1)
CALCIUM SERPL-MCNC: 13.6 MG/DL (ref 8.5–10.1)
CALCIUM SERPL-MCNC: 13.9 MG/DL (ref 8.5–10.1)
CALCIUM SERPL-MCNC: 14 MG/DL (ref 8.5–10.1)
CALCIUM SERPL-MCNC: 14.4 MG/DL (ref 8.5–10.1)
CALCIUM SERPL-MCNC: 8.5 MG/DL (ref 8.5–10.1)
CALCIUM SERPL-MCNC: 8.5 MG/DL (ref 8.5–10.1)
CALCIUM SERPL-MCNC: 8.8 MG/DL (ref 8.5–10.1)
CALCIUM SERPL-MCNC: 9 MG/DL (ref 8.5–10.1)
CALCIUM SERPL-MCNC: 9 MG/DL (ref 8.5–10.1)
CALCIUM SERPL-MCNC: 9.7 MG/DL (ref 8.5–10.1)
CALCIUM SERPL-MCNC: 9.7 MG/DL (ref 8.5–10.1)
CALCIUM SERPL-MCNC: 9.8 MG/DL (ref 8.5–10.1)
CALCIUM SERPL-MCNC: 9.8 MG/DL (ref 8.5–10.1)
CALCIUM SERPL-MCNC: 9.9 MG/DL (ref 8.5–10.1)
CALCIUM SERPL-MCNC: ABNORMAL MG/DL (ref 8.5–10.1)
CALCULATED P AXIS, ECG09: 33 DEGREES
CALCULATED P AXIS, ECG09: 54 DEGREES
CALCULATED P AXIS, ECG09: 83 DEGREES
CALCULATED P AXIS, ECG09: 87 DEGREES
CALCULATED R AXIS, ECG10: -29 DEGREES
CALCULATED R AXIS, ECG10: -30 DEGREES
CALCULATED R AXIS, ECG10: -30 DEGREES
CALCULATED R AXIS, ECG10: -31 DEGREES
CALCULATED T AXIS, ECG11: 35 DEGREES
CALCULATED T AXIS, ECG11: 47 DEGREES
CALCULATED T AXIS, ECG11: 75 DEGREES
CALCULATED T AXIS, ECG11: 90 DEGREES
CANNABINOIDS UR QL SCN: NEGATIVE
CC UR VC: ABNORMAL
CC UR VC: ABNORMAL
CHAIN OF CUSTODY,CHC: NO
CHLORIDE SERPL-SCNC: 100 MMOL/L (ref 97–108)
CHLORIDE SERPL-SCNC: 101 MMOL/L (ref 97–108)
CHLORIDE SERPL-SCNC: 101 MMOL/L (ref 97–108)
CHLORIDE SERPL-SCNC: 102 MMOL/L (ref 97–108)
CHLORIDE SERPL-SCNC: 103 MMOL/L (ref 97–108)
CHLORIDE SERPL-SCNC: 103 MMOL/L (ref 97–108)
CHLORIDE SERPL-SCNC: 104 MMOL/L (ref 97–108)
CHLORIDE SERPL-SCNC: 105 MMOL/L (ref 97–108)
CHLORIDE SERPL-SCNC: 106 MMOL/L (ref 97–108)
CHLORIDE SERPL-SCNC: 107 MMOL/L (ref 97–108)
CHLORIDE SERPL-SCNC: 107 MMOL/L (ref 97–108)
CHLORIDE SERPL-SCNC: 108 MMOL/L (ref 97–108)
CHLORIDE SERPL-SCNC: 109 MMOL/L (ref 97–108)
CHLORIDE SERPL-SCNC: 109 MMOL/L (ref 97–108)
CHLORIDE SERPL-SCNC: 110 MMOL/L (ref 97–108)
CHLORIDE SERPL-SCNC: 110 MMOL/L (ref 97–108)
CHLORIDE SERPL-SCNC: 112 MMOL/L (ref 97–108)
CHLORIDE SERPL-SCNC: 92 MMOL/L (ref 97–108)
CHLORIDE SERPL-SCNC: 95 MMOL/L (ref 97–108)
CHLORIDE SERPL-SCNC: 95 MMOL/L (ref 97–108)
CHLORIDE SERPL-SCNC: 98 MMOL/L (ref 97–108)
CHLORIDE SERPL-SCNC: 98 MMOL/L (ref 97–108)
CK SERPL-CCNC: 128 U/L (ref 26–192)
CO2 SERPL-SCNC: 18 MMOL/L (ref 21–32)
CO2 SERPL-SCNC: 19 MMOL/L (ref 21–32)
CO2 SERPL-SCNC: 19 MMOL/L (ref 21–32)
CO2 SERPL-SCNC: 20 MMOL/L (ref 21–32)
CO2 SERPL-SCNC: 20 MMOL/L (ref 21–32)
CO2 SERPL-SCNC: 21 MMOL/L (ref 21–32)
CO2 SERPL-SCNC: 22 MMOL/L (ref 21–32)
CO2 SERPL-SCNC: 23 MMOL/L (ref 21–32)
CO2 SERPL-SCNC: 24 MMOL/L (ref 21–32)
CO2 SERPL-SCNC: 25 MMOL/L (ref 21–32)
CO2 SERPL-SCNC: 26 MMOL/L (ref 21–32)
CO2 SERPL-SCNC: 27 MMOL/L (ref 21–32)
CO2 SERPL-SCNC: 28 MMOL/L (ref 21–32)
CO2 SERPL-SCNC: 29 MMOL/L (ref 21–32)
CO2 SERPL-SCNC: 29 MMOL/L (ref 21–32)
CO2 SERPL-SCNC: 30 MMOL/L (ref 21–32)
CO2 SERPL-SCNC: 30 MMOL/L (ref 21–32)
COCAINE UR QL SCN: NEGATIVE
COLOR UR: ABNORMAL
COMMENT, HOLDF: NORMAL
COVID-19 RAPID TEST, COVR: NOT DETECTED
CREAT SERPL-MCNC: 0.52 MG/DL (ref 0.55–1.02)
CREAT SERPL-MCNC: 0.6 MG/DL (ref 0.55–1.02)
CREAT SERPL-MCNC: 0.66 MG/DL (ref 0.55–1.02)
CREAT SERPL-MCNC: 0.67 MG/DL (ref 0.55–1.02)
CREAT SERPL-MCNC: 0.68 MG/DL (ref 0.55–1.02)
CREAT SERPL-MCNC: 0.69 MG/DL (ref 0.55–1.02)
CREAT SERPL-MCNC: 0.71 MG/DL (ref 0.55–1.02)
CREAT SERPL-MCNC: 0.78 MG/DL (ref 0.55–1.02)
CREAT SERPL-MCNC: 0.79 MG/DL (ref 0.55–1.02)
CREAT SERPL-MCNC: 0.85 MG/DL (ref 0.55–1.02)
CREAT SERPL-MCNC: 0.88 MG/DL (ref 0.55–1.02)
CREAT SERPL-MCNC: 0.9 MG/DL (ref 0.55–1.02)
CREAT SERPL-MCNC: 0.99 MG/DL (ref 0.55–1.02)
CREAT SERPL-MCNC: 1.02 MG/DL (ref 0.55–1.02)
CREAT SERPL-MCNC: 1.03 MG/DL (ref 0.55–1.02)
CREAT SERPL-MCNC: 1.06 MG/DL (ref 0.55–1.02)
CREAT SERPL-MCNC: 1.08 MG/DL (ref 0.55–1.02)
CREAT SERPL-MCNC: 1.11 MG/DL (ref 0.55–1.02)
CREAT SERPL-MCNC: 1.14 MG/DL (ref 0.55–1.02)
CREAT SERPL-MCNC: 1.18 MG/DL (ref 0.55–1.02)
CREAT SERPL-MCNC: 1.19 MG/DL (ref 0.55–1.02)
CREAT SERPL-MCNC: 1.23 MG/DL (ref 0.55–1.02)
CREAT SERPL-MCNC: 1.52 MG/DL (ref 0.55–1.02)
CREAT SERPL-MCNC: 1.63 MG/DL (ref 0.55–1.02)
CREAT SERPL-MCNC: 1.63 MG/DL (ref 0.55–1.02)
CREAT SERPL-MCNC: 1.64 MG/DL (ref 0.55–1.02)
CREAT SERPL-MCNC: 1.69 MG/DL (ref 0.55–1.02)
CREAT SERPL-MCNC: 1.74 MG/DL (ref 0.55–1.02)
CREAT SERPL-MCNC: 1.79 MG/DL (ref 0.55–1.02)
CREAT SERPL-MCNC: 1.81 MG/DL (ref 0.55–1.02)
CREAT SERPL-MCNC: 1.87 MG/DL (ref 0.55–1.02)
CREAT SERPL-MCNC: 1.88 MG/DL (ref 0.55–1.02)
CREAT SERPL-MCNC: 1.89 MG/DL (ref 0.55–1.02)
CREAT SERPL-MCNC: 1.91 MG/DL (ref 0.55–1.02)
CREAT SERPL-MCNC: 1.94 MG/DL (ref 0.55–1.02)
CREAT UR-MCNC: 65 MG/DL
CROSSMATCH RESULT,%XM: NORMAL
CROSSMATCH RESULT,%XM: NORMAL
DAT POLY-SP REAG RBC QL: NORMAL
DIAGNOSIS, 93000: NORMAL
DIFFERENTIAL METHOD BLD: ABNORMAL
DRUG SCRN COMMENT,DRGCM: NORMAL
ECHO AO ASC DIAM: 3.4 CM
ECHO AO ASC DIAM: 4.1 CM
ECHO AO ASCENDING AORTA INDEX: 2.24 CM/M2
ECHO AO ASCENDING AORTA INDEX: 2.83 CM/M2
ECHO AR MAX VEL PISA: 4.1 M/S
ECHO AR MAX VEL PISA: 4.1 M/S
ECHO AV AREA PEAK VELOCITY: 1.6 CM2
ECHO AV AREA PEAK VELOCITY: 1.6 CM2
ECHO AV AREA PEAK VELOCITY: 1.7 CM2
ECHO AV AREA PEAK VELOCITY: 1.7 CM2
ECHO AV AREA PEAK VELOCITY: 2.4 CM2
ECHO AV AREA VTI: 1.9 CM2
ECHO AV AREA VTI: 2.1 CM2
ECHO AV AREA VTI: 2.4 CM2
ECHO AV AREA/BSA PEAK VELOCITY: 1.7 CM2/M2
ECHO AV AREA/BSA VTI: 1.7 CM2/M2
ECHO AV MEAN GRADIENT: 7 MMHG
ECHO AV MEAN GRADIENT: 7 MMHG
ECHO AV MEAN VELOCITY: 1.2 M/S
ECHO AV MEAN VELOCITY: 1.3 M/S
ECHO AV PEAK GRADIENT: 14 MMHG
ECHO AV PEAK GRADIENT: 15 MMHG
ECHO AV PEAK VELOCITY: 1.9 M/S
ECHO AV PEAK VELOCITY: 1.9 M/S
ECHO AV REGURGITANT PHT: 417.6 MILLISECOND
ECHO AV REGURGITANT PHT: 502.1 MILLISECOND
ECHO AV VELOCITY RATIO: 0.68
ECHO AV VTI: 33.1 CM
ECHO AV VTI: 38.8 CM
ECHO LA DIAMETER INDEX: 2.11 CM/M2
ECHO LA DIAMETER INDEX: 3.31 CM/M2
ECHO LA DIAMETER: 3.2 CM
ECHO LA DIAMETER: 4.8 CM
ECHO LA VOL 2C: 64 ML (ref 22–52)
ECHO LA VOL 2C: 79 ML (ref 22–52)
ECHO LA VOL 4C: 39 ML (ref 22–52)
ECHO LA VOL 4C: 69 ML (ref 22–52)
ECHO LA VOL BP: 57 ML (ref 22–52)
ECHO LA VOL BP: 57 ML (ref 22–52)
ECHO LA VOL BP: 68 ML (ref 22–52)
ECHO LA VOL/BSA BIPLANE: 47 ML/M2 (ref 16–34)
ECHO LA VOLUME AREA LENGTH: 61 ML
ECHO LA VOLUME AREA LENGTH: 73 ML
ECHO LA VOLUME INDEX A2C: 44 ML/M2 (ref 16–34)
ECHO LA VOLUME INDEX A2C: 52 ML/M2 (ref 16–34)
ECHO LA VOLUME INDEX A4C: 26 ML/M2 (ref 16–34)
ECHO LA VOLUME INDEX A4C: 48 ML/M2 (ref 16–34)
ECHO LA VOLUME INDEX AREA LENGTH: 40 ML/M2 (ref 16–34)
ECHO LA VOLUME INDEX AREA LENGTH: 50 ML/M2 (ref 16–34)
ECHO LV E' LATERAL VELOCITY: 7 CM/S
ECHO LV E' LATERAL VELOCITY: 8 CM/S
ECHO LV E' SEPTAL VELOCITY: 5 CM/S
ECHO LV E' SEPTAL VELOCITY: 6 CM/S
ECHO LV EDV A2C: 193 ML
ECHO LV EDV A4C: 93 ML
ECHO LV EDV BP: 135 ML (ref 56–104)
ECHO LV EDV INDEX A4C: 61 ML/M2
ECHO LV EDV INDEX BP: 89 ML/M2
ECHO LV EDV NDEX A2C: 127 ML/M2
ECHO LV EJECTION FRACTION A2C: 76 %
ECHO LV EJECTION FRACTION A4C: 69 %
ECHO LV EJECTION FRACTION BIPLANE: 72 % (ref 55–100)
ECHO LV EJECTION FRACTION BIPLANE: 72 % (ref 55–100)
ECHO LV ESV A2C: 46 ML
ECHO LV ESV A4C: 29 ML
ECHO LV ESV BP: 38 ML (ref 19–49)
ECHO LV ESV INDEX A2C: 30 ML/M2
ECHO LV ESV INDEX A4C: 19 ML/M2
ECHO LV ESV INDEX BP: 25 ML/M2
ECHO LV FRACTIONAL SHORTENING: 29 % (ref 28–44)
ECHO LV FRACTIONAL SHORTENING: 32 % (ref 28–44)
ECHO LV INTERNAL DIMENSION DIASTOLE INDEX: 2.76 CM/M2
ECHO LV INTERNAL DIMENSION DIASTOLE INDEX: 3.66 CM/M2
ECHO LV INTERNAL DIMENSION DIASTOLIC: 4.2 CM (ref 3.9–5.3)
ECHO LV INTERNAL DIMENSION DIASTOLIC: 5.3 CM (ref 3.9–5.3)
ECHO LV INTERNAL DIMENSION SYSTOLIC INDEX: 1.97 CM/M2
ECHO LV INTERNAL DIMENSION SYSTOLIC INDEX: 2.48 CM/M2
ECHO LV INTERNAL DIMENSION SYSTOLIC: 3 CM
ECHO LV INTERNAL DIMENSION SYSTOLIC: 3.6 CM
ECHO LV IVSD: 1.2 CM (ref 0.6–0.9)
ECHO LV IVSD: 1.6 CM (ref 0.6–0.9)
ECHO LV MASS 2D: 236.7 G (ref 67–162)
ECHO LV MASS 2D: 256.6 G (ref 67–162)
ECHO LV MASS INDEX 2D: 155.8 G/M2 (ref 43–95)
ECHO LV MASS INDEX 2D: 176.9 G/M2 (ref 43–95)
ECHO LV POSTERIOR WALL DIASTOLIC: 1.2 CM (ref 0.6–0.9)
ECHO LV POSTERIOR WALL DIASTOLIC: 1.3 CM (ref 0.6–0.9)
ECHO LV RELATIVE WALL THICKNESS RATIO: 0.45
ECHO LV RELATIVE WALL THICKNESS RATIO: 0.62
ECHO LVOT AREA: 2.5 CM2
ECHO LVOT AREA: 3.5 CM2
ECHO LVOT AV VTI INDEX: 0.68
ECHO LVOT AV VTI INDEX: 0.79
ECHO LVOT DIAM: 1.8 CM
ECHO LVOT DIAM: 2.1 CM
ECHO LVOT MEAN GRADIENT: 3 MMHG
ECHO LVOT MEAN GRADIENT: 3 MMHG
ECHO LVOT PEAK GRADIENT: 6 MMHG
ECHO LVOT PEAK GRADIENT: 6 MMHG
ECHO LVOT PEAK GRADIENT: 7 MMHG
ECHO LVOT PEAK VELOCITY: 1.2 M/S
ECHO LVOT PEAK VELOCITY: 1.2 M/S
ECHO LVOT PEAK VELOCITY: 1.3 M/S
ECHO LVOT STROKE VOLUME INDEX: 51.2 ML/M2
ECHO LVOT STROKE VOLUME INDEX: 53.5 ML/M2
ECHO LVOT SV: 77.5 ML
ECHO LVOT SV: 77.8 ML
ECHO LVOT VTI: 22.4 CM
ECHO LVOT VTI: 30.6 CM
ECHO MV A VELOCITY: 0.9 M/S
ECHO MV A VELOCITY: 1.12 M/S
ECHO MV AREA VTI: 1.6 CM2
ECHO MV E DECELERATION TIME (DT): 229.1 MS
ECHO MV E DECELERATION TIME (DT): 294.8 MS
ECHO MV E VELOCITY: 0.79 M/S
ECHO MV E VELOCITY: 1.12 M/S
ECHO MV E/A RATIO: 0.71
ECHO MV E/A RATIO: 1.24
ECHO MV E/E' LATERAL: 16
ECHO MV E/E' LATERAL: 9.88
ECHO MV E/E' RATIO (AVERAGED): 12.84
ECHO MV E/E' RATIO (AVERAGED): 17.33
ECHO MV E/E' SEPTAL: 15.8
ECHO MV E/E' SEPTAL: 18.67
ECHO MV LVOT VTI INDEX: 1.55
ECHO MV MAX VELOCITY: 1.2 M/S
ECHO MV MEAN GRADIENT: 3 MMHG
ECHO MV MEAN VELOCITY: 0.8 M/S
ECHO MV PEAK GRADIENT: 5 MMHG
ECHO MV REGURGITANT PEAK GRADIENT: 77 MMHG
ECHO MV REGURGITANT PEAK GRADIENT: 96 MMHG
ECHO MV REGURGITANT PEAK VELOCITY: 4.4 M/S
ECHO MV REGURGITANT PEAK VELOCITY: 4.9 M/S
ECHO MV VTI: 47.3 CM
ECHO PULMONARY ARTERY END DIASTOLIC PRESSURE: 7 MMHG
ECHO PV MAX VELOCITY: 1.2 M/S
ECHO PV MAX VELOCITY: 1.5 M/S
ECHO PV PEAK GRADIENT: 5 MMHG
ECHO PV PEAK GRADIENT: 9 MMHG
ECHO PV REGURGITANT MAX VELOCITY: 1.3 M/S
ECHO RV INTERNAL DIMENSION: 4.8 CM
ECHO RV TAPSE: 2.6 CM (ref 1.7–?)
ECHO RV TAPSE: 3.1 CM (ref 1.5–2)
ECHO RVOT PEAK GRADIENT: 5 MMHG
ECHO RVOT PEAK VELOCITY: 1.1 M/S
ECHO TV REGURGITANT MAX VELOCITY: 2.51 M/S
ECHO TV REGURGITANT MAX VELOCITY: 2.75 M/S
ECHO TV REGURGITANT PEAK GRADIENT: 26 MMHG
ECHO TV REGURGITANT PEAK GRADIENT: 30 MMHG
EOSINOPHIL # BLD: 0 K/UL (ref 0–0.4)
EOSINOPHIL # BLD: 0.1 K/UL (ref 0–0.4)
EOSINOPHIL # BLD: 0.2 K/UL (ref 0–0.4)
EOSINOPHIL # BLD: 0.3 K/UL (ref 0–0.4)
EOSINOPHIL NFR BLD: 1 % (ref 0–7)
EOSINOPHIL NFR BLD: 2 % (ref 0–7)
EOSINOPHIL NFR BLD: 3 % (ref 0–7)
EOSINOPHIL NFR BLD: 4 % (ref 0–7)
EOSINOPHIL NFR BLD: 4 % (ref 0–7)
EOSINOPHIL NFR BLD: 5 % (ref 0–7)
EPITH CASTS URNS QL MICRO: ABNORMAL /LPF
EPO SERPL-ACNC: 13.8 MIU/ML (ref 2.6–18.5)
ERYTHROCYTE [DISTWIDTH] IN BLOOD BY AUTOMATED COUNT: 13.3 % (ref 11.5–14.5)
ERYTHROCYTE [DISTWIDTH] IN BLOOD BY AUTOMATED COUNT: 13.3 % (ref 11.5–14.5)
ERYTHROCYTE [DISTWIDTH] IN BLOOD BY AUTOMATED COUNT: 13.4 % (ref 11.5–14.5)
ERYTHROCYTE [DISTWIDTH] IN BLOOD BY AUTOMATED COUNT: 13.6 % (ref 11.5–14.5)
ERYTHROCYTE [DISTWIDTH] IN BLOOD BY AUTOMATED COUNT: 13.8 % (ref 11.5–14.5)
ERYTHROCYTE [DISTWIDTH] IN BLOOD BY AUTOMATED COUNT: 13.9 % (ref 11.5–14.5)
ERYTHROCYTE [DISTWIDTH] IN BLOOD BY AUTOMATED COUNT: 14.3 % (ref 11.5–14.5)
ERYTHROCYTE [DISTWIDTH] IN BLOOD BY AUTOMATED COUNT: 14.4 % (ref 11.5–14.5)
ERYTHROCYTE [DISTWIDTH] IN BLOOD BY AUTOMATED COUNT: 14.5 % (ref 11.5–14.5)
ERYTHROCYTE [DISTWIDTH] IN BLOOD BY AUTOMATED COUNT: 14.5 % (ref 11.5–14.5)
ERYTHROCYTE [DISTWIDTH] IN BLOOD BY AUTOMATED COUNT: 14.6 % (ref 11.5–14.5)
ERYTHROCYTE [DISTWIDTH] IN BLOOD BY AUTOMATED COUNT: 14.7 % (ref 11.5–14.5)
ERYTHROCYTE [DISTWIDTH] IN BLOOD BY AUTOMATED COUNT: 14.8 % (ref 11.5–14.5)
ERYTHROCYTE [DISTWIDTH] IN BLOOD BY AUTOMATED COUNT: 14.9 % (ref 11.5–14.5)
ERYTHROCYTE [DISTWIDTH] IN BLOOD BY AUTOMATED COUNT: 15 % (ref 11.5–14.5)
ERYTHROCYTE [DISTWIDTH] IN BLOOD BY AUTOMATED COUNT: 15.1 % (ref 11.5–14.5)
ERYTHROCYTE [DISTWIDTH] IN BLOOD BY AUTOMATED COUNT: 15.2 % (ref 11.5–14.5)
ERYTHROCYTE [DISTWIDTH] IN BLOOD BY AUTOMATED COUNT: 15.2 % (ref 11.5–14.5)
ERYTHROCYTE [DISTWIDTH] IN BLOOD BY AUTOMATED COUNT: 15.8 % (ref 11.5–14.5)
ERYTHROCYTE [DISTWIDTH] IN BLOOD BY AUTOMATED COUNT: 15.9 % (ref 11.5–14.5)
ERYTHROCYTE [DISTWIDTH] IN BLOOD BY AUTOMATED COUNT: 16.3 % (ref 11.5–14.5)
ERYTHROCYTE [DISTWIDTH] IN BLOOD BY AUTOMATED COUNT: 16.7 % (ref 11.5–14.5)
ERYTHROCYTE [DISTWIDTH] IN BLOOD BY AUTOMATED COUNT: 16.9 % (ref 11.5–14.5)
EST. AVERAGE GLUCOSE BLD GHB EST-MCNC: ABNORMAL MG/DL
ETHANOL,ETHX: NEGATIVE MG/L
FERRITIN SERPL-MCNC: 26 NG/ML (ref 8–252)
FERRITIN SERPL-MCNC: 34 NG/ML (ref 8–252)
FERRITIN SERPL-MCNC: 85 NG/ML (ref 26–388)
FOLATE SERPL-MCNC: 29.2 NG/ML (ref 5–21)
FOLATE SERPL-MCNC: 52.3 NG/ML (ref 5–21)
GAMMA GLOB 24H MFR UR ELPH: 71 %
GAMMA GLOB SERPL ELPH-MCNC: 4.6 G/DL (ref 0.4–1.8)
GAMMA GLOB SERPL ELPH-MCNC: 5 G/DL (ref 0.4–1.8)
GLOBULIN SER CALC-MCNC: 6.5 G/DL (ref 2.2–3.9)
GLOBULIN SER CALC-MCNC: 7.2 G/DL (ref 2–4)
GLOBULIN SER CALC-MCNC: 7.2 G/DL (ref 2–4)
GLOBULIN SER CALC-MCNC: 7.3 G/DL (ref 2–4)
GLOBULIN SER CALC-MCNC: 7.4 G/DL (ref 2–4)
GLOBULIN SER CALC-MCNC: 7.5 G/DL (ref 2–4)
GLOBULIN SER CALC-MCNC: 7.6 G/DL (ref 2–4)
GLOBULIN SER CALC-MCNC: 7.6 G/DL (ref 2–4)
GLOBULIN SER CALC-MCNC: 7.7 G/DL (ref 2–4)
GLOBULIN SER CALC-MCNC: 7.8 G/DL (ref 2–4)
GLOBULIN SER CALC-MCNC: 7.9 G/DL (ref 2–4)
GLOBULIN SER CALC-MCNC: 8 G/DL (ref 2–4)
GLOBULIN SER CALC-MCNC: 8.2 G/DL (ref 2–4)
GLOBULIN SER CALC-MCNC: 8.2 G/DL (ref 2–4)
GLOBULIN SER CALC-MCNC: 8.4 G/DL (ref 2–4)
GLOBULIN SER CALC-MCNC: 8.6 G/DL (ref 2–4)
GLOBULIN SER CALC-MCNC: 8.6 G/DL (ref 2–4)
GLOBULIN SER CALC-MCNC: 8.8 G/DL (ref 2–4)
GLOBULIN SER CALC-MCNC: 8.8 G/DL (ref 2–4)
GLOBULIN SER CALC-MCNC: 9.1 G/DL (ref 2–4)
GLOBULIN SER CALC-MCNC: 9.3 G/DL (ref 2–4)
GLOBULIN SER-MCNC: 6.8 G/DL (ref 2.2–3.9)
GLUCOSE BLD STRIP.AUTO-MCNC: 103 MG/DL (ref 65–117)
GLUCOSE BLD STRIP.AUTO-MCNC: 104 MG/DL (ref 65–117)
GLUCOSE BLD STRIP.AUTO-MCNC: 104 MG/DL (ref 65–117)
GLUCOSE BLD STRIP.AUTO-MCNC: 106 MG/DL (ref 65–117)
GLUCOSE BLD STRIP.AUTO-MCNC: 108 MG/DL (ref 65–117)
GLUCOSE BLD STRIP.AUTO-MCNC: 110 MG/DL (ref 65–117)
GLUCOSE BLD STRIP.AUTO-MCNC: 111 MG/DL (ref 65–117)
GLUCOSE BLD STRIP.AUTO-MCNC: 114 MG/DL (ref 65–117)
GLUCOSE BLD STRIP.AUTO-MCNC: 117 MG/DL (ref 65–117)
GLUCOSE BLD STRIP.AUTO-MCNC: 118 MG/DL (ref 65–117)
GLUCOSE BLD STRIP.AUTO-MCNC: 119 MG/DL (ref 65–117)
GLUCOSE BLD STRIP.AUTO-MCNC: 121 MG/DL (ref 65–117)
GLUCOSE BLD STRIP.AUTO-MCNC: 121 MG/DL (ref 65–117)
GLUCOSE BLD STRIP.AUTO-MCNC: 123 MG/DL (ref 65–117)
GLUCOSE BLD STRIP.AUTO-MCNC: 130 MG/DL (ref 65–117)
GLUCOSE BLD STRIP.AUTO-MCNC: 142 MG/DL (ref 65–117)
GLUCOSE BLD STRIP.AUTO-MCNC: 163 MG/DL (ref 65–117)
GLUCOSE BLD STRIP.AUTO-MCNC: 65 MG/DL (ref 65–117)
GLUCOSE BLD STRIP.AUTO-MCNC: 84 MG/DL (ref 65–117)
GLUCOSE BLD STRIP.AUTO-MCNC: 87 MG/DL (ref 65–117)
GLUCOSE BLD STRIP.AUTO-MCNC: 91 MG/DL (ref 65–117)
GLUCOSE BLD STRIP.AUTO-MCNC: 93 MG/DL (ref 65–117)
GLUCOSE BLD STRIP.AUTO-MCNC: 93 MG/DL (ref 65–117)
GLUCOSE BLD STRIP.AUTO-MCNC: 97 MG/DL (ref 65–117)
GLUCOSE SERPL-MCNC: 101 MG/DL (ref 65–100)
GLUCOSE SERPL-MCNC: 102 MG/DL (ref 65–100)
GLUCOSE SERPL-MCNC: 104 MG/DL (ref 65–100)
GLUCOSE SERPL-MCNC: 105 MG/DL (ref 65–100)
GLUCOSE SERPL-MCNC: 107 MG/DL (ref 65–100)
GLUCOSE SERPL-MCNC: 107 MG/DL (ref 65–100)
GLUCOSE SERPL-MCNC: 112 MG/DL (ref 65–100)
GLUCOSE SERPL-MCNC: 115 MG/DL (ref 65–100)
GLUCOSE SERPL-MCNC: 123 MG/DL (ref 65–100)
GLUCOSE SERPL-MCNC: 132 MG/DL (ref 65–100)
GLUCOSE SERPL-MCNC: 136 MG/DL (ref 65–100)
GLUCOSE SERPL-MCNC: 143 MG/DL (ref 65–100)
GLUCOSE SERPL-MCNC: 153 MG/DL (ref 65–100)
GLUCOSE SERPL-MCNC: 73 MG/DL (ref 65–100)
GLUCOSE SERPL-MCNC: 74 MG/DL (ref 65–100)
GLUCOSE SERPL-MCNC: 76 MG/DL (ref 65–100)
GLUCOSE SERPL-MCNC: 81 MG/DL (ref 65–100)
GLUCOSE SERPL-MCNC: 81 MG/DL (ref 65–100)
GLUCOSE SERPL-MCNC: 84 MG/DL (ref 65–100)
GLUCOSE SERPL-MCNC: 85 MG/DL (ref 65–100)
GLUCOSE SERPL-MCNC: 85 MG/DL (ref 65–100)
GLUCOSE SERPL-MCNC: 87 MG/DL (ref 65–100)
GLUCOSE SERPL-MCNC: 89 MG/DL (ref 65–100)
GLUCOSE SERPL-MCNC: 91 MG/DL (ref 65–100)
GLUCOSE SERPL-MCNC: 91 MG/DL (ref 65–100)
GLUCOSE SERPL-MCNC: 92 MG/DL (ref 65–100)
GLUCOSE SERPL-MCNC: 95 MG/DL (ref 65–100)
GLUCOSE SERPL-MCNC: 95 MG/DL (ref 65–100)
GLUCOSE SERPL-MCNC: 96 MG/DL (ref 65–100)
GLUCOSE SERPL-MCNC: 97 MG/DL (ref 65–100)
GLUCOSE SERPL-MCNC: 98 MG/DL (ref 65–100)
GLUCOSE SERPL-MCNC: 98 MG/DL (ref 65–100)
GLUCOSE SERPL-MCNC: 99 MG/DL (ref 65–100)
GLUCOSE UR STRIP.AUTO-MCNC: NEGATIVE MG/DL
HAPTOGLOB SERPL-MCNC: 128 MG/DL (ref 30–200)
HAPTOGLOB SERPL-MCNC: 72 MG/DL (ref 30–200)
HBA1C MFR BLD: <3.8 % (ref 4–5.6)
HCT VFR BLD AUTO: 20.2 % (ref 35–47)
HCT VFR BLD AUTO: 20.8 % (ref 35–47)
HCT VFR BLD AUTO: 21 % (ref 35–47)
HCT VFR BLD AUTO: 21.4 % (ref 35–47)
HCT VFR BLD AUTO: 22.6 % (ref 35–47)
HCT VFR BLD AUTO: 22.9 % (ref 35–47)
HCT VFR BLD AUTO: 23 % (ref 35–47)
HCT VFR BLD AUTO: 23.2 % (ref 35–47)
HCT VFR BLD AUTO: 23.8 % (ref 35–47)
HCT VFR BLD AUTO: 23.9 % (ref 35–47)
HCT VFR BLD AUTO: 24.4 % (ref 35–47)
HCT VFR BLD AUTO: 24.7 % (ref 35–47)
HCT VFR BLD AUTO: 24.8 % (ref 35–47)
HCT VFR BLD AUTO: 25.2 % (ref 35–47)
HCT VFR BLD AUTO: 25.3 % (ref 35–47)
HCT VFR BLD AUTO: 25.6 % (ref 35–47)
HCT VFR BLD AUTO: 25.8 % (ref 35–47)
HCT VFR BLD AUTO: 26.1 % (ref 35–47)
HCT VFR BLD AUTO: 26.3 % (ref 35–47)
HCT VFR BLD AUTO: 26.6 % (ref 35–47)
HCT VFR BLD AUTO: 26.8 % (ref 35–47)
HCT VFR BLD AUTO: 27.7 % (ref 35–47)
HCT VFR BLD AUTO: 27.9 % (ref 35–47)
HCT VFR BLD AUTO: 28.3 % (ref 35–47)
HCT VFR BLD AUTO: 28.9 % (ref 35–47)
HCT VFR BLD AUTO: 31.2 % (ref 35–47)
HCT VFR BLD AUTO: 34.9 % (ref 35–47)
HEMOCCULT STL QL: POSITIVE
HEMOCCULT STL QL: POSITIVE
HGB BLD-MCNC: 10.6 G/DL (ref 11.5–16)
HGB BLD-MCNC: 11 G/DL (ref 11.5–16)
HGB BLD-MCNC: 6.4 G/DL (ref 11.5–16)
HGB BLD-MCNC: 6.8 G/DL (ref 11.5–16)
HGB BLD-MCNC: 6.8 G/DL (ref 11.5–16)
HGB BLD-MCNC: 7 G/DL (ref 11.5–16)
HGB BLD-MCNC: 7.2 G/DL (ref 11.5–16)
HGB BLD-MCNC: 7.3 G/DL (ref 11.5–16)
HGB BLD-MCNC: 7.4 G/DL (ref 11.5–16)
HGB BLD-MCNC: 7.5 G/DL (ref 11.5–16)
HGB BLD-MCNC: 7.6 G/DL (ref 11.5–16)
HGB BLD-MCNC: 7.8 G/DL (ref 11.5–16)
HGB BLD-MCNC: 7.8 G/DL (ref 11.5–16)
HGB BLD-MCNC: 7.9 G/DL (ref 11.5–16)
HGB BLD-MCNC: 8 G/DL (ref 11.5–16)
HGB BLD-MCNC: 8 G/DL (ref 11.5–16)
HGB BLD-MCNC: 8.1 G/DL (ref 11.5–16)
HGB BLD-MCNC: 8.1 G/DL (ref 11.5–16)
HGB BLD-MCNC: 8.2 G/DL (ref 11.5–16)
HGB BLD-MCNC: 8.4 G/DL (ref 11.5–16)
HGB BLD-MCNC: 8.4 G/DL (ref 11.5–16)
HGB BLD-MCNC: 8.6 G/DL (ref 11.5–16)
HGB BLD-MCNC: 8.7 G/DL (ref 11.5–16)
HGB BLD-MCNC: 8.9 G/DL (ref 11.5–16)
HGB BLD-MCNC: 9.1 G/DL (ref 11.5–16)
HGB BLD-MCNC: 9.3 G/DL (ref 11.5–16)
HGB BLD-MCNC: 9.4 G/DL (ref 11.5–16)
HGB BLD-MCNC: 9.6 G/DL (ref 11.5–16)
HGB BLD-MCNC: 9.7 G/DL (ref 11.5–16)
HGB UR QL STRIP: ABNORMAL
HGB UR QL STRIP: ABNORMAL
HGB UR QL STRIP: NEGATIVE
HISTORY CHECKED?,CKHIST: NORMAL
HISTORY CHECKED?,CKHIST: NORMAL
HYALINE CASTS URNS QL MICRO: ABNORMAL /LPF (ref 0–5)
HYALINE CASTS URNS QL MICRO: ABNORMAL /LPF (ref 0–5)
IGA SERPL-MCNC: 18 MG/DL (ref 64–422)
IGG SERPL-MCNC: 6766 MG/DL (ref 586–1602)
IGM SERPL-MCNC: 9 MG/DL (ref 26–217)
IMM GRANULOCYTES # BLD AUTO: 0 K/UL
IMM GRANULOCYTES # BLD AUTO: 0 K/UL (ref 0–0.04)
IMM GRANULOCYTES # BLD AUTO: 0.1 K/UL (ref 0–0.04)
IMM GRANULOCYTES NFR BLD AUTO: 0 %
IMM GRANULOCYTES NFR BLD AUTO: 0 % (ref 0–0.5)
IMM GRANULOCYTES NFR BLD AUTO: 1 % (ref 0–0.5)
IMM GRANULOCYTES NFR BLD AUTO: 2 % (ref 0–0.5)
INTERPRETATION SERPL IEP-IMP: ABNORMAL
IRON SATN MFR SERPL: 18 % (ref 20–50)
IRON SATN MFR SERPL: 20 % (ref 20–50)
IRON SERPL-MCNC: 43 UG/DL (ref 35–150)
IRON SERPL-MCNC: 51 UG/DL (ref 35–150)
ISOPROPANOL,ISOPX: NEGATIVE MG/L
KAPPA LC FREE SER-MCNC: 1351.7 MG/L (ref 3.3–19.4)
KAPPA LC FREE/LAMBDA FREE SER: 329.68 {RATIO} (ref 0.26–1.65)
KETONES UR QL STRIP.AUTO: NEGATIVE MG/DL
LACTATE SERPL-SCNC: 0.8 MMOL/L (ref 0.4–2)
LAMBDA LC FREE SERPL-MCNC: 4.1 MG/L (ref 5.7–26.3)
LDH SERPL L TO P-CCNC: 242 U/L (ref 81–246)
LDH SERPL L TO P-CCNC: 337 U/L (ref 81–246)
LEUKOCYTE ESTERASE UR QL STRIP.AUTO: ABNORMAL
LYMPHOCYTES # BLD: 0.4 K/UL (ref 0.8–3.5)
LYMPHOCYTES # BLD: 0.6 K/UL (ref 0.8–3.5)
LYMPHOCYTES # BLD: 0.7 K/UL (ref 0.8–3.5)
LYMPHOCYTES # BLD: 0.7 K/UL (ref 0.8–3.5)
LYMPHOCYTES # BLD: 0.8 K/UL (ref 0.8–3.5)
LYMPHOCYTES # BLD: 0.9 K/UL (ref 0.8–3.5)
LYMPHOCYTES # BLD: 1 K/UL (ref 0.8–3.5)
LYMPHOCYTES # BLD: 1 K/UL (ref 0.8–3.5)
LYMPHOCYTES # BLD: 1.1 K/UL (ref 0.8–3.5)
LYMPHOCYTES # BLD: 1.2 K/UL (ref 0.8–3.5)
LYMPHOCYTES # BLD: 1.2 K/UL (ref 0.8–3.5)
LYMPHOCYTES # BLD: 1.6 K/UL (ref 0.8–3.5)
LYMPHOCYTES NFR BLD: 11 % (ref 12–49)
LYMPHOCYTES NFR BLD: 12 % (ref 12–49)
LYMPHOCYTES NFR BLD: 13 % (ref 12–49)
LYMPHOCYTES NFR BLD: 13 % (ref 12–49)
LYMPHOCYTES NFR BLD: 14 % (ref 12–49)
LYMPHOCYTES NFR BLD: 15 % (ref 12–49)
LYMPHOCYTES NFR BLD: 16 % (ref 12–49)
LYMPHOCYTES NFR BLD: 19 % (ref 12–49)
LYMPHOCYTES NFR BLD: 20 % (ref 12–49)
LYMPHOCYTES NFR BLD: 23 % (ref 12–49)
LYMPHOCYTES NFR BLD: 7 % (ref 12–49)
LYMPHOCYTES NFR BLD: 7 % (ref 12–49)
M PROTEIN MFR UR ELPH: 62.7 %
M PROTEIN SERPL ELPH-MCNC: 4.4 G/DL
M PROTEIN SERPL ELPH-MCNC: 4.8 G/DL
MAGNESIUM SERPL-MCNC: 1.3 MG/DL (ref 1.6–2.4)
MAGNESIUM SERPL-MCNC: 1.4 MG/DL (ref 1.6–2.4)
MAGNESIUM SERPL-MCNC: 1.4 MG/DL (ref 1.6–2.4)
MAGNESIUM SERPL-MCNC: 1.5 MG/DL (ref 1.6–2.4)
MAGNESIUM SERPL-MCNC: 1.6 MG/DL (ref 1.6–2.4)
MAGNESIUM SERPL-MCNC: 1.7 MG/DL (ref 1.6–2.4)
MAGNESIUM SERPL-MCNC: 1.7 MG/DL (ref 1.6–2.4)
MAGNESIUM SERPL-MCNC: 1.8 MG/DL (ref 1.6–2.4)
MAGNESIUM SERPL-MCNC: 2 MG/DL (ref 1.6–2.4)
MCH RBC QN AUTO: 31.9 PG (ref 26–34)
MCH RBC QN AUTO: 32.5 PG (ref 26–34)
MCH RBC QN AUTO: 32.5 PG (ref 26–34)
MCH RBC QN AUTO: 32.7 PG (ref 26–34)
MCH RBC QN AUTO: 32.9 PG (ref 26–34)
MCH RBC QN AUTO: 33 PG (ref 26–34)
MCH RBC QN AUTO: 33.1 PG (ref 26–34)
MCH RBC QN AUTO: 33.2 PG (ref 26–34)
MCH RBC QN AUTO: 33.2 PG (ref 26–34)
MCH RBC QN AUTO: 33.3 PG (ref 26–34)
MCH RBC QN AUTO: 33.5 PG (ref 26–34)
MCH RBC QN AUTO: 33.6 PG (ref 26–34)
MCH RBC QN AUTO: 34.4 PG (ref 26–34)
MCH RBC QN AUTO: 34.5 PG (ref 26–34)
MCH RBC QN AUTO: 34.5 PG (ref 26–34)
MCH RBC QN AUTO: 35.2 PG (ref 26–34)
MCHC RBC AUTO-ENTMCNC: 31.7 G/DL (ref 30–36.5)
MCHC RBC AUTO-ENTMCNC: 31.8 G/DL (ref 30–36.5)
MCHC RBC AUTO-ENTMCNC: 31.9 G/DL (ref 30–36.5)
MCHC RBC AUTO-ENTMCNC: 32 G/DL (ref 30–36.5)
MCHC RBC AUTO-ENTMCNC: 32.1 G/DL (ref 30–36.5)
MCHC RBC AUTO-ENTMCNC: 32.3 G/DL (ref 30–36.5)
MCHC RBC AUTO-ENTMCNC: 32.3 G/DL (ref 30–36.5)
MCHC RBC AUTO-ENTMCNC: 32.4 G/DL (ref 30–36.5)
MCHC RBC AUTO-ENTMCNC: 32.4 G/DL (ref 30–36.5)
MCHC RBC AUTO-ENTMCNC: 32.7 G/DL (ref 30–36.5)
MCHC RBC AUTO-ENTMCNC: 32.8 G/DL (ref 30–36.5)
MCHC RBC AUTO-ENTMCNC: 32.9 G/DL (ref 30–36.5)
MCHC RBC AUTO-ENTMCNC: 33.2 G/DL (ref 30–36.5)
MCHC RBC AUTO-ENTMCNC: 33.6 G/DL (ref 30–36.5)
MCHC RBC AUTO-ENTMCNC: 33.6 G/DL (ref 30–36.5)
MCHC RBC AUTO-ENTMCNC: 34 G/DL (ref 30–36.5)
MCHC RBC AUTO-ENTMCNC: 34 G/DL (ref 30–36.5)
MCHC RBC AUTO-ENTMCNC: 34.1 G/DL (ref 30–36.5)
MCHC RBC AUTO-ENTMCNC: 34.8 G/DL (ref 30–36.5)
MCV RBC AUTO: 100.3 FL (ref 80–99)
MCV RBC AUTO: 100.9 FL (ref 80–99)
MCV RBC AUTO: 101.3 FL (ref 80–99)
MCV RBC AUTO: 101.4 FL (ref 80–99)
MCV RBC AUTO: 102.1 FL (ref 80–99)
MCV RBC AUTO: 102.2 FL (ref 80–99)
MCV RBC AUTO: 102.5 FL (ref 80–99)
MCV RBC AUTO: 103.2 FL (ref 80–99)
MCV RBC AUTO: 104 FL (ref 80–99)
MCV RBC AUTO: 104.1 FL (ref 80–99)
MCV RBC AUTO: 104.7 FL (ref 80–99)
MCV RBC AUTO: 105.6 FL (ref 80–99)
MCV RBC AUTO: 105.6 FL (ref 80–99)
MCV RBC AUTO: 107.2 FL (ref 80–99)
MCV RBC AUTO: 107.5 FL (ref 80–99)
MCV RBC AUTO: 95.2 FL (ref 80–99)
MCV RBC AUTO: 95.7 FL (ref 80–99)
MCV RBC AUTO: 96.6 FL (ref 80–99)
MCV RBC AUTO: 96.7 FL (ref 80–99)
MCV RBC AUTO: 98.4 FL (ref 80–99)
MCV RBC AUTO: 98.6 FL (ref 80–99)
MCV RBC AUTO: 98.8 FL (ref 80–99)
MCV RBC AUTO: 99.6 FL (ref 80–99)
METHADONE UR QL: NEGATIVE
METHANOL,METHX: NEGATIVE MG/L
MONOCYTES # BLD: 0.6 K/UL (ref 0–1)
MONOCYTES # BLD: 0.7 K/UL (ref 0–1)
MONOCYTES # BLD: 0.8 K/UL (ref 0–1)
MONOCYTES # BLD: 0.9 K/UL (ref 0–1)
MONOCYTES # BLD: 1 K/UL (ref 0–1)
MONOCYTES # BLD: 1 K/UL (ref 0–1)
MONOCYTES NFR BLD: 10 % (ref 5–13)
MONOCYTES NFR BLD: 11 % (ref 5–13)
MONOCYTES NFR BLD: 12 % (ref 5–13)
MONOCYTES NFR BLD: 12 % (ref 5–13)
MONOCYTES NFR BLD: 13 % (ref 5–13)
MONOCYTES NFR BLD: 14 % (ref 5–13)
MONOCYTES NFR BLD: 14 % (ref 5–13)
MONOCYTES NFR BLD: 15 % (ref 5–13)
MONOCYTES NFR BLD: 15 % (ref 5–13)
MONOCYTES NFR BLD: 8 % (ref 5–13)
NEUTS BAND NFR BLD MANUAL: 1 % (ref 0–6)
NEUTS SEG # BLD: 3.4 K/UL (ref 1.8–8)
NEUTS SEG # BLD: 3.5 K/UL (ref 1.8–8)
NEUTS SEG # BLD: 3.7 K/UL (ref 1.8–8)
NEUTS SEG # BLD: 3.9 K/UL (ref 1.8–8)
NEUTS SEG # BLD: 4 K/UL (ref 1.8–8)
NEUTS SEG # BLD: 4.4 K/UL (ref 1.8–8)
NEUTS SEG # BLD: 4.5 K/UL (ref 1.8–8)
NEUTS SEG # BLD: 4.6 K/UL (ref 1.8–8)
NEUTS SEG # BLD: 4.7 K/UL (ref 1.8–8)
NEUTS SEG # BLD: 4.7 K/UL (ref 1.8–8)
NEUTS SEG # BLD: 4.8 K/UL (ref 1.8–8)
NEUTS SEG # BLD: 4.9 K/UL (ref 1.8–8)
NEUTS SEG # BLD: 4.9 K/UL (ref 1.8–8)
NEUTS SEG # BLD: 5 K/UL (ref 1.8–8)
NEUTS SEG # BLD: 5.3 K/UL (ref 1.8–8)
NEUTS SEG # BLD: 5.8 K/UL (ref 1.8–8)
NEUTS SEG # BLD: 7.1 K/UL (ref 1.8–8)
NEUTS SEG NFR BLD: 61 % (ref 32–75)
NEUTS SEG NFR BLD: 63 % (ref 32–75)
NEUTS SEG NFR BLD: 67 % (ref 32–75)
NEUTS SEG NFR BLD: 68 % (ref 32–75)
NEUTS SEG NFR BLD: 70 % (ref 32–75)
NEUTS SEG NFR BLD: 70 % (ref 32–75)
NEUTS SEG NFR BLD: 71 % (ref 32–75)
NEUTS SEG NFR BLD: 72 % (ref 32–75)
NEUTS SEG NFR BLD: 73 % (ref 32–75)
NEUTS SEG NFR BLD: 75 % (ref 32–75)
NEUTS SEG NFR BLD: 76 % (ref 32–75)
NEUTS SEG NFR BLD: 80 % (ref 32–75)
NEUTS SEG NFR BLD: 81 % (ref 32–75)
NITRITE UR QL STRIP.AUTO: NEGATIVE
NITRITE UR QL STRIP.AUTO: POSITIVE
NITRITE UR QL STRIP.AUTO: POSITIVE
NRBC # BLD: 0 K/UL (ref 0–0.01)
NRBC BLD-RTO: 0 PER 100 WBC
OPIATES UR QL: NEGATIVE
OSMOLALITY SERPL: 278 MOSM/KG H2O
OSMOLALITY UR: 297 MOSM/KG H2O
P-R INTERVAL, ECG05: 216 MS
P-R INTERVAL, ECG05: 234 MS
P-R INTERVAL, ECG05: 246 MS
P-R INTERVAL, ECG05: 262 MS
PCP UR QL: NEGATIVE
PERIPHERAL SMEAR,PSM: NORMAL
PERIPHERAL SMEAR,PSM: NORMAL
PH UR STRIP: 5.5 [PH] (ref 5–8)
PH UR STRIP: 6.5 [PH] (ref 5–8)
PH UR STRIP: 6.5 [PH] (ref 5–8)
PHOSPHATE SERPL-MCNC: 1.4 MG/DL (ref 2.6–4.7)
PHOSPHATE SERPL-MCNC: 1.7 MG/DL (ref 2.6–4.7)
PHOSPHATE SERPL-MCNC: 2 MG/DL (ref 2.6–4.7)
PHOSPHATE SERPL-MCNC: 2.3 MG/DL (ref 2.6–4.7)
PHOSPHATE SERPL-MCNC: 2.6 MG/DL (ref 2.6–4.7)
PHOSPHATE SERPL-MCNC: 2.9 MG/DL (ref 2.6–4.7)
PLATELET # BLD AUTO: 244 K/UL (ref 150–400)
PLATELET # BLD AUTO: 258 K/UL (ref 150–400)
PLATELET # BLD AUTO: 267 K/UL (ref 150–400)
PLATELET # BLD AUTO: 275 K/UL (ref 150–400)
PLATELET # BLD AUTO: 285 K/UL (ref 150–400)
PLATELET # BLD AUTO: 288 K/UL (ref 150–400)
PLATELET # BLD AUTO: 292 K/UL (ref 150–400)
PLATELET # BLD AUTO: 292 K/UL (ref 150–400)
PLATELET # BLD AUTO: 296 K/UL (ref 150–400)
PLATELET # BLD AUTO: 296 K/UL (ref 150–400)
PLATELET # BLD AUTO: 297 K/UL (ref 150–400)
PLATELET # BLD AUTO: 304 K/UL (ref 150–400)
PLATELET # BLD AUTO: 312 K/UL (ref 150–400)
PLATELET # BLD AUTO: 312 K/UL (ref 150–400)
PLATELET # BLD AUTO: 319 K/UL (ref 150–400)
PLATELET # BLD AUTO: 322 K/UL (ref 150–400)
PLATELET # BLD AUTO: 325 K/UL (ref 150–400)
PLATELET # BLD AUTO: 337 K/UL (ref 150–400)
PLATELET # BLD AUTO: 348 K/UL (ref 150–400)
PLATELET # BLD AUTO: 363 K/UL (ref 150–400)
PLATELET # BLD AUTO: 387 K/UL (ref 150–400)
PLEASE NOTE:, 133800: ABNORMAL
PMV BLD AUTO: 8.6 FL (ref 8.9–12.9)
PMV BLD AUTO: 8.7 FL (ref 8.9–12.9)
PMV BLD AUTO: 8.7 FL (ref 8.9–12.9)
PMV BLD AUTO: 8.8 FL (ref 8.9–12.9)
PMV BLD AUTO: 8.9 FL (ref 8.9–12.9)
PMV BLD AUTO: 9 FL (ref 8.9–12.9)
PMV BLD AUTO: 9 FL (ref 8.9–12.9)
PMV BLD AUTO: 9.1 FL (ref 8.9–12.9)
PMV BLD AUTO: 9.1 FL (ref 8.9–12.9)
PMV BLD AUTO: 9.2 FL (ref 8.9–12.9)
PMV BLD AUTO: 9.4 FL (ref 8.9–12.9)
PMV BLD AUTO: 9.5 FL (ref 8.9–12.9)
PMV BLD AUTO: 9.6 FL (ref 8.9–12.9)
PMV BLD AUTO: 9.7 FL (ref 8.9–12.9)
PMV BLD AUTO: 9.8 FL (ref 8.9–12.9)
PMV BLD AUTO: 9.8 FL (ref 8.9–12.9)
POTASSIUM SERPL-SCNC: 2.4 MMOL/L (ref 3.5–5.1)
POTASSIUM SERPL-SCNC: 2.4 MMOL/L (ref 3.5–5.1)
POTASSIUM SERPL-SCNC: 2.5 MMOL/L (ref 3.5–5.1)
POTASSIUM SERPL-SCNC: 2.7 MMOL/L (ref 3.5–5.1)
POTASSIUM SERPL-SCNC: 2.8 MMOL/L (ref 3.5–5.1)
POTASSIUM SERPL-SCNC: 2.9 MMOL/L (ref 3.5–5.1)
POTASSIUM SERPL-SCNC: 3 MMOL/L (ref 3.5–5.1)
POTASSIUM SERPL-SCNC: 3.1 MMOL/L (ref 3.5–5.1)
POTASSIUM SERPL-SCNC: 3.2 MMOL/L (ref 3.5–5.1)
POTASSIUM SERPL-SCNC: 3.3 MMOL/L (ref 3.5–5.1)
POTASSIUM SERPL-SCNC: 3.3 MMOL/L (ref 3.5–5.1)
POTASSIUM SERPL-SCNC: 3.4 MMOL/L (ref 3.5–5.1)
POTASSIUM SERPL-SCNC: 3.5 MMOL/L (ref 3.5–5.1)
POTASSIUM SERPL-SCNC: 3.5 MMOL/L (ref 3.5–5.1)
POTASSIUM SERPL-SCNC: 3.6 MMOL/L (ref 3.5–5.1)
POTASSIUM SERPL-SCNC: 3.8 MMOL/L (ref 3.5–5.1)
POTASSIUM SERPL-SCNC: 3.9 MMOL/L (ref 3.5–5.1)
POTASSIUM SERPL-SCNC: 5.1 MMOL/L (ref 3.5–5.1)
PROT SERPL-MCNC: 10 G/DL (ref 6.4–8.2)
PROT SERPL-MCNC: 10.1 G/DL (ref 6–8.5)
PROT SERPL-MCNC: 10.2 G/DL (ref 6.4–8.2)
PROT SERPL-MCNC: 10.2 G/DL (ref 6.4–8.2)
PROT SERPL-MCNC: 10.3 G/DL (ref 6.4–8.2)
PROT SERPL-MCNC: 10.4 G/DL (ref 6.4–8.2)
PROT SERPL-MCNC: 10.5 G/DL (ref 6.4–8.2)
PROT SERPL-MCNC: 10.6 G/DL (ref 6.4–8.2)
PROT SERPL-MCNC: 10.7 G/DL (ref 6.4–8.2)
PROT SERPL-MCNC: 10.8 G/DL (ref 6.4–8.2)
PROT SERPL-MCNC: 10.9 G/DL (ref 6.4–8.2)
PROT SERPL-MCNC: 11.2 G/DL (ref 6.4–8.2)
PROT SERPL-MCNC: 11.6 G/DL (ref 6.4–8.2)
PROT SERPL-MCNC: 9 G/DL (ref 6.4–8.2)
PROT SERPL-MCNC: 9 G/DL (ref 6.4–8.2)
PROT SERPL-MCNC: 9.2 G/DL (ref 6.4–8.2)
PROT SERPL-MCNC: 9.3 G/DL (ref 6.4–8.2)
PROT SERPL-MCNC: 9.3 G/DL (ref 6.4–8.2)
PROT SERPL-MCNC: 9.4 G/DL (ref 6.4–8.2)
PROT SERPL-MCNC: 9.7 G/DL (ref 6.4–8.2)
PROT SERPL-MCNC: 9.7 G/DL (ref 6–8.5)
PROT SERPL-MCNC: 9.8 G/DL (ref 6.4–8.2)
PROT UR STRIP-MCNC: 100 MG/DL
PROT UR STRIP-MCNC: 30 MG/DL
PROT UR STRIP-MCNC: ABNORMAL MG/DL
PROT UR-MCNC: 34.6 MG/DL
PTH RELATED PROT SERPL-SCNC: <2 PMOL/L
PTH-INTACT SERPL-MCNC: 8 PG/ML (ref 18.4–88)
PTH-INTACT SERPL-MCNC: <6.3 PG/ML (ref 18.4–88)
Q-T INTERVAL, ECG07: 370 MS
Q-T INTERVAL, ECG07: 402 MS
Q-T INTERVAL, ECG07: 404 MS
Q-T INTERVAL, ECG07: 404 MS
QRS DURATION, ECG06: 116 MS
QRS DURATION, ECG06: 132 MS
QRS DURATION, ECG06: 134 MS
QRS DURATION, ECG06: 134 MS
QTC CALCULATION (BEZET), ECG08: 347 MS
QTC CALCULATION (BEZET), ECG08: 368 MS
QTC CALCULATION (BEZET), ECG08: 404 MS
QTC CALCULATION (BEZET), ECG08: 423 MS
RBC # BLD AUTO: 1.97 M/UL (ref 3.8–5.2)
RBC # BLD AUTO: 1.99 M/UL (ref 3.8–5.2)
RBC # BLD AUTO: 2.13 M/UL (ref 3.8–5.2)
RBC # BLD AUTO: 2.14 M/UL (ref 3.8–5.2)
RBC # BLD AUTO: 2.21 M/UL (ref 3.8–5.2)
RBC # BLD AUTO: 2.22 M/UL (ref 3.8–5.2)
RBC # BLD AUTO: 2.27 M/UL (ref 3.8–5.2)
RBC # BLD AUTO: 2.3 M/UL (ref 3.8–5.2)
RBC # BLD AUTO: 2.33 M/UL (ref 3.8–5.2)
RBC # BLD AUTO: 2.35 M/UL (ref 3.8–5.2)
RBC # BLD AUTO: 2.4 M/UL (ref 3.8–5.2)
RBC # BLD AUTO: 2.41 M/UL (ref 3.8–5.2)
RBC # BLD AUTO: 2.41 M/UL (ref 3.8–5.2)
RBC # BLD AUTO: 2.43 M/UL (ref 3.8–5.2)
RBC # BLD AUTO: 2.48 M/UL (ref 3.8–5.2)
RBC # BLD AUTO: 2.57 M/UL (ref 3.8–5.2)
RBC # BLD AUTO: 2.65 M/UL (ref 3.8–5.2)
RBC # BLD AUTO: 2.79 M/UL (ref 3.8–5.2)
RBC # BLD AUTO: 2.8 M/UL (ref 3.8–5.2)
RBC # BLD AUTO: 2.84 M/UL (ref 3.8–5.2)
RBC # BLD AUTO: 2.88 M/UL (ref 3.8–5.2)
RBC # BLD AUTO: 2.93 M/UL (ref 3.8–5.2)
RBC # BLD AUTO: 3.08 M/UL (ref 3.8–5.2)
RBC #/AREA URNS HPF: ABNORMAL /HPF (ref 0–5)
RBC MORPH BLD: ABNORMAL
REPORT STATUS,RSTSX: NORMAL
RETICS # AUTO: 0.04 M/UL (ref 0.02–0.08)
RETICS # AUTO: 0.07 M/UL (ref 0.02–0.08)
RETICS/RBC NFR AUTO: 1.3 % (ref 0.7–2.1)
RETICS/RBC NFR AUTO: 2.1 % (ref 0.7–2.1)
SALICYLATES SERPL-MCNC: <1.7 MG/DL (ref 2.8–20)
SAMPLES BEING HELD,HOLD: NORMAL
SERVICE CMNT-IMP: ABNORMAL
SERVICE CMNT-IMP: NORMAL
SODIUM SERPL-SCNC: 125 MMOL/L (ref 136–145)
SODIUM SERPL-SCNC: 128 MMOL/L (ref 136–145)
SODIUM SERPL-SCNC: 128 MMOL/L (ref 136–145)
SODIUM SERPL-SCNC: 129 MMOL/L (ref 136–145)
SODIUM SERPL-SCNC: 130 MMOL/L (ref 136–145)
SODIUM SERPL-SCNC: 130 MMOL/L (ref 136–145)
SODIUM SERPL-SCNC: 131 MMOL/L (ref 136–145)
SODIUM SERPL-SCNC: 132 MMOL/L (ref 136–145)
SODIUM SERPL-SCNC: 133 MMOL/L (ref 136–145)
SODIUM SERPL-SCNC: 134 MMOL/L (ref 136–145)
SODIUM SERPL-SCNC: 135 MMOL/L (ref 136–145)
SODIUM SERPL-SCNC: 136 MMOL/L (ref 136–145)
SODIUM SERPL-SCNC: 137 MMOL/L (ref 136–145)
SODIUM SERPL-SCNC: 138 MMOL/L (ref 136–145)
SODIUM SERPL-SCNC: 139 MMOL/L (ref 136–145)
SODIUM SERPL-SCNC: 140 MMOL/L (ref 136–145)
SODIUM SERPL-SCNC: 141 MMOL/L (ref 136–145)
SODIUM UR-SCNC: 21 MMOL/L
SOURCE, COVRS: NORMAL
SP GR UR REFRACTOMETRY: 1.01 (ref 1–1.03)
SPECIMEN EXP DATE BLD: NORMAL
SPECIMEN SOURCE: NORMAL
STATUS OF UNIT,%ST: NORMAL
STATUS OF UNIT,%ST: NORMAL
TIBC SERPL-MCNC: 238 UG/DL (ref 250–450)
TIBC SERPL-MCNC: 252 UG/DL (ref 250–450)
TROPONIN-HIGH SENSITIVITY: 54 NG/L (ref 0–51)
TROPONIN-HIGH SENSITIVITY: 56 NG/L (ref 0–51)
TROPONIN-HIGH SENSITIVITY: 65 NG/L (ref 0–51)
TROPONIN-HIGH SENSITIVITY: 70 NG/L (ref 0–51)
TROPONIN-HIGH SENSITIVITY: 70 NG/L (ref 0–51)
TSH SERPL DL<=0.05 MIU/L-ACNC: 0.4 UIU/ML (ref 0.36–3.74)
TSH SERPL DL<=0.05 MIU/L-ACNC: 1.55 UIU/ML (ref 0.36–3.74)
TSH SERPL DL<=0.05 MIU/L-ACNC: 6.26 UIU/ML (ref 0.36–3.74)
UNIT DIVISION, %UDIV: 0
UNIT DIVISION, %UDIV: 0
UR CULT HOLD, URHOLD: NORMAL
UROBILINOGEN UR QL STRIP.AUTO: 0.2 EU/DL (ref 0.2–1)
VENTRICULAR RATE, ECG03: 50 BPM
VENTRICULAR RATE, ECG03: 53 BPM
VENTRICULAR RATE, ECG03: 61 BPM
VENTRICULAR RATE, ECG03: 66 BPM
VIT B12 SERPL-MCNC: 1065 PG/ML (ref 193–986)
VIT B12 SERPL-MCNC: 497 PG/ML (ref 193–986)
WBC # BLD AUTO: 5.1 K/UL (ref 3.6–11)
WBC # BLD AUTO: 5.1 K/UL (ref 3.6–11)
WBC # BLD AUTO: 5.7 K/UL (ref 3.6–11)
WBC # BLD AUTO: 5.8 K/UL (ref 3.6–11)
WBC # BLD AUTO: 5.9 K/UL (ref 3.6–11)
WBC # BLD AUTO: 6.2 K/UL (ref 3.6–11)
WBC # BLD AUTO: 6.4 K/UL (ref 3.6–11)
WBC # BLD AUTO: 6.5 K/UL (ref 3.6–11)
WBC # BLD AUTO: 6.6 K/UL (ref 3.6–11)
WBC # BLD AUTO: 6.6 K/UL (ref 3.6–11)
WBC # BLD AUTO: 6.7 K/UL (ref 3.6–11)
WBC # BLD AUTO: 6.9 K/UL (ref 3.6–11)
WBC # BLD AUTO: 7 K/UL (ref 3.6–11)
WBC # BLD AUTO: 7 K/UL (ref 3.6–11)
WBC # BLD AUTO: 7.2 K/UL (ref 3.6–11)
WBC # BLD AUTO: 7.7 K/UL (ref 3.6–11)
WBC # BLD AUTO: 8.9 K/UL (ref 3.6–11)
WBC URNS QL MICRO: ABNORMAL /HPF (ref 0–4)

## 2022-01-01 PROCEDURE — 97116 GAIT TRAINING THERAPY: CPT

## 2022-01-01 PROCEDURE — 74011250637 HC RX REV CODE- 250/637

## 2022-01-01 PROCEDURE — 82652 VIT D 1 25-DIHYDROXY: CPT

## 2022-01-01 PROCEDURE — 85025 COMPLETE CBC W/AUTO DIFF WBC: CPT

## 2022-01-01 PROCEDURE — 99356 PR PROLONGED SVC I/P OR OBS SETTING 1ST HOUR: CPT | Performed by: NURSE PRACTITIONER

## 2022-01-01 PROCEDURE — 83010 ASSAY OF HAPTOGLOBIN QUANT: CPT

## 2022-01-01 PROCEDURE — 36415 COLL VENOUS BLD VENIPUNCTURE: CPT

## 2022-01-01 PROCEDURE — 93306 TTE W/DOPPLER COMPLETE: CPT

## 2022-01-01 PROCEDURE — 88313 SPECIAL STAINS GROUP 2: CPT

## 2022-01-01 PROCEDURE — 65270000046 HC RM TELEMETRY

## 2022-01-01 PROCEDURE — 97530 THERAPEUTIC ACTIVITIES: CPT

## 2022-01-01 PROCEDURE — 82550 ASSAY OF CK (CPK): CPT

## 2022-01-01 PROCEDURE — 74011250636 HC RX REV CODE- 250/636: Performed by: STUDENT IN AN ORGANIZED HEALTH CARE EDUCATION/TRAINING PROGRAM

## 2022-01-01 PROCEDURE — G8420 CALC BMI NORM PARAMETERS: HCPCS | Performed by: FAMILY MEDICINE

## 2022-01-01 PROCEDURE — 80053 COMPREHEN METABOLIC PANEL: CPT

## 2022-01-01 PROCEDURE — 74011250637 HC RX REV CODE- 250/637: Performed by: STUDENT IN AN ORGANIZED HEALTH CARE EDUCATION/TRAINING PROGRAM

## 2022-01-01 PROCEDURE — 99238 HOSP IP/OBS DSCHRG MGMT 30/<: CPT | Performed by: STUDENT IN AN ORGANIZED HEALTH CARE EDUCATION/TRAINING PROGRAM

## 2022-01-01 PROCEDURE — 1111F DSCHRG MED/CURRENT MED MERGE: CPT | Performed by: INTERNAL MEDICINE

## 2022-01-01 PROCEDURE — 74011000250 HC RX REV CODE- 250

## 2022-01-01 PROCEDURE — 82962 GLUCOSE BLOOD TEST: CPT

## 2022-01-01 PROCEDURE — 83540 ASSAY OF IRON: CPT

## 2022-01-01 PROCEDURE — 83735 ASSAY OF MAGNESIUM: CPT

## 2022-01-01 PROCEDURE — 99285 EMERGENCY DEPT VISIT HI MDM: CPT

## 2022-01-01 PROCEDURE — G8427 DOCREV CUR MEDS BY ELIG CLIN: HCPCS | Performed by: INTERNAL MEDICINE

## 2022-01-01 PROCEDURE — 97116 GAIT TRAINING THERAPY: CPT | Performed by: PHYSICAL THERAPIST

## 2022-01-01 PROCEDURE — 99232 SBSQ HOSP IP/OBS MODERATE 35: CPT | Performed by: FAMILY MEDICINE

## 2022-01-01 PROCEDURE — 65660000000 HC RM CCU STEPDOWN

## 2022-01-01 PROCEDURE — 84165 PROTEIN E-PHORESIS SERUM: CPT

## 2022-01-01 PROCEDURE — G8420 CALC BMI NORM PARAMETERS: HCPCS | Performed by: INTERNAL MEDICINE

## 2022-01-01 PROCEDURE — G0463 HOSPITAL OUTPT CLINIC VISIT: HCPCS | Performed by: INTERNAL MEDICINE

## 2022-01-01 PROCEDURE — 96375 TX/PRO/DX INJ NEW DRUG ADDON: CPT

## 2022-01-01 PROCEDURE — 93005 ELECTROCARDIOGRAM TRACING: CPT

## 2022-01-01 PROCEDURE — 97161 PT EVAL LOW COMPLEX 20 MIN: CPT | Performed by: PHYSICAL THERAPIST

## 2022-01-01 PROCEDURE — 1090F PRES/ABSN URINE INCON ASSESS: CPT | Performed by: ORTHOPAEDIC SURGERY

## 2022-01-01 PROCEDURE — 83935 ASSAY OF URINE OSMOLALITY: CPT

## 2022-01-01 PROCEDURE — 72072 X-RAY EXAM THORAC SPINE 3VWS: CPT

## 2022-01-01 PROCEDURE — 83605 ASSAY OF LACTIC ACID: CPT

## 2022-01-01 PROCEDURE — 88184 FLOWCYTOMETRY/ TC 1 MARKER: CPT

## 2022-01-01 PROCEDURE — 99222 1ST HOSP IP/OBS MODERATE 55: CPT | Performed by: INTERNAL MEDICINE

## 2022-01-01 PROCEDURE — 86923 COMPATIBILITY TEST ELECTRIC: CPT

## 2022-01-01 PROCEDURE — 99233 SBSQ HOSP IP/OBS HIGH 50: CPT | Performed by: STUDENT IN AN ORGANIZED HEALTH CARE EDUCATION/TRAINING PROGRAM

## 2022-01-01 PROCEDURE — 74011250637 HC RX REV CODE- 250/637: Performed by: INTERNAL MEDICINE

## 2022-01-01 PROCEDURE — 74011250637 HC RX REV CODE- 250/637: Performed by: PHYSICIAN ASSISTANT

## 2022-01-01 PROCEDURE — 82607 VITAMIN B-12: CPT

## 2022-01-01 PROCEDURE — 80048 BASIC METABOLIC PNL TOTAL CA: CPT

## 2022-01-01 PROCEDURE — 74011000636 HC RX REV CODE- 636: Performed by: RADIOLOGY

## 2022-01-01 PROCEDURE — 3046F HEMOGLOBIN A1C LEVEL >9.0%: CPT | Performed by: STUDENT IN AN ORGANIZED HEALTH CARE EDUCATION/TRAINING PROGRAM

## 2022-01-01 PROCEDURE — 97535 SELF CARE MNGMENT TRAINING: CPT | Performed by: OCCUPATIONAL THERAPIST

## 2022-01-01 PROCEDURE — 99232 SBSQ HOSP IP/OBS MODERATE 35: CPT | Performed by: INTERNAL MEDICINE

## 2022-01-01 PROCEDURE — 80069 RENAL FUNCTION PANEL: CPT

## 2022-01-01 PROCEDURE — 74011000250 HC RX REV CODE- 250: Performed by: STUDENT IN AN ORGANIZED HEALTH CARE EDUCATION/TRAINING PROGRAM

## 2022-01-01 PROCEDURE — 74011000250 HC RX REV CODE- 250: Performed by: PHYSICIAN ASSISTANT

## 2022-01-01 PROCEDURE — 77030038269 HC DRN EXT URIN PURWCK BARD -A

## 2022-01-01 PROCEDURE — G0299 HHS/HOSPICE OF RN EA 15 MIN: HCPCS

## 2022-01-01 PROCEDURE — 82784 ASSAY IGA/IGD/IGG/IGM EACH: CPT

## 2022-01-01 PROCEDURE — 74011250636 HC RX REV CODE- 250/636

## 2022-01-01 PROCEDURE — 81001 URINALYSIS AUTO W/SCOPE: CPT

## 2022-01-01 PROCEDURE — 97165 OT EVAL LOW COMPLEX 30 MIN: CPT

## 2022-01-01 PROCEDURE — 82272 OCCULT BLD FECES 1-3 TESTS: CPT

## 2022-01-01 PROCEDURE — 87077 CULTURE AEROBIC IDENTIFY: CPT

## 2022-01-01 PROCEDURE — 83880 ASSAY OF NATRIURETIC PEPTIDE: CPT

## 2022-01-01 PROCEDURE — G8427 DOCREV CUR MEDS BY ELIG CLIN: HCPCS | Performed by: FAMILY MEDICINE

## 2022-01-01 PROCEDURE — 85018 HEMOGLOBIN: CPT

## 2022-01-01 PROCEDURE — 1101F PT FALLS ASSESS-DOCD LE1/YR: CPT | Performed by: STUDENT IN AN ORGANIZED HEALTH CARE EDUCATION/TRAINING PROGRAM

## 2022-01-01 PROCEDURE — 99233 SBSQ HOSP IP/OBS HIGH 50: CPT | Performed by: INTERNAL MEDICINE

## 2022-01-01 PROCEDURE — 84484 ASSAY OF TROPONIN QUANT: CPT

## 2022-01-01 PROCEDURE — 86900 BLOOD TYPING SEROLOGIC ABO: CPT

## 2022-01-01 PROCEDURE — 80179 DRUG ASSAY SALICYLATE: CPT

## 2022-01-01 PROCEDURE — 84443 ASSAY THYROID STIM HORMONE: CPT

## 2022-01-01 PROCEDURE — 87186 SC STD MICRODIL/AGAR DIL: CPT

## 2022-01-01 PROCEDURE — 85045 AUTOMATED RETICULOCYTE COUNT: CPT

## 2022-01-01 PROCEDURE — 82140 ASSAY OF AMMONIA: CPT

## 2022-01-01 PROCEDURE — 93306 TTE W/DOPPLER COMPLETE: CPT | Performed by: INTERNAL MEDICINE

## 2022-01-01 PROCEDURE — 96374 THER/PROPH/DIAG INJ IV PUSH: CPT

## 2022-01-01 PROCEDURE — 0651 HSPC ROUTINE HOME CARE

## 2022-01-01 PROCEDURE — 65270000029 HC RM PRIVATE

## 2022-01-01 PROCEDURE — 3331090004 HSPC SERVICE INTENSITY ADD-ON

## 2022-01-01 PROCEDURE — 77030028872 HC BN BIOP NDL ON CNTRL TY TELE -C

## 2022-01-01 PROCEDURE — 70450 CT HEAD/BRAIN W/O DYE: CPT

## 2022-01-01 PROCEDURE — 84156 ASSAY OF PROTEIN URINE: CPT

## 2022-01-01 PROCEDURE — 1101F PT FALLS ASSESS-DOCD LE1/YR: CPT | Performed by: INTERNAL MEDICINE

## 2022-01-01 PROCEDURE — 99214 OFFICE O/P EST MOD 30 MIN: CPT | Performed by: INTERNAL MEDICINE

## 2022-01-01 PROCEDURE — G8536 NO DOC ELDER MAL SCRN: HCPCS | Performed by: STUDENT IN AN ORGANIZED HEALTH CARE EDUCATION/TRAINING PROGRAM

## 2022-01-01 PROCEDURE — 83615 LACTATE (LD) (LDH) ENZYME: CPT

## 2022-01-01 PROCEDURE — 92610 EVALUATE SWALLOWING FUNCTION: CPT | Performed by: SPEECH-LANGUAGE PATHOLOGIST

## 2022-01-01 PROCEDURE — 77012 CT SCAN FOR NEEDLE BIOPSY: CPT

## 2022-01-01 PROCEDURE — 99223 1ST HOSP IP/OBS HIGH 75: CPT | Performed by: INTERNAL MEDICINE

## 2022-01-01 PROCEDURE — 73521 X-RAY EXAM HIPS BI 2 VIEWS: CPT

## 2022-01-01 PROCEDURE — G8420 CALC BMI NORM PARAMETERS: HCPCS | Performed by: ORTHOPAEDIC SURGERY

## 2022-01-01 PROCEDURE — 07DR3ZX EXTRACTION OF ILIAC BONE MARROW, PERCUTANEOUS APPROACH, DIAGNOSTIC: ICD-10-PCS | Performed by: RADIOLOGY

## 2022-01-01 PROCEDURE — 99496 TRANSJ CARE MGMT HIGH F2F 7D: CPT | Performed by: FAMILY MEDICINE

## 2022-01-01 PROCEDURE — 74011000258 HC RX REV CODE- 258: Performed by: NURSE PRACTITIONER

## 2022-01-01 PROCEDURE — 82397 CHEMILUMINESCENT ASSAY: CPT

## 2022-01-01 PROCEDURE — 99233 SBSQ HOSP IP/OBS HIGH 50: CPT | Performed by: NURSE PRACTITIONER

## 2022-01-01 PROCEDURE — 20611 DRAIN/INJ JOINT/BURSA W/US: CPT | Performed by: ORTHOPAEDIC SURGERY

## 2022-01-01 PROCEDURE — P9016 RBC LEUKOCYTES REDUCED: HCPCS

## 2022-01-01 PROCEDURE — 99223 1ST HOSP IP/OBS HIGH 75: CPT | Performed by: FAMILY MEDICINE

## 2022-01-01 PROCEDURE — 71045 X-RAY EXAM CHEST 1 VIEW: CPT

## 2022-01-01 PROCEDURE — 82306 VITAMIN D 25 HYDROXY: CPT

## 2022-01-01 PROCEDURE — 96360 HYDRATION IV INFUSION INIT: CPT

## 2022-01-01 PROCEDURE — 70553 MRI BRAIN STEM W/O & W/DYE: CPT

## 2022-01-01 PROCEDURE — G8536 NO DOC ELDER MAL SCRN: HCPCS | Performed by: INTERNAL MEDICINE

## 2022-01-01 PROCEDURE — A9575 INJ GADOTERATE MEGLUMI 0.1ML: HCPCS | Performed by: RADIOLOGY

## 2022-01-01 PROCEDURE — 84300 ASSAY OF URINE SODIUM: CPT

## 2022-01-01 PROCEDURE — 72100 X-RAY EXAM L-S SPINE 2/3 VWS: CPT

## 2022-01-01 PROCEDURE — 83970 ASSAY OF PARATHORMONE: CPT

## 2022-01-01 PROCEDURE — 83930 ASSAY OF BLOOD OSMOLALITY: CPT

## 2022-01-01 PROCEDURE — 77030021593 HC FCPS BIOP ENDOSC BSC -A: Performed by: INTERNAL MEDICINE

## 2022-01-01 PROCEDURE — 99203 OFFICE O/P NEW LOW 30 MIN: CPT | Performed by: ORTHOPAEDIC SURGERY

## 2022-01-01 PROCEDURE — 82746 ASSAY OF FOLIC ACID SERUM: CPT

## 2022-01-01 PROCEDURE — 74011250636 HC RX REV CODE- 250/636: Performed by: RADIOLOGY

## 2022-01-01 PROCEDURE — 72125 CT NECK SPINE W/O DYE: CPT

## 2022-01-01 PROCEDURE — 99223 1ST HOSP IP/OBS HIGH 75: CPT | Performed by: NURSE PRACTITIONER

## 2022-01-01 PROCEDURE — 87635 SARS-COV-2 COVID-19 AMP PRB: CPT

## 2022-01-01 PROCEDURE — 77075 RADEX OSSEOUS SURVEY COMPL: CPT

## 2022-01-01 PROCEDURE — 51798 US URINE CAPACITY MEASURE: CPT

## 2022-01-01 PROCEDURE — 71260 CT THORAX DX C+: CPT

## 2022-01-01 PROCEDURE — 88341 IMHCHEM/IMCYTCHM EA ADD ANTB: CPT

## 2022-01-01 PROCEDURE — 74011636637 HC RX REV CODE- 636/637: Performed by: STUDENT IN AN ORGANIZED HEALTH CARE EDUCATION/TRAINING PROGRAM

## 2022-01-01 PROCEDURE — 99152 MOD SED SAME PHYS/QHP 5/>YRS: CPT

## 2022-01-01 PROCEDURE — 88342 IMHCHEM/IMCYTCHM 1ST ANTB: CPT

## 2022-01-01 PROCEDURE — 74011000250 HC RX REV CODE- 250: Performed by: NURSE PRACTITIONER

## 2022-01-01 PROCEDURE — 73030 X-RAY EXAM OF SHOULDER: CPT

## 2022-01-01 PROCEDURE — G8432 DEP SCR NOT DOC, RNG: HCPCS | Performed by: INTERNAL MEDICINE

## 2022-01-01 PROCEDURE — 82570 ASSAY OF URINE CREATININE: CPT

## 2022-01-01 PROCEDURE — G8432 DEP SCR NOT DOC, RNG: HCPCS | Performed by: ORTHOPAEDIC SURGERY

## 2022-01-01 PROCEDURE — 74011250636 HC RX REV CODE- 250/636: Performed by: EMERGENCY MEDICINE

## 2022-01-01 PROCEDURE — 77030014115

## 2022-01-01 PROCEDURE — 99214 OFFICE O/P EST MOD 30 MIN: CPT | Performed by: FAMILY MEDICINE

## 2022-01-01 PROCEDURE — 1090F PRES/ABSN URINE INCON ASSESS: CPT | Performed by: FAMILY MEDICINE

## 2022-01-01 PROCEDURE — 88374 M/PHMTRC ALYS ISHQUANT/SEMIQ: CPT

## 2022-01-01 PROCEDURE — 2709999900 HC NON-CHARGEABLE SUPPLY

## 2022-01-01 PROCEDURE — G8536 NO DOC ELDER MAL SCRN: HCPCS | Performed by: ORTHOPAEDIC SURGERY

## 2022-01-01 PROCEDURE — 74011250636 HC RX REV CODE- 250/636: Performed by: NURSE ANESTHETIST, CERTIFIED REGISTERED

## 2022-01-01 PROCEDURE — 80307 DRUG TEST PRSMV CHEM ANLYZR: CPT

## 2022-01-01 PROCEDURE — 97161 PT EVAL LOW COMPLEX 20 MIN: CPT

## 2022-01-01 PROCEDURE — G8432 DEP SCR NOT DOC, RNG: HCPCS | Performed by: FAMILY MEDICINE

## 2022-01-01 PROCEDURE — 74011250636 HC RX REV CODE- 250/636: Performed by: PHYSICIAN ASSISTANT

## 2022-01-01 PROCEDURE — 96361 HYDRATE IV INFUSION ADD-ON: CPT

## 2022-01-01 PROCEDURE — 2709999900 HC NON-CHARGEABLE SUPPLY: Performed by: INTERNAL MEDICINE

## 2022-01-01 PROCEDURE — 85027 COMPLETE CBC AUTOMATED: CPT

## 2022-01-01 PROCEDURE — 82728 ASSAY OF FERRITIN: CPT

## 2022-01-01 PROCEDURE — 74011250636 HC RX REV CODE- 250/636: Performed by: NURSE PRACTITIONER

## 2022-01-01 PROCEDURE — 76060000031 HC ANESTHESIA FIRST 0.5 HR: Performed by: INTERNAL MEDICINE

## 2022-01-01 PROCEDURE — 1111F DSCHRG MED/CURRENT MED MERGE: CPT | Performed by: ORTHOPAEDIC SURGERY

## 2022-01-01 PROCEDURE — 3336500001 HSPC ELECTION

## 2022-01-01 PROCEDURE — 99222 1ST HOSP IP/OBS MODERATE 55: CPT | Performed by: STUDENT IN AN ORGANIZED HEALTH CARE EDUCATION/TRAINING PROGRAM

## 2022-01-01 PROCEDURE — G8536 NO DOC ELDER MAL SCRN: HCPCS | Performed by: FAMILY MEDICINE

## 2022-01-01 PROCEDURE — 93042 RHYTHM ECG REPORT: CPT | Performed by: FAMILY MEDICINE

## 2022-01-01 PROCEDURE — 99222 1ST HOSP IP/OBS MODERATE 55: CPT | Performed by: FAMILY MEDICINE

## 2022-01-01 PROCEDURE — 87086 URINE CULTURE/COLONY COUNT: CPT

## 2022-01-01 PROCEDURE — 1101F PT FALLS ASSESS-DOCD LE1/YR: CPT | Performed by: FAMILY MEDICINE

## 2022-01-01 PROCEDURE — 99232 SBSQ HOSP IP/OBS MODERATE 35: CPT | Performed by: STUDENT IN AN ORGANIZED HEALTH CARE EDUCATION/TRAINING PROGRAM

## 2022-01-01 PROCEDURE — 92507 TX SP LANG VOICE COMM INDIV: CPT

## 2022-01-01 PROCEDURE — 1090F PRES/ABSN URINE INCON ASSESS: CPT | Performed by: INTERNAL MEDICINE

## 2022-01-01 PROCEDURE — 30233N1 TRANSFUSION OF NONAUTOLOGOUS RED BLOOD CELLS INTO PERIPHERAL VEIN, PERCUTANEOUS APPROACH: ICD-10-PCS | Performed by: FAMILY MEDICINE

## 2022-01-01 PROCEDURE — 36430 TRANSFUSION BLD/BLD COMPNT: CPT

## 2022-01-01 PROCEDURE — 88305 TISSUE EXAM BY PATHOLOGIST: CPT

## 2022-01-01 PROCEDURE — 1101F PT FALLS ASSESS-DOCD LE1/YR: CPT | Performed by: ORTHOPAEDIC SURGERY

## 2022-01-01 PROCEDURE — 72170 X-RAY EXAM OF PELVIS: CPT

## 2022-01-01 PROCEDURE — 88360 TUMOR IMMUNOHISTOCHEM/MANUAL: CPT

## 2022-01-01 PROCEDURE — G8432 DEP SCR NOT DOC, RNG: HCPCS | Performed by: STUDENT IN AN ORGANIZED HEALTH CARE EDUCATION/TRAINING PROGRAM

## 2022-01-01 PROCEDURE — 74011000250 HC RX REV CODE- 250: Performed by: EMERGENCY MEDICINE

## 2022-01-01 PROCEDURE — 99356 PR PROLONGED SVC I/P OR OBS SETTING 1ST HOUR: CPT | Performed by: INTERNAL MEDICINE

## 2022-01-01 PROCEDURE — 88311 DECALCIFY TISSUE: CPT

## 2022-01-01 PROCEDURE — G0463 HOSPITAL OUTPT CLINIC VISIT: HCPCS | Performed by: FAMILY MEDICINE

## 2022-01-01 PROCEDURE — 80320 DRUG SCREEN QUANTALCOHOLS: CPT

## 2022-01-01 PROCEDURE — 97530 THERAPEUTIC ACTIVITIES: CPT | Performed by: PHYSICAL THERAPIST

## 2022-01-01 PROCEDURE — 0DJ08ZZ INSPECTION OF UPPER INTESTINAL TRACT, VIA NATURAL OR ARTIFICIAL OPENING ENDOSCOPIC: ICD-10-PCS | Performed by: INTERNAL MEDICINE

## 2022-01-01 PROCEDURE — 88264 CHROMOSOME ANALYSIS 20-25: CPT

## 2022-01-01 PROCEDURE — 84100 ASSAY OF PHOSPHORUS: CPT

## 2022-01-01 PROCEDURE — 76040000019: Performed by: INTERNAL MEDICINE

## 2022-01-01 PROCEDURE — 0DBP8ZX EXCISION OF RECTUM, VIA NATURAL OR ARTIFICIAL OPENING ENDOSCOPIC, DIAGNOSTIC: ICD-10-PCS | Performed by: INTERNAL MEDICINE

## 2022-01-01 PROCEDURE — 74011000250 HC RX REV CODE- 250: Performed by: NURSE ANESTHETIST, CERTIFIED REGISTERED

## 2022-01-01 PROCEDURE — 99305 1ST NF CARE MODERATE MDM 35: CPT | Performed by: STUDENT IN AN ORGANIZED HEALTH CARE EDUCATION/TRAINING PROGRAM

## 2022-01-01 PROCEDURE — 88185 FLOWCYTOMETRY/TC ADD-ON: CPT

## 2022-01-01 PROCEDURE — 97165 OT EVAL LOW COMPLEX 30 MIN: CPT | Performed by: OCCUPATIONAL THERAPIST

## 2022-01-01 PROCEDURE — 83036 HEMOGLOBIN GLYCOSYLATED A1C: CPT

## 2022-01-01 PROCEDURE — G8427 DOCREV CUR MEDS BY ELIG CLIN: HCPCS | Performed by: ORTHOPAEDIC SURGERY

## 2022-01-01 PROCEDURE — 82668 ASSAY OF ERYTHROPOIETIN: CPT

## 2022-01-01 PROCEDURE — 86880 COOMBS TEST DIRECT: CPT

## 2022-01-01 PROCEDURE — 74011250637 HC RX REV CODE- 250/637: Performed by: FAMILY MEDICINE

## 2022-01-01 PROCEDURE — 99215 OFFICE O/P EST HI 40 MIN: CPT | Performed by: INTERNAL MEDICINE

## 2022-01-01 PROCEDURE — 97535 SELF CARE MNGMENT TRAINING: CPT

## 2022-01-01 PROCEDURE — G8510 SCR DEP NEG, NO PLAN REQD: HCPCS | Performed by: INTERNAL MEDICINE

## 2022-01-01 PROCEDURE — 88237 TISSUE CULTURE BONE MARROW: CPT

## 2022-01-01 RX ORDER — LEVOTHYROXINE SODIUM 88 UG/1
88 TABLET ORAL
Qty: 30 TABLET | Refills: 0 | Status: SHIPPED
Start: 2022-01-01 | End: 2022-07-07

## 2022-01-01 RX ORDER — PROPOFOL 10 MG/ML
INJECTION, EMULSION INTRAVENOUS AS NEEDED
Status: DISCONTINUED | OUTPATIENT
Start: 2022-01-01 | End: 2022-01-01 | Stop reason: HOSPADM

## 2022-01-01 RX ORDER — MAGNESIUM SULFATE HEPTAHYDRATE 40 MG/ML
2 INJECTION, SOLUTION INTRAVENOUS ONCE
Status: COMPLETED | OUTPATIENT
Start: 2022-01-01 | End: 2022-01-01

## 2022-01-01 RX ORDER — ACETAMINOPHEN 325 MG/1
650 TABLET ORAL
Status: DISCONTINUED | OUTPATIENT
Start: 2022-01-01 | End: 2022-01-01 | Stop reason: HOSPADM

## 2022-01-01 RX ORDER — EPINEPHRINE 0.1 MG/ML
1 INJECTION INTRACARDIAC; INTRAVENOUS
Status: DISCONTINUED | OUTPATIENT
Start: 2022-01-01 | End: 2022-01-01 | Stop reason: HOSPADM

## 2022-01-01 RX ORDER — LABETALOL HCL 20 MG/4 ML
20 SYRINGE (ML) INTRAVENOUS
Status: COMPLETED | OUTPATIENT
Start: 2022-01-01 | End: 2022-01-01

## 2022-01-01 RX ORDER — POTASSIUM CHLORIDE 7.45 MG/ML
10 INJECTION INTRAVENOUS
Status: DISCONTINUED | OUTPATIENT
Start: 2022-01-01 | End: 2022-01-01

## 2022-01-01 RX ORDER — LOSARTAN POTASSIUM 100 MG/1
100 TABLET ORAL DAILY
Qty: 30 TABLET | Refills: 0 | Status: SHIPPED
Start: 2022-01-01 | End: 2022-01-01

## 2022-01-01 RX ORDER — SODIUM CHLORIDE 9 MG/ML
10 INJECTION INTRAMUSCULAR; INTRAVENOUS; SUBCUTANEOUS AS NEEDED
Status: CANCELLED | OUTPATIENT
Start: 2022-01-01

## 2022-01-01 RX ORDER — HYDRALAZINE HYDROCHLORIDE 20 MG/ML
10 INJECTION INTRAMUSCULAR; INTRAVENOUS
Status: DISCONTINUED | OUTPATIENT
Start: 2022-01-01 | End: 2022-01-01

## 2022-01-01 RX ORDER — POTASSIUM CHLORIDE 7.45 MG/ML
10 INJECTION INTRAVENOUS
Status: COMPLETED | OUTPATIENT
Start: 2022-01-01 | End: 2022-01-01

## 2022-01-01 RX ORDER — LOSARTAN POTASSIUM 50 MG/1
50 TABLET ORAL DAILY
Status: DISCONTINUED | OUTPATIENT
Start: 2022-01-01 | End: 2022-01-01

## 2022-01-01 RX ORDER — AMLODIPINE BESYLATE 10 MG/1
10 TABLET ORAL DAILY
Qty: 90 TABLET | Refills: 1 | Status: ON HOLD | OUTPATIENT
Start: 2022-01-01 | End: 2022-01-01 | Stop reason: SDUPTHER

## 2022-01-01 RX ORDER — IRON POLYSACCHARIDE COMPLEX 150 MG
1 CAPSULE ORAL DAILY
Status: DISCONTINUED | OUTPATIENT
Start: 2022-01-01 | End: 2022-01-01 | Stop reason: HOSPADM

## 2022-01-01 RX ORDER — ONDANSETRON 2 MG/ML
4 INJECTION INTRAMUSCULAR; INTRAVENOUS
Status: DISCONTINUED | OUTPATIENT
Start: 2022-01-01 | End: 2022-01-01

## 2022-01-01 RX ORDER — SODIUM CHLORIDE 9 MG/ML
100 INJECTION, SOLUTION INTRAVENOUS ONCE
Status: DISCONTINUED | OUTPATIENT
Start: 2022-01-01 | End: 2022-01-01

## 2022-01-01 RX ORDER — OLANZAPINE 5 MG/1
5 TABLET, ORALLY DISINTEGRATING ORAL
Status: DISCONTINUED | OUTPATIENT
Start: 2022-01-01 | End: 2022-01-01 | Stop reason: HOSPADM

## 2022-01-01 RX ORDER — SUCRALFATE 1 G/1
1 TABLET ORAL
Qty: 120 TABLET | Refills: 0 | Status: SHIPPED
Start: 2022-01-01 | End: 2022-07-07

## 2022-01-01 RX ORDER — POLYETHYLENE GLYCOL 3350, SODIUM SULFATE, SODIUM CHLORIDE, POTASSIUM CHLORIDE, SODIUM ASCORBATE, AND ASCORBIC ACID 7.5-2.691G
1 KIT ORAL EVERY 6 HOURS
Status: COMPLETED | OUTPATIENT
Start: 2022-01-01 | End: 2022-01-01

## 2022-01-01 RX ORDER — HYDRALAZINE HYDROCHLORIDE 25 MG/1
25 TABLET, FILM COATED ORAL 3 TIMES DAILY
Status: DISCONTINUED | OUTPATIENT
Start: 2022-01-01 | End: 2022-01-01

## 2022-01-01 RX ORDER — POLYETHYLENE GLYCOL 3350 17 G/17G
17 POWDER, FOR SOLUTION ORAL DAILY
Qty: 30 PACKET | Refills: 0 | Status: SHIPPED | OUTPATIENT
Start: 2022-01-01 | End: 2022-01-01

## 2022-01-01 RX ORDER — SODIUM CHLORIDE 9 MG/ML
100 INJECTION, SOLUTION INTRAVENOUS CONTINUOUS
Status: DISCONTINUED | OUTPATIENT
Start: 2022-01-01 | End: 2022-01-01

## 2022-01-01 RX ORDER — POLYETHYLENE GLYCOL 3350 17 G/17G
17 POWDER, FOR SOLUTION ORAL DAILY
Qty: 30 EACH | Refills: 2 | Status: SHIPPED | OUTPATIENT
Start: 2022-01-01 | End: 2022-01-01 | Stop reason: SDUPTHER

## 2022-01-01 RX ORDER — LIDOCAINE HYDROCHLORIDE 10 MG/ML
9 INJECTION INFILTRATION; PERINEURAL ONCE
Status: COMPLETED | OUTPATIENT
Start: 2022-01-01 | End: 2022-01-01

## 2022-01-01 RX ORDER — MAGNESIUM SULFATE 100 %
4 CRYSTALS MISCELLANEOUS AS NEEDED
Status: DISCONTINUED | OUTPATIENT
Start: 2022-01-01 | End: 2022-01-01

## 2022-01-01 RX ORDER — SODIUM CHLORIDE 0.9 % (FLUSH) 0.9 %
5-40 SYRINGE (ML) INJECTION EVERY 8 HOURS
Status: DISCONTINUED | OUTPATIENT
Start: 2022-01-01 | End: 2022-01-01 | Stop reason: HOSPADM

## 2022-01-01 RX ORDER — CHOLECALCIFEROL (VITAMIN D3) 125 MCG
2000 CAPSULE ORAL DAILY
Qty: 30 TABLET | Refills: 0 | Status: SHIPPED
Start: 2022-01-01 | End: 2022-07-07

## 2022-01-01 RX ORDER — PANTOPRAZOLE SODIUM 40 MG/1
40 TABLET, DELAYED RELEASE ORAL 2 TIMES DAILY
Status: DISCONTINUED | OUTPATIENT
Start: 2022-01-01 | End: 2022-01-01

## 2022-01-01 RX ORDER — FLUMAZENIL 0.1 MG/ML
0.2 INJECTION INTRAVENOUS
Status: DISCONTINUED | OUTPATIENT
Start: 2022-01-01 | End: 2022-01-01 | Stop reason: HOSPADM

## 2022-01-01 RX ORDER — ONDANSETRON 2 MG/ML
4 INJECTION INTRAMUSCULAR; INTRAVENOUS
Status: DISCONTINUED | OUTPATIENT
Start: 2022-01-01 | End: 2022-01-01 | Stop reason: HOSPADM

## 2022-01-01 RX ORDER — ACETAMINOPHEN 650 MG/1
650 SUPPOSITORY RECTAL
Status: DISCONTINUED | OUTPATIENT
Start: 2022-01-01 | End: 2022-01-01 | Stop reason: HOSPADM

## 2022-01-01 RX ORDER — PROPOFOL 10 MG/ML
INJECTION, EMULSION INTRAVENOUS
Status: DISCONTINUED | OUTPATIENT
Start: 2022-01-01 | End: 2022-01-01 | Stop reason: HOSPADM

## 2022-01-01 RX ORDER — INSULIN LISPRO 100 [IU]/ML
INJECTION, SOLUTION INTRAVENOUS; SUBCUTANEOUS
COMMUNITY

## 2022-01-01 RX ORDER — LORAZEPAM 2 MG/ML
0.5 INJECTION INTRAMUSCULAR
Status: ACTIVE | OUTPATIENT
Start: 2022-01-01 | End: 2022-01-01

## 2022-01-01 RX ORDER — SODIUM CHLORIDE 9 MG/ML
250 INJECTION, SOLUTION INTRAVENOUS AS NEEDED
Status: DISCONTINUED | OUTPATIENT
Start: 2022-01-01 | End: 2022-01-01 | Stop reason: HOSPADM

## 2022-01-01 RX ORDER — POLYETHYLENE GLYCOL 3350 17 G/17G
17 POWDER, FOR SOLUTION ORAL DAILY PRN
Status: DISCONTINUED | OUTPATIENT
Start: 2022-01-01 | End: 2022-01-01 | Stop reason: HOSPADM

## 2022-01-01 RX ORDER — FENTANYL CITRATE 50 UG/ML
100 INJECTION, SOLUTION INTRAMUSCULAR; INTRAVENOUS
Status: DISCONTINUED | OUTPATIENT
Start: 2022-01-01 | End: 2022-01-01 | Stop reason: HOSPADM

## 2022-01-01 RX ORDER — HALOPERIDOL 5 MG/ML
2 INJECTION INTRAMUSCULAR ONCE
Status: COMPLETED | OUTPATIENT
Start: 2022-01-01 | End: 2022-01-01

## 2022-01-01 RX ORDER — HYDRALAZINE HYDROCHLORIDE 25 MG/1
25 TABLET, FILM COATED ORAL 4 TIMES DAILY
Status: DISCONTINUED | OUTPATIENT
Start: 2022-01-01 | End: 2022-01-01

## 2022-01-01 RX ORDER — LOSARTAN POTASSIUM 25 MG/1
25 TABLET ORAL DAILY
Status: DISCONTINUED | OUTPATIENT
Start: 2022-01-01 | End: 2022-01-01

## 2022-01-01 RX ORDER — METOPROLOL SUCCINATE 25 MG/1
25 TABLET, EXTENDED RELEASE ORAL DAILY
Qty: 30 TABLET | Refills: 0 | Status: SHIPPED
Start: 2022-01-01 | End: 2022-07-07

## 2022-01-01 RX ORDER — HYDRALAZINE HYDROCHLORIDE 10 MG/1
10 TABLET, FILM COATED ORAL 3 TIMES DAILY
Status: DISCONTINUED | OUTPATIENT
Start: 2022-01-01 | End: 2022-01-01

## 2022-01-01 RX ORDER — POLYETHYLENE GLYCOL 3350 17 G/17G
17 POWDER, FOR SOLUTION ORAL DAILY
Qty: 30 EACH | Refills: 2 | Status: SHIPPED
Start: 2022-01-01

## 2022-01-01 RX ORDER — MAGNESIUM SULFATE 100 %
4 CRYSTALS MISCELLANEOUS AS NEEDED
Status: DISCONTINUED | OUTPATIENT
Start: 2022-01-01 | End: 2022-01-01 | Stop reason: HOSPADM

## 2022-01-01 RX ORDER — DEXAMETHASONE SODIUM PHOSPHATE 100 MG/10ML
INJECTION INTRAMUSCULAR; INTRAVENOUS
COMMUNITY
End: 2022-01-01

## 2022-01-01 RX ORDER — LEVOTHYROXINE SODIUM 88 UG/1
88 TABLET ORAL
Qty: 30 TABLET | Refills: 0 | Status: ON HOLD
Start: 2022-01-01 | End: 2022-01-01

## 2022-01-01 RX ORDER — LIDOCAINE HYDROCHLORIDE 10 MG/ML
10 INJECTION, SOLUTION EPIDURAL; INFILTRATION; INTRACAUDAL; PERINEURAL
Status: DISPENSED | OUTPATIENT
Start: 2022-01-01 | End: 2022-01-01

## 2022-01-01 RX ORDER — TRIAMCINOLONE ACETONIDE 40 MG/ML
40 INJECTION, SUSPENSION INTRA-ARTICULAR; INTRAMUSCULAR ONCE
Status: COMPLETED | OUTPATIENT
Start: 2022-01-01 | End: 2022-01-01

## 2022-01-01 RX ORDER — LANOLIN ALCOHOL/MO/W.PET/CERES
1000 CREAM (GRAM) TOPICAL DAILY
Status: DISCONTINUED | OUTPATIENT
Start: 2022-01-01 | End: 2022-01-01

## 2022-01-01 RX ORDER — LOSARTAN POTASSIUM 50 MG/1
50 TABLET ORAL DAILY
Qty: 30 TABLET | Refills: 0 | Status: SHIPPED | OUTPATIENT
Start: 2022-01-01 | End: 2022-01-01 | Stop reason: SDUPTHER

## 2022-01-01 RX ORDER — METOPROLOL SUCCINATE 25 MG/1
25 TABLET, EXTENDED RELEASE ORAL DAILY
Qty: 30 TABLET | Refills: 0 | Status: SHIPPED | OUTPATIENT
Start: 2022-01-01 | End: 2022-01-01 | Stop reason: SDUPTHER

## 2022-01-01 RX ORDER — FUROSEMIDE 10 MG/ML
20 INJECTION INTRAMUSCULAR; INTRAVENOUS ONCE
Status: COMPLETED | OUTPATIENT
Start: 2022-01-01 | End: 2022-01-01

## 2022-01-01 RX ORDER — ONDANSETRON 4 MG/1
4 TABLET, ORALLY DISINTEGRATING ORAL
Status: DISCONTINUED | OUTPATIENT
Start: 2022-01-01 | End: 2022-01-01 | Stop reason: HOSPADM

## 2022-01-01 RX ORDER — POTASSIUM CHLORIDE 7.45 MG/ML
10 INJECTION INTRAVENOUS
Status: DISPENSED | OUTPATIENT
Start: 2022-01-01 | End: 2022-01-01

## 2022-01-01 RX ORDER — SODIUM CHLORIDE 9 MG/ML
50 INJECTION, SOLUTION INTRAVENOUS CONTINUOUS
Status: DISPENSED | OUTPATIENT
Start: 2022-01-01 | End: 2022-01-01

## 2022-01-01 RX ORDER — METOPROLOL SUCCINATE 25 MG/1
25 TABLET, EXTENDED RELEASE ORAL DAILY
Qty: 30 TABLET | Refills: 0 | Status: ON HOLD
Start: 2022-01-01 | End: 2022-01-01

## 2022-01-01 RX ORDER — METOPROLOL SUCCINATE 50 MG/1
50 TABLET, EXTENDED RELEASE ORAL DAILY
Status: DISCONTINUED | OUTPATIENT
Start: 2022-01-01 | End: 2022-01-01

## 2022-01-01 RX ORDER — ONDANSETRON 4 MG/1
4 TABLET, ORALLY DISINTEGRATING ORAL ONCE
Status: COMPLETED | OUTPATIENT
Start: 2022-01-01 | End: 2022-01-01

## 2022-01-01 RX ORDER — AMLODIPINE BESYLATE 10 MG/1
10 TABLET ORAL DAILY
Qty: 90 TABLET | Refills: 1 | Status: SHIPPED
Start: 2022-01-01

## 2022-01-01 RX ORDER — METOPROLOL SUCCINATE 25 MG/1
25 TABLET, EXTENDED RELEASE ORAL DAILY
Status: DISCONTINUED | OUTPATIENT
Start: 2022-01-01 | End: 2022-01-01 | Stop reason: HOSPADM

## 2022-01-01 RX ORDER — AMLODIPINE BESYLATE 5 MG/1
10 TABLET ORAL DAILY
Status: DISCONTINUED | OUTPATIENT
Start: 2022-01-01 | End: 2022-01-01 | Stop reason: HOSPADM

## 2022-01-01 RX ORDER — LANOLIN ALCOHOL/MO/W.PET/CERES
325 CREAM (GRAM) TOPICAL
Qty: 30 TABLET | Refills: 0 | Status: ON HOLD
Start: 2022-01-01 | End: 2022-01-01

## 2022-01-01 RX ORDER — CHOLECALCIFEROL (VITAMIN D3) 125 MCG
2000 CAPSULE ORAL DAILY
Qty: 30 TABLET | Refills: 0 | Status: ON HOLD
Start: 2022-01-01 | End: 2022-01-01

## 2022-01-01 RX ORDER — LEVOTHYROXINE SODIUM 88 UG/1
88 TABLET ORAL
Status: DISCONTINUED | OUTPATIENT
Start: 2022-01-01 | End: 2022-01-01 | Stop reason: HOSPADM

## 2022-01-01 RX ORDER — POLYETHYLENE GLYCOL 3350 17 G/17G
17 POWDER, FOR SOLUTION ORAL DAILY
Status: DISCONTINUED | OUTPATIENT
Start: 2022-01-01 | End: 2022-01-01 | Stop reason: SDUPTHER

## 2022-01-01 RX ORDER — LANOLIN ALCOHOL/MO/W.PET/CERES
1000 CREAM (GRAM) TOPICAL DAILY
Status: DISCONTINUED | OUTPATIENT
Start: 2022-01-01 | End: 2022-01-01 | Stop reason: HOSPADM

## 2022-01-01 RX ORDER — LANOLIN ALCOHOL/MO/W.PET/CERES
3 CREAM (GRAM) TOPICAL
Status: DISCONTINUED | OUTPATIENT
Start: 2022-01-01 | End: 2022-01-01 | Stop reason: HOSPADM

## 2022-01-01 RX ORDER — SODIUM CHLORIDE 0.9 % (FLUSH) 0.9 %
5-40 SYRINGE (ML) INJECTION AS NEEDED
Status: DISCONTINUED | OUTPATIENT
Start: 2022-01-01 | End: 2022-01-01 | Stop reason: HOSPADM

## 2022-01-01 RX ORDER — MAGNESIUM SULFATE 1 G/100ML
1 INJECTION INTRAVENOUS ONCE
Status: COMPLETED | OUTPATIENT
Start: 2022-01-01 | End: 2022-01-01

## 2022-01-01 RX ORDER — INSULIN LISPRO 100 [IU]/ML
INJECTION, SOLUTION INTRAVENOUS; SUBCUTANEOUS
Status: DISCONTINUED | OUTPATIENT
Start: 2022-01-01 | End: 2022-01-01 | Stop reason: HOSPADM

## 2022-01-01 RX ORDER — METOPROLOL SUCCINATE 25 MG/1
25 TABLET, EXTENDED RELEASE ORAL DAILY
Qty: 90 TABLET | Refills: 1 | Status: SHIPPED | OUTPATIENT
Start: 2022-01-01 | End: 2022-01-01

## 2022-01-01 RX ORDER — LATANOPROST 50 UG/ML
1 SOLUTION/ DROPS OPHTHALMIC
COMMUNITY

## 2022-01-01 RX ORDER — POLYETHYLENE GLYCOL 3350 17 G/17G
17 POWDER, FOR SOLUTION ORAL DAILY
Status: DISCONTINUED | OUTPATIENT
Start: 2022-01-01 | End: 2022-01-01 | Stop reason: HOSPADM

## 2022-01-01 RX ORDER — LANOLIN ALCOHOL/MO/W.PET/CERES
1000 CREAM (GRAM) TOPICAL DAILY
Qty: 30 TABLET | Refills: 0 | Status: SHIPPED
Start: 2022-01-01 | End: 2022-07-07

## 2022-01-01 RX ORDER — BISACODYL 5 MG
10 TABLET, DELAYED RELEASE (ENTERIC COATED) ORAL
Status: COMPLETED | OUTPATIENT
Start: 2022-01-01 | End: 2022-01-01

## 2022-01-01 RX ORDER — METOPROLOL TARTRATE 5 MG/5ML
5 INJECTION INTRAVENOUS 2 TIMES DAILY
Status: DISCONTINUED | OUTPATIENT
Start: 2022-01-01 | End: 2022-01-01

## 2022-01-01 RX ORDER — AMLODIPINE BESYLATE 10 MG/1
10 TABLET ORAL DAILY
Qty: 30 TABLET | Refills: 0 | Status: SHIPPED | OUTPATIENT
Start: 2022-01-01 | End: 2022-01-01 | Stop reason: SDUPTHER

## 2022-01-01 RX ORDER — METOPROLOL SUCCINATE 50 MG/1
TABLET, EXTENDED RELEASE ORAL
Qty: 90 TABLET | Refills: 3 | Status: SHIPPED | OUTPATIENT
Start: 2022-01-01 | End: 2022-01-01

## 2022-01-01 RX ORDER — LANOLIN ALCOHOL/MO/W.PET/CERES
325 CREAM (GRAM) TOPICAL
Status: DISCONTINUED | OUTPATIENT
Start: 2022-01-01 | End: 2022-01-01 | Stop reason: HOSPADM

## 2022-01-01 RX ORDER — HYDRALAZINE HYDROCHLORIDE 20 MG/ML
20 INJECTION INTRAMUSCULAR; INTRAVENOUS
Status: DISCONTINUED | OUTPATIENT
Start: 2022-01-01 | End: 2022-01-01

## 2022-01-01 RX ORDER — GLUCAGON 1 MG
VIAL (EA) INJECTION
COMMUNITY

## 2022-01-01 RX ORDER — BENZONATATE 100 MG/1
100 CAPSULE ORAL
Status: DISCONTINUED | OUTPATIENT
Start: 2022-01-01 | End: 2022-01-01 | Stop reason: HOSPADM

## 2022-01-01 RX ORDER — CALCITONIN SALMON 200 [USP'U]/ML
4 INJECTION, SOLUTION INTRAMUSCULAR; SUBCUTANEOUS ONCE
Status: COMPLETED | OUTPATIENT
Start: 2022-01-01 | End: 2022-01-01

## 2022-01-01 RX ORDER — SODIUM CHLORIDE 0.9 % (FLUSH) 0.9 %
5-10 SYRINGE (ML) INJECTION AS NEEDED
Status: CANCELLED | OUTPATIENT
Start: 2022-01-01

## 2022-01-01 RX ORDER — LIDOCAINE HYDROCHLORIDE 20 MG/ML
INJECTION, SOLUTION EPIDURAL; INFILTRATION; INTRACAUDAL; PERINEURAL AS NEEDED
Status: DISCONTINUED | OUTPATIENT
Start: 2022-01-01 | End: 2022-01-01 | Stop reason: HOSPADM

## 2022-01-01 RX ORDER — LOSARTAN POTASSIUM 50 MG/1
50 TABLET ORAL DAILY
Status: DISCONTINUED | OUTPATIENT
Start: 2022-01-01 | End: 2022-01-01 | Stop reason: HOSPADM

## 2022-01-01 RX ORDER — ONDANSETRON 4 MG/1
4 TABLET, ORALLY DISINTEGRATING ORAL
Status: DISCONTINUED | OUTPATIENT
Start: 2022-01-01 | End: 2022-01-01

## 2022-01-01 RX ORDER — LOSARTAN POTASSIUM 50 MG/1
50 TABLET ORAL DAILY
Qty: 30 TABLET | Refills: 0 | Status: SHIPPED | OUTPATIENT
Start: 2022-01-01 | End: 2022-06-12

## 2022-01-01 RX ORDER — MIDAZOLAM HYDROCHLORIDE 1 MG/ML
.25-5 INJECTION, SOLUTION INTRAMUSCULAR; INTRAVENOUS
Status: DISCONTINUED | OUTPATIENT
Start: 2022-01-01 | End: 2022-01-01 | Stop reason: HOSPADM

## 2022-01-01 RX ORDER — POTASSIUM CHLORIDE 750 MG/1
20 TABLET, FILM COATED, EXTENDED RELEASE ORAL
Status: DISCONTINUED | OUTPATIENT
Start: 2022-01-01 | End: 2022-01-01

## 2022-01-01 RX ORDER — ALENDRONATE SODIUM 10 MG/1
40 TABLET ORAL
Status: DISCONTINUED | OUTPATIENT
Start: 2022-01-01 | End: 2022-01-01

## 2022-01-01 RX ORDER — LANOLIN ALCOHOL/MO/W.PET/CERES
325 CREAM (GRAM) TOPICAL
Qty: 90 TABLET | Refills: 1 | Status: SHIPPED | OUTPATIENT
Start: 2022-01-01 | End: 2022-01-01

## 2022-01-01 RX ORDER — LATANOPROST 50 UG/ML
1 SOLUTION/ DROPS OPHTHALMIC
Status: DISCONTINUED | OUTPATIENT
Start: 2022-01-01 | End: 2022-01-01 | Stop reason: HOSPADM

## 2022-01-01 RX ORDER — DEXTROSE MONOHYDRATE 100 MG/ML
0-250 INJECTION, SOLUTION INTRAVENOUS AS NEEDED
Status: DISCONTINUED | OUTPATIENT
Start: 2022-01-01 | End: 2022-01-01

## 2022-01-01 RX ORDER — NALOXONE HYDROCHLORIDE 0.4 MG/ML
0.4 INJECTION, SOLUTION INTRAMUSCULAR; INTRAVENOUS; SUBCUTANEOUS
Status: DISCONTINUED | OUTPATIENT
Start: 2022-01-01 | End: 2022-01-01 | Stop reason: HOSPADM

## 2022-01-01 RX ORDER — SODIUM CHLORIDE 9 MG/ML
INJECTION, SOLUTION INTRAVENOUS
Status: DISCONTINUED | OUTPATIENT
Start: 2022-01-01 | End: 2022-01-01 | Stop reason: HOSPADM

## 2022-01-01 RX ORDER — POTASSIUM CHLORIDE 750 MG/1
40 TABLET, FILM COATED, EXTENDED RELEASE ORAL
Status: COMPLETED | OUTPATIENT
Start: 2022-01-01 | End: 2022-01-01

## 2022-01-01 RX ORDER — PANTOPRAZOLE SODIUM 40 MG/1
40 TABLET, DELAYED RELEASE ORAL DAILY
Status: DISCONTINUED | OUTPATIENT
Start: 2022-01-01 | End: 2022-01-01

## 2022-01-01 RX ORDER — HYDRALAZINE HYDROCHLORIDE 25 MG/1
50 TABLET, FILM COATED ORAL 3 TIMES DAILY
Status: DISCONTINUED | OUTPATIENT
Start: 2022-01-01 | End: 2022-01-01 | Stop reason: HOSPADM

## 2022-01-01 RX ORDER — LANOLIN ALCOHOL/MO/W.PET/CERES
325 CREAM (GRAM) TOPICAL
Qty: 30 TABLET | Refills: 0 | Status: SHIPPED
Start: 2022-01-01 | End: 2022-07-07

## 2022-01-01 RX ORDER — HYDRALAZINE HYDROCHLORIDE 20 MG/ML
10 INJECTION INTRAMUSCULAR; INTRAVENOUS
Status: DISCONTINUED | OUTPATIENT
Start: 2022-01-01 | End: 2022-01-01 | Stop reason: HOSPADM

## 2022-01-01 RX ORDER — LANOLIN ALCOHOL/MO/W.PET/CERES
1000 CREAM (GRAM) TOPICAL DAILY
Qty: 30 TABLET | Refills: 0 | Status: ON HOLD
Start: 2022-01-01 | End: 2022-01-01

## 2022-01-01 RX ORDER — SPIRONOLACTONE 25 MG/1
25 TABLET ORAL DAILY
Status: DISCONTINUED | OUTPATIENT
Start: 2022-01-01 | End: 2022-01-01

## 2022-01-01 RX ORDER — BENZONATATE 100 MG/1
100 CAPSULE ORAL AS NEEDED
Qty: 14 CAPSULE | Refills: 0 | Status: SHIPPED
Start: 2022-01-01

## 2022-01-01 RX ORDER — ATROPINE SULFATE 0.1 MG/ML
0.5 INJECTION INTRAVENOUS
Status: DISCONTINUED | OUTPATIENT
Start: 2022-01-01 | End: 2022-01-01 | Stop reason: HOSPADM

## 2022-01-01 RX ORDER — ENOXAPARIN SODIUM 100 MG/ML
40 INJECTION SUBCUTANEOUS EVERY 24 HOURS
Status: DISCONTINUED | OUTPATIENT
Start: 2022-01-01 | End: 2022-01-01 | Stop reason: HOSPADM

## 2022-01-01 RX ORDER — LENALIDOMIDE 15 MG/1
15 CAPSULE ORAL SEE ADMIN INSTRUCTIONS
Qty: 21 CAPSULE | Refills: 0 | Status: SHIPPED | OUTPATIENT
Start: 2022-01-01

## 2022-01-01 RX ORDER — HYDRALAZINE HYDROCHLORIDE 25 MG/1
50 TABLET, FILM COATED ORAL 3 TIMES DAILY
Status: DISCONTINUED | OUTPATIENT
Start: 2022-01-01 | End: 2022-01-01

## 2022-01-01 RX ORDER — SODIUM CHLORIDE 9 MG/ML
50 INJECTION, SOLUTION INTRAVENOUS CONTINUOUS
Status: DISCONTINUED | OUTPATIENT
Start: 2022-01-01 | End: 2022-01-01

## 2022-01-01 RX ORDER — LABETALOL HCL 20 MG/4 ML
10 SYRINGE (ML) INTRAVENOUS
Status: DISCONTINUED | OUTPATIENT
Start: 2022-01-01 | End: 2022-01-01

## 2022-01-01 RX ORDER — POTASSIUM CHLORIDE 750 MG/1
30 TABLET, FILM COATED, EXTENDED RELEASE ORAL
Status: COMPLETED | OUTPATIENT
Start: 2022-01-01 | End: 2022-01-01

## 2022-01-01 RX ORDER — SUCRALFATE 1 G/1
1 TABLET ORAL
Qty: 120 TABLET | Refills: 0 | Status: ON HOLD
Start: 2022-01-01 | End: 2022-01-01

## 2022-01-01 RX ORDER — GADOTERATE MEGLUMINE 376.9 MG/ML
10 INJECTION INTRAVENOUS
Status: COMPLETED | OUTPATIENT
Start: 2022-01-01 | End: 2022-01-01

## 2022-01-01 RX ORDER — MELATONIN
2000 DAILY
Status: DISCONTINUED | OUTPATIENT
Start: 2022-01-01 | End: 2022-01-01

## 2022-01-01 RX ORDER — HYDRALAZINE HYDROCHLORIDE 20 MG/ML
20 INJECTION INTRAMUSCULAR; INTRAVENOUS
Status: DISCONTINUED | OUTPATIENT
Start: 2022-01-01 | End: 2022-01-01 | Stop reason: HOSPADM

## 2022-01-01 RX ORDER — FENTANYL CITRATE 50 UG/ML
100 INJECTION, SOLUTION INTRAMUSCULAR; INTRAVENOUS
Status: DISCONTINUED | OUTPATIENT
Start: 2022-01-01 | End: 2022-01-01 | Stop reason: ALTCHOICE

## 2022-01-01 RX ORDER — PANTOPRAZOLE SODIUM 40 MG/1
40 TABLET, DELAYED RELEASE ORAL
Qty: 30 TABLET | Refills: 0 | Status: ON HOLD
Start: 2022-01-01 | End: 2022-01-01

## 2022-01-01 RX ORDER — PANTOPRAZOLE SODIUM 40 MG/1
40 TABLET, DELAYED RELEASE ORAL
Status: DISCONTINUED | OUTPATIENT
Start: 2022-01-01 | End: 2022-01-01 | Stop reason: HOSPADM

## 2022-01-01 RX ORDER — LANOLIN ALCOHOL/MO/W.PET/CERES
325 CREAM (GRAM) TOPICAL
Status: DISCONTINUED | OUTPATIENT
Start: 2022-01-01 | End: 2022-01-01

## 2022-01-01 RX ORDER — LOSARTAN POTASSIUM 50 MG/1
50 TABLET ORAL DAILY
Qty: 90 TABLET | Refills: 3 | Status: SHIPPED | OUTPATIENT
Start: 2022-01-01 | End: 2022-01-01

## 2022-01-01 RX ORDER — HYDRALAZINE HYDROCHLORIDE 50 MG/1
50 TABLET, FILM COATED ORAL 3 TIMES DAILY
Qty: 90 TABLET | Refills: 0 | Status: ON HOLD
Start: 2022-01-01 | End: 2022-01-01

## 2022-01-01 RX ORDER — INSULIN LISPRO 100 [IU]/ML
INJECTION, SOLUTION INTRAVENOUS; SUBCUTANEOUS
Status: DISCONTINUED | OUTPATIENT
Start: 2022-01-01 | End: 2022-01-01

## 2022-01-01 RX ORDER — HEPARIN SODIUM 5000 [USP'U]/ML
5000 INJECTION, SOLUTION INTRAVENOUS; SUBCUTANEOUS EVERY 12 HOURS
Status: DISCONTINUED | OUTPATIENT
Start: 2022-01-01 | End: 2022-01-01

## 2022-01-01 RX ORDER — SUCRALFATE 1 G/1
1 TABLET ORAL
Status: DISCONTINUED | OUTPATIENT
Start: 2022-01-01 | End: 2022-01-01 | Stop reason: HOSPADM

## 2022-01-01 RX ORDER — MAGNESIUM SULFATE HEPTAHYDRATE 40 MG/ML
2 INJECTION, SOLUTION INTRAVENOUS ONCE
Status: DISCONTINUED | OUTPATIENT
Start: 2022-01-01 | End: 2022-01-01

## 2022-01-01 RX ORDER — HYDRALAZINE HYDROCHLORIDE 20 MG/ML
20 INJECTION INTRAMUSCULAR; INTRAVENOUS EVERY 6 HOURS
Status: DISCONTINUED | OUTPATIENT
Start: 2022-01-01 | End: 2022-01-01

## 2022-01-01 RX ORDER — MELATONIN
2000 DAILY
Status: DISCONTINUED | OUTPATIENT
Start: 2022-01-01 | End: 2022-01-01 | Stop reason: HOSPADM

## 2022-01-01 RX ORDER — POTASSIUM CHLORIDE AND SODIUM CHLORIDE 900; 300 MG/100ML; MG/100ML
INJECTION, SOLUTION INTRAVENOUS ONCE
Status: COMPLETED | OUTPATIENT
Start: 2022-01-01 | End: 2022-01-01

## 2022-01-01 RX ORDER — AMLODIPINE BESYLATE 5 MG/1
5 TABLET ORAL DAILY
Status: DISCONTINUED | OUTPATIENT
Start: 2022-01-01 | End: 2022-01-01

## 2022-01-01 RX ORDER — PANTOPRAZOLE SODIUM 40 MG/1
40 TABLET, DELAYED RELEASE ORAL
Qty: 30 TABLET | Refills: 0 | Status: SHIPPED
Start: 2022-01-01 | End: 2022-07-07

## 2022-01-01 RX ORDER — LOSARTAN POTASSIUM 50 MG/1
100 TABLET ORAL DAILY
Status: DISCONTINUED | OUTPATIENT
Start: 2022-01-01 | End: 2022-01-01 | Stop reason: HOSPADM

## 2022-01-01 RX ORDER — HYDRALAZINE HYDROCHLORIDE 20 MG/ML
10 INJECTION INTRAMUSCULAR; INTRAVENOUS ONCE
Status: COMPLETED | OUTPATIENT
Start: 2022-01-01 | End: 2022-01-01

## 2022-01-01 RX ORDER — DEXTROMETHORPHAN/PSEUDOEPHED 2.5-7.5/.8
1.2 DROPS ORAL
Status: DISCONTINUED | OUTPATIENT
Start: 2022-01-01 | End: 2022-01-01 | Stop reason: HOSPADM

## 2022-01-01 RX ORDER — HYDRALAZINE HYDROCHLORIDE 50 MG/1
50 TABLET, FILM COATED ORAL 3 TIMES DAILY
Qty: 90 TABLET | Refills: 0 | Status: SHIPPED
Start: 2022-01-01 | End: 2022-07-07

## 2022-01-01 RX ORDER — HYDRALAZINE HYDROCHLORIDE 20 MG/ML
20 INJECTION INTRAMUSCULAR; INTRAVENOUS ONCE
Status: COMPLETED | OUTPATIENT
Start: 2022-01-01 | End: 2022-01-01

## 2022-01-01 RX ORDER — POTASSIUM CHLORIDE 750 MG/1
40 TABLET, FILM COATED, EXTENDED RELEASE ORAL DAILY
Status: DISCONTINUED | OUTPATIENT
Start: 2022-01-01 | End: 2022-01-01

## 2022-01-01 RX ORDER — HEPARIN 100 UNIT/ML
500 SYRINGE INTRAVENOUS AS NEEDED
Status: CANCELLED | OUTPATIENT
Start: 2022-01-01

## 2022-01-01 RX ORDER — MIDAZOLAM HYDROCHLORIDE 1 MG/ML
5 INJECTION, SOLUTION INTRAMUSCULAR; INTRAVENOUS
Status: DISCONTINUED | OUTPATIENT
Start: 2022-01-01 | End: 2022-01-01 | Stop reason: ALTCHOICE

## 2022-01-01 RX ORDER — IRON POLYSACCHARIDE COMPLEX 150 MG
CAPSULE ORAL
Qty: 180 CAPSULE | Refills: 1 | Status: SHIPPED | OUTPATIENT
Start: 2022-01-01 | End: 2022-01-01

## 2022-01-01 RX ORDER — METOPROLOL SUCCINATE 25 MG/1
25 TABLET, EXTENDED RELEASE ORAL DAILY
Status: DISCONTINUED | OUTPATIENT
Start: 2022-01-01 | End: 2022-01-01

## 2022-01-01 RX ORDER — DEXTROSE MONOHYDRATE 100 MG/ML
0-250 INJECTION, SOLUTION INTRAVENOUS AS NEEDED
Status: DISCONTINUED | OUTPATIENT
Start: 2022-01-01 | End: 2022-01-01 | Stop reason: HOSPADM

## 2022-01-01 RX ORDER — PANTOPRAZOLE SODIUM 40 MG/1
40 TABLET, DELAYED RELEASE ORAL DAILY
Qty: 90 TABLET | Refills: 3 | Status: SHIPPED | OUTPATIENT
Start: 2022-01-01 | End: 2022-01-01

## 2022-01-01 RX ADMIN — PROPOFOL 80 MG: 10 INJECTION, EMULSION INTRAVENOUS at 11:46

## 2022-01-01 RX ADMIN — POTASSIUM CHLORIDE 10 MEQ: 7.45 INJECTION INTRAVENOUS at 09:48

## 2022-01-01 RX ADMIN — METOPROLOL SUCCINATE 25 MG: 25 TABLET, FILM COATED, EXTENDED RELEASE ORAL at 09:00

## 2022-01-01 RX ADMIN — HEPARIN SODIUM 5000 UNITS: 5000 INJECTION INTRAVENOUS; SUBCUTANEOUS at 18:42

## 2022-01-01 RX ADMIN — POTASSIUM CHLORIDE 40 MEQ: 750 TABLET, FILM COATED, EXTENDED RELEASE ORAL at 05:03

## 2022-01-01 RX ADMIN — HYDROMORPHONE HYDROCHLORIDE 0.5 MG: 5 SOLUTION ORAL at 15:45

## 2022-01-01 RX ADMIN — LOSARTAN POTASSIUM 100 MG: 50 TABLET, FILM COATED ORAL at 08:30

## 2022-01-01 RX ADMIN — Medication 10 ML: at 13:07

## 2022-01-01 RX ADMIN — SODIUM CHLORIDE 100 ML/HR: 9 INJECTION, SOLUTION INTRAVENOUS at 04:32

## 2022-01-01 RX ADMIN — HYDRALAZINE HYDROCHLORIDE 20 MG: 20 INJECTION INTRAMUSCULAR; INTRAVENOUS at 16:13

## 2022-01-01 RX ADMIN — SUCRALFATE 1 G: 1 TABLET ORAL at 07:23

## 2022-01-01 RX ADMIN — HYDRALAZINE HYDROCHLORIDE 20 MG: 20 INJECTION INTRAMUSCULAR; INTRAVENOUS at 17:29

## 2022-01-01 RX ADMIN — BENZONATATE 100 MG: 100 CAPSULE ORAL at 17:17

## 2022-01-01 RX ADMIN — FERROUS SULFATE TAB 325 MG (65 MG ELEMENTAL FE) 325 MG: 325 (65 FE) TAB at 07:23

## 2022-01-01 RX ADMIN — METOPROLOL TARTRATE 5 MG: 5 INJECTION INTRAVENOUS at 08:12

## 2022-01-01 RX ADMIN — LACTULOSE 30 ML: 20 SOLUTION ORAL at 04:05

## 2022-01-01 RX ADMIN — SUCRALFATE 1 G: 1 TABLET ORAL at 11:18

## 2022-01-01 RX ADMIN — HYDRALAZINE HYDROCHLORIDE 10 MG: 10 TABLET, FILM COATED ORAL at 17:19

## 2022-01-01 RX ADMIN — FUROSEMIDE 20 MG: 10 INJECTION, SOLUTION INTRAMUSCULAR; INTRAVENOUS at 13:47

## 2022-01-01 RX ADMIN — HYDRALAZINE HYDROCHLORIDE 10 MG: 20 INJECTION INTRAMUSCULAR; INTRAVENOUS at 09:31

## 2022-01-01 RX ADMIN — HEPARIN SODIUM 5000 UNITS: 5000 INJECTION INTRAVENOUS; SUBCUTANEOUS at 05:59

## 2022-01-01 RX ADMIN — POTASSIUM BICARBONATE 40 MEQ: 782 TABLET, EFFERVESCENT ORAL at 13:50

## 2022-01-01 RX ADMIN — LACTULOSE 30 ML: 20 SOLUTION ORAL at 18:25

## 2022-01-01 RX ADMIN — POLYETHYLENE GLYCOL 3350 17 G: 17 POWDER, FOR SOLUTION ORAL at 08:54

## 2022-01-01 RX ADMIN — Medication 10 ML: at 22:22

## 2022-01-01 RX ADMIN — POTASSIUM CHLORIDE 10 MEQ: 7.46 INJECTION, SOLUTION INTRAVENOUS at 15:13

## 2022-01-01 RX ADMIN — LEVOTHYROXINE SODIUM 88 MCG: 0.09 TABLET ORAL at 06:38

## 2022-01-01 RX ADMIN — LEVOTHYROXINE SODIUM 88 MCG: 0.09 TABLET ORAL at 07:23

## 2022-01-01 RX ADMIN — PHENOL 1 SPRAY: 1.5 LIQUID ORAL at 00:01

## 2022-01-01 RX ADMIN — HYDRALAZINE HYDROCHLORIDE 10 MG: 20 INJECTION INTRAMUSCULAR; INTRAVENOUS at 23:21

## 2022-01-01 RX ADMIN — LATANOPROST 1 DROP: 50 SOLUTION OPHTHALMIC at 21:10

## 2022-01-01 RX ADMIN — Medication 10 ML: at 05:08

## 2022-01-01 RX ADMIN — BENZONATATE 100 MG: 100 CAPSULE ORAL at 15:50

## 2022-01-01 RX ADMIN — Medication 10 ML: at 22:05

## 2022-01-01 RX ADMIN — POTASSIUM CHLORIDE 10 MEQ: 7.46 INJECTION, SOLUTION INTRAVENOUS at 15:18

## 2022-01-01 RX ADMIN — CYANOCOBALAMIN TAB 500 MCG 1000 MCG: 500 TAB at 09:02

## 2022-01-01 RX ADMIN — LACTULOSE 45 ML: 20 SOLUTION ORAL at 22:48

## 2022-01-01 RX ADMIN — HYDRALAZINE HYDROCHLORIDE 25 MG: 25 TABLET, FILM COATED ORAL at 21:35

## 2022-01-01 RX ADMIN — POTASSIUM CHLORIDE 10 MEQ: 7.46 INJECTION, SOLUTION INTRAVENOUS at 03:17

## 2022-01-01 RX ADMIN — SUCRALFATE 1 G: 1 TABLET ORAL at 06:31

## 2022-01-01 RX ADMIN — SUCRALFATE 1 G: 1 TABLET ORAL at 06:52

## 2022-01-01 RX ADMIN — HYDRALAZINE HYDROCHLORIDE 50 MG: 25 TABLET, FILM COATED ORAL at 21:24

## 2022-01-01 RX ADMIN — HYDRALAZINE HYDROCHLORIDE 50 MG: 25 TABLET, FILM COATED ORAL at 09:02

## 2022-01-01 RX ADMIN — LOSARTAN POTASSIUM 25 MG: 50 TABLET, FILM COATED ORAL at 09:35

## 2022-01-01 RX ADMIN — BISACODYL 10 MG: 5 TABLET, COATED ORAL at 22:48

## 2022-01-01 RX ADMIN — POTASSIUM CHLORIDE AND SODIUM CHLORIDE: 900; 300 INJECTION, SOLUTION INTRAVENOUS at 12:39

## 2022-01-01 RX ADMIN — METOPROLOL SUCCINATE 25 MG: 25 TABLET, FILM COATED, EXTENDED RELEASE ORAL at 08:15

## 2022-01-01 RX ADMIN — CEFTRIAXONE 1 G: 1 INJECTION, POWDER, FOR SOLUTION INTRAMUSCULAR; INTRAVENOUS at 09:09

## 2022-01-01 RX ADMIN — LOSARTAN POTASSIUM 50 MG: 50 TABLET, FILM COATED ORAL at 15:43

## 2022-01-01 RX ADMIN — METOPROLOL TARTRATE 5 MG: 5 INJECTION INTRAVENOUS at 10:22

## 2022-01-01 RX ADMIN — SODIUM CHLORIDE 100 ML/HR: 9 INJECTION, SOLUTION INTRAVENOUS at 02:32

## 2022-01-01 RX ADMIN — GADOTERATE MEGLUMINE 10 ML: 376.9 INJECTION INTRAVENOUS at 12:53

## 2022-01-01 RX ADMIN — SODIUM CHLORIDE, PRESERVATIVE FREE 10 ML: 5 INJECTION INTRAVENOUS at 00:25

## 2022-01-01 RX ADMIN — HYDRALAZINE HYDROCHLORIDE 50 MG: 25 TABLET, FILM COATED ORAL at 21:02

## 2022-01-01 RX ADMIN — Medication 10 ML: at 06:24

## 2022-01-01 RX ADMIN — POTASSIUM CHLORIDE 10 MEQ: 7.46 INJECTION, SOLUTION INTRAVENOUS at 03:46

## 2022-01-01 RX ADMIN — SODIUM CHLORIDE: 9 INJECTION, SOLUTION INTRAVENOUS at 11:23

## 2022-01-01 RX ADMIN — LEVOTHYROXINE SODIUM 88 MCG: 0.09 TABLET ORAL at 06:30

## 2022-01-01 RX ADMIN — Medication 10 ML: at 21:13

## 2022-01-01 RX ADMIN — AMLODIPINE BESYLATE 5 MG: 5 TABLET ORAL at 08:00

## 2022-01-01 RX ADMIN — POTASSIUM CHLORIDE 10 MEQ: 7.45 INJECTION INTRAVENOUS at 11:06

## 2022-01-01 RX ADMIN — AMLODIPINE BESYLATE 10 MG: 5 TABLET ORAL at 09:09

## 2022-01-01 RX ADMIN — HYDRALAZINE HYDROCHLORIDE 10 MG: 20 INJECTION INTRAMUSCULAR; INTRAVENOUS at 16:24

## 2022-01-01 RX ADMIN — HYDRALAZINE HYDROCHLORIDE 20 MG: 20 INJECTION INTRAMUSCULAR; INTRAVENOUS at 11:12

## 2022-01-01 RX ADMIN — HYDRALAZINE HYDROCHLORIDE 50 MG: 25 TABLET, FILM COATED ORAL at 08:32

## 2022-01-01 RX ADMIN — LATANOPROST 1 DROP: 50 SOLUTION OPHTHALMIC at 22:05

## 2022-01-01 RX ADMIN — LIDOCAINE HYDROCHLORIDE 9 ML: 10 INJECTION INFILTRATION; PERINEURAL at 12:00

## 2022-01-01 RX ADMIN — POTASSIUM CHLORIDE 10 MEQ: 7.46 INJECTION, SOLUTION INTRAVENOUS at 09:28

## 2022-01-01 RX ADMIN — AMLODIPINE BESYLATE 10 MG: 5 TABLET ORAL at 08:16

## 2022-01-01 RX ADMIN — POTASSIUM CHLORIDE 10 MEQ: 7.46 INJECTION, SOLUTION INTRAVENOUS at 03:28

## 2022-01-01 RX ADMIN — LABETALOL HYDROCHLORIDE 20 MG: 5 INJECTION, SOLUTION INTRAVENOUS at 20:22

## 2022-01-01 RX ADMIN — AMLODIPINE BESYLATE 5 MG: 5 TABLET ORAL at 09:35

## 2022-01-01 RX ADMIN — Medication 10 ML: at 14:46

## 2022-01-01 RX ADMIN — SODIUM CHLORIDE 50 ML/HR: 9 INJECTION, SOLUTION INTRAVENOUS at 11:40

## 2022-01-01 RX ADMIN — HYDRALAZINE HYDROCHLORIDE 10 MG: 20 INJECTION INTRAMUSCULAR; INTRAVENOUS at 08:23

## 2022-01-01 RX ADMIN — CYANOCOBALAMIN TAB 500 MCG 1000 MCG: 500 TAB at 08:32

## 2022-01-01 RX ADMIN — HYDRALAZINE HYDROCHLORIDE 20 MG: 20 INJECTION INTRAMUSCULAR; INTRAVENOUS at 06:28

## 2022-01-01 RX ADMIN — POTASSIUM CHLORIDE 10 MEQ: 7.45 INJECTION INTRAVENOUS at 07:50

## 2022-01-01 RX ADMIN — POTASSIUM CHLORIDE 10 MEQ: 7.46 INJECTION, SOLUTION INTRAVENOUS at 04:17

## 2022-01-01 RX ADMIN — FERROUS SULFATE TAB 325 MG (65 MG ELEMENTAL FE) 325 MG: 325 (65 FE) TAB at 06:34

## 2022-01-01 RX ADMIN — HEPARIN SODIUM 5000 UNITS: 5000 INJECTION INTRAVENOUS; SUBCUTANEOUS at 17:34

## 2022-01-01 RX ADMIN — SUCRALFATE 1 G: 1 TABLET ORAL at 09:02

## 2022-01-01 RX ADMIN — METOPROLOL TARTRATE 5 MG: 5 INJECTION INTRAVENOUS at 17:30

## 2022-01-01 RX ADMIN — HYOSCYAMINE SULFATE 0.12 MG: 0.12 TABLET SUBLINGUAL at 12:35

## 2022-01-01 RX ADMIN — LOSARTAN POTASSIUM 100 MG: 50 TABLET, FILM COATED ORAL at 08:22

## 2022-01-01 RX ADMIN — AMLODIPINE BESYLATE 10 MG: 5 TABLET ORAL at 08:29

## 2022-01-01 RX ADMIN — ALENDRONATE SODIUM 40 MG: 10 TABLET ORAL at 06:34

## 2022-01-01 RX ADMIN — HALOPERIDOL LACTATE 2 MG: 5 INJECTION, SOLUTION INTRAMUSCULAR at 21:00

## 2022-01-01 RX ADMIN — Medication 10 ML: at 06:00

## 2022-01-01 RX ADMIN — NITROGLYCERIN 1 INCH: 20 OINTMENT TOPICAL at 10:28

## 2022-01-01 RX ADMIN — HYDRALAZINE HYDROCHLORIDE 10 MG: 20 INJECTION INTRAMUSCULAR; INTRAVENOUS at 15:43

## 2022-01-01 RX ADMIN — SODIUM CHLORIDE 1 G: 9 INJECTION INTRAMUSCULAR; INTRAVENOUS; SUBCUTANEOUS at 20:14

## 2022-01-01 RX ADMIN — HYDRALAZINE HYDROCHLORIDE 10 MG: 20 INJECTION INTRAMUSCULAR; INTRAVENOUS at 03:30

## 2022-01-01 RX ADMIN — LORAZEPAM 0.5 MG: 2 CONCENTRATE ORAL at 11:06

## 2022-01-01 RX ADMIN — HYDRALAZINE HYDROCHLORIDE 10 MG: 20 INJECTION INTRAMUSCULAR; INTRAVENOUS at 15:27

## 2022-01-01 RX ADMIN — POTASSIUM CHLORIDE 10 MEQ: 7.46 INJECTION, SOLUTION INTRAVENOUS at 05:04

## 2022-01-01 RX ADMIN — HYDRALAZINE HYDROCHLORIDE 50 MG: 25 TABLET, FILM COATED ORAL at 16:39

## 2022-01-01 RX ADMIN — HYDROMORPHONE HYDROCHLORIDE 1 MG: 5 SOLUTION ORAL at 11:40

## 2022-01-01 RX ADMIN — METOPROLOL SUCCINATE 25 MG: 25 TABLET, FILM COATED, EXTENDED RELEASE ORAL at 08:23

## 2022-01-01 RX ADMIN — SUCRALFATE 1 G: 1 TABLET ORAL at 21:21

## 2022-01-01 RX ADMIN — HYDRALAZINE HYDROCHLORIDE 10 MG: 20 INJECTION INTRAMUSCULAR; INTRAVENOUS at 03:52

## 2022-01-01 RX ADMIN — PROPOFOL 50 MCG/KG/MIN: 10 INJECTION, EMULSION INTRAVENOUS at 11:46

## 2022-01-01 RX ADMIN — HYDRALAZINE HYDROCHLORIDE 50 MG: 25 TABLET, FILM COATED ORAL at 21:21

## 2022-01-01 RX ADMIN — MAGNESIUM SULFATE HEPTAHYDRATE 2 G: 40 INJECTION, SOLUTION INTRAVENOUS at 08:54

## 2022-01-01 RX ADMIN — SODIUM CHLORIDE 50 ML/HR: 9 INJECTION, SOLUTION INTRAVENOUS at 14:46

## 2022-01-01 RX ADMIN — SODIUM CHLORIDE 50 ML/HR: 9 INJECTION, SOLUTION INTRAVENOUS at 15:51

## 2022-01-01 RX ADMIN — LACTULOSE 45 ML: 20 SOLUTION ORAL at 20:51

## 2022-01-01 RX ADMIN — TRIAMCINOLONE ACETONIDE 40 MG: 40 INJECTION, SUSPENSION INTRA-ARTICULAR; INTRAMUSCULAR at 12:00

## 2022-01-01 RX ADMIN — SUCRALFATE 1 G: 1 TABLET ORAL at 11:50

## 2022-01-01 RX ADMIN — SUCRALFATE 1 G: 1 TABLET ORAL at 16:40

## 2022-01-01 RX ADMIN — SUCRALFATE 1 G: 1 TABLET ORAL at 11:12

## 2022-01-01 RX ADMIN — POTASSIUM BICARBONATE 40 MEQ: 782 TABLET, EFFERVESCENT ORAL at 06:34

## 2022-01-01 RX ADMIN — POTASSIUM CHLORIDE 10 MEQ: 7.45 INJECTION INTRAVENOUS at 06:08

## 2022-01-01 RX ADMIN — POTASSIUM CHLORIDE 10 MEQ: 7.46 INJECTION, SOLUTION INTRAVENOUS at 05:09

## 2022-01-01 RX ADMIN — PANTOPRAZOLE SODIUM 40 MG: 40 TABLET, DELAYED RELEASE ORAL at 06:59

## 2022-01-01 RX ADMIN — LORAZEPAM 1 MG: 2 CONCENTRATE ORAL at 14:30

## 2022-01-01 RX ADMIN — HYDRALAZINE HYDROCHLORIDE 50 MG: 25 TABLET, FILM COATED ORAL at 09:00

## 2022-01-01 RX ADMIN — ACETAMINOPHEN 650 MG: 650 SUPPOSITORY RECTAL at 23:30

## 2022-01-01 RX ADMIN — Medication 5 ML: at 01:21

## 2022-01-01 RX ADMIN — LEVOTHYROXINE SODIUM 88 MCG: 0.09 TABLET ORAL at 05:36

## 2022-01-01 RX ADMIN — SODIUM CHLORIDE 1000 ML: 9 INJECTION, SOLUTION INTRAVENOUS at 22:07

## 2022-01-01 RX ADMIN — Medication 150 MG: at 08:01

## 2022-01-01 RX ADMIN — SUCRALFATE 1 G: 1 TABLET ORAL at 21:24

## 2022-01-01 RX ADMIN — HYDROMORPHONE HYDROCHLORIDE 1 MG: 5 SOLUTION ORAL at 12:25

## 2022-01-01 RX ADMIN — Medication 10 ML: at 06:18

## 2022-01-01 RX ADMIN — POTASSIUM BICARBONATE 40 MEQ: 782 TABLET, EFFERVESCENT ORAL at 17:19

## 2022-01-01 RX ADMIN — HYDRALAZINE HYDROCHLORIDE 20 MG: 20 INJECTION INTRAMUSCULAR; INTRAVENOUS at 23:14

## 2022-01-01 RX ADMIN — SODIUM CHLORIDE, PRESERVATIVE FREE 10 ML: 5 INJECTION INTRAVENOUS at 16:13

## 2022-01-01 RX ADMIN — SODIUM CHLORIDE, PRESERVATIVE FREE 10 ML: 5 INJECTION INTRAVENOUS at 21:49

## 2022-01-01 RX ADMIN — SUCRALFATE 1 G: 1 TABLET ORAL at 18:24

## 2022-01-01 RX ADMIN — FERROUS SULFATE TAB 325 MG (65 MG ELEMENTAL FE) 325 MG: 325 (65 FE) TAB at 06:08

## 2022-01-01 RX ADMIN — HYDRALAZINE HYDROCHLORIDE 25 MG: 25 TABLET, FILM COATED ORAL at 09:09

## 2022-01-01 RX ADMIN — POTASSIUM CHLORIDE 10 MEQ: 7.46 INJECTION, SOLUTION INTRAVENOUS at 05:37

## 2022-01-01 RX ADMIN — POTASSIUM CHLORIDE 10 MEQ: 7.45 INJECTION INTRAVENOUS at 06:32

## 2022-01-01 RX ADMIN — NITROGLYCERIN 1 INCH: 20 OINTMENT TOPICAL at 17:44

## 2022-01-01 RX ADMIN — AMLODIPINE BESYLATE 10 MG: 5 TABLET ORAL at 09:00

## 2022-01-01 RX ADMIN — POTASSIUM BICARBONATE 20 MEQ: 782 TABLET, EFFERVESCENT ORAL at 18:42

## 2022-01-01 RX ADMIN — INSULIN LISPRO 2 UNITS: 100 INJECTION, SOLUTION INTRAVENOUS; SUBCUTANEOUS at 08:29

## 2022-01-01 RX ADMIN — POTASSIUM CHLORIDE 10 MEQ: 7.46 INJECTION, SOLUTION INTRAVENOUS at 07:00

## 2022-01-01 RX ADMIN — NITROGLYCERIN 1 INCH: 20 OINTMENT TOPICAL at 10:09

## 2022-01-01 RX ADMIN — BISACODYL 10 MG: 5 TABLET, COATED ORAL at 20:51

## 2022-01-01 RX ADMIN — POTASSIUM CHLORIDE 10 MEQ: 7.46 INJECTION, SOLUTION INTRAVENOUS at 04:05

## 2022-01-01 RX ADMIN — POTASSIUM CHLORIDE 10 MEQ: 7.45 INJECTION INTRAVENOUS at 05:08

## 2022-01-01 RX ADMIN — POTASSIUM CHLORIDE 10 MEQ: 7.46 INJECTION, SOLUTION INTRAVENOUS at 16:18

## 2022-01-01 RX ADMIN — POTASSIUM PHOSPHATE, MONOBASIC POTASSIUM PHOSPHATE, DIBASIC: 224; 236 INJECTION, SOLUTION, CONCENTRATE INTRAVENOUS at 07:10

## 2022-01-01 RX ADMIN — HYDRALAZINE HYDROCHLORIDE 10 MG: 20 INJECTION INTRAMUSCULAR; INTRAVENOUS at 11:18

## 2022-01-01 RX ADMIN — CYANOCOBALAMIN TAB 500 MCG 1000 MCG: 500 TAB at 08:22

## 2022-01-01 RX ADMIN — HYDRALAZINE HYDROCHLORIDE 10 MG: 20 INJECTION INTRAMUSCULAR; INTRAVENOUS at 16:28

## 2022-01-01 RX ADMIN — POLYETHYLENE GLYCOL 3350, SODIUM SULFATE, SODIUM CHLORIDE, POTASSIUM CHLORIDE, ASCORBIC ACID, SODIUM ASCORBATE 1 L: KIT at 18:44

## 2022-01-01 RX ADMIN — AMLODIPINE BESYLATE 10 MG: 5 TABLET ORAL at 09:48

## 2022-01-01 RX ADMIN — HALOPERIDOL 1 MG: 2 SOLUTION ORAL at 14:40

## 2022-01-01 RX ADMIN — SODIUM CHLORIDE 90 ML/HR: 9 INJECTION, SOLUTION INTRAVENOUS at 15:07

## 2022-01-01 RX ADMIN — POTASSIUM BICARBONATE 20 MEQ: 782 TABLET, EFFERVESCENT ORAL at 10:27

## 2022-01-01 RX ADMIN — CYANOCOBALAMIN TAB 500 MCG 1000 MCG: 500 TAB at 08:17

## 2022-01-01 RX ADMIN — AMLODIPINE BESYLATE 10 MG: 5 TABLET ORAL at 08:22

## 2022-01-01 RX ADMIN — POTASSIUM CHLORIDE 10 MEQ: 7.46 INJECTION, SOLUTION INTRAVENOUS at 12:49

## 2022-01-01 RX ADMIN — HYDROMORPHONE HYDROCHLORIDE 0.5 MG: 5 SOLUTION ORAL at 10:59

## 2022-01-01 RX ADMIN — POTASSIUM CHLORIDE 10 MEQ: 7.46 INJECTION, SOLUTION INTRAVENOUS at 17:49

## 2022-01-01 RX ADMIN — ACETAMINOPHEN 650 MG: 650 SUPPOSITORY RECTAL at 03:52

## 2022-01-01 RX ADMIN — POTASSIUM CHLORIDE 10 MEQ: 7.46 INJECTION, SOLUTION INTRAVENOUS at 04:25

## 2022-01-01 RX ADMIN — HYDRALAZINE HYDROCHLORIDE 10 MG: 10 TABLET, FILM COATED ORAL at 21:33

## 2022-01-01 RX ADMIN — HYDRALAZINE HYDROCHLORIDE 50 MG: 25 TABLET, FILM COATED ORAL at 08:14

## 2022-01-01 RX ADMIN — POTASSIUM CHLORIDE 10 MEQ: 7.46 INJECTION, SOLUTION INTRAVENOUS at 16:25

## 2022-01-01 RX ADMIN — OLANZAPINE 5 MG: 5 TABLET, ORALLY DISINTEGRATING ORAL at 22:10

## 2022-01-01 RX ADMIN — HYDRALAZINE HYDROCHLORIDE 10 MG: 20 INJECTION INTRAMUSCULAR; INTRAVENOUS at 19:29

## 2022-01-01 RX ADMIN — LACTULOSE 30 ML: 20 SOLUTION ORAL at 09:48

## 2022-01-01 RX ADMIN — SODIUM CHLORIDE, PRESERVATIVE FREE 10 ML: 5 INJECTION INTRAVENOUS at 06:17

## 2022-01-01 RX ADMIN — SODIUM CHLORIDE 50 ML/HR: 9 INJECTION, SOLUTION INTRAVENOUS at 01:48

## 2022-01-01 RX ADMIN — METOPROLOL SUCCINATE 50 MG: 25 TABLET, EXTENDED RELEASE ORAL at 09:35

## 2022-01-01 RX ADMIN — ONDANSETRON 4 MG: 4 TABLET, ORALLY DISINTEGRATING ORAL at 05:28

## 2022-01-01 RX ADMIN — POTASSIUM CHLORIDE 10 MEQ: 7.46 INJECTION, SOLUTION INTRAVENOUS at 08:23

## 2022-01-01 RX ADMIN — ALENDRONATE SODIUM 40 MG: 10 TABLET ORAL at 06:38

## 2022-01-01 RX ADMIN — SUCRALFATE 1 G: 1 TABLET ORAL at 15:19

## 2022-01-01 RX ADMIN — ACETAMINOPHEN 650 MG: 325 TABLET ORAL at 14:33

## 2022-01-01 RX ADMIN — LEVOTHYROXINE SODIUM 88 MCG: 0.09 TABLET ORAL at 06:34

## 2022-01-01 RX ADMIN — POTASSIUM CHLORIDE 10 MEQ: 7.46 INJECTION, SOLUTION INTRAVENOUS at 07:01

## 2022-01-01 RX ADMIN — SODIUM CHLORIDE 100 ML/HR: 9 INJECTION, SOLUTION INTRAVENOUS at 03:42

## 2022-01-01 RX ADMIN — METOPROLOL SUCCINATE 25 MG: 25 TABLET, FILM COATED, EXTENDED RELEASE ORAL at 17:17

## 2022-01-01 RX ADMIN — LEVOTHYROXINE SODIUM 88 MCG: 0.09 TABLET ORAL at 06:52

## 2022-01-01 RX ADMIN — SUCRALFATE 1 G: 1 TABLET ORAL at 21:02

## 2022-01-01 RX ADMIN — NITROGLYCERIN 1 INCH: 20 OINTMENT TOPICAL at 08:31

## 2022-01-01 RX ADMIN — Medication 10 ML: at 06:45

## 2022-01-01 RX ADMIN — SODIUM CHLORIDE 100 ML/HR: 9 INJECTION, SOLUTION INTRAVENOUS at 23:26

## 2022-01-01 RX ADMIN — LATANOPROST 1 DROP: 50 SOLUTION OPHTHALMIC at 22:25

## 2022-01-01 RX ADMIN — HEPARIN SODIUM 5000 UNITS: 5000 INJECTION INTRAVENOUS; SUBCUTANEOUS at 06:44

## 2022-01-01 RX ADMIN — OLANZAPINE 5 MG: 5 TABLET, ORALLY DISINTEGRATING ORAL at 18:21

## 2022-01-01 RX ADMIN — AMLODIPINE BESYLATE 10 MG: 5 TABLET ORAL at 08:54

## 2022-01-01 RX ADMIN — SODIUM CHLORIDE 1 G: 9 INJECTION INTRAMUSCULAR; INTRAVENOUS; SUBCUTANEOUS at 20:00

## 2022-01-01 RX ADMIN — SODIUM CHLORIDE 150 ML/HR: 9 INJECTION, SOLUTION INTRAVENOUS at 16:01

## 2022-01-01 RX ADMIN — SODIUM CHLORIDE, PRESERVATIVE FREE 10 ML: 5 INJECTION INTRAVENOUS at 15:14

## 2022-01-01 RX ADMIN — NITROGLYCERIN 1 INCH: 20 OINTMENT TOPICAL at 09:30

## 2022-01-01 RX ADMIN — SODIUM CHLORIDE, PRESERVATIVE FREE 10 ML: 5 INJECTION INTRAVENOUS at 09:27

## 2022-01-01 RX ADMIN — POTASSIUM CHLORIDE 10 MEQ: 7.46 INJECTION, SOLUTION INTRAVENOUS at 06:17

## 2022-01-01 RX ADMIN — CALCITONIN SALMON 200 INT'L UNITS: 200 INJECTION, SOLUTION INTRAMUSCULAR; SUBCUTANEOUS at 17:44

## 2022-01-01 RX ADMIN — POTASSIUM CHLORIDE 10 MEQ: 7.45 INJECTION INTRAVENOUS at 10:16

## 2022-01-01 RX ADMIN — LABETALOL HYDROCHLORIDE 10 MG: 5 INJECTION, SOLUTION INTRAVENOUS at 23:47

## 2022-01-01 RX ADMIN — POTASSIUM CHLORIDE 10 MEQ: 7.46 INJECTION, SOLUTION INTRAVENOUS at 03:16

## 2022-01-01 RX ADMIN — POTASSIUM CHLORIDE 10 MEQ: 7.46 INJECTION, SOLUTION INTRAVENOUS at 02:16

## 2022-01-01 RX ADMIN — LORAZEPAM 1 MG: 2 CONCENTRATE ORAL at 12:50

## 2022-01-01 RX ADMIN — IOPAMIDOL 100 ML: 755 INJECTION, SOLUTION INTRAVENOUS at 13:32

## 2022-01-01 RX ADMIN — MAGNESIUM SULFATE HEPTAHYDRATE 2 G: 40 INJECTION, SOLUTION INTRAVENOUS at 04:47

## 2022-01-01 RX ADMIN — HEPARIN SODIUM 5000 UNITS: 5000 INJECTION INTRAVENOUS; SUBCUTANEOUS at 18:24

## 2022-01-01 RX ADMIN — POTASSIUM BICARBONATE 40 MEQ: 782 TABLET, EFFERVESCENT ORAL at 03:38

## 2022-01-01 RX ADMIN — POTASSIUM BICARBONATE 40 MEQ: 782 TABLET, EFFERVESCENT ORAL at 08:32

## 2022-01-01 RX ADMIN — PHENOL 1 SPRAY: 1.5 LIQUID ORAL at 03:52

## 2022-01-01 RX ADMIN — HYDRALAZINE HYDROCHLORIDE 10 MG: 20 INJECTION INTRAMUSCULAR; INTRAVENOUS at 15:30

## 2022-01-01 RX ADMIN — AMLODIPINE BESYLATE 10 MG: 5 TABLET ORAL at 08:09

## 2022-01-01 RX ADMIN — Medication 2000 UNITS: at 09:01

## 2022-01-01 RX ADMIN — SODIUM CHLORIDE 100 ML/HR: 9 INJECTION, SOLUTION INTRAVENOUS at 15:18

## 2022-01-01 RX ADMIN — POTASSIUM CHLORIDE 10 MEQ: 7.45 INJECTION INTRAVENOUS at 10:53

## 2022-01-01 RX ADMIN — ONDANSETRON 4 MG: 2 INJECTION INTRAMUSCULAR; INTRAVENOUS at 09:11

## 2022-01-01 RX ADMIN — Medication 2000 UNITS: at 08:22

## 2022-01-01 RX ADMIN — ZOLEDRONIC ACID 4 MG: 4 INJECTION, SOLUTION, CONCENTRATE INTRAVENOUS at 10:59

## 2022-01-01 RX ADMIN — ONDANSETRON 4 MG: 2 INJECTION INTRAMUSCULAR; INTRAVENOUS at 05:11

## 2022-01-01 RX ADMIN — HEPARIN SODIUM 5000 UNITS: 5000 INJECTION INTRAVENOUS; SUBCUTANEOUS at 17:44

## 2022-01-01 RX ADMIN — Medication 150 MG: at 11:27

## 2022-01-01 RX ADMIN — NITROGLYCERIN 1 INCH: 20 OINTMENT TOPICAL at 09:00

## 2022-01-01 RX ADMIN — HEPARIN SODIUM 5000 UNITS: 5000 INJECTION INTRAVENOUS; SUBCUTANEOUS at 06:24

## 2022-01-01 RX ADMIN — HYDRALAZINE HYDROCHLORIDE 10 MG: 20 INJECTION INTRAMUSCULAR; INTRAVENOUS at 23:03

## 2022-01-01 RX ADMIN — SODIUM CHLORIDE 90 ML/HR: 9 INJECTION, SOLUTION INTRAVENOUS at 00:12

## 2022-01-01 RX ADMIN — ALENDRONATE SODIUM 40 MG: 10 TABLET ORAL at 12:15

## 2022-01-01 RX ADMIN — HYDRALAZINE HYDROCHLORIDE 20 MG: 20 INJECTION INTRAMUSCULAR; INTRAVENOUS at 06:08

## 2022-01-01 RX ADMIN — Medication 10 ML: at 06:09

## 2022-01-01 RX ADMIN — LEVOTHYROXINE SODIUM 88 MCG: 0.09 TABLET ORAL at 06:08

## 2022-01-01 RX ADMIN — LATANOPROST 1 DROP: 50 SOLUTION OPHTHALMIC at 22:22

## 2022-01-01 RX ADMIN — Medication 10 ML: at 13:32

## 2022-01-01 RX ADMIN — AMLODIPINE BESYLATE 10 MG: 5 TABLET ORAL at 09:02

## 2022-01-01 RX ADMIN — HYDRALAZINE HYDROCHLORIDE 50 MG: 25 TABLET, FILM COATED ORAL at 08:23

## 2022-01-01 RX ADMIN — POTASSIUM CHLORIDE 10 MEQ: 7.45 INJECTION INTRAVENOUS at 17:29

## 2022-01-01 RX ADMIN — HEPARIN SODIUM 5000 UNITS: 5000 INJECTION INTRAVENOUS; SUBCUTANEOUS at 17:29

## 2022-01-01 RX ADMIN — HYDRALAZINE HYDROCHLORIDE 10 MG: 20 INJECTION INTRAMUSCULAR; INTRAVENOUS at 21:18

## 2022-01-01 RX ADMIN — ALENDRONATE SODIUM 40 MG: 10 TABLET ORAL at 06:31

## 2022-01-01 RX ADMIN — MAGNESIUM SULFATE IN DEXTROSE 1 G: 10 INJECTION, SOLUTION INTRAVENOUS at 09:20

## 2022-01-01 RX ADMIN — LORAZEPAM 1 MG: 2 CONCENTRATE ORAL at 11:58

## 2022-01-01 RX ADMIN — PANTOPRAZOLE SODIUM 40 MG: 40 TABLET, DELAYED RELEASE ORAL at 06:30

## 2022-01-01 RX ADMIN — METOPROLOL SUCCINATE 25 MG: 25 TABLET, FILM COATED, EXTENDED RELEASE ORAL at 14:33

## 2022-01-01 RX ADMIN — SUCRALFATE 1 G: 1 TABLET ORAL at 16:51

## 2022-01-01 RX ADMIN — HEPARIN SODIUM 5000 UNITS: 5000 INJECTION INTRAVENOUS; SUBCUTANEOUS at 06:18

## 2022-01-01 RX ADMIN — SUCRALFATE 1 G: 1 TABLET ORAL at 10:52

## 2022-01-01 RX ADMIN — ONDANSETRON 4 MG: 2 INJECTION INTRAMUSCULAR; INTRAVENOUS at 06:45

## 2022-01-01 RX ADMIN — MAGNESIUM SULFATE HEPTAHYDRATE 2 G: 40 INJECTION, SOLUTION INTRAVENOUS at 06:34

## 2022-01-01 RX ADMIN — Medication 150 MG: at 08:09

## 2022-01-01 RX ADMIN — LORAZEPAM 0.5 MG: 2 CONCENTRATE ORAL at 16:00

## 2022-01-01 RX ADMIN — POTASSIUM CHLORIDE 10 MEQ: 7.46 INJECTION, SOLUTION INTRAVENOUS at 08:00

## 2022-01-01 RX ADMIN — POTASSIUM CHLORIDE 10 MEQ: 7.46 INJECTION, SOLUTION INTRAVENOUS at 08:01

## 2022-01-01 RX ADMIN — METOPROLOL SUCCINATE 25 MG: 25 TABLET, FILM COATED, EXTENDED RELEASE ORAL at 08:30

## 2022-01-01 RX ADMIN — HYDRALAZINE HYDROCHLORIDE 20 MG: 20 INJECTION INTRAMUSCULAR; INTRAVENOUS at 23:37

## 2022-01-01 RX ADMIN — POTASSIUM CHLORIDE 10 MEQ: 7.46 INJECTION, SOLUTION INTRAVENOUS at 06:16

## 2022-01-01 RX ADMIN — METOPROLOL SUCCINATE 25 MG: 25 TABLET, FILM COATED, EXTENDED RELEASE ORAL at 08:32

## 2022-01-01 RX ADMIN — SODIUM CHLORIDE 150 ML/HR: 9 INJECTION, SOLUTION INTRAVENOUS at 06:31

## 2022-01-01 RX ADMIN — Medication 10 ML: at 05:34

## 2022-01-01 RX ADMIN — SODIUM CHLORIDE 100 ML/HR: 9 INJECTION, SOLUTION INTRAVENOUS at 17:20

## 2022-01-01 RX ADMIN — LEVOTHYROXINE SODIUM 88 MCG: 0.09 TABLET ORAL at 06:24

## 2022-01-01 RX ADMIN — Medication 10 ML: at 13:54

## 2022-01-01 RX ADMIN — PANTOPRAZOLE SODIUM 40 MG: 40 TABLET, DELAYED RELEASE ORAL at 09:09

## 2022-01-01 RX ADMIN — SODIUM CHLORIDE 100 ML/HR: 9 INJECTION, SOLUTION INTRAVENOUS at 17:50

## 2022-01-01 RX ADMIN — HYDRALAZINE HYDROCHLORIDE 50 MG: 25 TABLET, FILM COATED ORAL at 14:33

## 2022-01-01 RX ADMIN — LOSARTAN POTASSIUM 100 MG: 50 TABLET, FILM COATED ORAL at 08:23

## 2022-01-01 RX ADMIN — POTASSIUM CHLORIDE 10 MEQ: 7.46 INJECTION, SOLUTION INTRAVENOUS at 18:52

## 2022-01-01 RX ADMIN — METOPROLOL SUCCINATE 25 MG: 25 TABLET, FILM COATED, EXTENDED RELEASE ORAL at 09:02

## 2022-01-01 RX ADMIN — MAGNESIUM SULFATE HEPTAHYDRATE 2 G: 40 INJECTION, SOLUTION INTRAVENOUS at 02:15

## 2022-01-01 RX ADMIN — POTASSIUM CHLORIDE 10 MEQ: 7.45 INJECTION INTRAVENOUS at 18:23

## 2022-01-01 RX ADMIN — Medication 10 ML: at 21:02

## 2022-01-01 RX ADMIN — CEFTRIAXONE 1 G: 1 INJECTION, POWDER, FOR SOLUTION INTRAMUSCULAR; INTRAVENOUS at 12:29

## 2022-01-01 RX ADMIN — POTASSIUM CHLORIDE 10 MEQ: 7.46 INJECTION, SOLUTION INTRAVENOUS at 06:33

## 2022-01-01 RX ADMIN — SUCRALFATE 1 G: 1 TABLET ORAL at 22:22

## 2022-01-01 RX ADMIN — SODIUM CHLORIDE 150 ML/HR: 9 INJECTION, SOLUTION INTRAVENOUS at 23:29

## 2022-01-01 RX ADMIN — PANTOPRAZOLE SODIUM 40 MG: 40 TABLET, DELAYED RELEASE ORAL at 08:54

## 2022-01-01 RX ADMIN — ONDANSETRON 4 MG: 4 TABLET, ORALLY DISINTEGRATING ORAL at 09:09

## 2022-01-01 RX ADMIN — HEPARIN SODIUM 5000 UNITS: 5000 INJECTION INTRAVENOUS; SUBCUTANEOUS at 05:23

## 2022-01-01 RX ADMIN — POTASSIUM CHLORIDE 10 MEQ: 7.46 INJECTION, SOLUTION INTRAVENOUS at 05:59

## 2022-01-01 RX ADMIN — POTASSIUM CHLORIDE 10 MEQ: 7.46 INJECTION, SOLUTION INTRAVENOUS at 09:35

## 2022-01-01 RX ADMIN — POTASSIUM CHLORIDE 10 MEQ: 7.46 INJECTION, SOLUTION INTRAVENOUS at 07:31

## 2022-01-01 RX ADMIN — LOSARTAN POTASSIUM 100 MG: 50 TABLET, FILM COATED ORAL at 09:02

## 2022-01-01 RX ADMIN — SUCRALFATE 1 G: 1 TABLET ORAL at 06:08

## 2022-01-01 RX ADMIN — POLYETHYLENE GLYCOL 3350 17 G: 17 POWDER, FOR SOLUTION ORAL at 09:02

## 2022-01-01 RX ADMIN — METOPROLOL TARTRATE 5 MG: 5 INJECTION INTRAVENOUS at 16:13

## 2022-01-01 RX ADMIN — HYDRALAZINE HYDROCHLORIDE 25 MG: 25 TABLET, FILM COATED ORAL at 17:17

## 2022-01-01 RX ADMIN — POLYETHYLENE GLYCOL 3350 17 G: 17 POWDER, FOR SOLUTION ORAL at 09:09

## 2022-01-01 RX ADMIN — CEFTRIAXONE 1 G: 1 INJECTION, POWDER, FOR SOLUTION INTRAMUSCULAR; INTRAVENOUS at 08:54

## 2022-01-01 RX ADMIN — POTASSIUM CHLORIDE 10 MEQ: 7.45 INJECTION INTRAVENOUS at 08:57

## 2022-01-01 RX ADMIN — POTASSIUM CHLORIDE 10 MEQ: 7.46 INJECTION, SOLUTION INTRAVENOUS at 02:04

## 2022-01-01 RX ADMIN — HYDRALAZINE HYDROCHLORIDE 50 MG: 25 TABLET, FILM COATED ORAL at 16:59

## 2022-01-01 RX ADMIN — Medication 10 ML: at 05:46

## 2022-01-01 RX ADMIN — POTASSIUM CHLORIDE 40 MEQ: 750 TABLET, EXTENDED RELEASE ORAL at 23:39

## 2022-01-01 RX ADMIN — HYDRALAZINE HYDROCHLORIDE 10 MG: 20 INJECTION INTRAMUSCULAR; INTRAVENOUS at 03:48

## 2022-01-01 RX ADMIN — POTASSIUM CHLORIDE 10 MEQ: 7.46 INJECTION, SOLUTION INTRAVENOUS at 20:35

## 2022-01-01 RX ADMIN — POLYETHYLENE GLYCOL 3350 17 G: 17 POWDER, FOR SOLUTION ORAL at 08:22

## 2022-01-01 RX ADMIN — LEVOTHYROXINE SODIUM 88 MCG: 0.09 TABLET ORAL at 07:45

## 2022-01-01 RX ADMIN — LATANOPROST 1 DROP: 50 SOLUTION OPHTHALMIC at 22:40

## 2022-01-01 RX ADMIN — POTASSIUM CHLORIDE 10 MEQ: 7.46 INJECTION, SOLUTION INTRAVENOUS at 17:50

## 2022-01-01 RX ADMIN — Medication 3 MG: at 00:25

## 2022-01-01 RX ADMIN — HYDRALAZINE HYDROCHLORIDE 10 MG: 20 INJECTION INTRAMUSCULAR; INTRAVENOUS at 17:00

## 2022-01-01 RX ADMIN — METOPROLOL SUCCINATE 25 MG: 25 TABLET, FILM COATED, EXTENDED RELEASE ORAL at 08:22

## 2022-01-01 RX ADMIN — METOPROLOL TARTRATE 5 MG: 5 INJECTION INTRAVENOUS at 09:47

## 2022-01-01 RX ADMIN — HYDRALAZINE HYDROCHLORIDE 20 MG: 20 INJECTION INTRAMUSCULAR; INTRAVENOUS at 23:30

## 2022-01-01 RX ADMIN — DEXAMETHASONE 20 MG: 4 TABLET ORAL at 11:18

## 2022-01-01 RX ADMIN — HYDROMORPHONE HYDROCHLORIDE 0.5 MG: 5 SOLUTION ORAL at 15:30

## 2022-01-01 RX ADMIN — SUCRALFATE 1 G: 1 TABLET ORAL at 22:54

## 2022-01-01 RX ADMIN — LEVOTHYROXINE SODIUM 88 MCG: 0.09 TABLET ORAL at 05:56

## 2022-01-01 RX ADMIN — HYDRALAZINE HYDROCHLORIDE 10 MG: 20 INJECTION INTRAMUSCULAR; INTRAVENOUS at 08:05

## 2022-01-01 RX ADMIN — HYDRALAZINE HYDROCHLORIDE 50 MG: 25 TABLET, FILM COATED ORAL at 22:54

## 2022-01-01 RX ADMIN — HYDRALAZINE HYDROCHLORIDE 25 MG: 25 TABLET, FILM COATED ORAL at 08:30

## 2022-01-01 RX ADMIN — PHENOL 1 SPRAY: 1.5 LIQUID ORAL at 07:52

## 2022-01-01 RX ADMIN — HYDRALAZINE HYDROCHLORIDE 20 MG: 20 INJECTION INTRAMUSCULAR; INTRAVENOUS at 18:57

## 2022-01-01 RX ADMIN — HYDRALAZINE HYDROCHLORIDE 25 MG: 25 TABLET, FILM COATED ORAL at 13:47

## 2022-01-01 RX ADMIN — POTASSIUM CHLORIDE 10 MEQ: 7.46 INJECTION, SOLUTION INTRAVENOUS at 05:18

## 2022-01-01 RX ADMIN — Medication 10 ML: at 21:24

## 2022-01-01 RX ADMIN — POLYETHYLENE GLYCOL 3350, SODIUM SULFATE, SODIUM CHLORIDE, POTASSIUM CHLORIDE, ASCORBIC ACID, SODIUM ASCORBATE 1 L: KIT at 13:48

## 2022-01-01 RX ADMIN — HYDRALAZINE HYDROCHLORIDE 10 MG: 20 INJECTION INTRAMUSCULAR; INTRAVENOUS at 16:17

## 2022-01-01 RX ADMIN — HEPARIN SODIUM 5000 UNITS: 5000 INJECTION INTRAVENOUS; SUBCUTANEOUS at 17:41

## 2022-01-01 RX ADMIN — FERROUS SULFATE TAB 325 MG (65 MG ELEMENTAL FE) 325 MG: 325 (65 FE) TAB at 06:24

## 2022-01-01 RX ADMIN — HYDRALAZINE HYDROCHLORIDE 20 MG: 20 INJECTION INTRAMUSCULAR; INTRAVENOUS at 04:06

## 2022-01-01 RX ADMIN — Medication 10 ML: at 18:45

## 2022-01-01 RX ADMIN — LATANOPROST 1 DROP: 50 SOLUTION OPHTHALMIC at 21:13

## 2022-01-01 RX ADMIN — HYOSCYAMINE SULFATE 0.12 MG: 0.12 TABLET SUBLINGUAL at 11:15

## 2022-01-01 RX ADMIN — Medication 2000 UNITS: at 08:29

## 2022-01-01 RX ADMIN — HYDRALAZINE HYDROCHLORIDE 50 MG: 25 TABLET, FILM COATED ORAL at 22:48

## 2022-01-01 RX ADMIN — METOPROLOL SUCCINATE 50 MG: 25 TABLET, EXTENDED RELEASE ORAL at 08:00

## 2022-01-01 RX ADMIN — HYDRALAZINE HYDROCHLORIDE 20 MG: 20 INJECTION INTRAMUSCULAR; INTRAVENOUS at 03:52

## 2022-01-01 RX ADMIN — POTASSIUM CHLORIDE 10 MEQ: 7.46 INJECTION, SOLUTION INTRAVENOUS at 11:12

## 2022-01-01 RX ADMIN — LOSARTAN POTASSIUM 25 MG: 50 TABLET, FILM COATED ORAL at 08:00

## 2022-01-01 RX ADMIN — HYDRALAZINE HYDROCHLORIDE 20 MG: 20 INJECTION INTRAMUSCULAR; INTRAVENOUS at 10:02

## 2022-01-01 RX ADMIN — Medication 10 ML: at 22:25

## 2022-01-01 RX ADMIN — LOSARTAN POTASSIUM 50 MG: 50 TABLET, FILM COATED ORAL at 08:09

## 2022-01-01 RX ADMIN — METOPROLOL TARTRATE 5 MG: 5 INJECTION INTRAVENOUS at 08:00

## 2022-01-01 RX ADMIN — LEVOTHYROXINE SODIUM 88 MCG: 0.09 TABLET ORAL at 06:16

## 2022-01-01 RX ADMIN — LATANOPROST 1 DROP: 50 SOLUTION OPHTHALMIC at 21:46

## 2022-01-01 RX ADMIN — HYDRALAZINE HYDROCHLORIDE 10 MG: 20 INJECTION INTRAMUSCULAR; INTRAVENOUS at 03:35

## 2022-01-01 RX ADMIN — PANTOPRAZOLE SODIUM 40 MG: 40 TABLET, DELAYED RELEASE ORAL at 06:52

## 2022-01-01 RX ADMIN — HYDRALAZINE HYDROCHLORIDE 50 MG: 25 TABLET, FILM COATED ORAL at 16:51

## 2022-01-01 RX ADMIN — POTASSIUM CHLORIDE 10 MEQ: 7.46 INJECTION, SOLUTION INTRAVENOUS at 11:39

## 2022-01-01 RX ADMIN — POTASSIUM CHLORIDE 10 MEQ: 7.46 INJECTION, SOLUTION INTRAVENOUS at 10:24

## 2022-01-01 RX ADMIN — HYDROMORPHONE HYDROCHLORIDE 1 MG: 5 SOLUTION ORAL at 14:30

## 2022-01-01 RX ADMIN — HEPARIN SODIUM 5000 UNITS: 5000 INJECTION INTRAVENOUS; SUBCUTANEOUS at 05:05

## 2022-01-01 RX ADMIN — LOSARTAN POTASSIUM 100 MG: 50 TABLET, FILM COATED ORAL at 09:00

## 2022-01-01 RX ADMIN — POTASSIUM CHLORIDE 10 MEQ: 7.45 INJECTION INTRAVENOUS at 07:07

## 2022-01-01 RX ADMIN — POTASSIUM BICARBONATE 40 MEQ: 782 TABLET, EFFERVESCENT ORAL at 17:21

## 2022-01-01 RX ADMIN — POTASSIUM CHLORIDE 30 MEQ: 750 TABLET, FILM COATED, EXTENDED RELEASE ORAL at 05:36

## 2022-01-01 RX ADMIN — HYDRALAZINE HYDROCHLORIDE 20 MG: 20 INJECTION INTRAMUSCULAR; INTRAVENOUS at 12:16

## 2022-01-01 RX ADMIN — POTASSIUM CHLORIDE 10 MEQ: 7.46 INJECTION, SOLUTION INTRAVENOUS at 09:05

## 2022-01-01 RX ADMIN — POTASSIUM CHLORIDE 10 MEQ: 7.46 INJECTION, SOLUTION INTRAVENOUS at 03:09

## 2022-01-01 RX ADMIN — MAGNESIUM SULFATE HEPTAHYDRATE 2 G: 40 INJECTION, SOLUTION INTRAVENOUS at 06:59

## 2022-01-01 RX ADMIN — FERROUS SULFATE TAB 325 MG (65 MG ELEMENTAL FE) 325 MG: 325 (65 FE) TAB at 06:38

## 2022-01-01 RX ADMIN — HEPARIN SODIUM 5000 UNITS: 5000 INJECTION INTRAVENOUS; SUBCUTANEOUS at 06:08

## 2022-01-01 RX ADMIN — ONDANSETRON 4 MG: 2 INJECTION INTRAMUSCULAR; INTRAVENOUS at 22:19

## 2022-01-01 RX ADMIN — HYDRALAZINE HYDROCHLORIDE 50 MG: 25 TABLET, FILM COATED ORAL at 08:22

## 2022-01-01 RX ADMIN — HYDRALAZINE HYDROCHLORIDE 50 MG: 25 TABLET, FILM COATED ORAL at 15:19

## 2022-01-01 RX ADMIN — HYDRALAZINE HYDROCHLORIDE 20 MG: 20 INJECTION INTRAMUSCULAR; INTRAVENOUS at 11:04

## 2022-01-01 RX ADMIN — PANTOPRAZOLE SODIUM 40 MG: 40 TABLET, DELAYED RELEASE ORAL at 05:36

## 2022-01-01 RX ADMIN — HYDRALAZINE HYDROCHLORIDE 50 MG: 25 TABLET, FILM COATED ORAL at 15:39

## 2022-01-01 RX ADMIN — SODIUM CHLORIDE, PRESERVATIVE FREE 10 ML: 5 INJECTION INTRAVENOUS at 05:36

## 2022-01-01 RX ADMIN — CYANOCOBALAMIN TAB 500 MCG 1000 MCG: 500 TAB at 09:48

## 2022-01-01 RX ADMIN — METOPROLOL SUCCINATE 25 MG: 25 TABLET, EXTENDED RELEASE ORAL at 08:09

## 2022-01-01 RX ADMIN — Medication 10 ML: at 13:13

## 2022-01-01 RX ADMIN — PANTOPRAZOLE SODIUM 40 MG: 40 TABLET, DELAYED RELEASE ORAL at 06:16

## 2022-01-01 RX ADMIN — ENOXAPARIN SODIUM 40 MG: 100 INJECTION SUBCUTANEOUS at 17:00

## 2022-01-01 RX ADMIN — SODIUM CHLORIDE 100 ML/HR: 9 INJECTION, SOLUTION INTRAVENOUS at 03:40

## 2022-01-01 RX ADMIN — SUCRALFATE 1 G: 1 TABLET ORAL at 16:39

## 2022-01-01 RX ADMIN — HYDRALAZINE HYDROCHLORIDE 10 MG: 20 INJECTION INTRAMUSCULAR; INTRAVENOUS at 04:35

## 2022-01-01 RX ADMIN — SUCRALFATE 1 G: 1 TABLET ORAL at 21:39

## 2022-01-01 RX ADMIN — POTASSIUM CHLORIDE 10 MEQ: 7.46 INJECTION, SOLUTION INTRAVENOUS at 05:27

## 2022-01-01 RX ADMIN — POTASSIUM CHLORIDE 40 MEQ: 750 TABLET, EXTENDED RELEASE ORAL at 18:18

## 2022-01-01 RX ADMIN — LIDOCAINE HYDROCHLORIDE 40 MG: 20 INJECTION, SOLUTION INTRAVENOUS at 11:46

## 2022-01-01 RX ADMIN — AMLODIPINE BESYLATE 10 MG: 5 TABLET ORAL at 08:32

## 2022-01-01 RX ADMIN — SUCRALFATE 1 G: 1 TABLET ORAL at 16:59

## 2022-01-01 RX ADMIN — HYDRALAZINE HYDROCHLORIDE 20 MG: 20 INJECTION INTRAMUSCULAR; INTRAVENOUS at 05:59

## 2022-01-01 RX ADMIN — ACETAMINOPHEN 650 MG: 325 TABLET ORAL at 22:10

## 2022-01-01 RX ADMIN — LOSARTAN POTASSIUM 50 MG: 50 TABLET, FILM COATED ORAL at 09:09

## 2022-01-01 RX ADMIN — POTASSIUM CHLORIDE 10 MEQ: 7.46 INJECTION, SOLUTION INTRAVENOUS at 06:59

## 2022-01-01 RX ADMIN — HYDRALAZINE HYDROCHLORIDE 50 MG: 25 TABLET, FILM COATED ORAL at 22:22

## 2022-01-01 RX ADMIN — LEVOTHYROXINE SODIUM 88 MCG: 0.09 TABLET ORAL at 09:02

## 2022-01-01 RX ADMIN — HYDRALAZINE HYDROCHLORIDE 50 MG: 25 TABLET, FILM COATED ORAL at 23:46

## 2022-01-01 RX ADMIN — Medication 10 ML: at 21:46

## 2022-01-01 RX ADMIN — SODIUM CHLORIDE 1000 ML: 9 INJECTION, SOLUTION INTRAVENOUS at 18:18

## 2022-01-01 RX ADMIN — MIDAZOLAM HYDROCHLORIDE 1 MG: 1 INJECTION, SOLUTION INTRAMUSCULAR; INTRAVENOUS at 12:09

## 2022-01-01 RX ADMIN — SODIUM CHLORIDE, PRESERVATIVE FREE 10 ML: 5 INJECTION INTRAVENOUS at 22:02

## 2022-01-01 RX ADMIN — FENTANYL CITRATE 25 MCG: 50 INJECTION, SOLUTION INTRAMUSCULAR; INTRAVENOUS at 12:09

## 2022-02-24 PROBLEM — R31.9 URINARY TRACT INFECTION WITH HEMATURIA: Status: ACTIVE | Noted: 2022-01-01

## 2022-02-24 PROBLEM — R77.8 ELEVATED TROPONIN: Status: ACTIVE | Noted: 2022-01-01

## 2022-02-24 PROBLEM — N39.0 URINARY TRACT INFECTION WITH HEMATURIA: Status: ACTIVE | Noted: 2022-01-01

## 2022-02-24 PROBLEM — E87.6 HYPOKALEMIA: Status: ACTIVE | Noted: 2022-01-01

## 2022-02-24 NOTE — TELEPHONE ENCOUNTER
Received call from AURELIO TALBERT  [facility] with [employer] Complaint. Subjective: Caller states Ambreen Le is kind of in a daze, fallen a couple times, losing weight again, weak all over\"     Current Symptoms: generalized weakness, falling, weight loss. Moving around but \"wobbly\"     Onset: a few days ago; unchanged    Associated Symptoms: reduced appetite    Pain Severity: mild pain from previous fall yesterday    Temperature: No     What has been tried: walking with walker    LMP: NA Pregnant: No    Recommended disposition: Go to [facility]    Care advice provided, patient verbalizes understanding; denies any other questions or concerns; instructed to call back for any new or worsening symptoms. Patient/Caller agrees with recommended disposition; writer provided warm transfer to Theora Lesch at [facility] for appointment scheduling    Attention Provider: Thank you for allowing me to participate in the care of your patient. The patient was connected to triage in response to information provided to the ECC. Please do not respond through this encounter as the response is not directed to a shared pool.       Reason for Disposition   MODERATE weakness (i.e., interferes with work, school, normal activities) and cause unknown (Exceptions: weakness with acute minor illness, or weakness from poor fluid intake)    Protocols used: WEAKNESS (GENERALIZED) AND FATIGUE-ADULT-[place]

## 2022-02-24 NOTE — ED PROVIDER NOTES
Date of Service:  2022    Patient:  Janna Arias    Chief Complaint:  Abnormal Lab Results and Dizziness       HPI:  Janna Arias is a 80 y.o.  female who presents for evaluation of dizziness, headedness, and  chest pain/pressure. [facility] and was referred to the emergency department for abnormal labs. Labs showed low potassium. Patient states chest pain/pressure and is a sleeping well for the past few days. Patient denies shortness of breath and abdominal pain. Patient's daughter states fall a couple weeks ago. Patient's daughter denies patient hitting her head or loss of conscious. Patient complains of left shoulder tenderness x2 days. Patient was not seen or treated in the emergency department. Denies patient has any loss of consciousness. Patient denies fever or chills. Patient denies any urinary symptoms. Past Medical History:   Diagnosis Date    Diabetes [facility]     GI bleeding     workup in [address] [place] [address] 12/15 was unremarkable (Dr. Joao Burton) - says she had EGD, colonoscopy, and small bowel capsule endoscopy    HTN (hypertension)     Hypercholesteremia     Hypothyroidism, iatrogenic     radioiodine    MI (myocardial infarction) ([facility]     During surgery in s - she's had multiple caths and stress tests       Past Surgical History:   Procedure Laterality Date    HX  SECTION      HX HERNIA REPAIR  7855    Open umbilical incisional herniorrhaphy [facility]).  HX HERNIA REPAIR Right 2020    Right inguinal herniorrhaphy with mesh.     HX ORTHOPAEDIC      Back surgery x 2.    HX BRIAN AND BSO           Family History:   Problem Relation Age of Onset    Heart Failure Mother     Heart Disease Mother     Hypertension Mother        Social History     Socioeconomic History    Marital status:      Spouse name: Not on file    Number of children: Not on file    Years of education: Not on file    Highest education level: Not on file   Occupational History    Not on file   Tobacco Use    Smoking status: Never Smoker    Smokeless tobacco: Never Used   Substance and Sexual Activity    Alcohol use: Yes     Alcohol/week: 0.0 standard drinks    Drug use: Not Currently    Sexual activity: Not Currently   Other Topics Concern    Not on file   Social History Narrative    Not on file     Social Determinants of Health     Financial Resource Strain:     Difficulty of Paying Living Expenses: Not on file   Food Insecurity:     Worried About Running Out of Food in the Last Year: Not on file    Angel Luis of Food in the Last Year: Not on file   Transportation Needs:     Lack of Transportation (Medical): Not on file    Lack of Transportation (Non-Medical): Not on file   Physical Activity:     Days of Exercise per Week: Not on file    Minutes of Exercise per Session: Not on file   Stress:     Feeling of Stress : Not on file   Social Connections:     Frequency of Communication with Friends and Family: Not on file    Frequency of Social Gatherings with Friends and Family: Not on file    Attends [employer] Services: Not on file    Active Member of [address] [place] [address] or Organizations: Not on file    Attends Club or Organization Meetings: Not on file    Marital Status: Not on file   Intimate Partner Violence:     Fear of Current or Ex-Partner: Not on file    Emotionally Abused: Not on file    Physically Abused: Not on file    Sexually Abused: Not on file   Housing Stability:     Unable to Pay for Housing in the Last Year: Not on file    Number of [place] in the Last Year: Not on file    Unstable Housing in the Last Year: Not on file         ALLERGIES: Celebrex [celecoxib] and Crestor [rosuvastatin]    Review of Systems   Constitutional: Negative for chills and fever. Eyes: Negative for visual disturbance. Respiratory: Negative for shortness of breath. Cardiovascular: Positive for chest pain.  Negative for palpitations and leg swelling. Gastrointestinal: Negative for abdominal pain. Genitourinary: Negative for dysuria. Musculoskeletal: Negative for neck stiffness. Skin: Negative for rash. Allergic/Immunologic: Negative for immunocompromised state. Neurological: Positive for dizziness and light-headedness. Negative for headaches. Psychiatric/Behavioral: Negative for agitation. All other systems reviewed and are negative. Vitals:    02/24/22 2017 02/24/22 2022 02/24/22 2030 02/24/22 2151   BP: (!) 215/127 (!) 215/127 (!) 184/148 (!) 139/106   Pulse: 63 63 (!) 56 62   Resp: 27  28 18   Temp:       SpO2: 98%  99% 97%   Weight:       Height:                Physical Exam  Vitals and nursing note reviewed. Constitutional:       General: She is not in acute distress. HENT:      Head: Atraumatic. Eyes:      Conjunctiva/sclera: Conjunctivae normal.   Cardiovascular:      Rate and Rhythm: Normal rate and regular rhythm. Heart sounds: No murmur heard. Pulmonary:      Effort: Pulmonary effort is normal.      Breath sounds: Normal breath sounds. Abdominal:      Palpations: Abdomen is soft. Tenderness: There is no abdominal tenderness. Musculoskeletal:         General: Normal range of motion. Cervical back: Normal range of motion. Skin:     General: Skin is warm and dry. Neurological:      Mental Status: She is alert and oriented to person, place, and time. Mental status is at baseline.           MDM       Procedures      VITAL SIGNS:  Patient Vitals for the past 4 hrs:   Pulse Resp BP SpO2   02/24/22 2151 62 18 (!) 139/106 97 %   02/24/22 2030 (!) 56 28 (!) 184/148 99 %   02/24/22 2022 63  (!) 215/127    02/24/22 2017 63 27 (!) 215/127 98 %   02/24/22 1947 67 20 (!) 237/125 100 %   02/24/22 1945    99 %   02/24/22 1915   (!) 177/107 100 %         LABS:  Recent Results (from the past 6 hour(s))   CBC WITH AUTOMATED DIFF    Collection Time: 02/24/22  4:44 PM   Result Value Ref Range    WBC 5.7 3.6 - 11.0 K/uL    RBC 3.08 (L) 3.80 - 5.20 M/uL    HGB 10.6 (L) 11.5 - 16.0 g/dL    HCT 31.2 (L) 35.0 - 47.0 %    .3 (H) 80.0 - 99.0 FL    MCH 34.4 (H) 26.0 - 34.0 PG    MCHC 34.0 30.0 - 36.5 g/dL    RDW 14.4 11.5 - 14.5 %    PLATELET 759 356 - 587 K/uL    MPV 9.4 8.9 - 12.9 FL    NRBC 0.0 0  WBC    ABSOLUTE NRBC 0.00 0.00 - 0.01 K/uL    NEUTROPHILS 68 32 - 75 %    LYMPHOCYTES 19 12 - 49 %    MONOCYTES 11 5 - 13 %    EOSINOPHILS 2 0 - 7 %    BASOPHILS 0 0 - 1 %    IMMATURE GRANULOCYTES 0 0.0 - 0.5 %    ABS. NEUTROPHILS 3.9 1.8 - 8.0 K/UL    ABS. LYMPHOCYTES 1.1 0.8 - 3.5 K/UL    ABS. MONOCYTES 0.6 0.0 - 1.0 K/UL    ABS. EOSINOPHILS 0.1 0.0 - 0.4 K/UL    ABS. BASOPHILS 0.0 0.0 - 0.1 K/UL    ABS. IMM. GRANS. 0.0 0.00 - 0.04 K/UL    DF AUTOMATED     METABOLIC PANEL, COMPREHENSIVE    Collection Time: 02/24/22  4:44 PM   Result Value Ref Range    Sodium 138 136 - 145 mmol/L    Potassium 2.5 (LL) 3.5 - 5.1 mmol/L    Chloride 105 97 - 108 mmol/L    CO2 29 21 - 32 mmol/L    Anion gap 4 (L) 5 - 15 mmol/L    Glucose 101 (H) 65 - 100 mg/dL    BUN 9 6 - 20 MG/DL    Creatinine 0.66 0.55 - 1.02 MG/DL    BUN/Creatinine ratio 14 12 - 20      GFR est AA >60 >60 ml/min/1.73m2    GFR est non-AA >60 >60 ml/min/1.73m2    Calcium 10.5 (H) 8.5 - 10.1 MG/DL    Bilirubin, total 0.4 0.2 - 1.0 MG/DL    ALT (SGPT) 24 12 - 78 U/L    AST (SGOT) 27 15 - 37 U/L    Alk.  phosphatase 59 45 - 117 U/L    Protein, total 10.5 (H) 6.4 - 8.2 g/dL    Albumin 2.8 (L) 3.5 - 5.0 g/dL    Globulin 7.7 (H) 2.0 - 4.0 g/dL    A-G Ratio 0.4 (L) 1.1 - 2.2     TROPONIN-HIGH SENSITIVITY    Collection Time: 02/24/22  4:44 PM   Result Value Ref Range    Troponin-High Sensitivity 65 (HH) 0 - 51 ng/L   SAMPLES BEING HELD    Collection Time: 02/24/22  4:44 PM   Result Value Ref Range    SAMPLES BEING HELD 1BLUE,1RED,1GRN,1SST     COMMENT        Add-on orders for these samples will be processed based on acceptable specimen integrity and analyte stability, which may vary by analyte. URINALYSIS W/MICROSCOPIC    Collection Time: 02/24/22  6:17 PM   Result Value Ref Range    Color YELLOW/STRAW      Appearance CLEAR CLEAR      Specific gravity 1.006 1.003 - 1.030      pH (UA) 6.5 5.0 - 8.0      Protein TRACE (A) NEG mg/dL    Glucose Negative NEG mg/dL    Ketone Negative NEG mg/dL    Bilirubin Negative NEG      Blood MODERATE (A) NEG      Urobilinogen 0.2 0.2 - 1.0 EU/dL    Nitrites Positive (A) NEG      Leukocyte Esterase MODERATE (A) NEG      WBC 20-50 0 - 4 /hpf    RBC 10-20 0 - 5 /hpf    Epithelial cells FEW FEW /lpf    Bacteria 2+ (A) NEG /hpf    Hyaline cast 0-2 0 - 5 /lpf   URINE CULTURE HOLD SAMPLE    Collection Time: 02/24/22  6:17 PM    Specimen: Serum; Urine   Result Value Ref Range    Urine culture hold        Urine on hold in Microbiology dept for 2 days. If unpreserved urine is submitted, it cannot be used for addtional testing after 24 hours, recollection will be required. TROPONIN-HIGH SENSITIVITY    Collection Time: 02/24/22  6:17 PM   Result Value Ref Range    Troponin-High Sensitivity 70 (HH) 0 - 51 ng/L        IMAGING:  XR SHOULDER RT AP/LAT MIN 2 V   Final Result   1. Marked soft tissue swelling about the lateral aspect of the shoulder. No   evidence of acute fracture. 2. 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 XR CHEST PORT   Final Result   No evidence of acute cardiopulmonary process.                Medications During Visit:  Medications   potassium chloride SR (KLOR-CON 10) tablet 40 mEq (40 mEq Oral Given 2/24/22 1818)   sodium chloride 0.9 % bolus infusion 1,000 mL (0 mL IntraVENous IV Completed 2/24/22 2034)   cefTRIAXone (ROCEPHIN) 1 g in 0.9% sodium chloride 10 mL IV syringe (1 g IntraVENous Given 2/24/22 2014)   labetaloL (NORMODYNE;TRANDATE) 20 mg/4 mL (5 mg/mL) injection 20 mg (20 mg IntraVENous Given 2/24/22 2022)         DECISION MAKING:  Nadira Batres is a 80 y.o. female who comes in as above. Patient lab work showed elevated troponin, hypokalemia, and UTI. Patient admitted hospital.     IMPRESSION:  1. Elevated troponin    2. Hypokalemia    3. Urinary tract infection with hematuria, site unspecified        DISPOSITION:        Current Discharge Medication List           Follow-up Information    None           Perfect Serve Consult for Admission  7:33 PM    ED Room Number: ER04/04  Patient Name and age: Nadira Batres 80 y.o.  female  Working Diagnosis:   1. Elevated troponin    2. Hypokalemia    3. Urinary tract infection with hematuria, site unspecified        COVID-19 Suspicion:  no  Sepsis present:  no  Reassessment needed: yes  Code Status:  Full Code  Readmission: no  Isolation Requirements:  no  Recommended Level of Care:  telemetry  Department:[facility] - [phone]  Other: Patient presented today with dizziness, chest pressure and low potassium x 3 days. [facility] and was referred to the emergency department due to low potassium.

## 2022-02-25 PROBLEM — R53.1 WEAKNESS GENERALIZED: Status: ACTIVE | Noted: 2022-01-01

## 2022-02-25 PROBLEM — I16.1 HYPERTENSIVE EMERGENCY: Status: ACTIVE | Noted: 2022-01-01

## 2022-02-25 NOTE — ED NOTES
Bedside and Verbal shift change report given to Cheri RN (oncoming nurse) by Sabra RN (offgoing nurse). Report included the following information SBAR, Kardex, ED Summary, Intake/Output and Recent Results.

## 2022-02-25 NOTE — DISCHARGE INSTRUCTIONS
HOME DISCHARGE INSTRUCTIONS    Karime Smith / [id number] : 1935    Admission date: 2022 Discharge date: 2022 9:02 AM     Please bring this form with you to show your care provider at your follow-up appointment. Primary care provider:  Janice Boss MD    Discharging provider:  Erik Roy MD  - Family Medicine Resident  Yelitza Hale MD - Family Medicine Attending      You have been admitted to the hospital with the following diagnoses:    ACUTE DIAGNOSES:  Elevated troponin [R77.8]  Hypokalemia [E87.6]  Urinary tract infection with hematuria [N39.0, R31.9]    . . . . . . . . . . . . . . . . . . . . . . . . . . . . . . . . . . . . . . . . . . . . . . . . . . . . . . . . . . . . . . . . . . . . . . . .   You are well enough to be discharged from the hospital. However, because you were inpatient in a hospital, you are at greater risk of having been exposed to the coronavirus. PLEASE stay inside and self-quarantine for 14 days to prevent further spread of the coronavirus. . . . . . . . . . . . . . . . . . . . . . . . . . . . . . . . . . . . . . . . . . . . . . . . . . . . . . . . . . . . . . . . . . . . . . . . .   . . . . . . . . . . . . . . . . . . . . . . . . . . . . . . . . . . . . . . . . . . . . . . . . . . . . . . . . . . . . . . . . . . . . . . . Eileen Peralta      FOLLOW-UP CARE RECOMMENDATIONS:    Appointments  Follow-up Information     Follow up With Specialties Details Why Contact Info    Janice Boss MD Family Medicine Call Schedule an appointment for follow up after your hospitalization [address] [facility]  [phone]               Follow-up tests needed:   - Talk with your doctor about repeating a basic metabolic panel (BMP) to monitor your potassium levels  - Follow up the final results of your vitamin D and PTHrp labs  - Consider a repeat lab to monitor your calcium levels  - Talk to your doctor about your blood pressure and whether you need any additional medications to better control your blood pressure    Pending test results: At the time of your discharge the following test results are still pending:   -PTHrp  -Vitamin D level  -Final urine culture results   Please make sure you review these results with your outpatient follow-up provider(s). DIET/what to eat:  Low salt diabetic diet    ACTIVITY:  Activity as tolerated    Wound care: none    Equipment needed:  none    Specific symptoms to watch for: chest pain, shortness of breath, fever, chills, nausea, vomiting, diarrhea, change in mentation, falling, weakness, bleeding. What to do if new or unexpected symptoms occur? If you experience any of the above symptoms (or should other concerns or questions arise after discharge) please call your primary care physician. Return to the emergency room if you cannot get hold of your doctor. · It is very important that you keep your follow-up appointment(s). · Please bring discharge papers, medication list (and/or medication bottles) to your follow-up appointments for review by your outpatient provider(s). · Please check the list of medications and be sure it includes every medication (even non-prescription medications) that your provider wants you to take. · It is important that you take the medication exactly as they are prescribed. · Keep your medication in the bottles provided by the pharmacist and keep a list of the medication names, dosages, and times to be taken in your wallet. · Do not take other medications without consulting your doctor. · If you have any questions about your medications or other instructions, please talk to your nurse or care provider before you leave the hospital.     Information obtained by:     I understand that if any problems occur once I am at home I am to contact my physician. These instructions were explained to me and I had the opportunity to ask questions.     I understand and acknowledge receipt of the instructions indicated above.                                                                                                                                                Physician's or R.N.'s Signature                                                                  Date/Time                                                                                                                                              Patient or Representative Signature                                                          Date/Time

## 2022-02-25 NOTE — H&P
Admission H&P     Name: Kavin Caballero MRN: [id number]    Sex: Female   YOB: 1935  Age: 80 y.o. PCP: Kelley Lu MD      Source of Information: Patient, family, medical records. Most information obtained form pt's daughter as history from patient limited by hearing impairment. Chief complaint: Weakness, light-headedness    History of Present Illness  Kavin Caballero is a 80 y.o. female with PMHx of CAD, prior GI bleed, DM2, HTN, HLD, and hypothyroidism who presents to the ED from [facility] for hypokalemia in the s/o ongoing weakness, fatigue, dizziness, and light-headedness. Pt and family report that pt has been experiencing weakness, fatigue, and dizziness for the past two weeks. Her symptoms got progressively worse today, prompting family to take her to [facility]. Pt was advised to go to the ED given finding of hypokalemia. Pt denies having chest pain, fevers, chills, or dysuria. Of note, pt sustained a ground-level fall around two weeks ago when she had difficulty locking her walker. She did not hit her head. No LOC. Two days ago, she was unable to get out of the bathtub and hurt her R shoulder attempting to get telephone to call for help. Vaccinated against COVID w/ J&J x1    In the ED:  Vitals: Temp 98   /103 (later elevated to 237/125, and later 215/127)   HR 70   RR 16   SatO2  99% on RA  Wt 56.2 kg  Labs: WBC 5.7, Hgb 10.6 (BL 12.5), platelet 590, K 2.5, creatinine 0.66 (BL 0.45-0.55), calcium 10.5, albumin 2.8, total protein 10.5, hs-trop 65 (repeat 70). UA w/ trace protein, moderate blood, positive nitrites, moderate LE, 20-50 WBC, 10-20 RBC, 2+ bacteria. Imaging:   - CXR: no evidence of acute cardiopulmonary process  - XR of right shoulder: Marked soft tissue swelling about the lateral aspect of the shoulder. No evidence of acute fracture.  Chronic rotator cuff tear and severe degenerative disease of the glenohumeral joint with high riding humerus and posterior subluxation. Treatment: CTX 1 g IV x1, labetalol 20 mg IV x1, Klor-con 40 mEq, 1 L NS IVF bolus. EKG: Sinus rhythm w/ HR 66. 1st degree AV block (MT interval 234 ms), slightly increased QRS duration (116 ms), and QTc 423 ms. Left axis deviation. Patient Vitals for the past 12 hrs:   Temp Pulse Resp BP SpO2   02/24/22 2337  (!) 58  (!) 219/139    02/24/22 2325  68  (!) 219/139    02/24/22 2322  60  (!) 207/129    02/24/22 2319 98.6 °F (37 °C) 61 15 (!) 204/74 98 %   02/24/22 2252  62      02/24/22 2151  62 18 (!) 139/106 97 %   02/24/22 2030  (!) 56 28 (!) 184/148 99 %   02/24/22 2022  63  (!) 215/127    02/24/22 2017  63 27 (!) 215/127 98 %   02/24/22 1947  67 20 (!) 237/125 100 %   02/24/22 1945     99 %   02/24/22 1915    (!) 177/107 100 %   02/24/22 1756    (!) 193/81 100 %   02/24/22 1630 98 °F (36.7 °C) 70 16 (!) 186/103 99 %       Review of Systems  Review of Systems   Constitutional: Negative for chills and fever. HENT: Negative for congestion and rhinorrhea. Eyes: Negative for visual disturbance. Respiratory: Negative for cough and shortness of breath. Cardiovascular: Negative for chest pain and leg swelling. Gastrointestinal: Negative for abdominal pain and vomiting. Genitourinary: Negative for dysuria. Musculoskeletal: Negative for myalgias. Skin: Negative for pallor and rash. Neurological: Positive for dizziness and weakness. Psychiatric/Behavioral: Negative for agitation and behavioral problems. Home Medications  Prior to Admission medications    Medication Sig Start Date End Date Taking?  Authorizing Provider   metoprolol succinate (TOPROL-XL) 50 mg XL tablet TAKE 1 TABLET DAILY 2/18/22   Bhakti [place] AMALIA, NP   rosuvastatin (CRESTOR) 10 mg tablet TAKE 1 TABLET NIGHTLY 9/30/21   Janell Miranda NP   levothyroxine (SYNTHROID) 88 mcg tablet TAKE 1 TABLET DAILY BEFORE BREAKFAST 9/16/21   Christal Grimaldo MD   losartan (COZAAR) 25 mg tablet TAKE 1 TABLET DAILY 21   Olena Mcgowan MD   valACYclovir (VALTREX) 1 gram tablet TAKE 1 TABLET DAILY 3/30/21   Olena Mcgowan MD   polyethylene glycol (Miralax) 17 gram packet Take 17 g by mouth daily. Provider, Historical   amLODIPine (NORVASC) 5 mg tablet TAKE 1 TABLET DAILY 3/8/21   Olena Mcgowan MD   NU-IRON 150 mg iron capsule TAKE 1 CAPSULE TWICE A DAY 6/3/19   Olena Mcgowan MD   vit C/E/Zn/coppr/lutein/zeaxan (PRESERVISION AREDS 2 PO) Take  by mouth. Provider, Historical   pantoprazole (PROTONIX) 40 mg tablet Take 1 Tab by mouth daily. 18   Olena Mcgowan MD   OTHER Willam Estrada with seat  DX: DDD lumbar 10/24/17   Olena Mcgowan MD   latanoprost Dickdanielle Leyvany) 0.005 % ophthalmic solution  3/17/17   Provider, Historical   naproxen sodium 220 mg cap Take  by mouth every other day. Provider, Historical   cholecalciferol, vitamin D3, (VITAMIN D3) 2,000 unit tab Take  by mouth. Provider, Historical   krill-om3-dha-epa-om6-lip-astx (KRILL OIL, OMEGA 3 & 6,) 1,500-165-67.5 mg cap Take  by mouth. Provider, Historical   MULTIVITAMIN (MULTIPLE VITAMIN PO) Take  by mouth. Provider, Historical   aspirin 81 mg chewable tablet Take 1 Tab by mouth daily.  9/23/15   Jese Proctor MD       Allergies  Allergies   Allergen Reactions    Celebrex [Celecoxib] Hives    Crestor [Rosuvastatin] Myalgia       Past Medical History  Past Medical History:   Diagnosis Date    Diabetes ([facility] [address]     GI bleeding     workup in [address] [place] [address] 12/15 was unremarkable (Dr. Vicky Glaser) - says she had EGD, colonoscopy, and small bowel capsule endoscopy    HTN (hypertension)     Hypercholesteremia     Hypothyroidism, iatrogenic     radioiodine    MI (myocardial infarction) ([facility] [address]     During surgery in 1970's - she's had multiple caths and stress tests       Previous Hospitalization(s)  Past Surgical History:   Procedure Laterality Date    HX  SECTION      HX HERNIA REPAIR  89    Open umbilical incisional herniorrhaphy [facility]).  HX HERNIA REPAIR Right 09/23/2020    Right inguinal herniorrhaphy with mesh.  HX ORTHOPAEDIC      Back surgery x 2.    HX BRIAN AND BSO         Family History  Family History   Problem Relation Age of Onset    Heart Failure Mother     Heart Disease Mother     Hypertension Mother        Social History  Alcohol history: 0.5 glass of wine twice weekly  Smoking history: No  Illicit drug history: No  Living arrangement: pt lives alone. Ambulates: Assisted walker    Vital Signs  Visit Vitals  BP (!) 219/139   Pulse (!) 58   Temp 98.6 °F (37 °C)   Resp 15   Ht 5' (1.524 m)   Wt 124 lb (56.2 kg)   SpO2 98%   BMI 24.22 kg/m²       Physical Examination:  General: No acute distress  Head: Normocephalic  Eyes: Conjunctiva pink  Neck: Supple  CV: Heart: regular rate and rhythm. 3/6 systolic ejection murmur heard over the RUSB. Resp: Lungs: clear to auscultation bilaterally anteriorly  GI: non-tender to palpation  MSK: R shoulder no tenderness to palpation. Full range of motion. Extremities: No lower extremity edema  Skin: Warm, dry  Neuro: Awake, alert, oriented x3. CN II-XII grossly intact.  strength, elbow flexion/extension 5/5 bilaterally. Laboratory Data  Recent Results (from the past 8 hour(s))   CBC WITH AUTOMATED DIFF    Collection Time: 02/24/22  4:44 PM   Result Value Ref Range    WBC 5.7 3.6 - 11.0 K/uL    RBC 3.08 (L) 3.80 - 5.20 M/uL    HGB 10.6 (L) 11.5 - 16.0 g/dL    HCT 31.2 (L) 35.0 - 47.0 %    .3 (H) 80.0 - 99.0 FL    MCH 34.4 (H) 26.0 - 34.0 PG    MCHC 34.0 30.0 - 36.5 g/dL    RDW 14.4 11.5 - 14.5 %    PLATELET 596 720 - 070 K/uL    MPV 9.4 8.9 - 12.9 FL    NRBC 0.0 0  WBC    ABSOLUTE NRBC 0.00 0.00 - 0.01 K/uL    NEUTROPHILS 68 32 - 75 %    LYMPHOCYTES 19 12 - 49 %    MONOCYTES 11 5 - 13 %    EOSINOPHILS 2 0 - 7 %    BASOPHILS 0 0 - 1 %    IMMATURE GRANULOCYTES 0 0.0 - 0.5 %    ABS. NEUTROPHILS 3.9 1.8 - 8.0 K/UL    ABS.  LYMPHOCYTES 1.1 0.8 - 3.5 K/UL    ABS. MONOCYTES 0.6 0.0 - 1.0 K/UL    ABS. EOSINOPHILS 0.1 0.0 - 0.4 K/UL    ABS. BASOPHILS 0.0 0.0 - 0.1 K/UL    ABS. IMM. GRANS. 0.0 0.00 - 0.04 K/UL    DF AUTOMATED     METABOLIC PANEL, COMPREHENSIVE    Collection Time: 02/24/22  4:44 PM   Result Value Ref Range    Sodium 138 136 - 145 mmol/L    Potassium 2.5 (LL) 3.5 - 5.1 mmol/L    Chloride 105 97 - 108 mmol/L    CO2 29 21 - 32 mmol/L    Anion gap 4 (L) 5 - 15 mmol/L    Glucose 101 (H) 65 - 100 mg/dL    BUN 9 6 - 20 MG/DL    Creatinine 0.66 0.55 - 1.02 MG/DL    BUN/Creatinine ratio 14 12 - 20      GFR est AA >60 >60 ml/min/1.73m2    GFR est non-AA >60 >60 ml/min/1.73m2    Calcium 10.5 (H) 8.5 - 10.1 MG/DL    Bilirubin, total 0.4 0.2 - 1.0 MG/DL    ALT (SGPT) 24 12 - 78 U/L    AST (SGOT) 27 15 - 37 U/L    Alk. phosphatase 59 45 - 117 U/L    Protein, total 10.5 (H) 6.4 - 8.2 g/dL    Albumin 2.8 (L) 3.5 - 5.0 g/dL    Globulin 7.7 (H) 2.0 - 4.0 g/dL    A-G Ratio 0.4 (L) 1.1 - 2.2     TROPONIN-HIGH SENSITIVITY    Collection Time: 02/24/22  4:44 PM   Result Value Ref Range    Troponin-High Sensitivity 65 (HH) 0 - 51 ng/L   SAMPLES BEING HELD    Collection Time: 02/24/22  4:44 PM   Result Value Ref Range    SAMPLES BEING HELD 1BLUE,1RED,1GRN,1SST     COMMENT        Add-on orders for these samples will be processed based on acceptable specimen integrity and analyte stability, which may vary by analyte.    MAGNESIUM    Collection Time: 02/24/22  4:44 PM   Result Value Ref Range    Magnesium 1.4 (L) 1.6 - 2.4 mg/dL   URINALYSIS W/MICROSCOPIC    Collection Time: 02/24/22  6:17 PM   Result Value Ref Range    Color YELLOW/STRAW      Appearance CLEAR CLEAR      Specific gravity 1.006 1.003 - 1.030      pH (UA) 6.5 5.0 - 8.0      Protein TRACE (A) NEG mg/dL    Glucose Negative NEG mg/dL    Ketone Negative NEG mg/dL    Bilirubin Negative NEG      Blood MODERATE (A) NEG      Urobilinogen 0.2 0.2 - 1.0 EU/dL    Nitrites Positive (A) NEG      Leukocyte Esterase MODERATE (A) NEG      WBC 20-50 0 - 4 /hpf    RBC 10-20 0 - 5 /hpf    Epithelial cells FEW FEW /lpf    Bacteria 2+ (A) NEG /hpf    Hyaline cast 0-2 0 - 5 /lpf   URINE CULTURE HOLD SAMPLE    Collection Time: 02/24/22  6:17 PM    Specimen: Serum; Urine   Result Value Ref Range    Urine culture hold        Urine on hold in Microbiology dept for 2 days. If unpreserved urine is submitted, it cannot be used for addtional testing after 24 hours, recollection will be required. TROPONIN-HIGH SENSITIVITY    Collection Time: 02/24/22  6:17 PM   Result Value Ref Range    Troponin-High Sensitivity 70 (HH) 0 - 51 ng/L   DRUG SCREEN, URINE    Collection Time: 02/24/22  6:17 PM   Result Value Ref Range    AMPHETAMINES Negative NEG      BARBITURATES Negative NEG      BENZODIAZEPINES Negative NEG      COCAINE Negative NEG      METHADONE Negative NEG      OPIATES Negative NEG      PCP(PHENCYCLIDINE) Negative NEG      THC (TH-CANNABINOL) Negative NEG      Drug screen comment (NOTE)        Imaging  CXR Results  (Last 48 hours)               02/24/22 1655  XR CHEST PORT Final result    Impression:  No evidence of acute cardiopulmonary process. Narrative:  INDICATION:  chest pressure        COMPARISON: May 2016       FINDINGS: Single AP portable view of the chest obtained at 1652 demonstrates a   stable cardiomediastinal silhouette. There is chronic cardiomegaly. The lungs   are clear bilaterally. No acute osseous abnormalities are seen. CT Results  (Last 48 hours)    None              Assessment and Plan     Rolando Barton is a 80 y.o. female with a PMHx of CAD, prior GI bleed, DM2, HTN, HLD, and hypothyroidism who is admitted for hypertensive emergency w/ elevated troponin, UTI, and hypokalemia. Hypertensive emergency: BP elevated in the ED (237-215)/(127-125). Hs-trop 65 POA (repeat 70). EKG non-ischemic, no chest pain. Hx of chronic HTN.  Reported compliance with home regimen of amlodipine 5 mg daily, losartan 25 mg daily, metoprolol XL 50 mg daily. Potential etiologies include secondary effects of shoulder pain. Creatinine 0.66 (prev 0.53 two months ago). - Admit to telemetry  - VS per unit routine, daily weights, strict I/Os  - Cardiac monitoring  - Hydralazine 20 mg IV q6H PRN for SBP >170 or DBP >110  - Obtain TSH, UDS  - Restart home antihypertensives: amlodipine 5 mg daily, losartan 25 mg daily, metoprolol XL 50 mg daily    Non-MI Elevated troponin: Hs-trop 65 POA (repeat 70). EKG non-ischemic. Pt denies having chest pain. Discussed with cardiology who recommend trending troponin. No further intervention at this time. - Trend troponin q6H    UTI: UA w/ trace protein, moderate blood, positive nitrites, moderate LE, 20-50 WBC, 10-20 RBC, 2+ bacteria. SIRS 0/4 POA. - CTX  - Follow-up urine culture. Hypokalemia: K 2.5 POA. Mg pending. Received 40 mEq Klor-con in the ED.  - Follow-up Mg level  - Administer an additional 40 mEq of Klor-con  - Re-check BMP  - Cardiac monitoring    Generalized weakness and fatigue: Fall and difficulty with getting out of bathtub in past 2 weeks. Etiology likely multifactorial. Hypertensive emergency, UTI, and hypokalemia POA as contributing factors. - Fall precautions  - Orthostatics  - Echocardiogram  - Obtain TSH, UDS  - Treatment of above, consider further work-up if no improvement  - Daily CBC, CMP, Mg    Hypercalcemia: Calcium 10.5 POA. Corrected 11.6. Alkaline phosphatase 59.  - Obtain ionized calcium level  - Obtain PTH    Non-severe anemia: Appears to be acute. Hgb 10.6 (BL 12.5). Hx of GI bleed in 2015 at [facility]. EGD, CSY, and small bowel capsule study normal at that time. - Follow-up LD, haptoglobin, iron panel, ferritin, reticulocyte count, VB12, folate, peripheral smear  - Obtain FOBT. - Continue iron supplementation    R shoulder pain: Hurt right shoulder when could not get out of bathtub on 2/22/22.  XR of right shoulder demonstrates soft tissue swelling and posterior subluxation of the R shoulder. No fracture. R shoulder non-tender to palpation on exam.  - Consult orthopedics for shoulder subluxation    Hx of GI bleed: Dec 2015? Work-up at [facility] unremarkable including EGD, CSY, and small bowel capsule study.  - Follow-up FOBT    DM2: Last A1c 5.3 on 12/6/21. Not on any home medications. Chronic HTN: Hypertensive emergency POA as above. - Continue home medications: amlodipine 5 mg daily, losartan 25 mg daily, metoprolol XL 50 mg daily    HLD: Last lipid panel 12/6/21: Tchol 122, HDL 67, LDL 45.6, trig 47. No home medications. Hypothyroidism: Last TSH 0.53 on 12/6/21.  - Continue home Synthroid 88 mcg daily    CAD: MI (during surgery in 1970s) had multiple caths and stress tests. Records of cardiology visit w/ Dr. Ander Lindsey on 3/22/17. Exercise Cardiolite 7/23/15 - walked 4:39 (6.3 METS), normal stress EKG. Normal MPI. LVEF 71%. Echo 7/23/15 - LVEF 55-60%, grade 1 DD. FEN/GI - Regular diet. NS at 100 mL/hr. Activity - Ambulate with assistance  DVT prophylaxis - SCDs, start AC ppx if FOBT negative  GI prophylaxis - Protonix  Fall prophylaxis - Fall precautions ordered. Disposition - Admit to Telemetry. Plan to d/c to TBD. Consulting PT and OT  Code Status - Full. Discussed with patient / caregivers. Next of Kin Name and Contact - Es Zhang, (daughter) [phone]  Kayleigh Saeid (daughter)  [phone]     Patient David Torres will be discussed with Dr. Barbi Villalba.     8:15 PM, 02/25/22  Raphael Angeles MD  Family Medicine Resident       For Billing    Chief Complaint   Patient presents with    Abnormal Lab Results   [address] Problems  Date Reviewed: 12/6/2021          Codes Class Noted POA    * (Principal) Hypertensive emergency ICD-10-CM: I16.1  ICD-9-CM: 401.9  2/25/2022 Unknown        Weakness generalized ICD-10-CM: R53.1  ICD-9-CM: 780.79  2/25/2022 Unknown        Hypokalemia ICD-10-CM: E87.6  ICD-9-CM: 276.8 2/24/2022 Unknown        Elevated troponin ICD-10-CM: R77.8  ICD-9-CM: 790.6  2/24/2022 Unknown        Urinary tract infection with hematuria ICD-10-CM: N39.0, R31.9  ICD-9-CM: 599.0, 599.70  2/24/2022 Unknown        Anemia ICD-10-CM: D64.9  ICD-9-CM: 285.9  8/29/2018 Yes        Type 2 diabetes mellitus without complication, without long-term current use of insulin (HCC) ICD-10-CM: E11.9  ICD-9-CM: 250.00  4/24/2017 Yes        Coronary artery disease involving native coronary artery of native heart without angina pectoris ICD-10-CM: I25.10  ICD-9-CM: 414.01  5/4/2016 Yes        HTN (hypertension) ICD-10-CM: I10  ICD-9-CM: 401.9  Unknown Yes        Hypothyroidism, iatrogenic ICD-10-CM: E03.2  ICD-9-CM: 244. 3  Unknown Yes    Overview Signed 7/13/2015  3:16 PM by Shari Foreman MD     radioiodine             Hypercholesteremia ICD-10-CM: E78.00  ICD-9-CM: 272.0  Unknown Yes

## 2022-02-25 NOTE — PROGRESS NOTES
7932 Received report on patient who will be coming to 316.    1900 Bedside and Verbal shift change report given to Darline [place] Randy (oncoming nurse) by Andry Wagoner RN (offgoing nurse). Report included the following information SBAR, Kardex, Intake/Output, MAR, Accordion, Recent Results and Med Rec Status.

## 2022-02-25 NOTE — PROGRESS NOTES
Physical Therapy    Consult received, chart reviewed and discussed patient with RN. Patient's x-ray significant for posterior shoulder dislocation due to fall 3 days prior. Ortho consult pending. Will assess as appropriate once ortho has seen and POC established.       Ashley Martell MS, PT

## 2022-02-25 NOTE — PROGRESS NOTES
[address] [place] [address]   Office [phone]  Fax [phone]          Assessment and Plan     Charmayne Roche is a 80 y.o. female with a PMHx of CAD, prior GI bleed, DM2, HTN, HLD, and hypothyroidism who is admitted for hypertensive emergency w/ elevated troponin, UTI, and hypokalemia. Overnight Events:   - Pt w/ difficulty sleeping, received melatonin. - BP over-corrected at 23:55 (139/84) after 20 mg hydralazine administered IV for a pressure of 219/139. Repeat readings 075R systolic ~ 10 minutes later.  - Troponin level 65->70->70, d/c'd. - BMP at midnight hemolyzed affecting potassium result. Obtaining repeat K level w/ morning labs. - Mg low, repletion ordered  - 1 dark bowel movement. Hgb down-trended (9.4, prev 10.6 POA), FOBT positive. H&H ordered q4H.     Hypertensive emergency: BP elevated in the ED (237-215)/(127-125). Hs-trop 65 POA. EKG non-ischemic. No CP. Hx of chronic HTN. Reported compliance with home regimen of amlodipine 5 mg daily, losartan 25 mg daily, metoprolol XL 50 mg daily. Creatinine 0.66 (prev 0.53 two months ago). TSH wnl, UDS negative. - Daily weights, strict I/Os  - Cardiac monitoring  - Hydralazine 20 mg IV q6H PRN for SBP >170 or DBP >110  - Start home antihypertensives this AM: amlodipine 5 mg daily, losartan 25 mg daily, metoprolol XL 50 mg daily     UTI: UA POA w/ trace protein, moderate blood, positive nitrites, moderate LE, 20-50 WBC, 10-20 RBC, 2+ bacteria. SIRS 0/4 POA. - Continue CTX  - Follow-up urine culture. Hypokalemia: K 2.5 POA. Received 40 mEq Klor-con x2. Repeat BMP hemolyzed. - Re-check BMP  - Cardiac monitoring    Hypomagnesemia: Mg 1.4.  - Replete and re-check Mg 2 hrs post infusion.     Generalized weakness and fatigue: Fall and difficulty with getting out of bathtub in past 2 weeks. Etiology likely multifactorial. Hypertensive emergency, UTI, and hypokalemia POA as contributing factors. TSH wnl. UDS negative.   - Fall precautions  - Orthostatics  - Follow-up Echocardiogram  - Treatment of above, consider further work-up if no improvement  - Daily CBC, BMP, Mg    Non-MI Elevated troponin: hs-trop 65->70->70. EKG non-ischemic. Pt denies having chest pain. - No longer trending troponin     Hypercalcemia: Calcium 10.5 POA. Corrected 11.6. Alkaline phosphatase 59.  - Obtain ionized calcium level  - Obtain PTH     Non-severe anemia c/f possible GI bleed: Appears to be acute. Hgb 10.6 (BL 12.5). Hx of GI bleed in 2015 at [facility]. EGD, CSY, and small bowel capsule study reportedly normal at that time. FOBT positive (pt also takes iron supplementation). Hgb down-trended 10.6->9.4 (potentially hemodilution w/ fluids). - Repeat H&H q8H  - Follow-up anemia labs     R shoulder pain: Hurt right shoulder when could not get out of bathtub on 2/22/22. XR of right shoulder demonstrates soft tissue swelling and posterior subluxation of the R shoulder. No fracture. R shoulder non-tender to palpation on exam.  - Orthopedics consulted for shoulder subluxation     DM2: Last A1c 5.3 on 12/6/21. Not on any home medications.      Chronic HTN: Hypertensive emergency POA as above. - Continue home medications: amlodipine 5 mg daily, losartan 25 mg daily, metoprolol XL 50 mg daily     HLD: Last lipid panel 12/6/21: Tchol 122, HDL 67, LDL 45.6, trig 47. No home medications.     Hypothyroidism: Last TSH 0.53 on 12/6/21.  - Continue home Synthroid 88 mcg daily     CAD: MI (during surgery in 1970s) had multiple caths and stress tests. Records of cardiology visit w/ Dr. Peter Garcia on 3/22/17. Exercise Cardiolite 7/23/15 - walked 4:39 (6.3 METS), normal stress EKG. Normal MPI. LVEF 71%. Echo 7/23/15 - LVEF 55-60%, grade 1 DD.           FEN/GI - Regular diet. Activity - Ambulate with assistance  DVT prophylaxis - SCDs (FOBT positive)  GI prophylaxis - Protonix  Fall prophylaxis - Fall precautions ordered. Disposition - Plan to d/c to TBD.  Consulting PT and OT  Code Status - Full. Discussed with patient / caregivers. Next of Agathava 69 Name and Contact - Toña Mckenzie, (daughter) [phone]  Marcus Brunner (daughter)  [phone]       Akilah Wagner MD  Family Medicine Resident         Subjective / Objective     Subjective: Pt was seen and examined at bedside. Pt reports that she did not sleep well overnight. Reports some occasional headache. Otherwise, no complaints. Objective:    Respiratory:   O2 Device: None (Room air)   Visit Vitals  BP (!) 163/67 (BP 1 Location: Left upper arm, BP Patient Position: Standing)   Pulse (!) 57   Temp 98.3 °F (36.8 °C)   Resp 16   Ht 5' (1.524 m)   Wt 124 lb (56.2 kg)   SpO2 98%   BMI 24.22 kg/m²     Physical Exam:  General: NAD, resting comfortably. Respiratory: CTAB anteriorly. Cardiovascular: Regular rate and rhythm. 3/6 systolic ejection murmur heard best over the RUSB. GI: Nontender. Skin: Warm, dry. Neuro: Awake, alert, and conversant (has difficulty hearing voice of examiner). CN II-VII, IX-XII grossly intact.  strength, elbow flexion/extension, hip flexion 5/5 bilaterally. Sensation grossly intact. I/O:  Date 02/24/22 0700 - 02/25/22 0659 02/25/22 0700 - 02/26/22 0659   Shift 4541-6362 7275-8607 24 Hour Total 8367-5850 5290-2696 24 Hour Total   INTAKE   I.V.(mL/kg/hr)  1251.7 1251.7        Volume (0.9% sodium chloride infusion)  451.7 451.7        Volume (sodium chloride 0.9 % bolus infusion 1,000 mL)  800 800      Shift Total(mL/kg)  1251. 7(22.3) 1251. 7(22.3)      OUTPUT   Urine(mL/kg/hr)  600 600        Urine Voided  600 600        Urine Occurrence(s)  1 x 1 x      Shift Total(mL/kg)  600(10.7) 600(10.7)      NET  651.7 651.7      Weight (kg) 56.2 56.2 56.2 56.2 56.2 56.2       Inpatient Medications  Current Facility-Administered Medications   Medication Dose Route Frequency    melatonin tablet 3 mg  3 mg Oral QHS PRN    sodium chloride (NS) flush 5-40 mL  5-40 mL IntraVENous Q8H    sodium chloride (NS) flush 5-40 mL  5-40 mL IntraVENous PRN    cefTRIAXone (ROCEPHIN) 1 g in 0.9% sodium chloride 10 mL IV syringe  1 g IntraVENous Q24H    hydrALAZINE (APRESOLINE) 20 mg/mL injection 20 mg  20 mg IntraVENous Q6H PRN    amLODIPine (NORVASC) tablet 5 mg  5 mg Oral DAILY    levothyroxine (SYNTHROID) tablet 88 mcg  88 mcg Oral ACB    losartan (COZAAR) tablet 25 mg  25 mg Oral DAILY    metoprolol succinate (TOPROL-XL) XL tablet 50 mg  50 mg Oral DAILY    iron polysaccharides (NIFEREX) capsule 150 mg  1 Capsule Oral DAILY    pantoprazole (PROTONIX) tablet 40 mg  40 mg Oral ACB     Current Outpatient Medications   Medication Sig    metoprolol succinate (TOPROL-XL) 50 mg XL tablet TAKE 1 TABLET DAILY    rosuvastatin (CRESTOR) 10 mg tablet TAKE 1 TABLET NIGHTLY    levothyroxine (SYNTHROID) 88 mcg tablet TAKE 1 TABLET DAILY BEFORE BREAKFAST    losartan (COZAAR) 25 mg tablet TAKE 1 TABLET DAILY    valACYclovir (VALTREX) 1 gram tablet TAKE 1 TABLET DAILY    polyethylene glycol (Miralax) 17 gram packet Take 17 g by mouth daily.  amLODIPine (NORVASC) 5 mg tablet TAKE 1 TABLET DAILY    NU-IRON 150 mg iron capsule TAKE 1 CAPSULE TWICE A DAY    vit C/E/Zn/coppr/lutein/zeaxan (PRESERVISION AREDS 2 PO) Take  by mouth.  pantoprazole (PROTONIX) 40 mg tablet Take 1 Tab by mouth daily.  OTHER Walker with seat  DX: DDD lumbar    latanoprost (XALATAN) 0.005 % ophthalmic solution     naproxen sodium 220 mg cap Take  by mouth every other day.  cholecalciferol, vitamin D3, (VITAMIN D3) 2,000 unit tab Take  by mouth.  krill-om3-dha-epa-om6-lip-astx (KRILL OIL, OMEGA 3 & 6,) 1,500-165-67.5 mg cap Take  by mouth.  MULTIVITAMIN (MULTIPLE VITAMIN PO) Take  by mouth.  aspirin 81 mg chewable tablet Take 1 Tab by mouth daily.          Allergies  Allergies   Allergen Reactions    Celebrex [Celecoxib] Hives    Crestor [Rosuvastatin] Myalgia         CBC:  Recent Labs     02/25/22  0013 02/24/22  1644   WBC 5.7 5.7   HGB 9.4* 10.6*   HCT 28.3* 31.2*    835       Metabolic Panel:  Recent Labs     02/25/22  0013 02/24/22  1644    138   K 3.9 2.5*   * 105   CO2 24 29   BUN 8 9   CREA 0.60 0.66   GLU 95 101*   CA 9.8 10.5*   MG  --  1.4*   ALB  --  2.8*   ALT  --  24              For Billing    Chief Complaint   Patient presents with    Abnormal Lab Results   [facility]       Hospital Problems  Date Reviewed: 12/6/2021          Codes Class Noted POA    * (Principal) Hypertensive emergency ICD-10-CM: I16.1  ICD-9-CM: 401.9  2/25/2022 Unknown        Weakness generalized ICD-10-CM: R53.1  ICD-9-CM: 780.79  2/25/2022 Unknown        Hypokalemia ICD-10-CM: E87.6  ICD-9-CM: 276.8  2/24/2022 Unknown        Elevated troponin ICD-10-CM: R77.8  ICD-9-CM: 790.6  2/24/2022 Unknown        Urinary tract infection with hematuria ICD-10-CM: N39.0, R31.9  ICD-9-CM: 599.0, 599.70  2/24/2022 Unknown        Anemia ICD-10-CM: D64.9  ICD-9-CM: 285.9  8/29/2018 Yes        Type 2 diabetes mellitus without complication, without long-term current use of insulin (HCC) ICD-10-CM: E11.9  ICD-9-CM: 250.00  4/24/2017 Yes        Coronary artery disease involving native coronary artery of native heart without angina pectoris ICD-10-CM: I25.10  ICD-9-CM: 414.01  5/4/2016 Yes        HTN (hypertension) ICD-10-CM: I10  ICD-9-CM: 401.9  Unknown Yes        Hypothyroidism, iatrogenic ICD-10-CM: E03.2  ICD-9-CM: 244. 3  Unknown Yes    Overview Signed 7/13/2015  3:16 PM by Han Pak MD     radioiodine             Hypercholesteremia ICD-10-CM: E78.00  ICD-9-CM: 272.0  Unknown Yes

## 2022-02-25 NOTE — PROGRESS NOTES
2/25/22  2:18 PM    Care Management Initial Evaluation:    CM reviewed EMR and met with patient and patients daughter, Juancarlos Beltre in the room to complete the initial evaluation. Confirmed charted demographics. Reason for Admission: Elevated Troponin                   RUR Score: 11%  Level: Low    PAYOR: [employer] and [address] for utilizing home health:   No current home health history       PCP: First and Last name:  Dixon Nicolas MD     Name of Practice:    Are you a current patient: Yes/No: Yes   Approximate date of last visit: 12/21   Can you participate in a virtual visit with your PCP:                     Current Advanced Directive/Advance Care Plan: Full Code      Healthcare Decision Maker:   Click here to complete [employer] including selection of the Healthcare Decision Maker Relationship (ie \"Primary\")  Juancarlos Beltre- daughter [phone] and [phone]  Tonja Heart- daughter [phone]      Transition of Care Plan:        Home: Resides alone, her sister lives close by and her daughterMat lives 15 minutes away  DME: Rollator, shower chair  RX: [facility] or [facility]  PTA: Before starting to feel dizzy and weak, she was independent with all care and ADL's    1). Patient admitted for medical management  2). Ortho consulted- no surgical intervention planned  3). PT/OT evaluations  4). Family will transport at dc  5). CM will follow for dc needs    [facility] Management Interventions  PCP Verified by CM: Yes (12/21)  Mode of Transport at Discharge:  Other (see comment)  Transition of Care Consult (CM Consult): Discharge Planning  Discharge Durable Medical Equipment: No  Physical Therapy Consult: Yes  Occupational Therapy Consult: Yes  Speech Therapy Consult: No  Support Systems: Child(jaclyn)  Confirm Follow Up Transport: Family  Discharge Location  Patient Expects to be Discharged to[de-identified] Home with family assistance

## 2022-02-25 NOTE — PROGRESS NOTES
Occupational Therapy:  02/25/22    Orders received and appreciated, chart reviewed. Patient's x-ray significant for posterior shoulder dislocation due to fall 3 days prior. Ortho consult pending. Will defer and follow up once ortho has seen and cleared for activity.      Thank you,  Mila Singh, OTR/L

## 2022-02-25 NOTE — ED NOTES
Bedside and Verbal shift change report given to [place], [address] (oncoming nurse) by Khloe Rodriguez RN (offgoing nurse). Report included the following information SBAR, ED Summary, Intake/Output, MAR and Recent Results.

## 2022-02-25 NOTE — CONSULTS
ORTHO CONSULT NOTE  Late entry, patient seen late morning. Date of Consultation:  2022  Referring Physician:  Altagracia Pittman  CC: none    HPI:  Wandy Guajardo is a 80 y.o. female who c/o resolved right shoulder pain s/p recent GLF at home. She states she fell in the bathroom resulting in right shoulder swelling, bruising, and discomfort. Her shoulder pain has now completely resolved. She has chronic motion limitations with no acute loss of function. In the past, she declined previous offers of surgical intervention as this major procedure carried risks given her age. She denies fever, shoulder redness, and shoulder wound. Daily ASA at home. Past Medical History:   Diagnosis Date    Diabetes ([address]     GI bleeding     workup in [address] [place] [address] 12/15 was unremarkable (Dr. Abraham Ji) - says she had EGD, colonoscopy, and small bowel capsule endoscopy    HTN (hypertension)     Hypercholesteremia     Hypothyroidism, iatrogenic     radioiodine    MI (myocardial infarction) ([address]     During surgery in  - she's had multiple caths and stress tests      Past Surgical History:   Procedure Laterality Date    HX  SECTION      HX HERNIA REPAIR      Open umbilical incisional herniorrhaphy [facility]).  HX HERNIA REPAIR Right 2020    Right inguinal herniorrhaphy with mesh.  HX ORTHOPAEDIC      Back surgery x 2.    HX BRIAN AND BSO        Family History   Problem Relation Age of Onset    Heart Failure Mother     Heart Disease Mother     Hypertension Mother       Social History     Tobacco Use    Smoking status: Never Smoker    Smokeless tobacco: Never Used   Substance Use Topics    Alcohol use: Yes     Alcohol/week: 0.0 standard drinks     Allergies   Allergen Reactions    Celebrex [Celecoxib] Hives    Crestor [Rosuvastatin] Myalgia        Review of Systems:  Per HPI.     Objective:     Patient Vitals for the past 8 hrs:   BP Pulse Resp SpO2 Height Weight   22 1500 (!) 195/84 62 23 97 %     22 1400 (!) 191/79 62 21 97 %     22 1300 (!) 180/106 65 22 96 %     22 1200 (!) 172/103 71 24 96 %     22 1130 (!) 171/68 (!) 55 18 95 %     22 1015 (!) 140/94    5' (1.524 m) 56.2 kg (124 lb)   22 1000 (!) 130/105 69 20 96 %     22 0930 (!) 140/94 70 16 98 %     22 0900  78 18 96 %     22 0800 (!) 170/64 61 21 97 %       Temp (24hrs), Av.3 °F (36.8 °C), Min:98 °F (36.7 °C), Max:98.6 °F (37 °C)      EXAM:   NAD. Kaw. Answers questions appropriately. Daughter bedside. Moves BUE spontaneously and is eating her lunch with RUE. Obvious right shoulder edema/effusion with no erythema and no open wound. She has some ecchymosis. Shoulder NTTP. AROM incl 75/75 flexion/abduction which family reports is her baseline. No pain PROM IR/ER. Moves elbow/wrist OK. RUE SILT. Palp pulses. Imaging Review:   Results from Hospital Encounter encounter on 22    XR SHOULDER RT AP/LAT MIN 2 V    Narrative  EXAM: XR SHOULDER RT AP/LAT MIN 2 V    INDICATION: fall. COMPARISON: None. FINDINGS: Three views of the right shoulder demonstrate high riding humeral head  and posterior subluxation of the humeral head. There is severe glenohumeral  joint and AC joint degenerative disease. There is marked soft tissue swelling  about the lateral aspect of the shoulder, but without evidence of an acute  fracture. Sclerotic changes in the acromion with subacromial spurring from  chronic rotator cuff tear. Osteopenia. Impression  1. Marked soft tissue swelling about the lateral aspect of the shoulder. No  evidence of acute fracture. 2. Findings of chronic rotator cuff tear and severe degenerative disease of the  glenohumeral joint with high riding humerus and posterior subluxation. No results found for this or any previous visit. Labs:   WBC 5.7.       Impression:     Patient Active Problem List    Diagnosis Date Noted    Hypertensive emergency 02/25/2022    Weakness generalized 02/25/2022    Hypokalemia 02/24/2022    Elevated troponin 02/24/2022    Urinary tract infection with hematuria 02/24/2022    Type 2 diabetes with nephropathy [facility] 12/01/2020    S/P right inguinal herniorrhaphy 10/07/2020    Nodule of left lobe of thyroid gland 09/08/2020    Anemia 08/29/2018    Type 2 diabetes mellitus without complication, without long-term current use of insulin ([address] 04/24/2017    Advance care planning 01/12/2017    DDD (degenerative disc disease), lumbar 09/06/2016    Gastric ulcer 09/06/2016    Coronary artery disease involving native coronary artery of native heart without angina pectoris 05/04/2016    GI bleeding     HTN (hypertension)     Hypothyroidism, iatrogenic     Hypercholesteremia      Principal Problem:    Hypertensive emergency (2/25/2022)    Active Problems:    HTN (hypertension) ()      Hypothyroidism, iatrogenic ()      Overview: radioiodine      Hypercholesteremia ()      Coronary artery disease involving native coronary artery of native heart without angina pectoris (5/4/2016)      Type 2 diabetes mellitus without complication, without long-term current use of insulin ([address] (4/24/2017)      Anemia (8/29/2018)      Hypokalemia (2/24/2022)      Elevated troponin (2/24/2022)      Urinary tract infection with hematuria (2/24/2022)      Weakness generalized (2/25/2022)      Chronic right shoulder cuff tear with high riding humeral head and DJD. No evidence of fracture, dislocation, or infection right shoulder. Plan:   No acute intervention needed other than ice right shoulder. Cont activities as kieran. Can FU as outpatient prn, [facility] Ram, or Chacha Rodney. Please call again if questions. Dr. Adam Horne is aware and agrees with above plan.       EHLENE Penaloza  Orthopedic Trauma Service  [facility]

## 2022-02-25 NOTE — ED NOTES
Assisted patient to bedside. Patient voided and had small BM. Patient cleansed and patient assisted back to bed.

## 2022-02-26 NOTE — PROGRESS NOTES
[facility] [address] [place] [address]   Office [phone]  Fax [phone]          Assessment and Plan     Shantell Moura is a 80 y.o. female with a PMHx of CAD, prior GI bleed, DM2, HTN, HLD, and hypothyroidism who is admitted for hypertensive emergency w/ elevated troponin, UTI, and hypokalemia. Overnight Events: Repeat K 3.3, repletion given.     Hypertensive emergency in s/o chronic HTN - resolved: POA BP (237-215)/(127-125), trop 65. EKG non-ischemic. Creatinine 0.66 at b/l. TSH wnl, UDS negative. - Daily weights, strict I/Os  - Cardiac monitoring  - Hydralazine 20 mg IV q6H PRN for SBP >170 or DBP >110  - Continue home antihypertensives: amlodipine 5 mg daily, losartan 25 mg daily, metoprolol XL 50 mg daily  - BP better controlled today - will monitor and if stable possible discharge later  - Can consider inc of either amlodipine or losartan in OP setting for inc BP control (losartan may help w/ hypokalemia)     UTI: UA POA w/ +nitrites, mod LE, 2+ bacteria. UCx w/ GNR.  - Continue CTX to complete 3-day course (last dose today)    Hypokalemia: K 2.5 POA. - Replete PRN  - Re-check BMP this morning  - Cardiac monitoring    Hypomagnesemia: Mg 1.4.  - Replete PRN     Generalized weakness and fatigue: Fall and difficulty with getting out of bathtub in past 2 weeks. Etiology likely multifactorial. Hypertensive emergency, UTI, and hypokalemia POA as contributing factors. TSH wnl. UDS negative. Orthostatics negative. Echo w/ LVEF 53-21%, normal diastolic function.  - Fall precautions  - Treatment of above, consider further work-up if no improvement  - Daily CBC, BMP, Mg  - PT/OT to see, pt lives alone and ambulates w/ walker    Non-MI Elevated troponin: hs-trop 65->70->70. EKG non-ischemic. Pt denies having chest pain. - No longer trending troponin     Hypercalcemia: Calcium 10.5 POA. Corrected 11.6. Alkaline phosphatase 59.  PTH low at <6.3.  - Obtain ionized calcium level  - Will plan to obtain PTHrp and vitamin D level for additional work-up, can f/u final results OP     Anemia of chronic disease c/f possible GI bleed: Appears to be acute. Hgb 10.6 (BL 12.5). Hx of GI bleed in [address] at [address], EGD, CSY, and small bowel capsule study reportedly normal at that time. FOBT positive (pt also takes iron supplementation). Possible hemodilution as hemoglobin now stable. - CBC daily     R shoulder pain: Injury trying to get out of tub on 2/22/22. XR of right shoulder demonstrates soft tissue swelling and posterior subluxation of the R shoulder. No fracture. R shoulder non-tender to palpation on exam.  - Per ortho continue ice PRN and mobilize as able, no further intervention at this time - appreciate recs  - PT/OT to see     DM2: Last A1c 5.3 on 12/6/21. Not on any home medications.      HLD: Last lipid panel 12/6/21: Tchol 122, HDL 67, LDL 45.6, trig 47. No home medications.     Hypothyroidism: Last TSH 0.53 on 12/6/21.  - Continue home Synthroid 88 mcg daily     CAD: MI (during surgery in 1970s) had multiple caths and stress tests. Records of cardiology visit w/ Dr. Darby Fleischer on 3/22/17. Exercise Cardiolite 7/23/15 - walked 4:39 (6.3 METS), normal stress EKG. Normal MPI. LVEF 71%. Echo 7/23/15 - LVEF 55-60%, grade 1 DD.        FEN/GI - Regular diet. Activity - Ambulate with assistance  DVT prophylaxis - SCDs (FOBT positive)  GI prophylaxis - Protonix  Fall prophylaxis - Fall precautions ordered. Disposition - Plan to d/c to TBD. Consulting PT and OT  Code Status - Full. Discussed with patient / caregivers. Next of Kin Name and Contact - Kathy Guevara, (daughter) [phone]  Kaushik Wright (daughter)  [phone]       Karen Arteaga MD  Family Medicine Resident         Subjective / Objective     Subjective: Pt was seen and examined at bedside. Feeling well this morning, no complaints, shoulder doing ok so far this morning. Denies fever, chills, CP, SOB, N/V/abd pain.     Objective:    Respiratory:   O2 Device: None (Room air)   Visit Vitals  BP (!) 145/65 (BP 1 Location: Right upper arm)   Pulse 69   Temp 98.2 °F (36.8 °C)   Resp 22   Ht 5' (1.524 m)   Wt 124 lb (56.2 kg)   SpO2 97%   BMI 24.22 kg/m²     Physical Exam:  General: NAD, resting comfortably. Hard of hearing. Respiratory: CTAB anteriorly. Cardiovascular: Regular rate and rhythm. 3/6 systolic ejection murmur heard best over the RUSB. GI: Nontender. Skin: Warm, dry. Neuro: Awake, alert, and conversant. CN II-VII, IX-XII grossly intact.  strength, elbow flexion/extension, hip flexion 5/5 bilaterally. Sensation grossly intact. I/O:  Date 02/25/22 0700 - 02/26/22 0659 02/26/22 0700 - 02/27/22 0659   Shift 9339-8765 2882-7517 24 Hour Total 9665-7890 7021-7917 24 Hour Total   INTAKE   P.O.  240 240        P. O.  240 240      I. V.(mL/kg/hr) 100(0.1)  100        Volume (potassium chloride 10 mEq in 100 ml IVPB) 100  100      Shift Total(mL/kg) 100(1.8) 240(4.3) 340(6)      OUTPUT   Urine(mL/kg/hr)  250 250        Urine Voided  250 250        Urine Occurrence(s)  1 x 1 x      Stool           Stool Occurrence(s)  1 x 1 x      Shift Total(mL/kg)  250(4.4) 250(4.4)       -10 90      Weight (kg) 56.2 56.2 56.2 56.2 56.2 56.2       Inpatient Medications  Current Facility-Administered Medications   Medication Dose Route Frequency    potassium chloride 10 mEq in 100 ml IVPB  10 mEq IntraVENous Q1H    melatonin tablet 3 mg  3 mg Oral QHS PRN    sodium chloride (NS) flush 5-40 mL  5-40 mL IntraVENous Q8H    sodium chloride (NS) flush 5-40 mL  5-40 mL IntraVENous PRN    cefTRIAXone (ROCEPHIN) 1 g in 0.9% sodium chloride 10 mL IV syringe  1 g IntraVENous Q24H    hydrALAZINE (APRESOLINE) 20 mg/mL injection 20 mg  20 mg IntraVENous Q6H PRN    amLODIPine (NORVASC) tablet 5 mg  5 mg Oral DAILY    levothyroxine (SYNTHROID) tablet 88 mcg  88 mcg Oral ACB    losartan (COZAAR) tablet 25 mg  25 mg Oral DAILY    metoprolol succinate (TOPROL-XL) XL tablet 50 mg  50 mg Oral DAILY    iron polysaccharides (NIFEREX) capsule 150 mg  1 Capsule Oral DAILY    pantoprazole (PROTONIX) tablet 40 mg  40 mg Oral ACB         Allergies  Allergies   Allergen Reactions    Celebrex [Celecoxib] Hives    Crestor [Rosuvastatin] Myalgia         CBC:  Recent Labs     02/26/22  0027 02/25/22  1150 02/25/22  0431 02/25/22  0013 02/25/22  0013 02/24/22  1644 02/24/22  1644   WBC 6.6  --   --   --  5.7  --  5.7   HGB 9.6* 9.4* 9.4*   < > 9.4*   < > 10.6*   HCT 28.9* 28.3* 27.7*   < > 28.3*   < > 31.2*     --   --   --  267  --  288    < > = values in this interval not displayed. Metabolic Panel:  Recent Labs     02/26/22  0027 02/25/22  1756 02/25/22  1304 02/25/22  0729 02/25/22  0508 02/25/22  0013 02/24/22  1644    135* 140  --    < >   < > 138   K 3.3* 5.1 3.4*  --    < >   < > 2.5*    108 110*  --    < >   < > 105   CO2 26 24 22  --    < >   < > 29   BUN 10 10 8  --    < >   < > 9   CREA 0.69 0.78 0.79  --    < >   < > 0.66   * 143* 136*  --    < >   < > 101*   CA 10.2* 10.0 9.7  --    < >   < > 10.5*   MG 1.6  --   --  2.0  --   --  1.4*   ALB  --   --   --   --   --   --  2.8*   ALT  --   --   --   --   --   --  24    < > = values in this interval not displayed.               For Billing    Chief Complaint   Patient presents with    Abnormal Lab Results   [facility]  Date Reviewed: 12/6/2021          Codes Class Noted POA    * (Principal) Hypertensive emergency ICD-10-CM: I16.1  ICD-9-CM: 401.9  2/25/2022 Unknown        Weakness generalized ICD-10-CM: R53.1  ICD-9-CM: 780.79  2/25/2022 Unknown        Hypokalemia ICD-10-CM: E87.6  ICD-9-CM: 276.8  2/24/2022 Unknown        Elevated troponin ICD-10-CM: R77.8  ICD-9-CM: 790.6  2/24/2022 Unknown        Urinary tract infection with hematuria ICD-10-CM: N39.0, R31.9  ICD-9-CM: 599.0, 599.70  2/24/2022 Unknown        Anemia ICD-10-CM: D64.9  ICD-9-CM: 285.9  8/29/2018 Yes        Type 2 diabetes mellitus without complication, without long-term current use of insulin ([facility] [address] ICD-10-CM: E11.9  ICD-9-CM: 250.00  4/24/2017 Yes        Coronary artery disease involving native coronary artery of native heart without angina pectoris ICD-10-CM: I25.10  ICD-9-CM: 414.01  5/4/2016 Yes        HTN (hypertension) ICD-10-CM: I10  ICD-9-CM: 401.9  Unknown Yes        Hypothyroidism, iatrogenic ICD-10-CM: E03.2  ICD-9-CM: 244. 3  Unknown Yes    Overview Signed 7/13/2015  3:16 PM by Edi Sheets MD     radioiodine             Hypercholesteremia ICD-10-CM: E78.00  ICD-9-CM: 272.0  Unknown Yes

## 2022-02-26 NOTE — DISCHARGE SUMMARY
[facility] [address] [place] [address]   Office [phone]  Fax [phone]       Discharge / Transfer / Off-Service Note     Name: Jaleesa Cerna MRN: [id number]  Sex: Female   YOB: 1935  Age: 80 y.o. PCP: Dixon Nicolas MD     Date of admission: 2/24/2022  Date of discharge/transfer: 2/27/2022    Attending physician at admission: [facility]. Attending physician at discharge/transfer: Jeane Nicholas MD  Resident physician at discharge/transfer: Fidel Quinn MD     Consultants during hospitalization  IP CONSULT TO [address]     Admission diagnoses   Elevated troponin [R77.8]  Hypokalemia [E87.6]  Urinary tract infection with hematuria [N39.0, R31.9]    Recommended follow-up after discharge    1. PCP-Mariama Mccrary MD  2. Orthopedics - R shoulder pain     Things to follow up on with PCP:   - Closely monitor blood pressure in outpatient setting with new regimen  - Consider repeat CMP to monitor K and Ca levels  - Follow up final results of urine culture  - Follow up final results of PTHrp and vitamin D. pt's calcium level was elevated. - Would benefit from home PT to continue working on strength and coordination  - Follow up on anemia of chronic disease.   ----------------------------------------------------------------------------------------------------------------------------  The patient was well enough to be discharged from the hospital. However, because they were inpatient in a hospital, they are at greater risk of having been exposed to the coronavirus.  PLEASE stay inside and self-quarantine for 14 days to prevent further spread of the coronavirus.  -----------------------------------------------------------------------------------------------------------------    Medication Changes:  START   None     STOP   None     CHANGES   - Norvasc changed from 5 mg daily to 10 mg daily  - Losartan changed from 25 mg daily to 50 mg daily  - Metoprolol changed from 50 mg to 25 mg due to bradycardia     No other changes were made to your medications, please take all your other home medicines as previously prescribed. History of Present Illness    As per admitting provider:     Amy Mauricio is a 80 y.o. female with PMHx of CAD, prior GI bleed, DM2, HTN, HLD, and hypothyroidism who presents to the ED from [facility] for hypokalemia in the s/o ongoing weakness, fatigue, dizziness, and light-headedness. Pt and family report that pt has been experiencing weakness, fatigue, and dizziness for the past two weeks. Her symptoms got progressively worse today, prompting family to take her to [facility]. Pt was advised to go to the ED given finding of hypokalemia. Pt denies having chest pain, fevers, chills, or dysuria. Of note, pt sustained a ground-level fall around two weeks ago when she had difficulty locking her walker. She did not hit her head. No LOC. Two days ago, she was unable to get out of the bathtub and hurt her R shoulder attempting to get telephone to call for help.     Vaccinated against COVID w/ [employer] x1      Hospital course    Antonio Kong was admitted to [facility] on 2/24/2022 and discharged on 02/27/2022 for HTN emergency. She presented to the hospital with systolic BP ranging between 914-674 and diastolic between 667-154. EKG did not show any ischemic changes. TSH wnl and UDS was negative. Patient received Hydralazine as needed to help control blood pressure while inpatient. The dosage of her home blood pressure medication was changed to help with BP control. Amlodipine was increased from 5 mg daily to 10 mg daily, Losartan was increased from 25 mg to 50 mg daily and Metoprolol was decreased from 50 mg daily to 25 mg daily due to bradycardia. She also presented to the hospital with low potassium level which was repleted. Urine culture grew >100,000 colonies/ml of GNR. She received 3 doses of CTX.  During hospitalization, OT recommended OT 2 days/week at home and assist and or supervision for safety with ADL. Family member will stay with patient 24/7 to provide help with ADL.    Physical exam at discharge:    Vitals Reviewed. Patient Vitals for the past 12 hrs:   Temp Pulse Resp BP SpO2   02/27/22 1025 97.9 °F (36.6 °C) 72 18 139/60 98 %   02/27/22 0958 98.9 °F (37.2 °C) 76 20 (!) 179/64 99 %   02/27/22 0838    135/89    02/27/22 0759 99 °F (37.2 °C) 76 22 (!) 190/116 100 %   02/27/22 0700  67      02/27/22 0400  (!) 58      02/27/22 0301 98.1 °F (36.7 °C) (!) 58 14 (!) 146/65 96 %      General: NAD, resting comfortably. Hard of hearing. Respiratory: CTAB anteriorly. Cardiovascular: Regular rate and rhythm. 3/6 systolic ejection murmur heard best over the RUSB. GI: Nontender. Skin: Warm, dry. Neuro: Awake, alert, and conversant. CN II-VII, IX-XII grossly intact.  strength, elbow flexion/extension, hip flexion 5/5 bilaterally. Sensation grossly intact. Condition at discharge: Stable. Labs  Recent Labs     02/27/22  0211 02/26/22  0027 02/25/22  1150 02/25/22  0431 02/25/22  0013   WBC 6.9 6.6  --   --  5.7   HGB 9.3* 9.6* 9.4*   < > 9.4*   HCT 28.3* 28.9* 28.3*   < > 28.3*    267  --   --  267    < > = values in this interval not displayed. Recent Labs     02/27/22  0211 02/26/22  0949 02/26/22  0027 02/25/22  1304 02/25/22  0729    138 139   < >  --    K 3.3* 3.6 3.3*   < >  --     107 108   < >  --    CO2 27 26 26   < >  --    BUN 11 8 10   < >  --    CREA 0.68 0.67 0.69   < >  --    * 107* 112*   < >  --    CA 10.3* 10.4* 10.2*   < >  --    MG 2.0  --  1.6  --  2.0    < > = values in this interval not displayed.      Recent Labs     02/24/22  1644   ALT 24   AP 59   TBILI 0.4   TP 10.5*   ALB 2.8*   GLOB 7.7*     Recent Labs     02/25/22  0120 02/25/22  0013   TIBC  --  238*   PSAT  --  18*   FERR 85  --        Micro:  Lab Results   Component Value Date/Time    Culture result: (A) 02/24/2022 06:17 PM     GRAM NEGATIVE RODS IDENTIFICATION AND SUSCEPTIBILITY TO FOLLOW       Imaging:  XR SHOULDER RT AP/LAT MIN 2 V    Result Date: 2/24/2022  EXAM: XR SHOULDER RT AP/LAT MIN 2 V INDICATION: fall. COMPARISON: None. FINDINGS: Three views of the right shoulder demonstrate high riding humeral head and posterior subluxation of the humeral head. There is severe glenohumeral joint and AC joint degenerative disease. There is marked soft tissue swelling about the lateral aspect of the shoulder, but without evidence of an acute fracture. Sclerotic changes in the acromion with subacromial spurring from chronic rotator cuff tear. Osteopenia. 1. Marked soft tissue swelling about the lateral aspect of the shoulder. No evidence of acute fracture. 2. Findings of chronic rotator cuff tear and severe degenerative disease of the glenohumeral joint with high riding humerus and posterior subluxation. XR CHEST PORT    Result Date: 2/24/2022  INDICATION:  chest pressure COMPARISON: May 2016 FINDINGS: Single AP portable view of the chest obtained at 1652 demonstrates a stable cardiomediastinal silhouette. There is chronic cardiomegaly. The lungs are clear bilaterally. No acute osseous abnormalities are seen. No evidence of acute cardiopulmonary process. ECHO ADULT COMPLETE    Result Date: 2/25/2022    Left Ventricle: Left ventricle size is normal. Findings consistent with severe concentric hypertrophy. Normal wall motion. Normal left ventricular systolic function with a visually estimated EF of 60 - 65%. Diastolic function present with increased LAP with normal LV EF.   Aortic Valve: Valve structure is normal. Mildly calcified cusp. Mild annular calcification.   Mitral Valve: Mild annular calcification of the mitral valve. Mild transvalvular regurgitation.        Procedures / Diagnostic Studies  · none    Chronic diagnoses   Problem List as of 2/27/2022 Date Reviewed: 12/6/2021          Codes Class Noted - Resolved    * (Principal) Hypertensive emergency ICD-10-CM: I16.1  ICD-9-CM: 401.9  2/25/2022 - Present        Weakness generalized ICD-10-CM: R53.1  ICD-9-CM: 780.79  2/25/2022 - Present        Hypokalemia ICD-10-CM: E87.6  ICD-9-CM: 276.8  2/24/2022 - Present        Elevated troponin ICD-10-CM: R77.8  ICD-9-CM: 790.6  2/24/2022 - Present        Urinary tract infection with hematuria ICD-10-CM: N39.0, R31.9  ICD-9-CM: 599.0, 599.70  2/24/2022 - Present        Type 2 diabetes with nephropathy ([facility] ICD-10-CM: E11.21  ICD-9-CM: 250.40, 583.81  12/1/2020 - Present        S/P right inguinal herniorrhaphy ICD-10-CM: Z98.890, Z87.19  ICD-9-CM: V45.89  10/7/2020 - Present    Overview Signed 10/7/2020  1:00 PM by Nathen Eric MD     With mesh. Nodule of left lobe of thyroid gland ICD-10-CM: E04.1  ICD-9-CM: 241.0  9/8/2020 - Present        Anemia ICD-10-CM: D64.9  ICD-9-CM: 285.9  8/29/2018 - Present        Type 2 diabetes mellitus without complication, without long-term current use of insulin ([facility] ICD-10-CM: E11.9  ICD-9-CM: 250.00  4/24/2017 - Present        Advance care planning ICD-10-CM: Z71.89  ICD-9-CM: V65.49  1/12/2017 - Present        DDD (degenerative disc disease), lumbar ICD-10-CM: M51.36  ICD-9-CM: 722.52  9/6/2016 - Present        Gastric ulcer ICD-10-CM: K25.9  ICD-9-CM: 531.90  9/6/2016 - Present        GI bleeding ICD-10-CM: K92.2  ICD-9-CM: 578.9  Unknown - Present    Overview Signed 5/4/2016 11:43 AM by Lamin Burns MD     workup in [address] [place] [address] 12/15 was unremarkable (Dr. Saad Peck) - says she had EGD, colonoscopy, and small bowel capsule endoscopy             Coronary artery disease involving native coronary artery of native heart without angina pectoris ICD-10-CM: I25.10  ICD-9-CM: 414.01  5/4/2016 - Present        HTN (hypertension) ICD-10-CM: I10  ICD-9-CM: 401.9  Unknown - Present        Hypothyroidism, iatrogenic ICD-10-CM: E03.2  ICD-9-CM: 244. 3  Unknown - Present    Overview Signed 7/13/2015  3:16 PM by Giovanni Al MD     radioiodine             Hypercholesteremia ICD-10-CM: E78.00  ICD-9-CM: 272.0  Unknown - Present        RESOLVED: Non-recurrent inguinal hernia of right side without obstruction or gangrene ICD-10-CM: K40.90  ICD-9-CM: 550.90  9/8/2020 - 10/7/2020        RESOLVED: Diabetes ([facility] [address] ICD-10-CM: E11.9  ICD-9-CM: 250.00  Unknown - 4/24/2017              Current Discharge Medication List      CONTINUE these medications which have CHANGED    Details   amLODIPine (NORVASC) 10 mg tablet Take 1 Tablet by mouth daily. Qty: 30 Tablet, Refills: 0  Start date: 2/28/2022      metoprolol succinate (TOPROL-XL) 25 mg XL tablet Take 1 Tablet by mouth daily. Qty: 30 Tablet, Refills: 0  Start date: 2/28/2022      losartan (COZAAR) 50 mg tablet Take 1 Tablet by mouth daily. Qty: 30 Tablet, Refills: 0  Start date: 2/28/2022         CONTINUE these medications which have NOT CHANGED    Details   rosuvastatin (CRESTOR) 10 mg tablet TAKE 1 TABLET NIGHTLY  Qty: 90 Tablet, Refills: 3    Associated Diagnoses: Hypercholesteremia      levothyroxine (SYNTHROID) 88 mcg tablet TAKE 1 TABLET DAILY BEFORE BREAKFAST  Qty: 90 Tablet, Refills: 3    Associated Diagnoses: Hypothyroidism, iatrogenic      valACYclovir (VALTREX) 1 gram tablet TAKE 1 TABLET DAILY  Qty: 30 Tab, Refills: 3      polyethylene glycol (Miralax) 17 gram packet Take 17 g by mouth daily. NU-IRON 150 mg iron capsule TAKE 1 CAPSULE TWICE A DAY  Qty: 60 Cap, Refills: 5      vit C/E/Zn/coppr/lutein/zeaxan (PRESERVISION AREDS 2 PO) Take  by mouth.      pantoprazole (PROTONIX) 40 mg tablet Take 1 Tab by mouth daily. Qty: 90 Tab, Refills: 3      OTHER Walker with seat  DX: DDD lumbar  Qty: 1 Each, Refills: 0      latanoprost (XALATAN) 0.005 % ophthalmic solution       naproxen sodium 220 mg cap Take  by mouth every other day.     Associated Diagnoses: Coronary artery disease involving native coronary artery of native heart without angina pectoris; Gastrointestinal hemorrhage, unspecified gastrointestinal hemorrhage type; Essential hypertension, hypertension with unspecified goal; Type 2 diabetes mellitus without complication ([facility] [address] Hypercholesteremia; Hypothyroidism, iatrogenic      cholecalciferol, vitamin D3, (VITAMIN D3) 2,000 unit tab Take  by mouth.      krill-om3-dha-epa-om6-lip-astx (KRILL OIL, OMEGA 3 & 6,) 1,500-165-67.5 mg cap Take  by mouth. MULTIVITAMIN (MULTIPLE VITAMIN PO) Take  by mouth. aspirin 81 mg chewable tablet Take 1 Tab by mouth daily. Qty: 30 Tab, Refills: 0              Diet:  Cardiac diet.     Activity:  As tolerated     Disposition: Home or Self Care    Discharge instructions to patient/family  Please seek medical attention for any new or worsening symptoms particularly fever, chest pain, shortness of breath, abdominal pain, nausea, vomiting    Follow up plans/appointments  Follow-up Information     Follow up With Specialties Details Why Contact Info    Luly Rodrigez MD Family Medicine Call Schedule an appointment for follow up after your hospitalization [address] [facility]  [phone]             Sarah Aquino MD  Family Medicine Resident     For Billing    Chief Complaint   Patient presents with    Abnormal Lab Results   [facility] Problems  Date Reviewed: 12/6/2021          Codes Class Noted POA    * (Principal) Hypertensive emergency ICD-10-CM: I16.1  ICD-9-CM: 401.9  2/25/2022 Unknown        Weakness generalized ICD-10-CM: R53.1  ICD-9-CM: 780.79  2/25/2022 Unknown        Hypokalemia ICD-10-CM: E87.6  ICD-9-CM: 276.8  2/24/2022 Unknown        Elevated troponin ICD-10-CM: R77.8  ICD-9-CM: 790.6  2/24/2022 Unknown        Urinary tract infection with hematuria ICD-10-CM: N39.0, R31.9  ICD-9-CM: 599.0, 599.70  2/24/2022 Unknown        Anemia ICD-10-CM: D64.9  ICD-9-CM: 285.9  8/29/2018 Yes        Type 2 diabetes mellitus without complication, without long-term current use of insulin ([facility] ICD-10-CM: E11.9  ICD-9-CM: 250.00  4/24/2017 Yes        Coronary artery disease involving native coronary artery of native heart without angina pectoris ICD-10-CM: I25.10  ICD-9-CM: 414.01  5/4/2016 Yes        HTN (hypertension) ICD-10-CM: I10  ICD-9-CM: 401.9  Unknown Yes        Hypothyroidism, iatrogenic ICD-10-CM: E03.2  ICD-9-CM: 244. 3  Unknown Yes    Overview Signed 7/13/2015  3:16 PM by Lourena Najjar, MD     radioiodine             Hypercholesteremia ICD-10-CM: E78.00  ICD-9-CM: 272.0  Unknown Yes

## 2022-02-26 NOTE — PROGRESS NOTES
Problem: Self Care Deficits Care Plan (Adult)  Goal: *Acute Goals and Plan of Care (Insert Text)  Description: FUNCTIONAL STATUS PRIOR TO ADMISSION: Patient was modified independent using a rollator for functional mobility. Fall ~2 weeks ago and then difficulty getting out of tub    HOME SUPPORT: The patient lived alone with her sister and daughter to provide assistance. Occupational Therapy Goals  Initiated 2/26/2022  1. Patient will perform grooming with modified independence standing at the sink within 7 day(s). 2.  Patient will perform upper body dressing and lower body dressing with supervision/set-up within 7 day(s). 3.  Patient will perform toilet transfers with supervision/set-up within 7 day(s). 4.  Patient will perform all aspects of toileting with modified independence within 7 day(s). 5.  Patient will demonstrate good safety awareness during ADL mobility within 7 day(s). 6.  Patient will use right dominant UE for functional tasks without increased complaint of pain within 7 day(s). Outcome: Not Met    OCCUPATIONAL THERAPY EVALUATION  Patient: Brigido Najera (42 y.o. female)  Date: 2/26/2022  Primary Diagnosis: Elevated troponin [R77.8]  Hypokalemia [E87.6]  Urinary tract infection with hematuria [N39.0, R31.9]        Precautions:   Fall    ASSESSMENT  Based on the objective data described below, the patient presents with decreased insight to need for assist, educated on fall prevention and need for assist with all transfers. Patient's blood pressure elevated (194/84) and assymptomatic, meds given prior. Patient's right shoulder with functional shoulder flexion to ~90 degrees. Recommend initially having increased assist at home and may benefit from use of rolling walker versus rollator (discussed with PT). Current Level of Function Impacting Discharge (ADLs/self-care): CGA to min assist toileting, bathing, LE ADL's    Functional Outcome Measure:   The patient scored 50/100 on the Barthel Index outcome measure which is indicative of partially dependent. Other factors to consider for discharge: lives alone     Patient will benefit from skilled therapy intervention to address the above noted impairments. PLAN :  Recommendations and Planned Interventions: self care training, functional mobility training, therapeutic exercise, balance training, therapeutic activities, endurance activities, patient education, home safety training and family training/education    Frequency/Duration: Patient will be followed by occupational therapy 5 times a week to address goals. Recommendation for discharge: (in order for the patient to meet his/her long term goals)  Occupational therapy at least 2 days/week in the home AND ensure assist and/or supervision for safety with ADL transfers, LE ADL's, bathing, cooking    This discharge recommendation:  Has not yet been discussed the attending provider and/or case management    IF patient discharges home will need the following DME: none       SUBJECTIVE:   Patient stated it's high when I get nervous.  re: blood pressure    OBJECTIVE DATA SUMMARY:   HISTORY:   Past Medical History:   Diagnosis Date    Diabetes ([facility] [address]     GI bleeding     workup in [address] [place] [address] 12/15 was unremarkable (Dr. Altagracia Craig) - says she had EGD, colonoscopy, and small bowel capsule endoscopy    HTN (hypertension)     Hypercholesteremia     Hypothyroidism, iatrogenic     radioiodine    MI (myocardial infarction) ([address]     During surgery in 's - she's had multiple caths and stress tests     Past Surgical History:   Procedure Laterality Date    HX  SECTION      HX HERNIA REPAIR  037    Open umbilical incisional herniorrhaphy [facility]).  HX HERNIA REPAIR Right 2020    Right inguinal herniorrhaphy with mesh.     HX ORTHOPAEDIC      Back surgery x 2.    HX BRIAN AND BSO         Expanded or extensive additional review of patient history:     [facility] Environment: Private residence  Support Systems: Child(jaclyn)  Patient Expects to be Discharged to[de-identified] Home with family assistance  Current DME Used/Available at Home: cari Brysonator  Tub or Shower Type: Tub/Shower combination    Hand dominance: Right    EXAMINATION OF PERFORMANCE DEFICITS:  Cognitive/Behavioral Status:  Neurologic State: Alert  Orientation Level: Oriented to person;Oriented to place;Oriented to situation  Cognition: Follows commands; Appropriate for age attention/concentration; Appropriate safety awareness  Perception: Appears intact  Perseveration: No perseveration noted  Safety/Judgement: Awareness of environment; Fall prevention;Home safety; Insight into deficits    Skin: intct    Edema: right shoulder     Hearing: Auditory  Auditory Impairment: Hard of hearing, bilateral,Hearing aid(s)  Hearing Aids/Status: Functioning,Bilateral   Located hearing aides charging in room and donned with supervision and min cues    Vision/Perceptual:       Not assessed  Range of Motion:  AROM: Within functional limits (x R shoulder)      right shoulder flexion to ~90 degrees      Strength:  Strength: Within functional limits (x R shoulder - RTC tear chronic)                Coordination:  Coordination: Within functional limits  Fine Motor Skills-Upper: Left Intact; Right Intact    Gross Motor Skills-Upper: Left Intact; Right Intact    Tone & Sensation:  Tone: Normal  Sensation: Intact                      Balance:  Sitting: Intact  Standing: Intact; With support    Functional Mobility and Transfers for ADLs:  Bed Mobility:  Rolling: Independent  Supine to Sit: Supervision  Sit to Supine: Supervision  Scooting: Supervision    Transfers:  Sit to Stand: Contact guard assistance (cues)  Stand to Sit: Contact guard assistance (cues)  Bed to Chair: Contact guard assistance  Bathroom Mobility: Contact guard assistance  Toilet Transfer : Minimum assistance; Adaptive equipment    ADL Assessment:  Feeding: Modified independent    Oral Facial Hygiene/Grooming: Stand-by assistance    Bathing: Minimum assistance    Upper Body Dressing: Supervision    Lower Body Dressing: Minimum assistance    Toileting: Contact guard assistance                ADL Intervention and task modifications:        Educated on role of OT, need for assist with all transfers and use of call bell, chair alarm placed under patient and instructed to sit if it goes off when she stands    Patient instructed and indicated understanding the benefits of maintaining activity tolerance, functional mobility, and independence with self care tasks during acute stay  to ensure safe return home and to baseline. Encouraged patient to increase frequency and duration OOB, be out of bed for all meals, perform daily ADLs (as approved by RN/MD regarding bathing etc), and performing functional mobility to/from bathroom. Cognitive Retraining  Safety/Judgement: Awareness of environment; Fall prevention;Home safety; Insight into deficits     Functional Measure:    Barthel Index:  Bathin  Bladder: 5  Bowels: 10  Groomin  Dressin  Feeding: 10  Mobility: 0  Stairs: 0  Toilet Use: 5  Transfer (Bed to Chair and Back): 10  Total: 50/100      The Barthel ADL Index: Guidelines  1. The index should be used as a record of what a patient does, not as a record of what a patient could do. 2. The main aim is to establish degree of independence from any help, physical or verbal, however minor and for whatever reason. 3. The need for supervision renders the patient not independent. 4. A patient's performance should be established using the best available evidence. Asking the patient, friends/relatives and nurses are the usual sources, but direct observation and common sense are also important. However direct testing is not needed. 5. Usually the patient's performance over the preceding 24-48 hours is important, but occasionally longer periods will be relevant.   6. Middle categories imply that the patient supplies over 50 per cent of the effort. 7. Use of aids to be independent is allowed. Score Interpretation (from [facility])    Independent   60-79 Minimally independent   40-59 Partially dependent   20-39 Very dependent   <20 Totally dependent     -Alf Gutierrez., Barthel, D.W. (1965). Functional evaluation: the Barthel Index. [address] ([address] (1997). The Barthel activities of daily living index: self-reporting versus actual performance in the old (> or = 75 years). Journal of [address] [place] [address] 45(7), 14 [facility], JYOLANDAF, Christ Almazan., [place]. (1999). Measuring the change in disability after inpatient rehabilitation; comparison of the responsiveness of the Barthel Index and Functional [place] Measure. Journal of Neurology, Neurosurgery, and Psychiatry, 66(4), 492-377. Edelmira Saucedo, N.J.A, CLAUDIO Gilliam, & Jorden Homans, M.A. (2004) Assessment of post-stroke quality of life in cost-effectiveness studies: The usefulness of the Barthel Index and the EuroQoL-5D. Quality of Life Research, 15, 260-40         Occupational Therapy Evaluation Charge Determination   History Examination Decision-Making   LOW Complexity : Brief history review  LOW Complexity : 1-3 performance deficits relating to physical, cognitive , or psychosocial skils that result in activity limitations and / or participation restrictions  MEDIUM Complexity : Patient may present with comorbidities that affect occupational performnce.  Miniml to moderate modification of tasks or assistance (eg, physical or verbal ) with assesment(s) is necessary to enable patient to complete evaluation       Based on the above components, the patient evaluation is determined to be of the following complexity level: LOW   Pain Rating:  Slight with right UE shoulder flexion    Activity Tolerance:   Fair    After treatment patient left in no apparent distress:    Sitting in chair, Call bell within reach, and Bed / chair alarm activated    COMMUNICATION/EDUCATION:   The patients plan of care was discussed with: Physical therapist and Registered nurse. Home safety education was provided and the patient/caregiver indicated understanding. and Patient/family have participated as able in goal setting and plan of care. This patients plan of care is appropriate for delegation to [facility] [place].     Thank you for this referral.  Danielle Mir, OTR/L  Time Calculation: 22 mins

## 2022-02-26 NOTE — PROGRESS NOTES
Assessed pt need, because she is heard crying hysterically from nursing station. Pt states \" someone lost my hearing aides and they cost me 8 thousand dollars\". Recently transferred to #[phone]. I personally located [employer] hearing aide, charging case and , and they are at the bedside with pt's belongings. Mirna Juárez RN notified.

## 2022-02-26 NOTE — PROGRESS NOTES
0700 Bedside and Verbal shift change report given to JEANETTE MURILLO (oncoming nurse) by Minerva Shultz RN (offgoing nurse). Report included the following information SBAR, Kardex, Intake/Output, MAR, Accordion, Recent Results and Med Rec Status. This patient was assisted with Intentional Toileting every 2 hours during this shift as appropriate. Documentation of ambulation and output reflected on Flowsheet as appropriate. Purposeful hourly rounding was completed using AIDET and 5Ps. Outcomes of PHR documented as they occurred. Bed alarm in use as appropriate. Dual Suction and ambubag in place. 26 Notified MD of patient's elevated BP and of the family's concerns regarding discharge. 1700 Updated family     1920 Bedside and Verbal shift change report given to Minerva Shultz RN (oncoming nurse) by Zuly Corrales RN (offgoing nurse). Report included the following information SBAR, Kardex, Intake/Output, MAR, Accordion, Recent Results and Med Rec Status.

## 2022-02-26 NOTE — PROGRESS NOTES
Problem: Mobility Impaired (Adult and Pediatric)  Goal: *Acute Goals and Plan of Care (Insert Text)  Description: FUNCTIONAL STATUS PRIOR TO ADMISSION: Patient was modified independent using a rollator for functional mobility. HOME SUPPORT PRIOR TO ADMISSION: The patient lived alone with sister & LEAH nearby to provide assistance. Physical Therapy Goals  Initiated 2/26/2022  2. Patient will transfer from bed to chair and chair to bed with modified independence using the least restrictive device within 7 day(s). 3.  Patient will perform sit to stand with modified independence within 7 day(s). 4.  Patient will ambulate with modified independence for 300 feet with the least restrictive device within 7 day(s). Outcome: Not Met     PHYSICAL THERAPY EVALUATION  Patient: Janna Arias (63 y.o. female)  Date: 2/26/2022  Primary Diagnosis: Elevated troponin [R77.8]  Hypokalemia [E87.6]  Urinary tract infection with hematuria [N39.0, R31.9]        Precautions: cardiac  Fall    ASSESSMENT  Based on the objective data described below, the patient presents with reduced activity tolerance, impaired balance with reliance upon walker, & R shoulder limitations following recent fall. RTC tear R shoulder chronic & patient reports ROM & function as prior, tho 'sore' from fall. Ambulates with stand by for safety, sit-stand with contact + cues, using rolling walker. Ambulation today at ~200' with mild SOB last 50.'  This is approximately 75% of her usual walk to mailbox. BP high at rest supine (181/79) and increased to 207/100 at termination. Dropped to 199/86 after 5 min. No dizziness. MDs in at termination of treatment. Will benefit from another visit or two for walker mgmt & HEP. HHPT would be helpful. Current Level of Function Impacting Discharge (mobility/balance): needs further instruction for safe use of walker    Functional Outcome Measure:   The patient scored 23 on the tinetti outcome measure which is indicative of mod fall risk. Other factors to consider for discharge: lives alone      Patient will benefit from skilled therapy intervention to address the above noted impairments. PLAN :  Recommendations and Planned Interventions: transfer training, gait training, therapeutic exercises, neuromuscular re-education, patient and family training/education, and therapeutic activities      Frequency/Duration: Patient will be followed by physical therapy:  5 times a week to address goals. - expect she will need just a couple visits if in House    Recommendation for discharge: (in order for the patient to meet his/her long term goals)  Physical therapy at least 2 days/week in the home     This discharge recommendation:  Has not yet been discussed the attending provider and/or case management    IF patient discharges home will need the following DME: none         SUBJECTIVE:   Patient stated No, I'm not walking as good as usual.    OBJECTIVE DATA SUMMARY:   HISTORY:    Past Medical History:   Diagnosis Date    Diabetes ([facility] [address]     GI bleeding     workup in [address] [place] [address] 12/15 was unremarkable (Dr. Yennifer Pastor) - says she had EGD, colonoscopy, and small bowel capsule endoscopy    HTN (hypertension)     Hypercholesteremia     Hypothyroidism, iatrogenic     radioiodine    MI (myocardial infarction) ([facility] [address]     During surgery in  - she's had multiple caths and stress tests     Past Surgical History:   Procedure Laterality Date    HX  SECTION      HX HERNIA REPAIR  4601    Open umbilical incisional herniorrhaphy [facility]).  HX HERNIA REPAIR Right 2020    Right inguinal herniorrhaphy with mesh.     HX ORTHOPAEDIC      Back surgery x 2.    HX BRIAN AND BSO         Personal factors and/or comorbidities impacting plan of care:     Home Situation  Home Environment: Private residence  Support Systems: Child(jaclyn)  Patient Expects to be Discharged to[de-identified] Home with family assistance    EXAMINATION/PRESENTATION/DECISION MAKING:   Critical Behavior:  Neurologic State: Appropriate for age  Orientation Level: Oriented to person,Oriented to time,Disoriented to place,Disoriented to situation  Cognition: Follows commands     Hearing: Auditory  Auditory Impairment: Hard of hearing, left side,Hard of hearing, right side  Skin:  some buising from fall  Edema: none of note x R shoulder  Range Of Motion:  AROM: Within functional limits (x R shoulder)                       Strength:    Strength: Within functional limits (x R shoulder - RTC tear chronic)                    Tone & Sensation:   Tone: Normal              Sensation: Intact               Coordination:  Coordination: Within functional limits  Vision:      Functional Mobility:  Bed Mobility:  Rolling: Independent  Supine to Sit: Supervision  Sit to Supine: Supervision  Scooting: Supervision  Transfers:  Sit to Stand: Contact guard assistance (cues)  Stand to Sit: Contact guard assistance (cues)                       Balance:   Sitting: Intact  Standing: Intact; With support  Ambulation/Gait Training:  Distance (ft): 200 Feet (ft)  Assistive Device: Walker, rolling  Ambulation - Level of Assistance: Stand-by assistance                                           Therapeutic Exercises:   Standing exercises with walker    Functional Measure:  Tinetti test:    Sitting Balance: 1  Arises: 1  Attempts to Rise: 2  Immediate Standing Balance: 1  Standing Balance: 2  Nudged: 2  Eyes Closed: 1  Turn 360 Degrees - Continuous/Discontinuous: 1  Turn 360 Degrees - Steady/Unsteady: 1  Sitting Down: 2  Balance Score: 14 Balance total score  Indication of Gait: 1  R Step Length/Height: 1  L Step Length/Height: 1  R Foot Clearance: 1  L Foot Clearance: 1  Step Symmetry: 1  Step Continuity: 1  Path: 1  Trunk: 0  Walking Time: 1  Gait Score: 9 Gait total score  Total Score: 23/28 Overall total score         Tinetti Tool Score Risk of Falls  <19 = High Fall Risk  19-24 = Moderate Fall Risk  25-28 = Low Fall Risk  Noel POWER. Performance-Oriented Assessment of Mobility Problems in Elderly Patients. Keith 66; [id number]. (Scoring Description: PT Bulletin Feb. 10, 1993)    Older adults: Mary Juárez et al, 2009; n = 1000 [facility] elderly evaluated with ABC, DENZEL, ADL, and IADL)  · Mean DENZEL score for males aged 69-68 years = 26.21(3.40)  · Mean DENZEL score for females age 69-68 years = 25.16(4.30)  · Mean DENZEL score for males over 80 years = 23.29(6.02)  · Mean DENZEL score for females over [de-identified] years = 17.20(8.32)           Physical Therapy Evaluation Charge Determination   History Examination Presentation Decision-Making   LOW Complexity : Zero comorbidities / personal factors that will impact the outcome / POC LOW Complexity : 1-2 Standardized tests and measures addressing body structure, function, activity limitation and / or participation in recreation  LOW Complexity : Stable, uncomplicated  Other outcome measures tinetti  LOW       Based on the above components, the patient evaluation is determined to be of the following complexity level: LOW     Pain Rating:  No complaint    Activity Tolerance:   observed SOB with activity (last 50') and very high BP    After treatment patient left in no apparent distress:   Supine in bed    COMMUNICATION/EDUCATION:   The patients plan of care was discussed with: Registered nurse and daughter; MDs . Fall prevention education was provided and the patient/caregiver indicated understanding. and Patient/family have participated as able in goal setting and plan of care.     Thank you for this referral.  [facility] Miguel, PT   Time Calculation: 34 mins

## 2022-02-26 NOTE — PROGRESS NOTES
Bedside and Verbal shift change report given to LIDIA Trinidad (oncoming nurse) by Pita Arora RN (offgoing nurse). Report included the following information SBAR, Kardex, Intake/Output, MAR and Recent Results. This patient was assisted with Intentional Toileting every 2 hours during this shift as appropriate. Documentation of ambulation and output reflected on Flowsheet as appropriate. Purposeful hourly rounding was completed using AIDET and 5Ps. Outcomes of PHR documented as they occurred. Bed alarm in use as appropriate. Dual Suction and ambubag in place. Bedside and Verbal shift change report given to LIDIA Sena (oncoming nurse) by Richard Shafer RN (offgoing nurse). Report included the following information SBAR, Kardex, Intake/Output, MAR and Recent Results.

## 2022-02-27 NOTE — PROGRESS NOTES
[address] [place] [address]   Office [phone]  Fax [phone]          Assessment and Plan     Margarito Knight is a 80 y.o. female with a PMHx of CAD, prior GI bleed, DM2, HTN, HLD, and hypothyroidism who is admitted for hypertensive emergency w/ elevated troponin, UTI, and hypokalemia. Overnight Events: No acute events overnight.      Hypertensive emergency in s/o chronic HTN - resolved: POA BP (237-215)/(127-125), trop 65. EKG non-ischemic. Creatinine 0.66 at b/l. TSH wnl, UDS negative. - Daily weights, strict I/Os  - Cardiac monitoring  - Hydralazine 20 mg IV q6H PRN for SBP >170 or DBP >110  - Continue home antihypertensives: amlodipine 5->10 mg daily, losartan 25->50 mg daily, metoprolol XL 50--> 25 mg daily.   - BP better controlled today - will monitor and if stable possible discharge later     UTI: UA POA w/ +nitrites, mod LE, 2+ bacteria. UCx w/ GNR. S/p CTX  - Follow up on urine culture. Hypokalemia: K 2.5 POA. - Replete PRN  - Cardiac monitoring    Hypomagnesemia: Mg 1.4.  - Replete PRN     Generalized weakness and fatigue: Fall and difficulty with getting out of bathtub in past 2 weeks. Etiology likely multifactorial. Hypertensive emergency, UTI, and hypokalemia POA as contributing factors. TSH wnl. UDS negative. Orthostatics negative. Echo w/ LVEF 00-33%, normal diastolic function.  - Fall precautions  - Treatment of above, consider further work-up if no improvement  - Daily CBC, BMP, Mg  - PT/OT to see, pt lives alone and ambulates w/ walker    Non-MI Elevated troponin: hs-trop 65->70->70. EKG non-ischemic. Pt denies having chest pain. - No longer trending troponin     Hypercalcemia: Calcium 10.5 POA. Corrected 11.6. Alkaline phosphatase 59. PTH low at <6.3.  - Obtain ionized calcium level  - Follow up PTH- Related peptide.      Anemia of chronic disease c/f possible GI bleed: Appears to be acute. Hgb 10.6 (BL 12.5).  Hx of GI bleed in 2015 at [facility], ALISHA, JOHN, and small bowel capsule study reportedly normal at that time. FOBT positive (pt also takes iron supplementation). Possible hemodilution as hemoglobin now stable. - CBC daily     R shoulder pain: Injury trying to get out of tub on 2/22/22. XR of right shoulder demonstrates soft tissue swelling and posterior subluxation of the R shoulder. No fracture. R shoulder non-tender to palpation on exam.  - Per ortho continue ice PRN and mobilize as able, no further intervention at this time - appreciate recs  - PT/OT to see     DM2: Last A1c 5.3 on 12/6/21. Not on any home medications.      HLD: Last lipid panel 12/6/21: Tchol 122, HDL 67, LDL 45.6, trig 47. No home medications.     Hypothyroidism: Last TSH 0.53 on 12/6/21.  - Continue home Synthroid 88 mcg daily     CAD: MI (during surgery in 1970s) had multiple caths and stress tests. Records of cardiology visit w/ Dr. Eneida Lauren on 3/22/17. Exercise Cardiolite 7/23/15 - walked 4:39 (6.3 METS), normal stress EKG. Normal MPI. LVEF 71%. Echo 7/23/15 - LVEF 55-60%, grade 1 DD.        FEN/GI - Regular diet. Activity - Ambulate with assistance  DVT prophylaxis - SCDs (FOBT positive)  GI prophylaxis - Protonix  Fall prophylaxis - Fall precautions ordered. Disposition - Plan to d/c to TBD. Consulting PT and OT  Code Status - Full. Discussed with patient / caregivers. Next of [place] [address] Name and Contact - Toña Mckenzie, (daughter) [phone]  Marcus Brunner (daughter)  [phone]       Paula Camarillo MD  Family Medicine Resident         Subjective / Objective     Subjective: Pt was seen and examined at bedside. Endorses left arm pain. Denies fever, chills, CP, SOB, N/V/abd pain.     Objective:    Respiratory:   O2 Device: None (Room air)   Visit Vitals  BP (!) 146/65 (BP 1 Location: Right upper arm, BP Patient Position: At rest)   Pulse (!) 58   Temp 98.1 °F (36.7 °C)   Resp 14   Ht 5' (1.524 m)   Wt 127 lb 6.4 oz (57.8 kg)   SpO2 96%   BMI 24.88 kg/m²     Physical Exam:  General: NAD, resting comfortably. Hard of hearing. Respiratory: CTAB anteriorly. Cardiovascular: Regular rate and rhythm. 3/6 systolic ejection murmur heard best over the RUSB. GI: Nontender. Skin: Warm, dry. Neuro: Awake, alert, and conversant. CN II-VII, IX-XII grossly intact.  strength, elbow flexion/extension, hip flexion 5/5 bilaterally. Sensation grossly intact. I/O:  Date 02/26/22 0700 - 02/27/22 0659 02/27/22 0700 - 02/28/22 0659   Shift 9044-4511 0625-0006 24 Hour Total 1730-9177 5946-2339 24 Hour Total   INTAKE   P.O. 840 120 960        P. O. 840 120 960      Shift Total(mL/kg) 840(14.9) 120(2.1) 960(16.6)      OUTPUT   Urine(mL/kg/hr)  450(0.6) 450(0.3)        Urine Voided  450 450        Urine Occurrence(s) 4 x 2 x 6 x      Stool           Stool Occurrence(s) 4 x 1 x 5 x      Shift Total(mL/kg)  450(7.8) 450(7.8)       -330 510      Weight (kg) 56.2 57.8 57.8 57.8 57.8 57.8       Inpatient Medications  Current Facility-Administered Medications   Medication Dose Route Frequency    metoprolol succinate (TOPROL-XL) XL tablet 25 mg  25 mg Oral DAILY    amLODIPine (NORVASC) tablet 10 mg  10 mg Oral DAILY    losartan (COZAAR) tablet 50 mg  50 mg Oral DAILY    melatonin tablet 3 mg  3 mg Oral QHS PRN    sodium chloride (NS) flush 5-40 mL  5-40 mL IntraVENous Q8H    sodium chloride (NS) flush 5-40 mL  5-40 mL IntraVENous PRN    hydrALAZINE (APRESOLINE) 20 mg/mL injection 20 mg  20 mg IntraVENous Q6H PRN    levothyroxine (SYNTHROID) tablet 88 mcg  88 mcg Oral ACB    iron polysaccharides (NIFEREX) capsule 150 mg  1 Capsule Oral DAILY    pantoprazole (PROTONIX) tablet 40 mg  40 mg Oral ACB         Allergies  Allergies   Allergen Reactions    Celebrex [Celecoxib] Hives    Crestor [Rosuvastatin] Myalgia         CBC:  Recent Labs     02/27/22  0211 02/26/22  0027 02/25/22  1150 02/25/22  0431 02/25/22  0013   WBC 6.9 6.6  --   --  5.7   HGB 9.3* 9.6* 9.4*   < > 9.4*   HCT 28.3* 28.9* 28.3*   < > 28.3*  267  --   --  267    < > = values in this interval not displayed. Metabolic Panel:  Recent Labs     02/27/22  0211 02/26/22  0949 02/26/22  0027 02/25/22  1304 02/25/22  0729 02/25/22  0013 02/24/22  1644    138 139   < >  --    < > 138   K 3.3* 3.6 3.3*   < >  --    < > 2.5*    107 108   < >  --    < > 105   CO2 27 26 26   < >  --    < > 29   BUN 11 8 10   < >  --    < > 9   CREA 0.68 0.67 0.69   < >  --    < > 0.66   * 107* 112*   < >  --    < > 101*   CA 10.3* 10.4* 10.2*   < >  --    < > 10.5*   MG 2.0  --  1.6  --  2.0  --  1.4*   ALB  --   --   --   --   --   --  2.8*   ALT  --   --   --   --   --   --  24    < > = values in this interval not displayed. For Billing    Chief Complaint   Patient presents with    Abnormal Lab Results   [facility]  Date Reviewed: 12/6/2021          Codes Class Noted POA    * (Principal) Hypertensive emergency ICD-10-CM: I16.1  ICD-9-CM: 401.9  2/25/2022 Unknown        Weakness generalized ICD-10-CM: R53.1  ICD-9-CM: 780.79  2/25/2022 Unknown        Hypokalemia ICD-10-CM: E87.6  ICD-9-CM: 276.8  2/24/2022 Unknown        Elevated troponin ICD-10-CM: R77.8  ICD-9-CM: 790.6  2/24/2022 Unknown        Urinary tract infection with hematuria ICD-10-CM: N39.0, R31.9  ICD-9-CM: 599.0, 599.70  2/24/2022 Unknown        Anemia ICD-10-CM: D64.9  ICD-9-CM: 285.9  8/29/2018 Yes        Type 2 diabetes mellitus without complication, without long-term current use of insulin (HCC) ICD-10-CM: E11.9  ICD-9-CM: 250.00  4/24/2017 Yes        Coronary artery disease involving native coronary artery of native heart without angina pectoris ICD-10-CM: I25.10  ICD-9-CM: 414.01  5/4/2016 Yes        HTN (hypertension) ICD-10-CM: I10  ICD-9-CM: 401.9  Unknown Yes        Hypothyroidism, iatrogenic ICD-10-CM: E03.2  ICD-9-CM: 244. 3  Unknown Yes    Overview Signed 7/13/2015  3:16 PM by Marixa Crabtree MD     radioiodine Hypercholesteremia ICD-10-CM: E78.00  ICD-9-CM: 272.0  Unknown Yes

## 2022-02-27 NOTE — PROGRESS NOTES
0700: Bedside and Verbal shift change report given to Farhad Chris (oncoming nurse) by Angela Caldwell (offgoing nurse). Report included the following information SBAR, Kardex, Accordion and Cardiac Rhythm NSR. TRANSFER - OUT REPORT:    Verbal report given to Yumiko MURILLO(name) on Rolando Barton  being transferred to 4th floor(unit) for routine progression of care       Report consisted of patients Situation, Background, Assessment and   Recommendations(SBAR). Information from the following report(s) SBAR, Kardex, Accordion and Cardiac Rhythm NSR was reviewed with the receiving nurse. Lines:   Peripheral IV 02/27/22 Anterior;Right Forearm (Active)        Opportunity for questions and clarification was provided. Patient transported with:   Monitor  Registered Nurse            This patient was assisted with Intentional Toileting every 2 hours during this shift as appropriate. Documentation of ambulation and output reflected on Flowsheet as appropriate. Purposeful hourly rounding was completed using AIDET and 5Ps. Outcomes of PHR documented as they occurred. Bed alarm in use as appropriate. Dual Suction and ambubag in place.

## 2022-02-27 NOTE — PROGRESS NOTES
2/27/2022  1:04 PM  Case management note    Spoke with family regarding HH. Patient is going to her home with 24/7 family support. They were good with ordering HH. [facility] was selected. Referral sent through [facility]. Will continue to follow.   Bety Guy

## 2022-02-27 NOTE — PROGRESS NOTES
Bedside and Verbal shift change report given to LIDIA Condon (oncoming nurse) by Juan Manuel Connell RN (offgoing nurse). Report included the following information SBAR, Kardex, Intake/Output, MAR and Recent Results. 0211: pt potassium 3.3. MD ordered 4 runs of potassium. 0345: LIDIA Salcedo discussed with MD about appropriateness of continuing to give pt runs of IV potassium given that pt can tolerate PO medications. MD changed potassium order to one 30 mEq PO potassium due at 0600. One run of IV potassium completed. This patient was assisted with Intentional Toileting every 2 hours during this shift as appropriate. Documentation of ambulation and output reflected on Flowsheet as appropriate. Purposeful hourly rounding was completed using AIDET and 5Ps. Outcomes of PHR documented as they occurred. Bed alarm in use as appropriate. Dual Suction and ambubag in place. Bedside and Verbal shift change report given to Maryam Dhillon RN (oncoming nurse) by Jeanie Reilly RN (offgoing nurse). Report included the following information SBAR, Kardex, Intake/Output, MAR and Recent Results.

## 2022-02-27 NOTE — PROGRESS NOTES
Spiritual Care Assessment/Progress Note  [address]      NAME: Guille Teran      MRN: [id number]  AGE: 80 y.o. SEX: female  [place] Affiliation: Other   Language: English     2/27/2022     Total Time (in minutes): 5     Spiritual Assessment begun in [facility] POST SURG ORT 2 through conversation with:         []Patient        [] Family    [] Friend(s)        Reason for Consult: Initial visit     Spiritual beliefs: (Please include comment if needed)     [] Identifies with a little tradition:         [] Supported by a little community:            [] Claims no spiritual orientation:           [] Seeking spiritual identity:                [] Adheres to an individual form of spirituality:           [x] Not able to assess:                           Identified resources for coping:      [] Prayer                               [] Music                  [] Guided Imagery     [] Family/friends                 [] Pet visits     [] Devotional reading                         [x] Unknown     [] Other:                                               Interventions offered during this visit: (See comments for more details)    Patient Interventions: Initial visit           Plan of Care:     [] Support spiritual and/or cultural needs    [] Support AMD and/or advance care planning process      [] Support grieving process   [] Coordinate Rites and/or Rituals    [] Coordination with community clergy   [] No spiritual needs identified at this time   [] Detailed Plan of Care below (See Comments)  [] Make referral to Music Therapy  [] Make referral to Pet Therapy     [] Make referral to Addiction services  [] Make referral to [facility]  [] Make referral to Spiritual Care Partner  [] No future visits requested        [x] Contact Spiritual Care for further referrals     Attempted to visit pt for initial spiritual assessment. Unable to complete assessment at this time, pt sleeping and did not awake.  Pt's chart was consulted.   Chaplain Rollins MDiv, MS, [facility]

## 2022-02-27 NOTE — PROGRESS NOTES
TRANSFER - IN REPORT:    Verbal report received from Min Ambrosio RN(name) on Cecelia Mendenhall  being received from [facility](unit) for routine progression of care      Report consisted of patients Situation, Background, Assessment and   Recommendations(SBAR). Information from the following report(s) SBAR, Kardex, ED Summary, MAR, Recent Results and Cardiac Rhythm SR was reviewed with the receiving nurse. Opportunity for questions and clarification was provided. Assessment completed upon patients arrival to unit and care assumed. This patient was assisted with Intentional Toileting every 2 hours during this shift as appropriate. Documentation of ambulation and output reflected on Flowsheet as appropriate. Purposeful hourly rounding was completed using AIDET and 5Ps. Outcomes of PHR documented as they occurred. Bed alarm in use as appropriate. Dual Suction and ambubag in place. I have reviewed discharge instructions with the patient and daughter. The patient and daughter verbalized understanding.

## 2022-02-28 NOTE — TELEPHONE ENCOUNTER
----- Message from Cynthia Perry sent at 2/28/2022 12:02 PM EST -----  Subject: Message to Provider    QUESTIONS  Information for Provider? patient has been d/c'd from hospital, hospital   changed some medications and has concerns on how to take them please call   ---------------------------------------------------------------------------  --------------  CALL BACK INFO  What is the best way for the office to contact you? OK to leave message on   voicemail  Preferred Call Back Phone Number? [phone]  ---------------------------------------------------------------------------  --------------  SCRIPT ANSWERS  Relationship to Patient?  Third Party  Representative Name? daughter

## 2022-02-28 NOTE — PROGRESS NOTES
Care Transitions Initial Call    Call within 2 business days of discharge: Yes     Patient: Cecelia Mendenhall Patient : 1935 MRN: [id number]    Last Discharge [address]       Complaint Diagnosis Description Type Department Provider    22 Abnormal Lab Results; Dizziness Elevated troponin . .. ED to Hosp-Admission (Discharged) (ADMIT) [id number] Chalino Gutierrez MD; Jesus Alberto Hannon,... Was this an external facility discharge? No Discharge Facility: n/a    Challenges to be reviewed by the provider   Additional needs identified to be addressed with provider: yes  labs- repeat labs suggested CMP to monitor K+ and Ca    Pantoprazole- on med list-not currently taking may need refill. Crestor-family reports stopped taking in 2021 will discuss at follow up, still on medication list.    Request family check BP twice daily and keep log to share with provider. BP elevated today, did not take new dose of BP meds, will start correct dose tomorrow. BP rechecked this afternoon now BP-156/69 with pulse 59     Method of communication with provider : chart routing    Discussed COVID-19 related testing which was not done at this time. Test results were not done. Patient informed of results, if available? n/a     Advance Care Planning:   Does patient have an Advance Directive:   currently not on file- will plan to discuss at follow up. Inpatient Readmission Risk score: Unplanned Readmit Risk Score: 11.6 ( )    Was this a readmission? no   Patient stated reason for the admission: dizziness/fatigue, low K+    Patients top risk factors for readmission: medical condition-uncontrolled HTN   Interventions to address risk factors: Scheduled appointment with PCP-3/2, Scheduled appointment with Specialist-3/ and Obtained and reviewed discharge summary and/or continuity of care documents    Care Transition Nurse (CTN) contacted the patient/family by telephone to perform post hospital discharge assessment. Verified name and  with family as identifiers. Provided introduction to self, and explanation of the CTN role. CTN reviewed discharge instructions, medical action plan and red flags with family who verbalized understanding. Were discharge instructions available to patient? yes. Reviewed appropriate site of care based on symptoms and resources available to patient including: PCP, Specialist and [address] [place] [address]. Family given an opportunity to ask questions and does not have any further questions or concerns at this time. The family agrees to contact the PCP office for questions related to their healthcare. Medication reconciliation was performed with family, who verbalizes understanding of administration of home medications. Referral to Pharm D needed: no     Patient has wrist cuff to check BP. During phone call daughter assist patient with check BP. She is not sure if cuff is on correctly but reports reading is 200s/100s with pulse-64. Patient is resting comfortably in chair and denies CP, SOB, dizziness/weakness, headache, or visual changes. Family was unsure about BP meds to give patient today d/t dose changes. Daughter,  currently at residence did not give patient meds today and she will have to contact her sister to confirm which BP meds patient took and dose. Addendum- Per family on return call, patient took BP meds at old dose today (prior to admission dose Norvasc 5 mg, Losartan 25 mg, and Metoprolol XL 50 mg). Metoprolol was reduced to 25 mg at discharge d/t bradycardia, Norvasc was increased to 10 mg and Losartan was increased to 50 mg. Leeanne 15 they are unable to see patient today for hospital follow up/BP check. Contacted patient/family and advised of above, recheck of /69 with pulse 59. Patient continues to be asymptomatic. Family will continue to monitor.       Home Health/Outpatient orders at discharge: home health care  [address]: [facility]  Date of initial visit: TBD- HH has contacted patient    Durable Medical Equipment ordered at discharge: None     Covid Risk Education    Educated patient about risk for severe COVID-19 due to risk factors according to CDC guidelines. CTN reviewed discharge instructions, medical action plan and red flag symptoms with the family who verbalized understanding. Discussed COVID vaccination status: no.     Discussed follow-up appointments. If no appointment was previously scheduled, appointment scheduling offered: n/a. Is follow up appointment scheduled within 7 days of discharge? yes. [facility] follow up appointment(s):   Future Appointments   Date Time Provider Lizbet Johnston   3/1/2022 10:20 AM Mira Lawrence MD [facility] BS AMB   3/2/2022  3:00 PM Malachi Delgado MD [facility] BS AMB     Non-[facility] follow up appointment(s):   None listed    Plan for follow-up call in 1-2 days based on severity of symptoms and risk factors. Plan for next call: follow up appointment-attend follow up and monitor BP keep log  CTN provided contact information for future needs. Goals Addressed                 This Visit's Progress     Prevent complications post hospitalization. 02/28/22   Attend follow up appointment as directed.    Medication compliance   Monitor BP/pulse and home a keep log

## 2022-02-28 NOTE — TELEPHONE ENCOUNTER
Returned [employer] She was at [facility] and discharged. Bp meds changed. Daughter has not yet doubled doses, but this am bp was 200/60. Currently at 5 pm bp 150/63. Informed to start new doses tomorrow.  Appt with Dr. Kimberli Peng on [facility] 1/2/2022

## 2022-03-01 NOTE — PROGRESS NOTES
Name: Brigido Najera    : 1935     Service Dept: [facility]    Chief Complaint   Patient presents with    Knee Pain        There were no vitals taken for this visit. Allergies   Allergen Reactions    Celebrex [Celecoxib] Hives    Crestor [Rosuvastatin] Myalgia        Current Outpatient Medications   Medication Sig Dispense Refill    dexamethasone (DECADRON) 10 mg/mL injection dexamethasone sodium phosphate 10 mg/mL injection solution   ORAL - Administer 0.6 mg/kg as a one time dose. Not to Exceed 10mg. Take the IV form and give PO.  amLODIPine (NORVASC) 10 mg tablet Take 1 Tablet by mouth daily. 30 Tablet 0    metoprolol succinate (TOPROL-XL) 25 mg XL tablet Take 1 Tablet by mouth daily. 30 Tablet 0    losartan (COZAAR) 50 mg tablet Take 1 Tablet by mouth daily. 30 Tablet 0    rosuvastatin (CRESTOR) 10 mg tablet TAKE 1 TABLET NIGHTLY (Patient not taking: Reported on 2022) 90 Tablet 3    levothyroxine (SYNTHROID) 88 mcg tablet TAKE 1 TABLET DAILY BEFORE BREAKFAST 90 Tablet 3    valACYclovir (VALTREX) 1 gram tablet TAKE 1 TABLET DAILY (Patient not taking: Reported on 2022) 30 Tab 3    polyethylene glycol (Miralax) 17 gram packet Take 17 g by mouth daily.  NU-IRON 150 mg iron capsule TAKE 1 CAPSULE TWICE A DAY 60 Cap 5    vit C/E/Zn/coppr/lutein/zeaxan (PRESERVISION AREDS 2 PO) Take  by mouth.  pantoprazole (PROTONIX) 40 mg tablet Take 1 Tab by mouth daily. (Patient not taking: Reported on 2022) 90 Tab 3    OTHER Walker with seat  DX: DDD lumbar 1 Each 0    latanoprost (XALATAN) 0.005 % ophthalmic solution       cholecalciferol, vitamin D3, (VITAMIN D3) 2,000 unit tab Take  by mouth.  krill-om3-dha-epa-om6-lip-astx (KRILL OIL, OMEGA 3 & 6,) 1,500-165-67.5 mg cap Take  by mouth.  MULTIVITAMIN (MULTIPLE VITAMIN PO) Take  by mouth.  aspirin 81 mg chewable tablet Take 1 Tab by mouth daily.  30 Tab 0 Current Facility-Administered Medications   Medication Dose Route Frequency Provider Last Rate Last Admin    [COMPLETED] lidocaine (XYLOCAINE) 10 mg/mL (1 %) injection 9 mL  9 mL Other ONCE Josesito Davis MD   9 mL at 03/01/22 1200    [COMPLETED] triamcinolone acetonide (KENALOG-40) 40 mg/mL injection 40 mg  40 mg Intra artICUlar ONCE Josesito Davis MD   40 mg at 03/01/22 1200    [COMPLETED] lidocaine (XYLOCAINE) 10 mg/mL (1 %) injection 9 mL  9 mL Other ONCE Josesito Davis MD   9 mL at 03/01/22 1200    [COMPLETED] triamcinolone acetonide (KENALOG-40) 40 mg/mL injection 40 mg  40 mg Intra artICUlar ONCE Aaliyah WADE MD   40 mg at 03/01/22 1200      Patient Active Problem List   Diagnosis Code    HTN (hypertension) I10    Hypothyroidism, iatrogenic E03.2    Hypercholesteremia E78.00    GI bleeding K92.2    Coronary artery disease involving native coronary artery of native heart without angina pectoris I25.10    DDD (degenerative disc disease), lumbar M51.36    Gastric ulcer K25.9    Advance care planning Z71.89    Type 2 diabetes mellitus without complication, without long-term current use of insulin (HCC) E11.9    Anemia D64.9    Nodule of left lobe of thyroid gland E04.1    S/P right inguinal herniorrhaphy Z98.890, Z87.19    Type 2 diabetes with nephropathy ([facility] E11.21    Hypokalemia E87.6    Elevated troponin R77.8    Urinary tract infection with hematuria N39.0, R31.9    Hypertensive emergency I16.1    Weakness generalized R53.1      Family History   Problem Relation Age of Onset    Heart Failure Mother     Heart Disease Mother     Hypertension Mother       Social History     Socioeconomic History    Marital status:    Tobacco Use    Smoking status: Never Smoker    Smokeless tobacco: Never Used   Substance and Sexual Activity    Alcohol use:  Yes     Alcohol/week: 0.0 standard drinks    Drug use: Not Currently    Sexual activity: Not Currently      Past Surgical History:   Procedure Laterality Date    HX  SECTION      HX HERNIA REPAIR  5287    Open umbilical incisional herniorrhaphy [facility]).  HX HERNIA REPAIR Right 2020    Right inguinal herniorrhaphy with mesh.  HX ORTHOPAEDIC      Back surgery x 2.    HX BRIAN AND BSO        Past Medical History:   Diagnosis Date    Diabetes ([address]     GI bleeding     workup in [address] [place] [address] 12/15 was unremarkable (Dr. Jaylene Turcios) - says she had EGD, colonoscopy, and small bowel capsule endoscopy    HTN (hypertension)     Hypercholesteremia     Hypothyroidism, iatrogenic     radioiodine    MI (myocardial infarction) ([facility] [address]     During surgery in s - she's had multiple caths and stress tests        I have reviewed and agree with [address] [place] [address] Nw and ROS and intake form in chart and the record furthermore I have reviewed prior medical record(s) regarding this patients care during this appointment. Review of Systems:   Patient is a pleasant appearing individual, appropriately dressed, well hydrated, well nourished, who is alert, appropriately oriented for age, and in no acute distress with a normal gait and normal affect who does not appear to be in any significant pain. Physical Exam:  Left Knee -Decrease range of motion with flexion, Knee arc of greater than 50 degrees, Some crepitation, Grossly neurovascularly intact, Good cap refill, No skin lesion, Moderate swelling, No gross instability, Some quadriceps weakness Kellgren and Ha at least grade 3    Right Knee -Decrease range of motion with flexion, Some crepitation, Grossly neurovascularly intact, Good cap refill, No skin lesion, Moderate swelling, No gross instability, Some quadriceps weaknessKellgren and Ha at least grade 3    Procedure Documentation:    I discussed in detail the risks, benefits and complications of an injection which included but are not limited to infection, skin reactions, hot swollen joint, and anaphylaxis with the patient.  The patient verbalized understanding and gave informed consent for the injection. The patient's knees were flexed to 90° and the skin prepped using sterile alcohol solution. A sterile needle was inserted into the bilateral knee(s) and the mixture of 9 mL Lidocaine 1%, 1 mL Kenalog 40 mg was injected under sterile technique. The needle was withdrawn and the puncture site sealed with a Band-Aid. Technique: Under sterile conditions a flo.do ultrasound unit with a variable frequency (7.0-14.0 MHz) linear transducer was used to localize the placement of needle into the bilateral knee(s) joint. Findings: Successful needle placement for knee injection. Final images were taken and saved for permanent record. The patient tolerated the injection well. The patient was instructed to call the office immediately if there is any pain, redness, warmth, fever, or chills. Encounter Diagnoses     ICD-10-CM ICD-9-CM   1. Pain in both knees, unspecified chronicity  M25.561 719.46    M25.562    2. Osteoarthritis of both knees, unspecified osteoarthritis type  M17.0 715.96       HPI:  The patient is here with a chief complaint of bilateral knee pain, throbbing, burning pain, progressively getting worse. Pain is 6/10. X-rays of bilateral knees are positive for severe OA. Assessment/Plan:  1. Bilateral knee pain. Plan will be for cortisone injection today. If it helps, it is all we need to do, and we will go from there. As part of continued conservative pain management options the patient was advised to utilize Tylenol or OTC NSAIDS as long as it is not medically contraindicated. Return to Office: Follow-up and Dispositions    · Return if symptoms worsen or fail to improve.         Administrations This Visit     lidocaine (XYLOCAINE) 10 mg/mL (1 %) injection 9 mL     Admin Date  03/01/2022 Action  Given Dose  9 mL Route  Other Administered By  Lupis Bhat LPN    Admin Date  40/94/5720 Action  Given Dose  9 mL Route  Other Administered By  Magalis Mojica LPN          triamcinolone acetonide (KENALOG-40) 40 mg/mL injection 40 mg     Admin Date  03/01/2022 Action  Given Dose  40 mg Route  Intra artICUlar Administered By  Magalis Mojica LPN    Admin Date  09/33/4764 Action  Given Dose  40 mg Route  Intra artICUlar Administered By  Magalis Mojica LPN               Scribed by Fidelia Naranjo LPN as dictated by [facility] ANGELITO Perez MD.  Documentation True and Accepted Josesito Perez MD

## 2022-03-01 NOTE — PATIENT INSTRUCTIONS
Knee Arthritis: Care Instructions  Your Care Instructions     Knee arthritis is a breakdown of the cartilage that cushions your knee joint. When the cartilage wears down, your bones rub against each other. This causes pain and stiffness. Knee arthritis tends to get worse with time. Treatment for knee arthritis involves reducing pain, making the leg muscles stronger, and staying at a healthy body weight. The treatment usually does not improve the health of the cartilage, but it can reduce pain and improve how well your knee works. You can take simple measures to protect your knee joints, ease your pain, and help you stay active. Follow-up care is a key part of your treatment and safety. Be sure to make and go to all appointments, and call your doctor if you are having problems. It's also a good idea to know your test results and keep a list of the medicines you take. How can you care for yourself at home? · Know that knee arthritis will cause more pain on some days than on others. · Stay at a healthy weight. Lose weight if you are overweight. When you stand up, the pressure on your knees from every pound of body weight is multiplied four times. So if you lose 10 pounds, you will reduce the pressure on your knees by 40 pounds. · Talk to your doctor or physical therapist about exercises that will help ease joint pain. ? Stretch to help prevent stiffness and to prevent injury before you exercise. You may enjoy gentle forms of yoga to help keep your knee joints and muscles flexible. ? Walk instead of jog.  ? Ride a bike. This makes your thigh muscles stronger and takes pressure off your knee. ? Wear well-fitting and comfortable shoes. ? Exercise in chest-deep water. This can help you exercise longer with less pain. ? Avoid exercises that include squatting or kneeling. They can put a lot of strain on your knees.   ? Talk to your doctor to make sure that the exercise you do is not making the arthritis worse.  · Do not sit for long periods of time. Try to walk once in a while to keep your knee from getting stiff. · Ask your doctor or physical therapist whether shoe inserts may reduce your arthritis pain. · If you can afford it, get new athletic shoes at least every year. This can help reduce the strain on your knees. · Use a device to help you do everyday activities. ? A cane or walking stick can help you keep your balance when you walk. Hold the cane or walking stick in the hand opposite the painful knee. ? If you feel like you may fall when you walk, try using crutches or a front-wheeled walker. These can prevent falls that could cause more damage to your knee. ? A knee brace may help keep your knee stable and prevent pain. ? You also can use other things to make life easier, such as a higher toilet seat and handrails in the bathtub or shower. · Take pain medicines exactly as directed. ? Do not wait until you are in severe pain. You will get better results if you take it sooner. ? If you are not taking a prescription pain medicine, take an over-the-counter medicine such as acetaminophen (Tylenol), ibuprofen (Advil, Motrin), or naproxen (Aleve). Read and follow all instructions on the label. ? Do not take two or more pain medicines at the same time unless the doctor told you to. Many pain medicines have acetaminophen, which is Tylenol. Too much acetaminophen (Tylenol) can be harmful. ? Tell your doctor if you take a blood thinner, have diabetes, or have allergies to shellfish. · Ask your doctor if you might benefit from a shot of steroid medicine into your knee. This may provide pain relief for several months. · Many people take the supplements glucosamine and chondroitin for osteoarthritis. Some people feel they help, but the medical research does not show that they work. Talk to your doctor before you take these supplements. When should you call for help?    Call your doctor now or seek immediate medical care if:    · You have sudden swelling, warmth, or pain in your knee.     · You have knee pain and a fever or rash.     · You have such bad pain that you cannot use your knee. Watch closely for changes in your health, and be sure to contact your doctor if you have any problems. Where can you learn more? Go to http://www.gray.com/  Enter W187 in the search box to learn more about \"Knee Arthritis: Care Instructions. \"  Current as of: April 30, 2021               Content Version: 13.0  © 4725-5811 [employer]. Care instructions adapted under license by [employer] (which disclaims liability or warranty for this information). If you have questions about a medical condition or this instruction, always ask your healthcare professional. [address] any warranty or liability for your use of this information.

## 2022-03-01 NOTE — LETTER
3/2/2022    Patient: Megan Orourke   YOB: 1935   Date of Visit: 3/1/2022     Louise Segal, [address] [facility] [address] [facility] [address] [address]  [address] [facility] [address]    Dear Louise Segal MD,      Thank you for referring Ms. Elvia Padgett to [address] [place] [address] AND SPORTS MEDICINE for evaluation. My notes for this consultation are attached. If you have questions, please do not hesitate to call me. I look forward to following your patient along with you.       Sincerely,    Blade Hogan MD

## 2022-03-01 NOTE — LETTER
Tylor Score   1935   [id number]       3/1/2022       I hereby authorize and direct Josesito Currie MD, Barbara Guo, and whomever he may designate as his associate to perform upon myself the following procedure:    Injection of: Kenalog, bl knee    If any unforeseen condition arises in the course of the procedure, I further authorize him and his associated and/or assistant(s) to do whatever he/she deems advisable. The nature, purpose, benefits, risks, side effects, likelihood of achieving goals, and potential problems that might occur during recuperation, risks for not receiving the proposed care, treatment and services and alternatives of the procedure have been fully explained to me by my physician including, but not limited to:    Swelling, joint pain, skin pigment changes, worsening of condition, and failure to improve. I acknowledge that no guarantee or assurance has been made to me as to the results that may be obtained or the likelihood of success.                 _______________________________________     Signature of patient or authorized representative                [employer] [facility] fax: [phone]

## 2022-03-02 NOTE — TELEPHONE ENCOUNTER
Called and spoke to Derrick from [facility]. Patient has an appointment this afternoon at 3 pm with DR. Mccrary. Meds will be addressed during visit.

## 2022-03-02 NOTE — PROGRESS NOTES
Chief Complaint   Patient presents with   [facility] Follow Up     [facility] ED 2/24/22    Hypertension     1. Have you been to the ER, urgent care clinic since your last visit? Hospitalized since your last visit? Yes Where: [facility] ED 2/24/22    2. Have you seen or consulted any other health care providers outside of the [address] [place] [address] since your last visit? Include any pap smears or colon screening. Yes Where: [facility] Knee injection             Chief Complaint   Patient presents with   [facility] Follow Up     [facility] ED 2/24/22    Hypertension     She is a 80 y.o. female who presents for evalution. Reviewed PmHx, RxHx, FmHx, SocHx, AllgHx and updated and dated in the chart. Patient Active Problem List    Diagnosis    Hypertensive emergency    Weakness generalized    Hypokalemia    Elevated troponin    Urinary tract infection with hematuria    Type 2 diabetes with nephropathy ([address]    S/P right inguinal herniorrhaphy     With mesh.  Nodule of left lobe of thyroid gland    Anemia    Type 2 diabetes mellitus without complication, without long-term current use of insulin ([facility])    Advance care planning    DDD (degenerative disc disease), lumbar    Gastric ulcer    Coronary artery disease involving native coronary artery of native heart without angina pectoris    GI bleeding     workup in [address] [place] [address] 12/15 was unremarkable (Dr. Cortes Gagnon) - says she had EGD, colonoscopy, and small bowel capsule endoscopy      HTN (hypertension)    Hypothyroidism, iatrogenic     radioiodine      Hypercholesteremia       Review of Systems - negative except as listed above in the HPI    Objective:     Vitals:    03/02/22 1527   BP: (!) 189/80   Pulse: 77   Resp: 16   Temp: 98 °F (36.7 °C)   TempSrc: Oral   SpO2: 98%   Weight: 122 lb (55.3 kg)   Height: 5' (1.524 m)         Assessment/ Plan:   Diagnoses and all orders for this visit:    1.  Primary hypertension  -inc s/p dc from hosp but no sxs and pt does not want changes  -ELTON visit with [facility] med recon  -restart Iron tab and check CBC and CMP in one month  -cont HH    2. Type 2 diabetes with nephropathy (HCC)  Lab Results   Component Value Date/Time    Hemoglobin A1c 5.3 12/06/2021 10:14 AM       Other orders  -     iron polysaccharides (Nu-Iron) 150 mg iron capsule; TAKE 1 CAPSULE TWICE A DAY  -     pantoprazole (PROTONIX) 40 mg tablet; Take 1 Tablet by mouth daily. Follow-up and Dispositions    · Return in about 1 month (around 4/2/2022). I have discussed the diagnosis with the patient and the intended plan as seen in the above orders. The patient understands and agrees with the plan. The patient has received an after-visit summary and questions were answered concerning future plans. Medication Side Effects and Warnings were discussed with patient  Patient Labs were reviewed and or requested:  Patient Past Records were reviewed and or requested    Jenny Maciel M.D. There are no Patient Instructions on file for this visit.

## 2022-03-02 NOTE — TELEPHONE ENCOUNTER
----- Message from Harpreet Noyola sent at 3/2/2022  1:25 PM EST -----  Subject: Message to Provider    QUESTIONS  Information for Provider? Gwen Ortez with [facility]   calling about physical therapy admissions is completed, medication   discrepancy noticed, in hospital patient was taking Rosuvastin as well as   Pantoprazole. Patient was not prescribed either for at home. Gwen Ortez   phone #  ---------------------------------------------------------------------------  --------------  Ky Fraction INFO  What is the best way for the office to contact you? OK to leave message on   voicemail  Preferred Call Back Phone Number? [phone]  ---------------------------------------------------------------------------  --------------  SCRIPT ANSWERS  Relationship to Patient? Third Party  Representative Name?  Gwen Ortez with [facility]

## 2022-03-08 NOTE — PROGRESS NOTES
Chart reviewed today. Follow up appointment completed? yes. Was follow up appointment scheduled within 7 days of discharge? yes. [address] [facility] follow up appointment(s):   Future Appointments   Date Time Provider Lizbet Johnston   4/5/2022 10:30 AM Dixon Nicolas MD [facility]       Goals Addressed                 This Visit's Progress     Prevent complications post hospitalization. 02/28/22   Attend follow up appointment as directed.  Medication compliance   Monitor BP/pulse and home a keep log    03/08/22  Patient attend PCP follow up.  Reviewed OV note, no medication changes at this time per patient request.

## 2022-03-15 NOTE — PROGRESS NOTES
Care Transitions Follow Up Call    Care Transition Nurse (CTN) contacted the family by telephone to follow up after admission on . Verified name and  with family as identifiers. Addressed changes since last contact: has [employer] BP monitoring system in place  Follow up appointment completed? yes. Was follow up appointment scheduled within 7 days of discharge? yes. [facility] follow up appointment(s):   Future Appointments   Date Time Provider Lizbet Johnston   2022 10:30 AM Olena Mcgowan MD [facility]        CTN provided contact information for future needs. Plan for follow-up call in 7-10 days based on severity of symptoms and risk factors. Goals Addressed                 This Visit's Progress     Prevent complications post hospitalization. 22   Attend follow up appointment as directed.  Medication compliance   Monitor BP/pulse and home a keep log    22  Patient attend PCP follow up. Reviewed OV note, no medication changes at this time per patient request.     22   Activity- Patient continues to work with [facility]. Per daughter, Tony Townsend she is \"doing great\" and \"therapist is pleased with her progress\". She has been walking outside some with supervision.  Family is utilizing [employer] BP monitoring system to monitor BP at home. Reading are high sometimes with cuff but when [facility] staff check readings are fine. Tony Kristian is expecting [place] nurse tomorrow and will have them compare readings for accuracy. Last readings 139/79, 140/75. Patient remains asymptomatic even when readings are slightly high, but patient does get anxious when readings are high. She will have high reading then recheck in 1-2 hours then it is WNL.

## 2022-03-23 NOTE — PROGRESS NOTES
Patient has graduated from the Transitions of Care Coordination  program on 3/23/2022. Patient/family has the ability to self-manage at this time Care management goals have been completed. Patient was not [facility] the [facility] team for further management. Goals Addressed                 This Visit's Progress     COMPLETED: Prevent complications post hospitalization. 02/28/22   Attend follow up appointment as directed.  Medication compliance   Monitor BP/pulse and home a keep log    03/08/22  Patient attend PCP follow up. Reviewed OV note, no medication changes at this time per patient request.     03/16/22   Activity- [facility] to [facility]. Per daughter, Kelly Gaffney she is \"doing great\" and \"therapist is pleased with her progress\". She has been walking outside some with supervision.  Family is utilizing [employer] BP monitoring system to monitor BP at home. Reading are high sometimes with cuff but when [facility] staff check readings are fine. Kelly Gaffney is expecting [facility] nurse tomorrow and will have them compare readings for accuracy. Last readings 139/79, 140/75. Patient remains asymptomatic even when readings are slightly high, but patient does get anxious when readings are high. She will have high reading then recheck in 1-2 hours then it is WNL. Patient has Care Transition Nurse's contact information for any further questions, concerns, or needs.   Patients upcoming visits:    Future Appointments   Date Time Provider Lizbet Johnston   4/5/2022 10:30 AM Janice Boss MD [facility]

## 2022-04-05 NOTE — PROGRESS NOTES
Chief Complaint   Patient presents with    Hypertension     NO BP meds today    Labs     Fasting today    Medication Refill     90 day supply     1. Have you been to the ER, urgent care clinic since your last visit? Hospitalized since your last visit? No    2. Have you seen or consulted any other health care providers outside of the [address] [place] [address] since your last visit? Include any pap smears or colon screening. No             Chief Complaint   Patient presents with    Hypertension     NO BP meds today    Labs     Fasting today    Medication Refill     90 day supply     She is a 80 y.o. female who presents for evalution. Reviewed PmHx, RxHx, FmHx, SocHx, AllgHx and updated and dated in the chart. Patient Active Problem List    Diagnosis    Hypertensive emergency    Weakness generalized    Hypokalemia    Elevated troponin    Urinary tract infection with hematuria    Type 2 diabetes with nephropathy ([facility]    S/P right inguinal herniorrhaphy     With mesh.  Nodule of left lobe of thyroid gland    Anemia    Type 2 diabetes mellitus without complication, without long-term current use of insulin ([facility])    Advance care planning    DDD (degenerative disc disease), lumbar    Gastric ulcer    Coronary artery disease involving native coronary artery of native heart without angina pectoris    GI bleeding     workup in [address] [place] [address] 12/15 was unremarkable (Dr. Valera Peabody) - says she had EGD, colonoscopy, and small bowel capsule endoscopy      HTN (hypertension)    Hypothyroidism, iatrogenic     radioiodine      Hypercholesteremia       Review of Systems - negative except as listed above in the HPI    Objective:     Vitals:    04/05/22 1028 04/05/22 1046   BP: (!) 186/75 (!) 147/68   Pulse: 86    Resp: 16    Temp: 98.5 °F (36.9 °C)    TempSrc: Oral    SpO2: 99%    Weight: 118 lb 3.2 oz (53.6 kg)    Height: 5' (1.524 m)          Assessment/ Plan:   Diagnoses and all orders for this visit:    1. Primary hypertension  -     METABOLIC PANEL, COMPREHENSIVE; Future  -     losartan (COZAAR) 50 mg tablet; Take 1 Tablet by mouth daily. -     metoprolol succinate (TOPROL-XL) 25 mg XL tablet; Take 1 Tablet by mouth daily. -     amLODIPine (NORVASC) 10 mg tablet; Take 1 Tablet by mouth daily. -at goal for age    3. Hypothyroidism, iatrogenic  -labs stable    3. Anemia, unspecified type  -     CBC WITH AUTOMATED DIFF; Future  -     IRON PROFILE; Future  -     FERRITIN; Future  -     ferrous sulfate 325 mg (65 mg iron) tablet; Take 1 Tablet by mouth Daily (before breakfast). -is taking iron       Follow-up and Dispositions    · Return in about 3 months (around 7/5/2022). I have discussed the diagnosis with the patient and the intended plan as seen in the above orders. The patient understands and agrees with the plan. The patient has received an after-visit summary and questions were answered concerning future plans. Medication Side Effects and Warnings were discussed with patient  Patient Labs were reviewed and or requested:  Patient Past Records were reviewed and or requested    Garland Masters M.D. There are no Patient Instructions on file for this visit.

## 2022-04-12 NOTE — TELEPHONE ENCOUNTER
Spoke with Erwin Tong on [place], she is calling for what the next steps are as far as pt labs go. Not sure if provider wants pt to increase iron or see hematology.

## 2022-04-13 NOTE — TELEPHONE ENCOUNTER
Spoke to Josue, on [place], informed of low iron, and referral to heme. Number given [phone]. Josue states she will call now for an appt.

## 2022-04-28 PROBLEM — N39.0 UTI (URINARY TRACT INFECTION): Status: ACTIVE | Noted: 2022-01-01

## 2022-04-28 NOTE — ED PROVIDER NOTES
Geri Yoder is an 79 yo F with bilateral leg pain and weakness. She states that her legs gave out yesterday when she was walking in the bathroom. She landed on carpet and states that she did not hit her head but hs been having increasing weakness since then. Is normally able to ambulate with a walker but is not able to bear weight due to pain. She states the pain is throughout both of her legs. She notes she had abdominal pain 2 days ago due to not having a BM when she stopped taking her Miralax but she restarted taking it an had a coupld of BMs yesterday and has not pain today. Her daughter states that she has had problems with anemia and low potassium in the past.            Past Medical History:   Diagnosis Date    Diabetes ([address]     GI bleeding     workup in [address] [place] [address] 12/15 was unremarkable (Dr. Roma Brunner) - says she had EGD, colonoscopy, and small bowel capsule endoscopy    HTN (hypertension)     Hypercholesteremia     Hypothyroidism, iatrogenic     radioiodine    MI (myocardial infarction) ([address]     During surgery in s - she's had multiple caths and stress tests       Past Surgical History:   Procedure Laterality Date    HX  SECTION      HX HERNIA REPAIR  4498    Open umbilical incisional herniorrhaphy [facility]).  HX HERNIA REPAIR Right 2020    Right inguinal herniorrhaphy with mesh.  HX ORTHOPAEDIC      Back surgery x 2.    HX BRIAN AND BSO           Family History:   Problem Relation Age of Onset    Heart Failure Mother     Heart Disease Mother     Hypertension Mother        Social History     Socioeconomic History    Marital status:      Spouse name: Not on file    Number of children: Not on file    Years of education: Not on file    Highest education level: Not on file   Occupational History    Not on file   Tobacco Use    Smoking status: Never Smoker    Smokeless tobacco: Never Used   Substance and Sexual Activity    Alcohol use:  Yes Alcohol/week: 0.0 standard drinks    Drug use: Not Currently    Sexual activity: Not Currently   Other Topics Concern    Not on file   Social History Narrative    Not on file     Social Determinants of Health     Financial Resource Strain:     Difficulty of Paying Living Expenses: Not on file   Food Insecurity:     Worried About Running Out of Food in the Last Year: Not on file    Angel Luis of Food in the Last Year: Not on file   Transportation Needs:     Lack of Transportation (Medical): Not on file    Lack of Transportation (Non-Medical): Not on file   Physical Activity:     Days of Exercise per Week: Not on file    Minutes of Exercise per Session: Not on file   Stress:     Feeling of Stress : Not on file   Social Connections:     Frequency of Communication with Friends and Family: Not on file    Frequency of Social Gatherings with Friends and Family: Not on file    Attends Zoroastrianism Services: Not on file    Active Member of [address] [place] [address] or Organizations: Not on file    Attends Club or Organization Meetings: Not on file    Marital Status: Not on file   Intimate Partner Violence:     Fear of Current or Ex-Partner: Not on file    Emotionally Abused: Not on file    Physically Abused: Not on file    Sexually Abused: Not on file   Housing Stability:     Unable to Pay for Housing in the Last Year: Not on file    Number of [place] in the Last Year: Not on file    Unstable Housing in the Last Year: Not on file         ALLERGIES: Celebrex [celecoxib] and Crestor [rosuvastatin]    Review of Systems   Constitutional: Positive for fatigue. Negative for fever. HENT: Negative for sore throat. Eyes: Negative for visual disturbance. Respiratory: Negative for cough. Cardiovascular: Negative for chest pain. Gastrointestinal: Positive for abdominal pain and constipation (now resolved). Genitourinary: Negative for dysuria. Musculoskeletal: Negative for back pain.         Bilateral leg pain   Skin: Negative for rash. Neurological: Positive for weakness (bilateral lower legs). Negative for headaches. Vitals:    04/28/22 1051   BP: (!) 173/64   Pulse: (!) 55   Resp: 16   Temp: 97.6 °F (36.4 °C)   SpO2: 97%   Weight: 53.5 kg (118 lb)            Physical Exam  Vitals and nursing note reviewed. Constitutional:       General: She is not in acute distress. Appearance: She is well-developed and underweight. HENT:      Head: Normocephalic and atraumatic. Eyes:      Conjunctiva/sclera: Conjunctivae normal.   Neck:      Trachea: Phonation normal.   Cardiovascular:      Rate and Rhythm: Normal rate. Pulmonary:      Effort: Pulmonary effort is normal. No respiratory distress. Abdominal:      General: There is no distension. Genitourinary:     Rectum: Guaiac stool: brown stool. External hemorrhoid present. No tenderness. Musculoskeletal:         General: No tenderness. Normal range of motion. Cervical back: Normal range of motion. Right lower leg: No edema. Left lower leg: No edema. Skin:     General: Skin is warm and dry. Neurological:      Mental Status: She is alert. She is not disoriented. Cranial Nerves: No dysarthria or facial asymmetry. Sensory: Sensation is intact. Motor: Weakness (generalized) present. No abnormal muscle tone or pronator drift. ED EKG interpretation:  Rhythm: sinus bradycardia and 1st degree block; and regular . Rate (approx.): 53; Axis: left axis deviation; P wave: prolonged; QRS interval: prolonged; ST/T wave: non-specific changes; Other findings: abnormal ekg. This EKG was interpreted by Beryl Winston MD,ED Provider. [facility]       Perfect Serve Consult for Admission  12:34 PM    ED Room Number: ER17/17  Patient Name and age: Cinthia Paul 80 y.o.  female  Working Diagnosis:   1. Anemia, unspecified type    2. Hypokalemia    3. Hypercalcemia    4.  Urinary tract infection without hematuria, site unspecified COVID-19 Suspicion:  no  Sepsis present:  no  Reassessment needed: N/A  Code Status:  Full Code  Readmission: no  Isolation Requirements:  no  Recommended Level of Care:  telemetry  Department:[facility] Adult ED - 21   Other:  Patient with increased fatigue, unable to walk at home. Found to have UTI, Rocephin ordered, as well as Hypokalemia with K 2.4, Magnesium 1.6, Ca 12.8. I have ordered 1 L IVNS +40meq KCL. FOBT obtained with brown stool and results pending. 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     Procedures

## 2022-04-28 NOTE — H&P
Admission H&P     Name: Bev Woods MRN: [id number]    Sex: Female   YOB: 1935  Age: 80 y.o. PCP: Alfonso Ramirez MD      Source of Information: Patient, patient's daughters, medical records. Chief complaint: Weakness, GLF. History of Present Illness  Bev Woods is a 80 y.o. female with PMHx of CAD, HTN, HLD, DM2, hypothyroidism, EDUARDO, GERD who presents to the ED complaining of bilateral lower extremity weakness and pain. She reports ongoing worsening weakness of her lower extremities. Yesterday while ambulating at home, she felt her legs give out from under her. She subsequently fell to the floor. She reports that she did not hit her head or lose consciousness. She states that she is having pain extending from her feet to her knees bilaterally. She has been unable to ambulate since yesterday secondary to generalized weakness and pain. Pt denies having any other complaints, including headache, vision changes, chest pain, SOB, nausea, vomiting, abdominal pain, hematochezia, or dysuria. Of note, pt lives alone and uses a walker to ambulate at baseline. Her daughter checks on her daily. Pt has been vaccinated against COVID w/ J&J vaccine. In the ED:  Vitals: Temp 97.6   /64   HR 55   RR 16   SatO2  97% on RA  Labs: Hgb 7.0, Na 134, K 2.4, Mg 1.6, Cr 1.19 (BL ~0.7), calcium 12.8, albumin 2.0, LA 0.8, , troponin 56. UA w/ protein 30, small blood, positive nitrities, moderate LE, 3+ bacteria. Imaging:   - CT head (1) numerous small foci in calvarium, possibly multiple myeloma. (2) Decreased attenuation right parietal lobe likely related to old injury and encephalomalacia, however nonspecific on CT. Consider nonemergent MRI for confirmation. - CT spine cervical no fracture. - XR spine lumbar w/ DDD and posterior decompression and fusion w/o complication. - XR spine thoracic no acute finding.   - XR hips no acute abnormality.    Treatment: CTX 1 g x1, 1 L NaCl w/ 40 mEq KCl    EKG: HR 53, left axis deviation, QTc 347. Patient Vitals for the past 12 hrs:   Temp Pulse Resp BP SpO2   04/28/22 1442 98 °F (36.7 °C) 64 16 (!) 185/84 97 %   04/28/22 1300  (!) 51 15 (!) 181/59 99 %   04/28/22 1230  (!) 57 14 (!) 149/76 98 %   04/28/22 1130  (!) 54 16 (!) 176/65 100 %   04/28/22 1051 97.6 °F (36.4 °C) (!) 55 16 (!) 173/64 97 %       Review of Systems  Review of Systems   Constitutional: Negative for chills and fever. HENT: Negative for congestion and rhinorrhea. Eyes: Negative for visual disturbance. Respiratory: Negative for cough and shortness of breath. Cardiovascular: Negative for chest pain. Gastrointestinal: Negative for abdominal pain, blood in stool and vomiting. Genitourinary: Negative for dysuria. Musculoskeletal: Negative for joint swelling. Neurological: Positive for weakness. Psychiatric/Behavioral: Negative for agitation. Home Medications  Prior to Admission medications    Medication Sig Start Date End Date Taking? Authorizing Provider   losartan (COZAAR) 50 mg tablet Take 1 Tablet by mouth daily. 4/5/22   Yessica Guzman MD   metoprolol succinate (TOPROL-XL) 25 mg XL tablet Take 1 Tablet by mouth daily. 4/5/22   Yessica Guzman MD   amLODIPine (NORVASC) 10 mg tablet Take 1 Tablet by mouth daily. 4/5/22   Yessica Guzman MD   ferrous sulfate 325 mg (65 mg iron) tablet Take 1 Tablet by mouth Daily (before breakfast). 4/5/22   Yessica Guzman MD   pantoprazole (PROTONIX) 40 mg tablet Take 1 Tablet by mouth daily. 3/2/22   Yessica Guzman MD   levothyroxine (SYNTHROID) 88 mcg tablet TAKE 1 TABLET DAILY BEFORE BREAKFAST 9/16/21   Yessica Guzmna MD   valACYclovir (VALTREX) 1 gram tablet TAKE 1 TABLET DAILY 3/30/21   Yessica Guzman MD   polyethylene glycol (Miralax) 17 gram packet Take 17 g by mouth daily. Provider, Historical   vit C/E/Zn/coppr/lutein/zeaxan (PRESERVISION AREDS 2 PO) Take  by mouth.     Provider, Historical   OTHER Esteban Fell with seat  DX: DDD lumbar 10/24/17   Foreign Gross MD   latanoprost Ronna Gallego) 0.005 % ophthalmic solution  3/17/17   Provider, Historical   cholecalciferol, vitamin D3, (VITAMIN D3) 2,000 unit tab Take  by mouth. Provider, Historical   krill-om3-dha-epa-om6-lip-astx (KRILL OIL, OMEGA 3 & 6,) 1,500-165-67.5 mg cap Take  by mouth. Provider, Historical   MULTIVITAMIN (MULTIPLE VITAMIN PO) Take  by mouth. Provider, Historical   aspirin 81 mg chewable tablet Take 1 Tab by mouth daily. 9/23/15   Jaelyn Zavaleta MD       Allergies  Allergies   Allergen Reactions    Celebrex [Celecoxib] Hives    Crestor [Rosuvastatin] Myalgia       Past Medical History  Past Medical History:   Diagnosis Date    Diabetes ([facility]     GI bleeding     workup in [address] [place] [address] 12/15 was unremarkable (Dr. Favio Craig) - says she had EGD, colonoscopy, and small bowel capsule endoscopy    HTN (hypertension)     Hypercholesteremia     Hypothyroidism, iatrogenic     radioiodine    MI (myocardial infarction) ([facility]     During surgery in 's - she's had multiple caths and stress tests       Previous Hospitalization(s)  Past Surgical History:   Procedure Laterality Date    HX  SECTION      HX HERNIA REPAIR  7999    Open umbilical incisional herniorrhaphy [facility]).  HX HERNIA REPAIR Right 2020    Right inguinal herniorrhaphy with mesh.  HX ORTHOPAEDIC      Back surgery x 2.    HX BRIAN AND BSO         Family History  Family History   Problem Relation Age of Onset    Heart Failure Mother     Heart Disease Mother     Hypertension Mother        Social History  Alcohol history: Not at all  Smoking history: Non-smoker  Illicit drug history: Not at all  Living arrangement: patient lives alone.   Ambulates: Assisted walker    Vital Signs  Visit Vitals  BP (!) 185/84 (BP 1 Location: Left upper arm, BP Patient Position: At rest;Lying)   Pulse 64   Temp 98 °F (36.7 °C)   Resp 16   Wt 118 lb (53.5 kg)   SpO2 97%   BMI 23.05 kg/m²       Physical Examination:  General: No acute distress  Head: Normocephalic  Eyes: Conjunctiva pink  Neck: Supple  ENT: No rhinorrhea  CV: Heart: bradycardic rate, 3/6 holosystolic ejection murmur heard best at upper sternal border. No lower extremity edema. Resp: Lungs: clear to auscultation bilaterally  GI: non-tender to palpation, non-distended. MSK: No tenderness to palpation over bilateral knees/ankles. No tenderness on internal or external rotation of hips. Skin: Warm, dry  Neuro: Awake, alert, and oriented to person, place, and time. Moving extremities spontaneously. Sensation grossly intact. Laboratory Data  Recent Results (from the past 8 hour(s))   URINALYSIS W/MICROSCOPIC    Collection Time: 04/28/22 11:15 AM   Result Value Ref Range    Color YELLOW/STRAW      Appearance CLOUDY (A) CLEAR      Specific gravity 1.014 1.003 - 1.030      pH (UA) 5.5 5.0 - 8.0      Protein 30 (A) NEG mg/dL    Glucose Negative NEG mg/dL    Ketone Negative NEG mg/dL    Bilirubin Negative NEG      Blood SMALL (A) NEG      Urobilinogen 0.2 0.2 - 1.0 EU/dL    Nitrites Positive (A) NEG      Leukocyte Esterase MODERATE (A) NEG      WBC 10-20 0 - 4 /hpf    RBC 5-10 0 - 5 /hpf    Epithelial cells FEW FEW /lpf    Bacteria 3+ (A) NEG /hpf   URINE CULTURE HOLD SAMPLE    Collection Time: 04/28/22 11:15 AM    Specimen: Serum; Urine   Result Value Ref Range    Urine culture hold        Urine on hold in Microbiology dept for 2 days. If unpreserved urine is submitted, it cannot be used for addtional testing after 24 hours, recollection will be required.    CBC WITH AUTOMATED DIFF    Collection Time: 04/28/22 11:16 AM   Result Value Ref Range    WBC 6.6 3.6 - 11.0 K/uL    RBC 1.99 (L) 3.80 - 5.20 M/uL    HGB 7.0 (L) 11.5 - 16.0 g/dL    HCT 21.4 (L) 35.0 - 47.0 %    .5 (H) 80.0 - 99.0 FL    MCH 35.2 (H) 26.0 - 34.0 PG    MCHC 32.7 30.0 - 36.5 g/dL    RDW 14.8 (H) 11.5 - 14.5 %    PLATELET 117 534 - 223 K/uL    MPV 9.7 8.9 - 12.9 FL    NRBC 0.0 0  WBC    ABSOLUTE NRBC 0.00 0.00 - 0.01 K/uL    NEUTROPHILS 73 32 - 75 %    LYMPHOCYTES 14 12 - 49 %    MONOCYTES 10 5 - 13 %    EOSINOPHILS 1 0 - 7 %    BASOPHILS 0 0 - 1 %    IMMATURE GRANULOCYTES 2 (H) 0.0 - 0.5 %    ABS. NEUTROPHILS 4.9 1.8 - 8.0 K/UL    ABS. LYMPHOCYTES 0.9 0.8 - 3.5 K/UL    ABS. MONOCYTES 0.6 0.0 - 1.0 K/UL    ABS. EOSINOPHILS 0.1 0.0 - 0.4 K/UL    ABS. BASOPHILS 0.0 0.0 - 0.1 K/UL    ABS. IMM. GRANS. 0.1 (H) 0.00 - 0.04 K/UL    DF AUTOMATED     METABOLIC PANEL, COMPREHENSIVE    Collection Time: 04/28/22 11:16 AM   Result Value Ref Range    Sodium 134 (L) 136 - 145 mmol/L    Potassium 2.4 (LL) 3.5 - 5.1 mmol/L    Chloride 101 97 - 108 mmol/L    CO2 30 21 - 32 mmol/L    Anion gap 3 (L) 5 - 15 mmol/L    Glucose 123 (H) 65 - 100 mg/dL    BUN 19 6 - 20 MG/DL    Creatinine 1.19 (H) 0.55 - 1.02 MG/DL    BUN/Creatinine ratio 16 12 - 20      GFR est AA 52 (L) >60 ml/min/1.73m2    GFR est non-AA 43 (L) >60 ml/min/1.73m2    Calcium 12.8 (H) 8.5 - 10.1 MG/DL    Bilirubin, total 0.4 0.2 - 1.0 MG/DL    ALT (SGPT) 24 12 - 78 U/L    AST (SGOT) 32 15 - 37 U/L    Alk.  phosphatase 52 45 - 117 U/L    Protein, total 10.6 (H) 6.4 - 8.2 g/dL    Albumin 2.0 (L) 3.5 - 5.0 g/dL    Globulin 8.6 (H) 2.0 - 4.0 g/dL    A-G Ratio 0.2 (L) 1.1 - 2.2     TROPONIN-HIGH SENSITIVITY    Collection Time: 04/28/22 11:16 AM   Result Value Ref Range    Troponin-High Sensitivity 56 (HH) 0 - 51 ng/L   LACTIC ACID    Collection Time: 04/28/22 11:16 AM   Result Value Ref Range    Lactic acid 0.8 0.4 - 2.0 MMOL/L   MAGNESIUM    Collection Time: 04/28/22 11:16 AM   Result Value Ref Range    Magnesium 1.6 1.6 - 2.4 mg/dL   CK    Collection Time: 04/28/22 11:16 AM   Result Value Ref Range     26 - 192 U/L   SAMPLES BEING HELD    Collection Time: 04/28/22 11:16 AM   Result Value Ref Range    SAMPLES BEING HELD SST.RED.[email or web address]     COMMENT        Add-on orders for these samples will be processed based on acceptable specimen integrity and analyte stability, which may vary by analyte. OCCULT BLOOD, STOOL    Collection Time: 04/28/22 12:33 PM   Result Value Ref Range    Occult blood, stool Positive (A) NEG         Imaging  CXR Results  (Last 48 hours)    None        CT Results  (Last 48 hours)               04/28/22 1152  CT HEAD WO CONT Final result    Impression:  1. Decreased attenuation right parietal lobe likely related to old injury and   encephalomalacia, however nonspecific on CT. Consider nonemergent MRI for   confirmation. 2. Otherwise, no acute intracranial abnormality demonstrated. 3. Numerous small foci of lucency in the calvarium is nonspecific. Possibility   of multiple myeloma can be excluded clinically. Narrative:  EXAM:  CT HEAD WO CONT   INDICATION:  fall last night, dizziness. TECHNIQUE:   Thin axial images were obtained through the calvarium and saved with standard   and bone window algorithm. Coronal and sagittal reconstructions were generated. CT dose reduction was achieved through use of a standardized protocol tailored   for this examination and automatic exposure control for dose modulation. COMPARISON: None available. FINDINGS:   Ventricular and sulcal prominence consistent with age-related volume loss within   normal limits. Focal decreased attenuation right parietal lobe has imaging characteristics   suggesting old injury/infarct, however this is incompletely evaluated and should   be correlated clinically. Depending on clinical circumstance, consider MRI to   exclude other etiologies. Patchy decreased attenuation in the white matter suggesting mild chronic small   vessel ischemic change. Atherosclerotic calcification in vertebral arteries and carotid siphons. No evidence to suggest an acute brain infarct, hemorrhage or other abnormal   extra-axial fluid collection. Visualized osseous structures of the skull base are unremarkable.    There are numerous small lucencies in the calvarium most prominent lead   demonstrated towards the vertex. Although this may represent normal variation,   possibility of early sequela of multiple myeloma cannot be excluded. 04/28/22 1152  CT SPINE CERV WO CONT Final result    Impression:  No fracture. Narrative:  INDICATION: fall        EXAM: Axial unenhanced CT of the cervical spine is performed with 2D coronal and   sagittal reformatted images provided. CT dose reduction was achieved through use   of a standardized protocol tailored for this examination and automatic exposure   control for dose modulation. FINDINGS: There is no fracture or significant subluxation. There is multilevel   degenerative disc disease, severe from C4 to C7. There is no prevertebral soft   tissue swelling. Paraspinal soft tissues show chronic left goiter. Assessment and Plan     Mara Siegel is a 80 y.o. female with a PMHx of anemia, CAD, HTN, HLD, DM2, hypothyroidism, GERD who is admitted for weakness in the s/o acute on chronic anemia, UTI, hypokalemia, and hypercalcemia. Generalized LE weakness and GLF: Likely multifactorial 2/2 acute on chronic anemia / hypokalemia / hypercalcemia / UTI. CT head w/ no acute intracranial abnormality.  - Admit to telemetry  - Cardiac monitoring  - Fall precautions  - PT/OT  - Correct hypokalemia, mIVF for hypercalcemia, tx UTI, monitor Hgb    Acute on chronic macrocytic anemia: POA Hgb 7.0 (BL ~9). Concern for multiple myeloma with elevated calcium and calvarial lucencies on CT head. No signs of bleeding. Peripheral smear (2/25) w/ normocytic normochromic anemia. Iron profile wnl (4/5/2022). Folate elevated and B12 wnl two months ago. Hx of GI bleed in December 2015? Work-up at [address] [place] [address] unremarkable including EGD, CSY, and small bowel capsule study. No hematochezia per hx. FOBT positive in s/o iron supplementation.   - GI consulted  - Consented for blood products if needed  - Obtain T&S  - Re-check H&H this afternoon   - Re-check folate as this was hemolyzed last check  - Re-check peripheral smear  - Obtain reticulocyte count  - Obtain SPEP/UPEP given calvarial lucencies on CT to evaluate for MM  - Referred to H/O by PCP earlier this month. Will consider H/O consult pending SPEP/UPEP    UTI: No signs of sepsis. UA positive for nitrites, LE, and 3+ bacteria. S/p CTX in the ED. Prior UCx positive for E coli resistant to Bactrim. - Continue CTX for 3 days  - F/u UCx     At-risk DOMINIK: Creatinine 1.19 (BL ~0.7). DDx includes dehydration, blood loss, losartan. S/p 1 L IVF bolus in the ED.  - HOLD home losartan 50 mg daily  - Monitor Hgb, f/u FOBT  - mIVF  - Encourage PO intake and follow creatinine w/ daily labs    Hypokalemia: POA K 2.4. Mg 1.6. S/p 80 mEq K.  - Re-check renal function panel  - Replete PRN    Hypercalcemia: POA 12.8. C/f malignancy in s/o calvarial lucencies. PTH-related peptide low (2/26), PTH low (2/25), Vitamin D wnl and calcitriol low (2/26). - Re-check PTH  - Obtain ionized calcium  - Obtain phosphorus  - Obtain Vitamin D and calcitriol    - PTH-related peptide negative recently, will hold off on repeating     Tropinemia: Trop 56. EKG without ischemia. No chest pain. - Trend troponin. Bradycardia: HR 50s POA.  - HOLD home metoprolol    DM2: Last A1c 5.3 on 12/6/21. Not on any home medications.  - SSI ACHS w/ glucose checks  - Hypoglycemia protocols  - Re-check A1c    HTN: Chronic. On amlodipine 10 mg daily, losartan 50 mg daily, and metoprolol XL 25 mg daily. - Continue amlodipine 10 mg daily  - HOLD home losartan 50 mg daily given elevated creatinine  - HOLD home metoprolol XL 25 mg daily given bradycardia    HLD: Last lipid panel 12/6/21: Tchol 122, HDL 67, LDL 45.6, trig 47. No home medications. Hypothyroidism: TSH 0.40 (2/25/22). - Continue home Synthroid 88 mcg daily before breakfast    CAD: MI (during surgery in 1970s) had multiple caths and stress tests. Records of cardiology visit w/ Dr. David Rothman on 3/22/17. Exercise Cardiolite 7/23/15 - walked 4:39 (6.3 METS), normal stress EKG. Normal MPI. LVEF 71%. Echo 7/23/15 - LVEF 55-60%, grade 1 DD. GERD: Chronic.  - Continue home Protonix 40 mg daily      FEN/GI - Regular diet. NS at 90 mL/hr. Activity - As tolerated w/ assistance. DVT prophylaxis - SCDs  GI prophylaxis - Protonix  Fall prophylaxis - Fall precautions ordered. Disposition - Admit to Telemetry. Plan to d/c to TBD. Consulting PT, OT and CM  Code Status - Full. Discussed with patient / caregivers. Next of Kin Name and Contact - Calderon Hadley (Child) [phone] (Mobile)    Patient Conrad Church will be discussed with Dr. Ashley Caldwell.     12:40 PM, 04/28/22  Zackery Barillas MD  Family Medicine Resident       For Billing    Chief Complaint   Patient presents with   [facility] Problems  Date Reviewed: 3/2/2022          Codes Class Noted POA    UTI (urinary tract infection) ICD-10-CM: N39.0  ICD-9-CM: 599.0  4/28/2022 Yes        * (Principal) Weakness generalized ICD-10-CM: R53.1  ICD-9-CM: 780.79  2/25/2022 Yes        Hypokalemia ICD-10-CM: E87.6  ICD-9-CM: 276.8  2/24/2022 Unknown        Elevated troponin ICD-10-CM: R77.8  ICD-9-CM: 790.6  2/24/2022 Yes        Urinary tract infection with hematuria ICD-10-CM: N39.0, R31.9  ICD-9-CM: 599.0, 599.70  2/24/2022 Yes        Anemia ICD-10-CM: D64.9  ICD-9-CM: 285.9  8/29/2018 Yes        Type 2 diabetes mellitus without complication, without long-term current use of insulin ([facility] ICD-10-CM: E11.9  ICD-9-CM: 250.00  4/24/2017 Yes        DDD (degenerative disc disease), lumbar ICD-10-CM: M51.36  ICD-9-CM: 722.52  9/6/2016 Yes        Coronary artery disease involving native coronary artery of native heart without angina pectoris ICD-10-CM: I25.10  ICD-9-CM: 414.01  5/4/2016 Yes        HTN (hypertension) ICD-10-CM: I10  ICD-9-CM: 401.9  Unknown Yes        Hypothyroidism, iatrogenic ICD-10-CM: E03.2  ICD-9-CM: 244. 3  Unknown Yes    Overview Signed 7/13/2015  3:16 PM by Sharyle Callow, MD     radioiodine             Hypercholesteremia ICD-10-CM: E78.00  ICD-9-CM: 272.0  Unknown Yes

## 2022-04-28 NOTE — PROGRESS NOTES
I have discussed with the patient and family the rationale for blood component transfusion; its benefits in treating or preventing fatigue, organ damage, or death; and its risk which includes mild transfusion reactions, rare risk of blood borne infection, or more serious but rare reactions. I have discussed the alternatives to transfusion, including the risk and consequences of not receiving transfusion. The patient had an opportunity to ask questions and agreed to proceed with transfusion of blood components if needed.     Sheyla Gutierrez MD

## 2022-04-28 NOTE — PROGRESS NOTES
CARE MANAGEMENT INITIAL ASSESSEMENT      NAME:   Samantha Alonzo   :     1935   MRN:     [id number]       Emergency Contact:  Extended Emergency Contact Information  Primary Emergency Contact: [address]  Phone: [phone]  Mobile Phone: [phone]  Relation: Child  Secondary Emergency Contact: [address] Phone: [phone]  Mobile Phone: [phone]  Relation: Child    Advance Directive:  Full Code, does not have an advance directive. Healthcare Decision Maker: Trung Lemon- daughter- [phone]    Reason for Admission:  Ms. Gerardo Reddy is a 80 y.o. female with history that includes anemia, DM, CAD, HTN and hypothyroidism  who was emergently admitted for:  UTI    Patient Active Problem List   Diagnosis Code    HTN (hypertension) I10    Hypothyroidism, iatrogenic E03.2    Hypercholesteremia E78.00    GI bleeding K92.2    Coronary artery disease involving native coronary artery of native heart without angina pectoris I25.10    DDD (degenerative disc disease), lumbar M51.36    Gastric ulcer K25.9    Advance care planning Z71.89    Type 2 diabetes mellitus without complication, without long-term current use of insulin ([facility] E11.9    Anemia D64.9    Nodule of left lobe of thyroid gland E04.1    S/P right inguinal herniorrhaphy Z98.890, Z87.19    Type 2 diabetes with nephropathy ([address] E11.21    Hypokalemia E87.6    Elevated troponin R77.8    Urinary tract infection with hematuria N39.0, R31.9    Hypertensive emergency I16.1    Weakness generalized R53.1    UTI (urinary tract infection) N39.0       Assessment: In person with patient and daughter Telma Miller. RUR:  Not available  Risk Level:  N/A  Value-based purchasing:  Yes  Bundle patient:  No    Residency:  Private residence  Exterior Steps:  None  Interior Steps:  None    Lives With:  Alone    Prior functioning:  Independent.   Patient requires assistance with: N/A    Prior DME required:  Rolling walker and Shower chair    DME available:  Same as above    Rehab history:  Home Health:  Provider - [facility]  Date - CURRENTLY OPEN    Discharge Concerns/Barriers Identified:  None identified      Insurer:  Payor: Lupe Shelton / Plan: [place] MEDICARE PART A & B / Product Type: Medicare /     PCP: Juliann Nash MD   Name of Practice:  [facility]   Current patient: Yes   Approximate date of last visit: 4/5/22   Access to virtual PCP visits:  Yes    Pharmacy:  [facility]   Financial/Difficulty affording medications:  None identified    COVID-19 vaccination status:  Fully vaccinated. Pt has also received 2 boosters. DC Transport:  TBD      Transition of care plan:  Unable to determine at this time. Awaiting clinical progress, and disposition recommendations     Comments:   Pt admitted on 4/28/22 for UTI. CM met with Pt and 2 daughters to complete assessment. Pt was sleeping during assessment so daughters Leatha Kahn and [place]) answered questions on Pt's behalf. Pt lives at home alone. Annalisa Pierce states that she resides about 20 min away. Annalisa Pierce states that she checks on Pt daily and [place] checks on Pt a couple times a week. Neighbors also provide a support system for Pt. Annalisa Pierce reports she lived with Pt after Pt's hospital discharge in February until Pt was able to stay alone again. Pt is currently open to [facility]. Annalisa iPerce states that Pt was scheduled to be released by [facility] services after SN visit tomorrow. Pt had already been released by [facility] PT. Pt has no hx of home O2. Pt has a walker and shower chair at home. Annalisa Pierce reports Pt is independent with ADLs. Annalisa Pierce denies any problems with ADLs. Annalisa Pierce denies any recent problems with ambulation. Pt has been ambulating without difficulty using her walker. Annalisa Pierce confirms that Pt fell last night. Annalisa Pierce also reports that Pt started complaining of leg pain about a week ago. Pt has a life alert system in place.      Pt will likely return home upon discharge. Family was open to SNF if necessary (Pt was not awake to voice her preference). If Pt returns home, she will likely need new orders for [facility]. CM will continue to follow for discharge needs. _____________________________________  Faithroe Clements, [address] [facility]  Available via [address] [place] [address]  4/28/2022   1:57 PM      Care Management Interventions  PCP Verified by CM: Yes Mendy Morales MD)  Mode of Transport at Discharge:  Other (see comment) (TBD)  Transition of Care Consult (CM Consult): Discharge [address]: No  Reason Outside [place]: Patient already serviced by other home care/hospice agency  MyChart Signup: No  Discharge Durable Medical Equipment: No  Physical Therapy Consult: No  Occupational Therapy Consult: No  Speech Therapy Consult: No  Support Systems: Child(jaclyn)  Confirm Follow Up Transport: Family  Discharge Location  Patient Expects to be Discharged to[de-identified] Unable to determine at this time

## 2022-04-28 NOTE — CONSULTS
Satish Winter, LEONARDA-C  [phone] cell     Gastroenterology Consultation Note      Admit Date: 4/28/2022  Consult Date: 4/28/2022   I greatly appreciate your asking me to see Arthur Garcia, thank you very much for the opportunity to participate in her care. Narrative Assessment and Plan   GI consultation for anemia and heme+ stool. 79 y/o female admitted with weakness starting 3 days ago with GLF. History of EDUARDO with hgb found to be 7 (it was 9.3 on 2/27/2022 and 8.1 on 4/5/2022). Recently referred to hem/onc by PCP. GI work up in 2015 by Dr. Dionisio Galvez with small paraesophageal hernia on EGD, diverticulosis on colonoscopy, and superficial gastric erosion that was not bleeding on capsule study. BUN is 19 (previously 13). No GI complaints. Cells are macrocytic, B12 and folate normal 2/2022. Iron studies lower limit of normal 4/5/2022. Reports 10-15 lb weight loss in last few weeks. Stopped taking Miralax recently and became constipated but bowel habits improved after resuming it last weekend. Takes PPI daily, no reflux symptoms. Takes iron daily. Pertinent labs: WBC 6.6, hct 21.4, .5, platelets 130, sodium 134, potassium 2.4, creatinine 1.19, (iron studies 4/5/2022--iron 51, TIBC 252, saturation 20%, ferritin 26), normal LFTs.      - Discussed with daughters at bedside, they would like to pursue endoscopic evaluation including EGD and colonoscopy. This would more than likely be done Monday, 5/2/2022, given need to prep for colonoscopy and her having had a regular diet today. Will follow up tomorrow morning and order prep if they still want to pursue endoscopic evaluation.  - Continue PPI and Miralax  - Follow H&H, transfuse prn    Subjective:     Chief Complaint: Anemia and heme+ stool    History of Present Illness: GI consultation for anemia and heme+ stool. 79 y/o female admitted with weakness starting 3 days ago with GLF.  History of EDUARDO with hgb found to be 7 (it was 9.3 on 2022 and 8.1 on 2022). Recently referred to hem/onc by PCP. GI work up in  by Dr. Roel Pierce with small paraesophageal hernia on EGD, diverticulosis on colonoscopy, and superficial gastric erosion that was not bleeding on capsule study. BUN is 19 (previously 13). No GI complaints. Cells are macrocytic, B12 and folate normal 2022. Iron studies lower limit of normal 2022. Reports 10-15 lb weight loss in last few weeks. Stopped taking Miralax recently and became constipated but bowel habits improved after resuming it last weekend. Takes PPI daily, no reflux symptoms. Takes iron daily. Pertinent labs: WBC 6.6, hct 21.4, .5, platelets 106, sodium 134, potassium 2.4, creatinine 1.19, (iron studies 2022--iron 51, TIBC 252, saturation 20%, ferritin 26), normal LFTs. PCP:  Magalys Goldberg MD    Past Medical History:   Diagnosis Date    Diabetes [facility])     GI bleeding     workup in [address] [place] [address] 12/15 was unremarkable (Dr. Deneen Baker) - says she had EGD, colonoscopy, and small bowel capsule endoscopy    HTN (hypertension)     Hypercholesteremia     Hypothyroidism, iatrogenic     radioiodine    MI (myocardial infarction) ([facility] [address]     During surgery in 1970's - she's had multiple caths and stress tests        Past Surgical History:   Procedure Laterality Date    HX  SECTION      HX HERNIA REPAIR  5855    Open umbilical incisional [facility]niorrhaphy [facility]).  HX HERNIA REPAIR Right 2020    Right inguinal herniorrhaphy with mesh.  HX ORTHOPAEDIC      Back surgery x 2.    HX BRIAN AND BSO         Social History     Tobacco Use    Smoking status: Never Smoker    Smokeless tobacco: Never Used   Substance Use Topics    Alcohol use:  Yes     Alcohol/week: 0.0 standard drinks        Family History   Problem Relation Age of Onset    Heart Failure Mother     Heart Disease Mother     Hypertension Mother         Allergies   Allergen Reactions    Celebrex [Celecoxib] Hives    Crestor [Rosuvastatin] Myalgia            Home Medications:  Prior to Admission Medications   Prescriptions Last Dose Informant Patient Reported? Taking? MULTIVITAMIN (MULTIPLE VITAMIN PO)   Yes No   Sig: Take  by mouth. OTHER   No No   Sig: Walker with seat  DX: DDD lumbar   amLODIPine (NORVASC) 10 mg tablet   No No   Sig: Take 1 Tablet by mouth daily. aspirin 81 mg chewable tablet   No No   Sig: Take 1 Tab by mouth daily. cholecalciferol, vitamin D3, (VITAMIN D3) 2,000 unit tab   Yes No   Sig: Take  by mouth. ferrous sulfate 325 mg (65 mg iron) tablet   No No   Sig: Take 1 Tablet by mouth Daily (before breakfast). krill-om3-dha-epa-om6-lip-astx (KRILL OIL, OMEGA 3 & 6,) 1,500-165-67.5 mg cap   Yes No   Sig: Take  by mouth.   latanoprost (XALATAN) 0.005 % ophthalmic solution   Yes No   levothyroxine (SYNTHROID) 88 mcg tablet   No No   Sig: TAKE 1 TABLET DAILY BEFORE BREAKFAST   losartan (COZAAR) 50 mg tablet   No No   Sig: Take 1 Tablet by mouth daily. metoprolol succinate (TOPROL-XL) 25 mg XL tablet   No No   Sig: Take 1 Tablet by mouth daily. pantoprazole (PROTONIX) 40 mg tablet   No No   Sig: Take 1 Tablet by mouth daily. polyethylene glycol (Miralax) 17 gram packet   Yes No   Sig: Take 17 g by mouth daily. valACYclovir (VALTREX) 1 gram tablet   No No   Sig: TAKE 1 TABLET DAILY   vit C/E/Zn/coppr/lutein/zeaxan (PRESERVISION AREDS 2 PO)   Yes No   Sig: Take  by mouth.       Facility-Administered Medications: None       Hospital Medications:  Current Facility-Administered Medications   Medication Dose Route Frequency    ondansetron (ZOFRAN ODT) tablet 4 mg  4 mg Oral Q8H PRN    Or    ondansetron (ZOFRAN) injection 4 mg  4 mg IntraVENous Q6H PRN    [START ON 4/29/2022] amLODIPine (NORVASC) tablet 10 mg  10 mg Oral DAILY    [START ON 4/29/2022] ferrous sulfate tablet 325 mg  325 mg Oral ACB    [START ON 4/29/2022] levothyroxine (SYNTHROID) tablet 88 mcg  88 mcg Oral ACB    [Held by provider] losartan (COZAAR) tablet 50 mg  50 mg Oral DAILY    [START ON 4/29/2022] pantoprazole (PROTONIX) tablet 40 mg  40 mg Oral DAILY    [START ON 4/29/2022] polyethylene glycol (MIRALAX) packet 17 g  17 g Oral DAILY    [START ON 4/29/2022] cefTRIAXone (ROCEPHIN) 1 g in 0.9% sodium chloride 10 mL IV syringe  1 g IntraVENous Q24H    0.9% sodium chloride infusion  90 mL/hr IntraVENous CONTINUOUS    glucose chewable tablet 16 g  4 Tablet Oral PRN    dextrose 10% infusion 0-250 mL  0-250 mL IntraVENous PRN    glucagon (GLUCAGEN) injection 1 mg  1 mg IntraMUSCular PRN    insulin lispro (HUMALOG) injection   SubCUTAneous AC&HS       Review of Systems: Per HPI, otherwise negative. Objective:     Physical Exam:  Visit Vitals  BP (!) 185/84 (BP 1 Location: Left upper arm, BP Patient Position: At rest;Lying)   Pulse (!) 59   Temp 98 °F (36.7 °C)   Resp 16   Wt 53.5 kg (118 lb)   SpO2 97%   BMI 23.05 kg/m²     SpO2 Readings from Last 6 Encounters:   04/28/22 97%   04/05/22 99%   03/02/22 98%   02/27/22 98%   12/06/21 98%   05/04/21 97%        No intake or output data in the 24 hours ending 04/28/22 1537     General: no distress, comfortable, elderly  Skin:  No rash or palpable dermatologic mass lesions  HEENT: Pupils equal, sclera anicteric, oropharynx with no gross lesions  Cardiovascular: No abnormal audible heart sounds, well perfused, no edema  Respiratory:  No abnormal audible breath sounds, normal respiratory effort, no throacic deformity  GI:  Abdomen nondistended, nontender, no mass, no free fluid, no rebound or guarding. Musculoskeletal:  No skeletal deformity nor acute arthritis noted.   Neurological:  Motor and sensory function intact in upper extremeties  Psychiatric:  Normal affect, memory intact, appears to have insight into current illness    Laboratory:    Recent Results (from the past 24 hour(s))   URINALYSIS W/MICROSCOPIC    Collection Time: 04/28/22 11:15 AM   Result Value Ref Range    Color YELLOW/STRAW      Appearance CLOUDY (A) CLEAR      Specific gravity 1.014 1.003 - 1.030      pH (UA) 5.5 5.0 - 8.0      Protein 30 (A) NEG mg/dL    Glucose Negative NEG mg/dL    Ketone Negative NEG mg/dL    Bilirubin Negative NEG      Blood SMALL (A) NEG      Urobilinogen 0.2 0.2 - 1.0 EU/dL    Nitrites Positive (A) NEG      Leukocyte Esterase MODERATE (A) NEG      WBC 10-20 0 - 4 /hpf    RBC 5-10 0 - 5 /hpf    Epithelial cells FEW FEW /lpf    Bacteria 3+ (A) NEG /hpf   URINE CULTURE HOLD SAMPLE    Collection Time: 04/28/22 11:15 AM    Specimen: Serum; Urine   Result Value Ref Range    Urine culture hold        Urine on hold in Microbiology dept for 2 days. If unpreserved urine is submitted, it cannot be used for addtional testing after 24 hours, recollection will be required. CBC WITH AUTOMATED DIFF    Collection Time: 04/28/22 11:16 AM   Result Value Ref Range    WBC 6.6 3.6 - 11.0 K/uL    RBC 1.99 (L) 3.80 - 5.20 M/uL    HGB 7.0 (L) 11.5 - 16.0 g/dL    HCT 21.4 (L) 35.0 - 47.0 %    .5 (H) 80.0 - 99.0 FL    MCH 35.2 (H) 26.0 - 34.0 PG    MCHC 32.7 30.0 - 36.5 g/dL    RDW 14.8 (H) 11.5 - 14.5 %    PLATELET 783 778 - 256 K/uL    MPV 9.7 8.9 - 12.9 FL    NRBC 0.0 0  WBC    ABSOLUTE NRBC 0.00 0.00 - 0.01 K/uL    NEUTROPHILS 73 32 - 75 %    LYMPHOCYTES 14 12 - 49 %    MONOCYTES 10 5 - 13 %    EOSINOPHILS 1 0 - 7 %    BASOPHILS 0 0 - 1 %    IMMATURE GRANULOCYTES 2 (H) 0.0 - 0.5 %    ABS. NEUTROPHILS 4.9 1.8 - 8.0 K/UL    ABS. LYMPHOCYTES 0.9 0.8 - 3.5 K/UL    ABS. MONOCYTES 0.6 0.0 - 1.0 K/UL    ABS. EOSINOPHILS 0.1 0.0 - 0.4 K/UL    ABS. BASOPHILS 0.0 0.0 - 0.1 K/UL    ABS. IMM.  GRANS. 0.1 (H) 0.00 - 0.04 K/UL    DF AUTOMATED     METABOLIC PANEL, COMPREHENSIVE    Collection Time: 04/28/22 11:16 AM   Result Value Ref Range    Sodium 134 (L) 136 - 145 mmol/L    Potassium 2.4 (LL) 3.5 - 5.1 mmol/L    Chloride 101 97 - 108 mmol/L    CO2 30 21 - 32 mmol/L    Anion gap 3 (L) 5 - 15 mmol/L    Glucose 123 (H) 65 - 100 mg/dL    BUN 19 6 - 20 MG/DL    Creatinine 1.19 (H) 0.55 - 1.02 MG/DL    BUN/Creatinine ratio 16 12 - 20      GFR est AA 52 (L) >60 ml/min/1.73m2    GFR est non-AA 43 (L) >60 ml/min/1.73m2    Calcium 12.8 (H) 8.5 - 10.1 MG/DL    Bilirubin, total 0.4 0.2 - 1.0 MG/DL    ALT (SGPT) 24 12 - 78 U/L    AST (SGOT) 32 15 - 37 U/L    Alk. phosphatase 52 45 - 117 U/L    Protein, total 10.6 (H) 6.4 - 8.2 g/dL    Albumin 2.0 (L) 3.5 - 5.0 g/dL    Globulin 8.6 (H) 2.0 - 4.0 g/dL    A-G Ratio 0.2 (L) 1.1 - 2.2     TROPONIN-HIGH SENSITIVITY    Collection Time: 04/28/22 11:16 AM   Result Value Ref Range    Troponin-High Sensitivity 56 (HH) 0 - 51 ng/L   LACTIC ACID    Collection Time: 04/28/22 11:16 AM   Result Value Ref Range    Lactic acid 0.8 0.4 - 2.0 MMOL/L   MAGNESIUM    Collection Time: 04/28/22 11:16 AM   Result Value Ref Range    Magnesium 1.6 1.6 - 2.4 mg/dL   CK    Collection Time: 04/28/22 11:16 AM   Result Value Ref Range     26 - 192 U/L   SAMPLES BEING HELD    Collection Time: 04/28/22 11:16 AM   Result Value Ref Range    SAMPLES BEING HELD SST.RED.[email or web address]     COMMENT        Add-on orders for these samples will be processed based on acceptable specimen integrity and analyte stability, which may vary by analyte.    OCCULT BLOOD, STOOL    Collection Time: 04/28/22 12:33 PM   Result Value Ref Range    Occult blood, stool Positive (A) NEG           Assessment/Plan:     Principal Problem:    Weakness generalized (2/25/2022)    Active Problems:    HTN (hypertension) ()      Hypothyroidism, iatrogenic ()      Overview: radioiodine      Hypercholesteremia ()      Coronary artery disease involving native coronary artery of native heart without angina pectoris (5/4/2016)      DDD (degenerative disc disease), lumbar (9/6/2016)      Type 2 diabetes mellitus without complication, without long-term current use of insulin ([facility] [address] (4/24/2017)      Anemia (8/29/2018)      Hypokalemia (2/24/2022)      Elevated troponin (2/24/2022)      Urinary tract infection with hematuria (2/24/2022)      UTI (urinary tract infection) (4/28/2022)         See above narrative for full detail. Jacqueline Madsen NP     Not unreasonable to offer repeat endoscopic evaluation in light of her anemia and duration of 7 years since last.  Regrettably, cannot prepare her for procedures tomorrow and upcoming weekend inserts a delay as the endoscopy unit is closed on weekends.   Could perform those studies as inpatient or outpatient based on desires of the patient and the primary team.  Nancy Salas MD

## 2022-04-28 NOTE — ED TRIAGE NOTES
Pt to ED with daughter for c/o fall last night and dizziness. Pt lives at home alone was ambulating to the bathroom when \"legs gave out\" landing on carpet. Denies LOC, thinners or striking head. C/o bilateral leg pain and normally able to ambulate with a walker but unable to bear weight today.

## 2022-04-28 NOTE — PROGRESS NOTES
GI note    Last endoscopic evaluation for anemia conducted at [facility] 2015 by Dr.s Jane/Regan    EGD- small paraesophageal hernia  Colonoscopy- diverticulosis  M2A - no findings.     Patricia Lucero MD

## 2022-04-28 NOTE — PROGRESS NOTES
[facility] IN REPORT:    Verbal report received from [facility] (name) on Sundra Baars  being received from ED (unit) for routine progression of care      Report consisted of patients Situation, Background, Assessment and   Recommendations(SBAR). Information from the following report(s) SBAR, Kardex, ED Summary, Intake/Output, MAR, Accordion, Recent Results, Med Rec Status and Cardiac Rhythm SINUS MESERET was reviewed with the receiving nurse. Opportunity for questions and clarification was provided. Assessment completed upon patients arrival to unit and care assumed. This patient was assisted with Intentional Toileting every 2 hours during this shift as appropriate. Documentation of ambulation and output reflected on Flowsheet as appropriate. Purposeful hourly rounding was completed using AIDET and 5Ps. Outcomes of PHR documented as they occurred. Bed alarm in use as appropriate. Dual Suction and ambubag in place. [phone] Notified MD of patient's elevated BP; orders received for PRN IV hydralazine. Will administer once verified by pharmacy. 1728 Unable to fully complete med rec as the patient and family at bedside are unsure of which medications the patient is taking    1900 Bedside and Verbal shift change report given to Sebas Maurer (oncoming nurse) by Luciano Cranker RN (offgoing nurse). Report included the following information SBAR, Kardex, Intake/Output, MAR, Accordion, Recent Results, Med Rec Status and Cardiac Rhythm SINUS MESERET / NSR /BBB ? Krystina Bustos

## 2022-04-29 NOTE — PROGRESS NOTES
Bedside and Verbal shift change report given to Shital (oncoming nurse) by Sue Decker RN (offgoing nurse). Report included the following information SBAR, Kardex, ED Summary, MAR, Recent Results and Cardiac Rhythm SR. This patient was assisted with Intentional Toileting every 2 hours during this shift as appropriate. Documentation of ambulation and output reflected on Flowsheet as appropriate. Purposeful hourly rounding was completed using AIDET and 5Ps. Outcomes of PHR documented as they occurred. Bed alarm in use as appropriate. Dual Suction and ambubag in place. Bedside and Verbal shift change report given to Perfecto Banerjee RN (oncoming nurse) by Shital (offgoing nurse).  Report included the following information SBAR, Kardex, ED Summary, MAR, Recent Results and Cardiac Rhythm SR.

## 2022-04-29 NOTE — PROGRESS NOTES
OCCUPATIONAL THERAPY EVALUATION/DISCHARGE  Patient: Angy Renteria (29 y.o. female)  Date: 2022  Primary Diagnosis: UTI (urinary tract infection) [N39.0]  Hypokalemia [E87.6]  Anemia [D64.9]       Precautions:  Fall,WBAT    ASSESSMENT  Based on the objective data described below, the patient presents at her functional baseline following admission for UTI after experiencing a fall at home. 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 Patient performed transfers today without LOB at supervision level using RW. Patient engaged in ADLs with good tolerance. Patient has no further skilled OT needs at this time. Current Level of Function (ADLs/self-care): Patient is supervision level for functional mobility. Patient is independent to supervision level for ADLs. Functional Outcome Measure: The patient scored 75/100 on the Barthel Index outcome measure. PLAN :    Recommendation for discharge: (in order for the patient to meet his/her long term goals)  Recommend increased supervision/family assist d//t fall history/safety concerns    This discharge recommendation:  Has been made in collaboration with the attending provider and/or case management    IF patient discharges home will need the following DME: none        SUBJECTIVE:   Patient agreeable to OT evaluation.     OBJECTIVE DATA SUMMARY:   HISTORY:   Past Medical History:   Diagnosis Date    Diabetes ([facility] [address]     GI bleeding     workup in [address] [place] [address] 12/15 was unremarkable (Dr. Jazmyne Robbins) - says she had EGD, colonoscopy, and small bowel capsule endoscopy    HTN (hypertension)     Hypercholesteremia     Hypothyroidism, iatrogenic     radioiodine    MI (myocardial infarction) ([facility] [address]     During surgery in 's - she's had multiple caths and stress tests     Past Surgical History:   Procedure Laterality Date    HX  SECTION      HX HERNIA REPAIR   Open umbilical incisional herniorrhaphy [facility]).  HX HERNIA REPAIR Right 09/23/2020    Right inguinal herniorrhaphy with mesh.  HX ORTHOPAEDIC      Back surgery x 2.    HX BRIAN AND BSO         Prior Level of Function/Environment/Context: Patient lives alone. Expanded or extensive additional review of patient history:   Home Situation  Home Environment: Apartment ([employer])  One/Two Story Residence: One story  Living Alone: Yes  Support Systems: Child(jaclyn)  Patient Expects to be Discharged to[de-identified] Skilled nursing facility  Current DME Used/Available at Home: Walker, rollator,Shower chair,Cane, straight    Hand dominance: Right    EXAMINATION OF PERFORMANCE DEFICITS:  Cognitive/Behavioral Status:  Neurologic State: Alert  Orientation Level: Oriented X4  Cognition: Appropriate decision making; Appropriate for age attention/concentration; Appropriate safety awareness  Perception: Appears intact  Perseveration: No perseveration noted  Safety/Judgement: Awareness of environment    Skin: Intact in the uppers    Edema: None noted in the uppers    Hearing: Auditory  Auditory Impairment: Hard of hearing, bilateral    Vision/Perceptual:    Tracking: Able to track stimulus in all quadrants w/o difficulty    Diplopia: No    Acuity: Within Defined Limits       Range of Motion:  AROM: Within functional limits  PROM: Within functional limits    Strength:  Strength: Generally decreased, functional in the uppers     Coordination:  Fine Motor Skills-Upper: Left Intact; Right Intact    Gross Motor Skills-Upper: Left Intact; Right Intact    Tone & Sensation:  Tone: Normal   Sensation: intact     Balance:  Sitting: Intact  Standing: Intact    Functional Mobility and Transfers for ADLs:  Bed Mobility:  Rolling: Stand-by assistance  Supine to Sit: Stand-by assistance  Sit to Supine: Stand-by assistance    Transfers:  Sit to Stand: Contact guard assistance  Stand to Sit: Contact guard assistance  Bed to Chair: Contact guard assistance  Bathroom Mobility: Contact guard assistance  Toilet Transfer : Contact guard assistance    ADL Assessment:  Feeding: Independent    Oral Facial Hygiene/Grooming: Independent    Bathing: Supervision    Upper Body Dressing: Independent    Lower Body Dressing: Supervision    Toileting: Supervision    Cognitive Retraining  Safety/Judgement: Awareness of environment    Functional Measure:    Barthel Index:  Bathin  Bladder: 10  Bowels: 10  Groomin  Dressin  Feeding: 10  Mobility: 10  Stairs: 5  Toilet Use: 10  Transfer (Bed to Chair and Back): 10  Total: 75/100      The Barthel ADL Index: Guidelines  1. The index should be used as a record of what a patient does, not as a record of what a patient could do. 2. The main aim is to establish degree of independence from any help, physical or verbal, however minor and for whatever reason. 3. The need for supervision renders the patient not independent. 4. A patient's performance should be established using the best available evidence. Asking the patient, friends/relatives and nurses are the usual sources, but direct observation and common sense are also important. However direct testing is not needed. 5. Usually the patient's performance over the preceding 24-48 hours is important, but occasionally longer periods will be relevant. 6. Middle categories imply that the patient supplies over 50 per cent of the effort. 7. Use of aids to be independent is allowed. Score Interpretation (from [address] [place] [address])    Independent   60-79 Minimally independent   40-59 Partially dependent   20-39 Very dependent   <20 Totally dependent     -Alf Gutierrez., Barthel, D.W. (1965). Functional evaluation: the Barthel Index. [address] [facility] ([address] (1997). The Barthel activities of daily living index: self-reporting versus actual performance in the old (> or = 75 years).  Journal of [address] [place] [address] 45(7), Juliano Beyer OJ Alfredo, Amisha Tipton., Summer Parrish., Mey Deng (1999). Measuring the change in disability after inpatient rehabilitation; comparison of the responsiveness of the Barthel Index and Functional [place] Measure. Journal of Neurology, Neurosurgery, and Psychiatry, 66(4), 740-798. CARLOS Castro, CLAUDIO Gilliam, & Lashaun Maharaj M.A. (2004) Assessment of post-stroke quality of life in cost-effectiveness studies: The usefulness of the Barthel Index and the EuroQoL-5D. Quality of Life Research, 15, 603-12       Occupational Therapy Evaluation Charge Determination   History Examination Decision-Making   LOW Complexity : Brief history review  LOW Complexity : 1-3 performance deficits relating to physical, cognitive , or psychosocial skils that result in activity limitations and / or participation restrictions  LOW Complexity : No comorbidities that affect functional and no verbal or physical assistance needed to complete eval tasks       Based on the above components, the patient evaluation is determined to be of the following complexity level: LOW     Activity Tolerance:   Good    After treatment patient left in no apparent distress:    Sitting in chair, Call bell within reach and Bed / chair alarm activated    COMMUNICATION/EDUCATION:   The patients plan of care was discussed with: Physical therapist, Registered nurse and patient. .     Thank you for this referral.  LEOBARDO Foreman/L  Time Calculation: 38 mins

## 2022-04-29 NOTE — PROGRESS NOTES
Problem: Mobility Impaired (Adult and Pediatric)  Goal: *Acute Goals and Plan of Care (Insert Text)  Description: FUNCTIONAL STATUS PRIOR TO ADMISSION: Patient was modified independent using a rollator for functional mobility. HOME SUPPORT PRIOR TO ADMISSION: The patient lived alone with daughters to provide assistance. Physical Therapy Goals  Initiated 4/29/2022  1. Patient will move from supine to sit and sit to supine  in bed with supervision/set-up within 7 day(s). 2.  Patient will transfer from bed to chair and chair to bed with supervision/set-up using the least restrictive device within 7 day(s). 3.  Patient will perform sit to stand with supervision/set-up within 7 day(s). 4.  Patient will ambulate with supervision/set-up for 150 feet with the least restrictive device within 7 day(s). Outcome: Progressing Towards Goal  Note:   PHYSICAL THERAPY EVALUATION  Patient: Elif Gary (76 y.o. female)  Date: 4/29/2022  Primary Diagnosis: UTI (urinary tract infection) [N39.0]  Hypokalemia [E87.6]  Anemia [D64.9]        Precautions:   Fall,WBAT      ASSESSMENT  Based on the objective data described below, the patient presents with decreased endurance following admission for fall and weakness and found to have a UTI. Patient has undergone x-ray's which were negative, CT of head performed which shows a chronic right partial infarct and awaiting MRI. Patient continues to have elevated blood pressure, see below. Patient was SBA-CGA with RW without loss of balance or instability. Patient with increased history of falls at home, safety concerns present due to living alone- discussed with family and patient. Current Level of Function Impacting Discharge (mobility/balance): SBA-CGA with RW, no loss of balance    Functional Outcome Measure: The patient scored Total Score: 13/28 on the Tinetti outcome measure which is indicative of high fall risk.       Other factors to consider for discharge: Patient will benefit from skilled therapy intervention to address the above noted impairments. PLAN :  Recommendations and Planned Interventions: bed mobility training, transfer training, gait training, therapeutic exercises, and therapeutic activities      Frequency/Duration: Patient will be followed by physical therapy:  5 times a week to address goals. Recommendation for discharge: (in order for the patient to meet his/her long term goals)  Physical therapy at least 2 days/week in the home AND ensure assist and/or supervision for safety with all upright activity due to history of recurrent falls and safety concersn    This discharge recommendation:  Has been made in collaboration with the attending provider and/or case management    IF patient discharges home will need the following DME: patient owns DME required for discharge         SUBJECTIVE:   Patient stated Steven Gabriel will think about it.     OBJECTIVE DATA SUMMARY:   HISTORY:    Past Medical History:   Diagnosis Date    Diabetes ([facility] [address]     GI bleeding     workup in [address] [place] [address] 12/15 was unremarkable (Dr. Delta Medrano) - says she had EGD, colonoscopy, and small bowel capsule endoscopy    HTN (hypertension)     Hypercholesteremia     Hypothyroidism, iatrogenic     radioiodine    MI (myocardial infarction) ([address]     During surgery in 's - she's had multiple caths and stress tests     Past Surgical History:   Procedure Laterality Date    HX  SECTION      HX HERNIA REPAIR  [id number]    Open umbilical incisional herniorrhaphy [facility]). HX HERNIA REPAIR Right 2020    Right inguinal herniorrhaphy with mesh. HX ORTHOPAEDIC      Back surgery x 2.     HX BRIAN AND BSO         Personal factors and/or comorbidities impacting plan of care: see above    Home Situation  Home Environment: Apartment ([facility])  One/Two Story Residence: One story  Living Alone: Yes  Support Systems: Child(jaclyn)  Patient Expects to be Discharged to[de-identified] Home with home health  Current DME Used/Available at Home: Walker, rollator,Shower chair,Cane, straight    EXAMINATION/PRESENTATION/DECISION MAKING:   Vitals:    04/29/22 1058 04/29/22 1100 04/29/22 1111 04/29/22 1123   BP: (!) 173/76 (!) 200/80 (!) 199/82 (!) 180/80   BP 1 Location: Left upper arm   Left upper arm   BP Patient Position: At rest Sitting Comment: post ambulation At rest   Pulse: 64 69 70 60   Temp:    97.9 °F (36.6 °C)   Resp:    16   Weight:       SpO2:    95%       Critical Behavior:     Orientation Level: Oriented X4        Hearing:   Auditory  Auditory Impairment: Hard of hearing, bilateral  Skin:  all exposed intact  Edema: none noted  Range Of Motion:  AROM: Within functional limits           PROM: Within functional limits           Strength:    Strength: Generally decreased, functional                    Tone & Sensation:   Tone: Normal                              Coordination:  Coordination: Generally decreased, functional  Vision:      Functional Mobility:  Bed Mobility:  Rolling: Stand-by assistance  Supine to Sit: Stand-by assistance  Sit to Supine: Stand-by assistance     Transfers:  Sit to Stand: Contact guard assistance  Stand to Sit: Contact guard assistance        Bed to Chair: Contact guard assistance              Balance:      Ambulation/Gait Training:  Distance (ft): 50 Feet (ft)  Assistive Device: Walker, rolling;Gait belt  Ambulation - Level of Assistance: Contact guard assistance     Gait Description (WDL): Exceptions to WDL                               Functional Measure:  Tinetti test:    Sitting Balance: 1  Arises: 1  Attempts to Rise: 2  Immediate Standing Balance: 1  Standing Balance: 1  Nudged: 1  Eyes Closed: 0  Turn 360 Degrees - Continuous/Discontinuous: 0  Turn 360 Degrees - Steady/Unsteady: 0  Sitting Down: 1  Balance Score: 8 Balance total score  Indication of Gait: 0  R Step Length/Height: 0  L Step Length/Height: 0  R Foot Clearance: 1  L Foot Clearance: 1  Step Symmetry: 1  Step Continuity: 0  Path: 1  Trunk: 0  Walking Time: 1  Gait Score: 5 Gait total score  Total Score: 13/28 Overall total score         Tinetti Tool Score Risk of Falls  <19 = High Fall Risk  19-24 = Moderate Fall Risk  25-28 = Low Fall Risk  Leoncioetti ME. Performance-Oriented Assessment of Mobility Problems in Elderly Patients. [facility]; [id number]. (Scoring Description: PT Bulletin Feb. 10, 1993)    Older adults: Mariana Traore et al, 2009; n = 1000 [facility] elderly evaluated with ABC, DENZEL, ADL, and IADL)  · Mean DENZEL score for males aged 69-68 years = 26.21(3.40)  · Mean DENZEL score for females age 69-68 years = 25.16(4.30)  · Mean DENZEL score for males over 80 years = 23.29(6.02)  · Mean DENZEL score for females over 80 years = 17.20(8.32)        Physical Therapy Evaluation Charge Determination   History Examination Presentation Decision-Making   HIGH Complexity :3+ comorbidities / personal factors will impact the outcome/ POC  MEDIUM Complexity : 3 Standardized tests and measures addressing body structure, function, activity limitation and / or participation in recreation  MEDIUM Complexity : Evolving with changing characteristics  Other outcome measures tinetti  HIGH       Based on the above components, the patient evaluation is determined to be of the following complexity level: MEDIUM    Pain Rating:  None reported    Activity Tolerance:   Fair      After treatment patient left in no apparent distress:   Supine in bed, Call bell within reach, Bed / chair alarm activated, and Caregiver / family present    COMMUNICATION/EDUCATION:   The patients plan of care was discussed with: Occupational therapist and Registered nurse. Fall prevention education was provided and the patient/caregiver indicated understanding., Patient/family have participated as able in goal setting and plan of care. , and Patient/family agree to work toward stated goals and plan of care.     Thank you for this referral.  Mir Rodriguez, PT, DPT Time Calculation: 28 mins

## 2022-04-29 NOTE — PROGRESS NOTES
Comprehensive Nutrition Assessment    Type and Reason for Visit: Initial,Positive nutrition screen    Nutrition Recommendations/Plan:   1. Continue Regular diet  2. Monitor plan of care  3. EGD Monday? Monitor BG     Malnutrition Assessment:  Malnutrition Status: At risk for malnutrition (specify) (04/29/22 1316)    Context:  Acute illness     Findings of the 6 clinical characteristics of malnutrition:   Energy Intake:  Mild decrease in energy intake (specify)  Weight Loss:  No significant weight loss     Body Fat Loss:  Mild body fat loss,     Muscle Mass Loss:  Mild muscle mass loss,    Fluid Accumulation:  No significant fluid accumulation,     Strength:  Not performed         Nutrition Assessment:    Pt admitted with UTI (urinary tract infection) [N39.0]  Hypokalemia [E87.6]  Anemia [D64.9]    Past Medical History:   Diagnosis Date    Diabetes ([facility] [address]     GI bleeding     workup in [facility] 12/15 was unremarkable (Dr. Kaity Quevedo) - says she had EGD, colonoscopy, and small bowel capsule endoscopy    HTN (hypertension)     Hypercholesteremia     Hypothyroidism, iatrogenic     radioiodine    MI (myocardial infarction) ([facility] [address]     During surgery in 1970's - she's had multiple caths and stress tests     Pt screened for MST- reported wt loss PTA 2/2 less PO intake. No need for carbohydrate restriction. A1c low but inaccurate 2/2 blood loss. A1c when Hgb WNL was 5.7. Pt usual BW closer to 125 lbs. Currently 119 lbs with wt of 122 lbs in march and 127 lbs in Feb. Seen by GI, EGD planned for Monday if still admitted. Pt ate fairly well for breakfast this morning. No n/v reported. No known food allergies. No hx of chew/swallow difficulties.         Last BM:  (PTA),      PO intake:   Patient Vitals for the past 168 hrs:   % Diet Eaten   04/29/22 0830 51 - 75%   04/28/22 1735 51 - 75%       Wt Readings from Last 30 Encounters:   04/29/22 54 kg (119 lb)   04/05/22 53.6 kg (118 lb 3.2 oz)   03/02/22 55.3 kg (122 lb)   02/27/22 57.8 kg (127 lb 6.4 oz)   12/06/21 55.8 kg (123 lb)   05/04/21 57.4 kg (126 lb 9.6 oz)   03/30/21 57.4 kg (126 lb 7 oz)   12/01/20 57.2 kg (126 lb)   10/07/20 57.4 kg (126 lb 8 oz)   09/23/20 56.6 kg (124 lb 12.5 oz)   09/08/20 56.7 kg (125 lb)   09/02/20 56.5 kg (124 lb 9.6 oz)   07/28/20 56.3 kg (124 lb 3.2 oz)   09/16/19 58.1 kg (128 lb)   03/18/19 54.7 kg (120 lb 11.2 oz)   08/29/18 59.9 kg (132 lb)   05/29/18 60.6 kg (133 lb 11.2 oz)   04/24/18 60.3 kg (133 lb)   10/24/17 63.5 kg (140 lb)   04/24/17 64 kg (141 lb)   03/22/17 64.1 kg (141 lb 6.4 oz)   01/12/17 64.1 kg (141 lb 6 oz)   10/13/16 62.2 kg (137 lb 3 oz)   09/15/16 63 kg (139 lb)   09/07/16 64 kg (141 lb)   09/06/16 63.5 kg (140 lb)   08/17/16 65.3 kg (144 lb)   05/17/16 66.9 kg (147 lb 6.4 oz)   05/04/16 66.7 kg (147 lb)   10/28/15 67 kg (147 lb 12.8 oz)           Nutrition Related Findings:      Wound Type: None    Current Nutrition Intake & Therapies:  Average Meal Intake: 51-75%  Average Supplement Intake: None ordered  ADULT DIET Regular  ADULT DIET Clear Liquid  DIET NPO    Anthropometric Measures:  Height: 5' (152.4 cm)  Ideal Body Weight (IBW): 100 lbs (45 kg)     Current Body Wt:  54 kg (119 lb 0.8 oz), 119 % IBW.  Bed scale  Current BMI (kg/m2): 23.3        Weight Adjustment: No adjustment                 BMI Category: Normal weight (BMI 22.0-24.9) age over 72    Estimated Daily Nutrient Needs:  Energy Requirements Based On: Kcal/kg  Weight Used for Energy Requirements: Current  Energy (kcal/day): 1350  Weight Used for Protein Requirements: Current  Protein (g/day): 54  Method Used for Fluid Requirements: 1 ml/kcal  Fluid (ml/day): 1500 for elderly    Nutrition Diagnosis:   · Altered GI function related to altered GI function as evidenced by GI abnormality      Nutrition Interventions:   Food and/or Nutrient Delivery: Continue current diet  Nutrition Education/Counseling: No recommendations at this time  Coordination of Nutrition Care: Continue to monitor while inpatient,Interdisciplinary rounds       Goals:     Goals: PO intake 50% or greater,within 7 days       Nutrition Monitoring and Evaluation:   Behavioral-Environmental Outcomes: Beliefs and attitudes  Food/Nutrient Intake Outcomes: Food and nutrient intake  Physical Signs/Symptoms Outcomes: Biochemical data,Weight,GI status    Discharge Planning:     Too soon to determine    Jennifer Mirza, [address]: [phone]

## 2022-04-29 NOTE — PROGRESS NOTES
[address] [place] [address]   Office [phone]  Fax [phone]            Subjective / Objective     Genny Elizabeth is a 80 y.o. female with a PMHx of anemia who is admitted for weakness in the s/o acute on chronic anemia, UTI, hypokalemia, and hypercalcemia, concern for multiple myeloma. 24 Hour Events:   - Hgb of 6.4 overnight, received one unit PRBC with transfusion completed at 5am. Post transfusion Hgb of 7.8. Subjective: Pt was seen and examined at bedside. No current symptoms, states she does get dizzy and light headed when she stands, but has not been ambulating. Also endorses some mild suprapubic discomfort. Denies chest pain, SOB, nausea, vomiting, abdominal pain. Objective:    Respiratory:   O2 Device: None (Room air)   Visit Vitals  BP (!) 187/101 (BP 1 Location: Left upper arm, BP Patient Position: At rest)   Pulse 67   Temp 98.5 °F (36.9 °C)   Resp 16   Wt 119 lb 0.8 oz (54 kg)   SpO2 98%   BMI 23.25 kg/m²     Physical Exam:  General: No acute distress. Alert. Cooperative. Respiratory: CTAB, no wheezes or crackles   Cardiovascular: Regular rate, normal rhythm. No murmurs. GI: Nondistended. + bowel sounds. Mild suprapubic tenderness. Extremities: No LE edema. Skin: Warm, dry. Neuro: Alert and oriented x3. Unsure of hospital name and president name. I/O:  Date 04/28/22 0700 - 04/29/22 0659 04/29/22 0700 - 04/30/22 0659   Shift 3094-8373 1024-7003 24 Hour Total 1428-2157 4452-8853 24 Hour Total   INTAKE   P.O. 120  120        P. O. 120  120      I. V.(mL/kg/hr)  757.5(1.2) 757.5(0.6)        Volume (0.9% sodium chloride infusion)  757.5 757.5      Blood  358.8 358.8        Volume (TRANSFUSE PACKED RBC'S)  358.8 358.8      Shift Total(mL/kg) 120(2.2) 1116.3(20.7) 1236.3(22.9)      OUTPUT   Urine(mL/kg/hr) 225(0.4) 290(0.4) 515(0.4)        Urine Voided 225 290 515        Urine Occurrence(s) 0 x 1 x 1 x      Stool           Stool Occurrence(s) 0 x  0 x Problem: Self Care Deficits Care Plan (Adult)  Goal: *Acute Goals and Plan of Care (Insert Text)  Description:   FUNCTIONAL STATUS PRIOR TO ADMISSION: Patient independent. Family assist with shoes and socks PRN. HOME SUPPORT: The patient lived with wife and two teenage daughters. Occupational Therapy Goals  Initiated 12/21/2020, reviewed 12/28/2020; goals reviewed 1/5/2021, all remain appropriate. 1.  Patient will perform grooming sitting unsupported with minimal assistance within 7 day(s). 2.  Patient will perform anterior neck to thigh bathing sitting unsupported with minimal assistance within 7 day(s). 3.  Patient will perform lower body dressing with moderate assistance within 7 day(s). 4.  Patient will perform toilet transfers to/from [facility] with moderate assistance within 7 day(s). 5.  Patient will perform all aspects of toileting with moderate assistance  within 7 day(s). 6.  Patient will participate in upper extremity therapeutic exercise/activities with supervision/set-up for 5 minutes within 7 day(s). 7.  Patient will utilize energy conservation techniques during functional activities with verbal cues within 7 day(s). Outcome: Progressing Towards Goal    OCCUPATIONAL THERAPY TREATMENT  Patient: Makayla Villeda (15 y.o. male)  Date: 1/11/2021  Diagnosis: AMS (altered mental status) [R41.82] AMS (altered mental status)  Procedure(s) (LRB):  PERCUTANEOUS ENDOSCOPIC GASTROSTOMY TUBE INSERTION :- (Left)  ESOPHAGOGASTRODUODENOSCOPY (EGD) (Left) 5 Days Post-Op  Precautions: Fall  Chart, occupational therapy assessment, plan of care, and goals were reviewed. ASSESSMENT  Patient continues with skilled OT services and is progressing towards goals. Patient sleeping on arrival and easily aroused, demonstrated good ROM, coordination and strength, however patient noted to have decreased attention to left during ADL mobility. Noted in chart review MRI positive for acute CGA on 1/1/20.  Patient with Shift Total(mL/kg) 225(4.2) 290(5.4) 515(9.5)      NET -105 826.3 721.3      Weight (kg) 53.5 54 54 54 54 54       Inpatient Medications  Current Facility-Administered Medications   Medication Dose Route Frequency    0.9% sodium chloride infusion 250 mL  250 mL IntraVENous PRN    potassium phosphate 20 mmol in 0.9% sodium chloride 250 mL infusion   IntraVENous ONCE    magnesium sulfate 2 g/50 ml IVPB (premix or compounded)  2 g IntraVENous ONCE    [START ON 5/1/2022] peg 3350-Electrolytes-Vit C (MOVIPREP) oral solution 1 L  1 L Oral Q6H    ondansetron (ZOFRAN ODT) tablet 4 mg  4 mg Oral Q8H PRN    Or    ondansetron (ZOFRAN) injection 4 mg  4 mg IntraVENous Q6H PRN    amLODIPine (NORVASC) tablet 10 mg  10 mg Oral DAILY    ferrous sulfate tablet 325 mg  325 mg Oral ACB    levothyroxine (SYNTHROID) tablet 88 mcg  88 mcg Oral ACB    [Held by provider] losartan (COZAAR) tablet 50 mg  50 mg Oral DAILY    pantoprazole (PROTONIX) tablet 40 mg  40 mg Oral DAILY    polyethylene glycol (MIRALAX) packet 17 g  17 g Oral DAILY    cefTRIAXone (ROCEPHIN) 1 g in 0.9% sodium chloride 10 mL IV syringe  1 g IntraVENous Q24H    0.9% sodium chloride infusion  150 mL/hr IntraVENous CONTINUOUS    glucose chewable tablet 16 g  4 Tablet Oral PRN    dextrose 10% infusion 0-250 mL  0-250 mL IntraVENous PRN    glucagon (GLUCAGEN) injection 1 mg  1 mg IntraMUSCular PRN    insulin lispro (HUMALOG) injection   SubCUTAneous AC&HS    hydrALAZINE (APRESOLINE) 20 mg/mL injection 10 mg  10 mg IntraVENous Q6H PRN       Allergies  Allergies   Allergen Reactions    Celebrex [Celecoxib] Hives    Crestor [Rosuvastatin] Myalgia       CBC:  Recent Labs     04/29/22  0631 04/29/22  0121 04/28/22  2357 04/28/22  1512 04/28/22  1116   WBC  --   --  7.0  --  6.6   HGB 7.8* 6.4* 6.8*   < > 7.0*   HCT 23.9* 20.2* 20.8*   < > 21.4*   PLT  --   --  312  --  325    < > = values in this interval not displayed.        Metabolic Panel:  Recent Labs supportive wife present and expressed interest in rehab. Current Level of Function Impacting Discharge (ADLs): min assist functional transfers with rolling walker, CGA to min assist LE ADL's, toileting    Other factors to consider for discharge:          PLAN :  Patient continues to benefit from skilled intervention to address the above impairments. Continue treatment per established plan of care. to address goals. Recommend with staff: Recommend with nursing patient to complete as able in order to maintain strength, endurance and independence: ADLs with supervision/setup to min assist, OOB to chair 3x/day and mobilizing to the bathroom for toileting with min assist. Thank you for your assistance. Recommend next OT session: further assess left inattention, dynamic standing balance    Recommendation for discharge: (in order for the patient to meet his/her long term goals)  Therapy 3 hours per day 5-7 days per week    This discharge recommendation:  Has been made in collaboration with the attending provider and/or case management    IF patient discharges home will need the following DME:        SUBJECTIVE:   Patient stated I don't min getting up.     OBJECTIVE DATA SUMMARY:   Cognitive/Behavioral Status:  Neurologic State: Alert(sleeping on arrival, easily aroused)  Orientation Level: Disoriented to place; Disoriented to person;Disoriented to situation  Cognition: Follows commands; Appropriate for age attention/concentration  Perception: Appears intact  Perseveration: No perseveration noted  Safety/Judgement: Awareness of environment; Fall prevention    Functional Mobility and Transfers for ADLs:  Bed Mobility:  Supine to Sit: Minimum assistance;Assist x1  Scooting: Contact guard assistance;Assist x1    Transfers:  Sit to Stand: Minimum assistance;Assist x2; Additional time; Adaptive equipment  Functional Transfers  Bathroom Mobility: Minimum assistance       Balance:  Sitting: Intact  Sitting - Static: 04/29/22  0631 04/28/22  2357 04/28/22  1512 04/28/22  1116 04/28/22  1116    137 137   < > 134*   K 3.5 3.4* 3.2*   < > 2.4*    106 103   < > 101   CO2 27 27 29   < > 30   BUN 19 19 20   < > 19   CREA 1.02 1.11* 1.18*   < > 1.19*   GLU 91 85 98   < > 123*   CA 12.2* 11.8* 12.7*  12.5*   < > 12.8*   MG 1.5*  --   --   --  1.6   PHOS 2.0* 1.7* 2.3*  --   --    ALB 2.0* 1.9* 2.0*   < > 2.0*   ALT  --  20  --   --  24    < > = values in this interval not displayed. Assessment and Plan     Bev Woods is a 80 y.o. female with a PMHx of anemia, CAD, HTN, HLD, DM2, hypothyroidism, GERD who is admitted for weakness in the s/o acute on chronic anemia, UTI, hypokalemia, and hypercalcemia, with concern for possible multiple myeloma.      Generalized LE weakness and GLF: Likely multifactorial 2/2 acute on chronic anemia / hypokalemia / hypercalcemia / UTI. CT head showing old right parietal injury.  - MRI today for old injury seen on CT  - Continue to correct electrolytes, mIVF for hypercalcemia, tx UTI, monitor Hgb     Acute on chronic macrocytic anemia: Now s/p 1u PRBC for Hgb of 6.4 (bl ~9) Concern for multiple myeloma with elevated calcium and calvarial lucencies on CT head, elevated protein. No signs of bleeding. Work-up at [facility] unremarkable. FOBT positive. - GI consulted, likely scope early next week  - HH q6, will space out if stable  - Heme/Onc consulted. peripheral smear, SPEP/UPEP    Hypercalcemia: POA 12.8. C/f malignancy in s/o calvarial lucencies. PTH-related peptide low (2/26), Vit D wnl. PTH wnl. Phos low, repleted. - fluids increased to 150/hr  - daily calcium      UTI: No signs of sepsis. UA positive for nitrites, LE, and 3+ bacteria. S/p CTX in the ED. Prior UCx positive for E coli resistant to Bactrim. - Continue CTX for 3 days (last dose 4/30)  - F/u UCx      At-risk DOMINIK: Creatinine 1.19 (BL ~0.7).  DDx includes dehydration, blood loss, losartan.  - HOLD home losartan Good (unsupported)  Sitting - Dynamic: Good (unsupported)  Standing: Impaired  Standing - Static: Constant support; Fair  Standing - Dynamic : Constant support; Fair    ADL Intervention:   Educated on potential need for visual scanning to left due to noted inattention during ADL mobility in room, assist to prevent walking into items on left side of room. Lower Body Dressing Assistance  Dressing Assistance: Contact guard assistance  Socks: Supervision; Compensatory technique training  Shoes with Cloth Laces: (instructed his wife to bring in shoes)  Leg Crossed Method Used: Yes  Position Performed: Seated edge of bed         Cognitive Retraining  Safety/Judgement: Awareness of environment; Fall prevention    Therapeutic Exercises:   Instructed on LE crossed leg stretch to prevent tightness in LE's and maintain ability to perform LE dressing. Crossed foot to opposite knee and maintained ~1 minute each leg    Pain:  No complaint    Activity Tolerance:   Good    After treatment patient left in no apparent distress:   Sitting in chair, Call bell within reach, Bed / chair alarm activated, and Caregiver / family present    COMMUNICATION/COLLABORATION:   The patients plan of care was discussed with: Physical therapist and Registered nurse.      CHULA Cintron/L  Time Calculation: 24 mins 50 mg daily  - mIVF  - Encourage PO intake and follow creatinine w/ daily labs     Hypokalemia: POA K 2.4. Mg 1.6. S/p 80 mEq K.  - Re-check renal function panel  - Replete PRN     Tropinemia (resolved): Trop 56., flat      Bradycardia: HR 50s POA.  - HOLD home metoprolol     DM2: Last A1c 5.3 on 12/6/21. Not on any home medications.  - SSI ACHS w/ glucose checks     HTN: Chronic. On amlodipine 10 mg daily, losartan 50 mg daily, and metoprolol XL 25 mg daily. - Continue amlodipine 10 mg daily  - HOLD home losartan 50 mg daily given elevated creatinine  - HOLD home metoprolol XL 25 mg daily given bradycardia  - Hydral PRN     HLD: Last lipid panel 12/6/21: Tchol 122, HDL 67, LDL 45.6, trig 47. No home medications.     Hypothyroidism: TSH 0.40 (2/25/22). - Continue home Synthroid 88 mcg daily before breakfast     CAD: MI (during surgery in 1970s) had multiple caths and stress tests. Records of cardiology visit w/ Dr. Peace Estrada on 3/22/17. Exercise Cardiolite 7/23/15 - walked 4:39 (6.3 METS), normal stress EKG. Normal MPI. LVEF 71%. Echo 7/23/15 - LVEF 55-60%, grade 1 DD.     GERD: Chronic.  - Continue home Protonix 40 mg daily        FEN/GI - Regular diet. NS at 150 mL/hr. Activity - As tolerated w/ assistance. DVT prophylaxis - SCDs  GI prophylaxis - Protonix  Fall prophylaxis - Fall precautions ordered. Disposition - Admit to Telemetry. Plan to d/c to TBD. Consulting PT, OT and CM  Code Status - Full. Discussed with patient / caregivers.   Next of Kin Name and Contact - Zulma Wheeler (Child) [phone] (Mobile)       Britta Domínguez MD  Family Medicine Resident       For Billing    Chief Complaint   Patient presents with   [facility] [place] Problems  Date Reviewed: 3/2/2022          Codes Class Noted POA    UTI (urinary tract infection) ICD-10-CM: N39.0  ICD-9-CM: 599.0  4/28/2022 Yes        * (Principal) Weakness generalized ICD-10-CM: R53.1  ICD-9-CM: 780.79  2/25/2022 Yes        Hypokalemia ICD-10-CM: E87.6  ICD-9-CM: 276.8  2/24/2022 Unknown        Elevated troponin ICD-10-CM: R77.8  ICD-9-CM: 790.6  2/24/2022 Yes        Urinary tract infection with hematuria ICD-10-CM: N39.0, R31.9  ICD-9-CM: 599.0, 599.70  2/24/2022 Yes        Anemia ICD-10-CM: D64.9  ICD-9-CM: 285.9  8/29/2018 Yes        Type 2 diabetes mellitus without complication, without long-term current use of insulin ([facility] ICD-10-CM: E11.9  ICD-9-CM: 250.00  4/24/2017 Yes        DDD (degenerative disc disease), lumbar ICD-10-CM: M51.36  ICD-9-CM: 722.52  9/6/2016 Yes        Coronary artery disease involving native coronary artery of native heart without angina pectoris ICD-10-CM: I25.10  ICD-9-CM: 414.01  5/4/2016 Yes        HTN (hypertension) ICD-10-CM: I10  ICD-9-CM: 401.9  Unknown Yes        Hypothyroidism, iatrogenic ICD-10-CM: E03.2  ICD-9-CM: 244. 3  Unknown Yes    Overview Signed 7/13/2015  3:16 PM by Girma Merida MD     radioiodine             Hypercholesteremia ICD-10-CM: E78.00  ICD-9-CM: 272.0  Unknown Yes

## 2022-04-29 NOTE — PROGRESS NOTES
Problem: Pressure Injury - Risk of  Goal: *Prevention of pressure injury  Description: Document Massimo Scale and appropriate interventions in the flowsheet. Outcome: Progressing Towards Goal  Note: Pressure Injury Interventions:  Sensory Interventions: Assess changes in LOC,Assess need for specialty bed,Keep linens dry and wrinkle-free,Minimize linen layers,Turn and reposition approx. every two hours (pillows and wedges if needed)    Moisture Interventions: Absorbent underpads,Check for incontinence Q2 hours and as needed,Minimize layers,Apply protective barrier, creams and emollients    Activity Interventions: PT/OT evaluation,Pressure redistribution bed/mattress(bed type),Increase time out of bed    Mobility Interventions: HOB 30 degrees or less,Pressure redistribution bed/mattress (bed type),PT/OT evaluation,Turn and reposition approx.  every two hours(pillow and wedges)    Nutrition Interventions: Document food/fluid/supplement intake,Offer support with meals,snacks and hydration                     Problem: Patient Education: Go to Patient Education Activity  Goal: Patient/Family Education  Outcome: Progressing Towards Goal

## 2022-04-29 NOTE — CONSULTS
[facility] [place] at [address], [address] [facility]: [phone]  F: [phone] Patient ID  Name: Elif Gary  YOB: 1935  MRN: [id number]  Referring Provider:   No referring provider defined for this encounter. Primary Care Provider:   Yudy Patino MD       HEMATOLOGY/MEDICAL ONCOLOGY  NOTE   Date of Visit: 22  Reason for Evaluation:     Chief Complaint   Patient presents with    Fall     Subjective:     History of Present Illness:     Elif Gary is a 80 y.o. F who presents as an inpatient consultation for anemia and question of myeloma. This is a subacute problem. Problem has occurred for months. Problem has worsened. Patient reports dealing with fatigue. She had a recent fall. She seems to have worsened over past weeks per daughters in room as patient is a limited historian. Patient reports adequate oral intake of food and hydration. Patient denies any bowel or bladder problems. Patient denies any uncontrolled pain. Patient denies any shortness of breath. -  Past Medical History:   Diagnosis Date    Diabetes ([address]     GI bleeding     workup in [address] [place] [address] 12/15 was unremarkable (Dr. Ora Felton) - says she had EGD, colonoscopy, and small bowel capsule endoscopy    HTN (hypertension)     Hypercholesteremia     Hypothyroidism, iatrogenic     radioiodine    MI (myocardial infarction) ([address]     During surgery in 's - she's had multiple caths and stress tests      Past Surgical History:   Procedure Laterality Date    HX  SECTION      HX HERNIA REPAIR  9848    Open umbilical incisional herniorrhaphy [facility]).  HX HERNIA REPAIR Right 2020    Right inguinal herniorrhaphy with mesh.  HX ORTHOPAEDIC      Back surgery x 2.    HX BRIAN AND BSO        Social History     Tobacco Use    Smoking status: Never Smoker    Smokeless tobacco: Never Used   Substance Use Topics    Alcohol use:  Yes Alcohol/week: 0.0 standard drinks      Family History   Problem Relation Age of Onset    Heart Failure Mother     Heart Disease Mother     Hypertension Mother      Current Facility-Administered Medications   Medication Dose Route Frequency    0.9% sodium chloride infusion 250 mL  250 mL IntraVENous PRN    potassium phosphate 20 mmol in 0.9% sodium chloride 250 mL infusion   IntraVENous ONCE    magnesium sulfate 2 g/50 ml IVPB (premix or compounded)  2 g IntraVENous ONCE    ondansetron (ZOFRAN ODT) tablet 4 mg  4 mg Oral Q8H PRN    Or    ondansetron (ZOFRAN) injection 4 mg  4 mg IntraVENous Q6H PRN    amLODIPine (NORVASC) tablet 10 mg  10 mg Oral DAILY    ferrous sulfate tablet 325 mg  325 mg Oral ACB    levothyroxine (SYNTHROID) tablet 88 mcg  88 mcg Oral ACB    [Held by provider] losartan (COZAAR) tablet 50 mg  50 mg Oral DAILY    pantoprazole (PROTONIX) tablet 40 mg  40 mg Oral DAILY    polyethylene glycol (MIRALAX) packet 17 g  17 g Oral DAILY    cefTRIAXone (ROCEPHIN) 1 g in 0.9% sodium chloride 10 mL IV syringe  1 g IntraVENous Q24H    0.9% sodium chloride infusion  150 mL/hr IntraVENous CONTINUOUS    glucose chewable tablet 16 g  4 Tablet Oral PRN    dextrose 10% infusion 0-250 mL  0-250 mL IntraVENous PRN    glucagon (GLUCAGEN) injection 1 mg  1 mg IntraMUSCular PRN    insulin lispro (HUMALOG) injection   SubCUTAneous AC&HS    hydrALAZINE (APRESOLINE) 20 mg/mL injection 10 mg  10 mg IntraVENous Q6H PRN      Allergies   Allergen Reactions    Celebrex [Celecoxib] Hives    Crestor [Rosuvastatin] Myalgia      -  Review of Systems provided by:patient  General: denies fever and reports fatigue  Eyes: denies any acute vision loss and denies any eye pain  HEENT: denies epistaxis and denies trouble swallowing  Cardio: denies any chest pain and denies any leg swelling  Resp: denies any shortness of breath. denies any hemoptysis.   Abdomen: denies any abdominal pain, denies any nausea and denies any vomiting  MSK: denies any myalgias and denies any arthralgias  Skin: denies any rash and denies any itching  Lymph: denies any lymph node enlargement and denies any lymph node tenderness  Neuro: denies any headache and denies any tremor  Psych: denies depression and denies anxiety    Objective:     Visit Vitals  BP (!) 187/101 (BP 1 Location: Left upper arm, BP Patient Position: At rest)   Pulse 67   Temp 98.5 °F (36.9 °C)   Resp 16   Wt 119 lb 0.8 oz (54 kg)   SpO2 96%   BMI 23.25 kg/m²     ECOG PS: 3- Capable of only limited selfcare; confined to bed or chair more than 50% of waking hours. Physical Exam  Constitutional: No acute distress. , Non-toxic appearance. and Chronic ill appearance. HENT: Normocephalic and atraumatic head. Eyes: Normal Conjunctivae. Anicteric sclerae. Cardiovascular: S1,S2 auscultated. No pitting edema. Pulmonary: Normal Respiratory Effort. No wheezing. No rhonchi. No rales. Abdominal: Normal bowel sounds. Soft Abdomen to palpation. No abdominal tenderness. Skin: No jaundice. No rash. Musculoskeletal: No muscle pain on palpation. No temporal muscle wasting on inspection. Lymph: No cervical, supraclavicular,or axillary lymph node enlargement or tenderness. Neurological: Alert and oriented. No tremor on inspection. Psychiatric: mood normal. normal speech rate normal affect    Results:     I personally reviewed Epic EHR labs/results below:   Lab Results   Component Value Date/Time    WBC 7.0 04/28/2022 11:57 PM    HGB 7.8 (L) 04/29/2022 06:31 AM    HCT 23.9 (L) 04/29/2022 06:31 AM    PLATELET 330 26/25/2043 11:57 PM    .6 (H) 04/28/2022 11:57 PM    ABS.  NEUTROPHILS 4.4 04/28/2022 11:57 PM     Lab Results   Component Value Date/Time    Sodium 137 04/29/2022 06:31 AM    Potassium 3.5 04/29/2022 06:31 AM    Chloride 105 04/29/2022 06:31 AM    CO2 27 04/29/2022 06:31 AM    Glucose 91 04/29/2022 06:31 AM    BUN 19 04/29/2022 06:31 AM    Creatinine 1.02 04/29/2022 06:31 AM    GFR est AA >60 04/29/2022 06:31 AM    GFR est non-AA 51 (L) 04/29/2022 06:31 AM    Calcium 12.2 (H) 04/29/2022 06:31 AM    Glucose (POC) 93 04/29/2022 07:45 AM     Lab Results   Component Value Date/Time    Bilirubin, total 0.2 04/28/2022 11:57 PM    ALT (SGPT) 20 04/28/2022 11:57 PM    Alk. phosphatase 46 04/28/2022 11:57 PM    Protein, total 9.7 (H) 04/28/2022 11:57 PM    Albumin 2.0 (L) 04/29/2022 06:31 AM    Globulin 7.8 (H) 04/28/2022 11:57 PM     ANEMIA LABS:  Hemoglobin:No components found for: HEME  Iron Studies:    Iron   Date Value Ref Range Status   04/05/2022 51 35 - 150 ug/dL Final     Vitamin B12:   Vitamin B12   Date Value Ref Range Status   02/25/2022 497 193 - 986 pg/mL Final     Folate: Folate   Date Value Ref Range Status   04/28/2022 29.2 (H) 5.0 - 21.0 ng/mL Final     SPEP:  No results found for: PE6T No results found for: PE9T  No results found for: KLFL3L No results found for: KLFL1L No components found for: KLFL2  Reticulocyte Count: No results found for: RET  Bilirubin:   Lab Results   Component Value Date/Time    Bilirubin, total 0.2 04/28/2022 11:57 PM    Bilirubin Negative 04/28/2022 11:15 AM     LDH:   LD   Date Value Ref Range Status   02/25/2022 337 (H) 81 - 246 U/L Final     Comment:     SPECIMEN HEMOLYZED, RESULTS MAY BE AFFECTED     Haptoglobin:  Haptoglobin   Date Value Ref Range Status   02/25/2022 72 30 - 200 mg/dL Final    No results found for: HGBPLT  ALEJANDRO: No results found for: PCTDBS  Copper Level: No results found for: COSLT  Occult Blood Testing: No results found for: OCBLLT  Hemoglobin Fractionation:  No components found for: HGBEL1, HGBEL2, HGBEL3, HGBEL4, HGBEL5, HGBEL6, HGBEL7, HGBEL8      Assessment and Recommendations:       1. Anemia, unspecified type  Patient with severe anemia. May need a bone marrow biopsy. Will order myeloma labs but repeat imaging on mri did not mention any clear skull lesion.  Cannot exclude an underlying myelodysplastic syndrome. 2.  Hypercalcemia  Cannot exclude occult malignancy. nospecific lytic lesions in hip and initially in calvarium and hips. However, brain mri without clear lesions of skull. Low PTH. Unclear vitamin d level; recommend ordering it. Lab Results   Component Value Date/Time    Calcium 11.5 (H) 04/29/2022 03:44 PM    Phosphorus 2.6 04/29/2022 03:44 PM    PTH, Intact 8.0 (L) 04/28/2022 03:12 PM   PTH rel peptide normal in 2/2022. 3. Urinary tract infection without hematuria, site unspecified  Treatment per primary team.    Will follow-up on Monday.     Signed By:   Devyn Means MD

## 2022-04-29 NOTE — PROGRESS NOTES
Care Management follow up    [phone]  Met with patient and her daughters Kortney Wilde and Ant Gallegos at bedside. Patient agreed to SNF placement, daughters agreeable with plan and choice is [facility]. Referral sent via [facility]. Patient admitted for UTI, falls   History of: HTN, GI bleed, DDD, CAD, diabetes, UTI  COVID Vaccine:  Yes    Vaccine plus 2 boosters. RUR 18 (Score %) moderate   Is This a Readmission NO  Is this a Bundle NO    Current status  Patient discussed during interdisciplinary rounds. Patient continues to require medical management including IV antibiotics, IV fluids, potassium and mag replete today. Patient very heard of hearing. Discussed possible SNF placement, patient and daughter Ant Gallegos requesting CM meet with patient's daughter Kortney Wilde later today to discuss disposition. SNF provider list left with patient. Patient open to [facility] for skilled nursing. Transition of Care Plan  1. Monitor patient status and response to treatment. 2. Patient continues to require medical management. 3. Open to [facility], will need order to resume home nursing, PT, OT if patient returns home at [place]. 4. Family to transport at [place]. 5. CM to monitor progress and recommendations.     Sirisha Walters, RN, MSN/Care manager

## 2022-04-29 NOTE — PROGRESS NOTES
Progress Note  Date:2022       Room:[facility]  Patient Name:Inge Lopez     Date of Birth:[birth year]     Age:87 y.o. Subjective    Subjective:  Symptoms:  Stable. Diet:  Poor intake. No nausea or vomiting. Pain:  She reports no pain. Review of Systems   Constitutional: Positive for fatigue and fever. Negative for appetite change. Gastrointestinal: Negative for abdominal pain, blood in stool, nausea and vomiting. Skin: Negative for pallor. Objective         Vitals Last 24 Hours:  TEMPERATURE:  Temp  Av.3 °F (36.8 °C)  Min: 97.6 °F (36.4 °C)  Max: 99.1 °F (37.3 °C)  RESPIRATIONS RANGE: Resp  Av.5  Min: 14  Max: 18  PULSE OXIMETRY RANGE: SpO2  Av.6 %  Min: 93 %  Max: 100 %  PULSE RANGE: Pulse  Av.9  Min: 51  Max: 67  BLOOD PRESSURE RANGE: Systolic (25ANU), MRV:642 , Min:145 , TVZ:335   ; Diastolic (34XYE), OZT:40, Min:59, Max:101    I/O (24Hr): Intake/Output Summary (Last 24 hours) at 2022 0918  Last data filed at 2022 0655  Gross per 24 hour   Intake 1236.3 ml   Output 515 ml   Net 721.3 ml     Objective:  General Appearance:  Comfortable and not in pain. Vital signs: (most recent): Blood pressure (!) 187/101, pulse 67, temperature 98.5 °F (36.9 °C), resp. rate 16, weight 54 kg (119 lb 0.8 oz), SpO2 96 %. Fever. Output: No stool output. HEENT: Normal HEENT exam.    Lungs:  Normal effort and normal respiratory rate. Breath sounds clear to auscultation. Heart: Normal rate. Regular rhythm. S1 normal and S2 normal.  Positive for murmur. Abdomen: Abdomen is soft and non-distended. Bowel sounds are normal.   There is no abdominal tenderness. Extremities: Normal range of motion. There is no dependent edema. Pulses: Distal pulses are intact. Neurological: Patient is alert and oriented to person, place and time. Pupils:  Pupils are equal, round, and reactive to light.     Skin:  Warm, dry and pale.      Labs/Imaging/Diagnostics    Labs:  CBC:  Recent Labs     04/29/22  0631 04/29/22  0121 04/28/22 2357 04/28/22  1512 04/28/22  1116   WBC  --   --  7.0  --  6.6   RBC  --   --  1.97*  --  1.99*   HGB 7.8* 6.4* 6.8*   < > 7.0*   HCT 23.9* 20.2* 20.8*   < > 21.4*   MCV  --   --  105.6*  --  107.5*   RDW  --   --  14.9*  --  14.8*   PLT  --   --  312  --  325    < > = values in this interval not displayed. CHEMISTRIES:  Recent Labs     04/29/22  0631 04/28/22 2357 04/28/22  1512 04/28/22  1116 04/28/22  1116    137 137   < > 134*   K 3.5 3.4* 3.2*   < > 2.4*    106 103   < > 101   CO2 27 27 29   < > 30   BUN 19 19 20   < > 19   CA 12.2* 11.8* 12.7*  12.5*   < > 12.8*   PHOS 2.0* 1.7* 2.3*  --   --    MG 1.5*  --   --   --  1.6    < > = values in this interval not displayed. PT/INR:No results for input(s): INR, INREXT in the last 72 hours. No lab exists for component: PROTIME  APTT:No results for input(s): APTT in the last 72 hours. LIVER PROFILE:  Recent Labs     04/28/22 2357 04/28/22  1116   AST 25 32   ALT 20 24     Lab Results   Component Value Date/Time    ALT (SGPT) 20 04/28/2022 11:57 PM    AST (SGOT) 25 04/28/2022 11:57 PM    Alk. phosphatase 46 04/28/2022 11:57 PM    Bilirubin, total 0.2 04/28/2022 11:57 PM       Imaging Last 24 Hours:  XR SPINE THORAC 3 V    Result Date: 4/28/2022  INDICATION: pain, fall Frontal, swimmers and lateral views of the thoracic spine show no apparent fracture, subluxation or destructive lesion. There is multilevel degenerative disc disease. There is scoliosis. No acute finding. XR SPINE LUMB 2 OR 3 V    Result Date: 4/28/2022  INDICATION: pain radiating down both legs EXAM: Lumbar Spine: Frontal, lateral and coned lateral L5-S1 views show no fracture or subluxation of the lumbar spine. There is previous surgical posterior decompression with instrumented fusion L3-L5 without apparent hardware failure.  There is multilevel degenerative disc disease, severe at T12-L1 and L1-L2. Soft tissues show vascular calcification. No acute finding. Lumbar degenerative disc disease. Posterior decompression and instrumented fusion without apparent complication. CT HEAD WO CONT    Result Date: 4/28/2022  EXAM:  CT HEAD WO CONT INDICATION:  fall last night, dizziness. TECHNIQUE: Thin axial images were obtained through the calvarium and saved with standard and bone window algorithm. Coronal and sagittal reconstructions were generated. CT dose reduction was achieved through use of a standardized protocol tailored for this examination and automatic exposure control for dose modulation. COMPARISON: None available. FINDINGS: Ventricular and sulcal prominence consistent with age-related volume loss within normal limits. Focal decreased attenuation right parietal lobe has imaging characteristics suggesting old injury/infarct, however this is incompletely evaluated and should be correlated clinically. Depending on clinical circumstance, consider MRI to exclude other etiologies. Patchy decreased attenuation in the white matter suggesting mild chronic small vessel ischemic change. Atherosclerotic calcification in vertebral arteries and carotid siphons. No evidence to suggest an acute brain infarct, hemorrhage or other abnormal extra-axial fluid collection. Visualized osseous structures of the skull base are unremarkable. There are numerous small lucencies in the calvarium most prominent lead demonstrated towards the vertex. Although this may represent normal variation, possibility of early sequela of multiple myeloma cannot be excluded. 1. Decreased attenuation right parietal lobe likely related to old injury and encephalomalacia, however nonspecific on CT. Consider nonemergent MRI for confirmation. 2. Otherwise, no acute intracranial abnormality demonstrated. 3. Numerous small foci of lucency in the calvarium is nonspecific.  Possibility of multiple myeloma can be excluded clinically. CT SPINE CERV WO CONT    Result Date: 4/28/2022  INDICATION: fall EXAM: Axial unenhanced CT of the cervical spine is performed with 2D coronal and sagittal reformatted images provided. CT dose reduction was achieved through use of a standardized protocol tailored for this examination and automatic exposure control for dose modulation. FINDINGS: There is no fracture or significant subluxation. There is multilevel degenerative disc disease, severe from C4 to C7. There is no prevertebral soft tissue swelling. Paraspinal soft tissues show chronic left goiter. No fracture. XR HIPS BI W OR WO AP PELV    Result Date: 4/28/2022  EXAM: XR HIPS BI W OR WO AP PELV INDICATION: bilateral hip pain, legs gave out. COMPARISON: None. FINDINGS: 3 views bilateral hips. AP view of the pelvis and frogleg lateral views of both hips demonstrate no fracture, dislocation or other acute abnormality. There is mild bilateral hip osteoarthritis. There are nonspecific osseous small lytic foci. No acute abnormality    Assessment//Plan   Principal Problem:    Weakness generalized (2/25/2022)    Active Problems:    HTN (hypertension) ()      Hypothyroidism, iatrogenic ()      Overview: radioiodine      Hypercholesteremia ()      Coronary artery disease involving native coronary artery of native heart without angina pectoris (5/4/2016)      DDD (degenerative disc disease), lumbar (9/6/2016)      Type 2 diabetes mellitus without complication, without long-term current use of insulin ([address] (4/24/2017)      Anemia (8/29/2018)      Hypokalemia (2/24/2022)      Elevated troponin (2/24/2022)      Urinary tract infection with hematuria (2/24/2022)      UTI (urinary tract infection) (4/28/2022)      Assessment:  (GI consultation for anemia and heme+ stool. 81 y/o female admitted with weakness starting 3 days ago with GLF. History of EDUARDO with hgb found to be 7 (it was 9.3 on 2/27/2022 and 8.1 on 4/5/2022).  Recently referred to hem/onc by PCP. GI work up in 2015 by Dr. Abby Krabbe with small paraesophageal hernia on EGD, diverticulosis on colonoscopy, and superficial gastric erosion that was not bleeding on capsule study. BUN is 19 (previously 13). No GI complaints. Cells are macrocytic, B12 and folate normal 2/2022. Iron studies lower limit of normal 4/5/2022. Reports 10-15 lb weight loss in last few weeks. Stopped taking Miralax recently and became constipated but bowel habits improved after resuming it last weekend. Takes PPI daily, no reflux symptoms. Takes iron daily. Pertinent labs: WBC 6.6, hct 21.4, .5, platelets 442, sodium 134, potassium 2.4, creatinine 1.19, (iron studies 4/5/2022--iron 51, TIBC 252, saturation 20%, ferritin 26), normal LFTs.     4/29/2022: Going to MRI for MRI of brain today. Hgb down to 6.4 yesterday, 7.8 after transfusion. No BM yet per nursing. No GI complaints. ). Plan:   (- Plan EGD and colonoscopy for Monday, 5/2/2022. Will order clear liquids for Sunday, 5/1/2022 and NPO past midnight Sunday. Moviprep on Sunday evening. If she is discharged we can arrange these to be done as OP.    - Follow H&H, transfuse prn).        Electronically signed by Heena Friend NP on 4/29/2022 at 9:18 AM  Antonieta Husain MD

## 2022-04-29 NOTE — ROUTINE PROCESS
4338  MRI check list completed to the best of the patients ability. There were some things the patient was not sure about. Discussed with MRI.

## 2022-04-29 NOTE — PROGRESS NOTES
0112    Patients HGB came back 6.8, previous HGB was 11.0. Contacted [facility], spoke with Dr. Cara Lazo, new orders to repeat HGB received. 4576    Patient HGB came back 6.4, Contacted [facility] spoke with Dr. Cara Lazo. Orders for a 1 unit of blood received    0225    Blood consent obtained, patient  is aware of the situation, pt was given the opportunity to ask questions, pt stated she did not have any questions. 1 unit of blood is infusing patient is stable, vitals are stable. 7479    Blood transfusion complete. Patient is stable and comfortable and vitals are stable. 0600    Patient wasn't able to answer all of MRI checklist questions     0700    Bedside and Verbal shift change report given to Sherin Banuelos RN (oncoming nurse) by Abi Landin RN (offgoing nurse). Report included the following information SBAR, Kardex, ED Summary, Intake/Output, MAR, Recent Results and Cardiac Rhythm NSR/ Sinus Timothy Fowler. This patient was assisted with Intentional Toileting every 2 hours during this shift as appropriate. Documentation of ambulation and output reflected on Flowsheet as appropriate. Purposeful hourly rounding was completed using AIDET and 5Ps. Outcomes of PHR documented as they occurred. Bed alarm in use as appropriate. Dual Suction and ambubag in place.

## 2022-04-30 NOTE — PROGRESS NOTES
6249    Patient blood pressure is 209/91, repeated 3 times on different sites still showed same reading. PRN order of hydralazine given. Patient states she feels fine and comfortable. 0700    Bedside and Verbal shift change report given to Yani Samaniego RN (oncoming nurse) by Heydi Lewis RN (offgoing nurse). Report included the following information SBAR, Kardex, ED Summary, Intake/Output, MAR, Recent Results and Cardiac Rhythm NSR. This patient was assisted with Intentional Toileting every 2 hours during this shift as appropriate. Documentation of ambulation and output reflected on Flowsheet as appropriate. Purposeful hourly rounding was completed using AIDET and 5Ps. Outcomes of PHR documented as they occurred. Bed alarm in use as appropriate. Dual Suction and ambubag in place.

## 2022-04-30 NOTE — PROGRESS NOTES
[address] [facility] [address] [place] [address]   Office [phone]  Fax [phone]            Subjective / Objective     Rio Gutierrez is a 80 y.o. female with a PMHx of anemia who is admitted for weakness in the s/o acute on chronic anemia, UTI, hypokalemia, and hypercalcemia, concern for multiple myeloma. 24 Hour Events:   - Overnight elevated BP to 209/91, received single Hydral IV 10mg. Subjective: Pt was seen and examined at bedside. No current symptoms, endorsing some mild diffuse abdominal pain which improved after bowel movement. Patient states she has been able to ambulate with assistance. Denies chest pain, SOB, nausea, vomiting, abdominal pain. Objective:    Respiratory:   O2 Device: None (Room air)   Visit Vitals  BP (!) 197/90 (BP 1 Location: Left upper arm, BP Patient Position: Semi fowlers)   Pulse 72   Temp 97.9 °F (36.6 °C)   Resp 18   Ht 5' (1.524 m)   Wt 119 lb (54 kg)   SpO2 96%   BMI 23.24 kg/m²     Physical Exam:  General: No acute distress. Alert. Cooperative. Respiratory: CTAB, no wheezes or crackles   Cardiovascular: Regular rate, normal rhythm. No murmurs. GI: Nondistended. + bowel sounds. Mild suprapubic tenderness. Extremities: No LE edema. Skin: Warm, dry. Neuro: Alert and oriented x3. Unsure of hospital name and president name. I/O:  Date 04/29/22 0700 - 04/30/22 0659 04/30/22 0700 - 05/01/22 0659   Shift [id number] 24 Hour Total 3247-7969 6155-9104 24 Hour Total   INTAKE   P.O. 600  600        P. O. 600  600      I. V.(mL/kg/hr) 1560(2.4)  1560(1.2)        I.V. 1500  1500        Volume (cefTRIAXone (ROCEPHIN) 1 g in 0.9% sodium chloride 10 mL IV syringe) 10  10        Volume (magnesium sulfate 2 g/50 ml IVPB (premix or compounded)) 50  50      Blood 0  0        Autotransfused 0  0      Other 0  0        Other 0  0      Shift Total(mL/kg) 3295(81)  0679(11)      OUTPUT   Urine(mL/kg/hr)  200(0.3) 200(0.2)        Urine Voided  200 200 Urine Occurrence(s)  1 x 1 x      Stool           Stool Occurrence(s)  1 x 1 x      Shift Total(mL/kg)  200(3.7) 200(3.7)      NET 2160 -200 1960      Weight (kg) 54 54 54 54 54 54       Inpatient Medications  Current Facility-Administered Medications   Medication Dose Route Frequency    hydrALAZINE (APRESOLINE) 20 mg/mL injection 10 mg  10 mg IntraVENous Q4H PRN    hydrALAZINE (APRESOLINE) tablet 25 mg  25 mg Oral TID    pantoprazole (PROTONIX) tablet 40 mg  40 mg Oral BID    ondansetron (ZOFRAN ODT) tablet 4 mg  4 mg Oral TID WITH MEALS    0.9% sodium chloride infusion 250 mL  250 mL IntraVENous PRN    [START ON 5/1/2022] peg 3350-Electrolytes-Vit C (MOVIPREP) oral solution 1 L  1 L Oral Q6H    alendronate (FOSAMAX) tablet 40 mg  40 mg Oral ACB    LORazepam (ATIVAN) injection 0.5 mg  0.5 mg IntraVENous ONCE PRN    losartan (COZAAR) tablet 50 mg  50 mg Oral DAILY    amLODIPine (NORVASC) tablet 10 mg  10 mg Oral DAILY    ferrous sulfate tablet 325 mg  325 mg Oral ACB    levothyroxine (SYNTHROID) tablet 88 mcg  88 mcg Oral ACB    polyethylene glycol (MIRALAX) packet 17 g  17 g Oral DAILY    0.9% sodium chloride infusion  100 mL/hr IntraVENous CONTINUOUS    glucose chewable tablet 16 g  4 Tablet Oral PRN    dextrose 10% infusion 0-250 mL  0-250 mL IntraVENous PRN    glucagon (GLUCAGEN) injection 1 mg  1 mg IntraMUSCular PRN    insulin lispro (HUMALOG) injection   SubCUTAneous AC&HS       Allergies  Allergies   Allergen Reactions    Celebrex [Celecoxib] Hives    Crestor [Rosuvastatin] Myalgia       CBC:  Recent Labs     04/30/22  0316 04/29/22  1544 04/29/22  0631 04/29/22  0121 04/28/22  2357 04/28/22  1512 04/28/22  1116   WBC 7.7  --   --   --  7.0  --  6.6   HGB 8.1* 8.7* 7.8*   < > 6.8*   < > 7.0*   HCT 24.8* 26.3* 23.9*   < > 20.8*   < > 21.4*     --   --   --  312  --  325    < > = values in this interval not displayed.        Metabolic Panel:  Recent Labs     04/30/22  3453 04/29/22  1544 04/29/22  0631 04/28/22  2357 04/28/22  2357 04/28/22  1512 04/28/22  1116    135* 137   < > 137   < > 134*   K 3.2* 3.2* 3.5   < > 3.4*   < > 2.4*    105 105   < > 106   < > 101   CO2 26 26 27   < > 27   < > 30   BUN 14 17 19   < > 19   < > 19   CREA 1.14* 1.06* 1.02   < > 1.11*   < > 1.19*   GLU 99 104* 91   < > 85   < > 123*   CA 11.0* 11.5* 12.2*   < > 11.8*   < > 12.8*   MG 1.8  --  1.5*  --   --   --  1.6   PHOS  --  2.6 2.0*  --  1.7*   < >  --    ALB 1.9* 2.0* 2.0*   < > 1.9*   < > 2.0*   ALT 19  --   --   --  20  --  24    < > = values in this interval not displayed. Assessment and Plan     Samantha Alonzo is a 80 y.o. female with a PMHx of anemia, CAD, HTN, HLD, DM2, hypothyroidism, GERD who is admitted for weakness in the s/o acute on chronic anemia, UTI, hypokalemia, and hypercalcemia, with concern for possible multiple myeloma.      Generalized LE weakness and GLF: Likely multifactorial 2/2 acute on chronic anemia / hypokalemia / hypercalcemia / UTI. CT head showing old right parietal injury. MRI no acute process. - Continue to correct electrolytes, mIVF for hypercalcemia, tx UTI, monitor Hgb  - Awaiting SNF placement.      Acute on chronic macrocytic anemia: Now s/p 1u PRBC for Hgb of 6.4 (bl ~9) Concern for multiple myeloma with elevated calcium and calvarial lucencies on CT head, elevated protein. No signs of bleeding. Work-up at [facility] unremarkable. FOBT positive. - GI consulted, plan to scope early next week  - Stable, monitor on daily CBC  - Heme/Onc consulted. peripheral smear, SPEP/UPEP. Will reevaluate next week. Hypercalcemia: POA 12.8. C/f malignancy in s/o calvarial lucencies. PTH-related peptide low (2/26), Vit D wnl. PTH wnl.   - fluids decreased to MIVF. - daily calcium checks, currently improving. HTN: Chronic. On amlodipine 10 mg daily, losartan 50 mg daily, and metoprolol XL 25 mg daily.   - Continue amlodipine 10 mg daily and home losartan 50 mg daily given elevated creatinine  - HOLD home metoprolol XL 25 mg daily given bradycardia  - Hydral 25mg TID  - Hydral 10mg IV q6 PRN     UTI: No signs of sepsis. UA positive for nitrites, LE, and 3+ bacteria. S/p CTX in the ED. Prior UCx positive for E coli resistant to Bactrim. - Continue CTX for 3 days (last dose 4/30) today. Given lack of systemic symptoms and patient has remained afebrile, doubt infectious component to generalized weakness.  - UCx gram negative rods, f/u speciation      At-risk DOMINIK: Improving. Creatinine 1.19 (BL ~0.7). - Now continue home losartan 50 mg daily  - mIVF  - Encourage PO intake and follow creatinine w/ daily labs     Hypokalemia: POA K 2.4. Mg 1.6. S/p 80 mEq K.  - Re-check renal function panel  - Replete PRN     Tropinemia (resolved): Trop 56., flat      Bradycardia: HR 50s POA.  - HOLD home metoprolol     DM2: Last A1c 5.3 on 12/6/21. Not on any home medications.  - SSI ACHS w/ glucose checks     HLD: Last lipid panel 12/6/21: Tchol 122, HDL 67, LDL 45.6, trig 47. No home medications.     Hypothyroidism: TSH 0.40 (2/25/22). - Continue home Synthroid 88 mcg daily before breakfast     CAD: MI (during surgery in 1970s) had multiple caths and stress tests. Records of cardiology visit w/ Dr. Jessica Montoya on 3/22/17. Exercise Cardiolite 7/23/15 - walked 4:39 (6.3 METS), normal stress EKG. Normal MPI. LVEF 71%. Echo 7/23/15 - LVEF 55-60%, grade 1 DD.     GERD: Chronic.  - Continue home Protonix 40 mg daily        FEN/GI - Regular diet. NS at 100 mL/hr. Activity - As tolerated w/ assistance. DVT prophylaxis - SCDs  GI prophylaxis - Protonix  Fall prophylaxis - Fall precautions ordered. Disposition - Admit to Telemetry. Plan to d/c to TBD. Consulting PT, OT and CM  Code Status - Full. Discussed with patient / caregivers.   Next of Kin Name and Contact - Aleah Byrd (Child) [phone] (Mobile)       Orvan Dye, MD  Family Medicine Resident       For Billing    Chief Complaint   Patient presents with   [facility] Problems  Date Reviewed: 3/2/2022          Codes Class Noted POA    UTI (urinary tract infection) ICD-10-CM: N39.0  ICD-9-CM: 599.0  4/28/2022 Yes        * (Principal) Weakness generalized ICD-10-CM: R53.1  ICD-9-CM: 780.79  2/25/2022 Yes        Hypokalemia ICD-10-CM: E87.6  ICD-9-CM: 276.8  2/24/2022 Unknown        Elevated troponin ICD-10-CM: R77.8  ICD-9-CM: 790.6  2/24/2022 Yes        Urinary tract infection with hematuria ICD-10-CM: N39.0, R31.9  ICD-9-CM: 599.0, 599.70  2/24/2022 Yes        Anemia ICD-10-CM: D64.9  ICD-9-CM: 285.9  8/29/2018 Yes        Type 2 diabetes mellitus without complication, without long-term current use of insulin ([facility] ICD-10-CM: E11.9  ICD-9-CM: 250.00  4/24/2017 Yes        DDD (degenerative disc disease), lumbar ICD-10-CM: M51.36  ICD-9-CM: 722.52  9/6/2016 Yes        Coronary artery disease involving native coronary artery of native heart without angina pectoris ICD-10-CM: I25.10  ICD-9-CM: 414.01  5/4/2016 Yes        HTN (hypertension) ICD-10-CM: I10  ICD-9-CM: 401.9  Unknown Yes        Hypothyroidism, iatrogenic ICD-10-CM: E03.2  ICD-9-CM: 244. 3  Unknown Yes    Overview Signed 7/13/2015  3:16 PM by Taylor Martin MD     radioiodine             Hypercholesteremia ICD-10-CM: E78.00  ICD-9-CM: 272.0  Unknown Yes

## 2022-04-30 NOTE — PROGRESS NOTES
Bedside and Verbal shift change report given to LIDIA Calderon (oncoming nurse) by Jean Lui (offgoing nurse). Report included the following information SBAR, Kardex, ED Summary, MAR, Recent Results and Cardiac Rhythm SR. This patient was assisted with Intentional Toileting every 2 hours during this shift as appropriate. Documentation of ambulation and output reflected on Flowsheet as appropriate. Purposeful hourly rounding was completed using AIDET and 5Ps. Outcomes of PHR documented as they occurred. Bed alarm in use as appropriate. Dual Suction and ambubag in place. Bedside and Verbal shift change report given to LIDIA Land (oncoming nurse) by Freeman Koroma (offgoing nurse).  Report included the following information SBAR, Kardex, ED Summary, MAR, Recent Results and Cardiac Rhythm SR.

## 2022-04-30 NOTE — PROGRESS NOTES
[address] [facility] Kamryn Taylor 33   Office [phone]  Fax [phone]            Subjective / Objective     24 Hour Events:   BP was elevated to 209/190, so hydral 10mg IV was given (total of 2 doses in last 24hrs). K was 3 so was repleted with oral potassium, after which patient developed nausea and belly pain. Pt received 1 dose of zofran and did not have any emesis. Pt also has been having small amount of BM with urine output, so 24hr urine was not collected. Subjective: Pt was seen and examined at bedside, hard of hearing. She is reporting nausea and belly pain after drinking the oral potassium. Denies any vomiting, CP or SOB. Objective:    Respiratory:   O2 Device: None (Room air)   Visit Vitals  BP (!) 185/79   Pulse 73   Temp 98.4 °F (36.9 °C)   Resp 16   Ht 5' (1.524 m)   Wt 119 lb (54 kg)   SpO2 92%   BMI 23.24 kg/m²     Physical Exam:  General: No acute distress. Alert. Cooperative. [place]  Respiratory: No increased work of breathing. Mild crackles in L lung base. No wheezing  Cardiovascular: Regular rate, normal rhythm. No murmurs. GI: Nondistended. + bowel sounds. Mild generalized tenderness to palpation. Extremities: No LE edema. Skin: Warm, dry. Neuro: Alert and oriented x2      I/O:  Date 04/30/22 0700 - 05/01/22 0659 05/01/22 0700 - 05/02/22 0659   Shift 5366-9102 1672-3212 24 Hour Total 6615-9318 1376-1246 24 Hour Total   INTAKE   P.O.         P. O.       I. V.(mL/kg/hr) 1000(1.5)  1000(0.8)        I.V. 1000  1000      Blood 0  0        Autotransfused 0  0      Other 0  0        Other 0  0      Shift Total(mL/kg) 1720(31.9) 580(10.7) 2300(42.6)      OUTPUT   Urine(mL/kg/hr) 400(0.6) 400(0.6) 800(0.6)        Urine Voided 400 400 800        Urine Occurrence(s) 1 x 3 x 4 x      Stool 300  300        Stool Occurrence(s) 1 x 2 x 3 x        Stool 300  300      Shift Total(mL/kg) 700(13) 400(7.4) 1100(20.4)      NET 7209 931 5269      Weight (kg) 54 54 54 54 54 54       Inpatient Medications  Current Facility-Administered Medications   Medication Dose Route Frequency    losartan (COZAAR) tablet 100 mg  100 mg Oral DAILY    hydrALAZINE (APRESOLINE) 20 mg/mL injection 10 mg  10 mg IntraVENous Q4H PRN    metoprolol succinate (TOPROL-XL) XL tablet 25 mg  25 mg Oral DAILY    benzonatate (TESSALON) capsule 100 mg  100 mg Oral TID PRN    cholecalciferol (VITAMIN D3) (1000 Units /25 mcg) tablet 2,000 Units  2,000 Units Oral DAILY    hydrALAZINE (APRESOLINE) tablet 25 mg  25 mg Oral QID    0.9% sodium chloride infusion 250 mL  250 mL IntraVENous PRN    peg 3350-Electrolytes-Vit C (MOVIPREP) oral solution 1 L  1 L Oral Q6H    alendronate (FOSAMAX) tablet 40 mg  40 mg Oral ACB    amLODIPine (NORVASC) tablet 10 mg  10 mg Oral DAILY    ferrous sulfate tablet 325 mg  325 mg Oral ACB    levothyroxine (SYNTHROID) tablet 88 mcg  88 mcg Oral ACB    polyethylene glycol (MIRALAX) packet 17 g  17 g Oral DAILY    0.9% sodium chloride infusion  100 mL/hr IntraVENous CONTINUOUS    glucose chewable tablet 16 g  4 Tablet Oral PRN    dextrose 10% infusion 0-250 mL  0-250 mL IntraVENous PRN    glucagon (GLUCAGEN) injection 1 mg  1 mg IntraMUSCular PRN    insulin lispro (HUMALOG) injection   SubCUTAneous AC&HS       Allergies  Allergies   Allergen Reactions    Celebrex [Celecoxib] Hives    Crestor [Rosuvastatin] Myalgia       CBC:  Recent Labs     05/01/22 0052 04/30/22  0316 04/29/22  1544 04/29/22  0121 04/28/22  2357   WBC 6.9 7.7  --   --  7.0   HGB 7.3* 8.1* 8.7*   < > 6.8*   HCT 23.0* 24.8* 26.3*   < > 20.8*    348  --   --  312    < > = values in this interval not displayed.        Metabolic Panel:  Recent Labs     05/01/22 0052 04/30/22  0316 04/29/22  1544 04/29/22  0631 04/29/22  0631 04/28/22  2357 04/28/22  2357    136 135*   < > 137   < > 137   K 3.0* 3.2* 3.2*   < > 3.5   < > 3.4*    106 105   < > 105   < > 106   CO2 27 26 26   < > 27 < > 27   BUN 12 14 17   < > 19   < > 19   CREA 1.03* 1.14* 1.06*   < > 1.02   < > 1.11*   GLU 97 99 104*   < > 91   < > 85   CA 10.6* 11.0* 11.5*   < > 12.2*   < > 11.8*   MG 1.6 1.8  --   --  1.5*  --   --    PHOS  --   --  2.6  --  2.0*  --  1.7*   ALB 1.8* 1.9* 2.0*   < > 2.0*   < > 1.9*   ALT 19 19  --   --   --   --  20    < > = values in this interval not displayed. Assessment and Plan     Claribel Jeffrey is a 80 y.o. female with a PMHx of anemia, CAD, HTN, HLD, DM2, hypothyroidism, GERD who is admitted for weakness in the s/o acute on chronic anemia, UTI, hypokalemia, and hypercalcemia, with concern for possible multiple myeloma.      Generalized LE weakness and GLF: Likely multifactorial 2/2 acute on chronic anemia / hypokalemia / hypercalcemia / UTI. Head imaging clear. - Continue to correct electrolytes, mIVF for hypercalcemia, tx UTI, monitor Hgb  - Awaiting SNF placement.      Acute on chronic macrocytic anemia: Now s/p 1u PRBC for Hgb of 6.4 (bl ~9) Concern for multiple myeloma with elevated calcium and calvarial lucencies on CT head, elevated protein. Work-up at [facility] unremarkable. FOBT positive. - GI consulted, plan to scope tomorrow, prep and NPO this evening.   - Stable, monitor on daily CBC  - Heme/Onc consulted: SPEP/UPEP pending    Hypercalcemia: POA 12.8. C/f malignancy in s/o calvarial lucencies. PTH-related peptide low (2/26), Vit D wnl. PTH wnl.   - MIVF  - Lasix 20mg IV once today  - Alendronate 40mg daily  - daily calcium checks, currently improving. Abdominal pain: possibly 2/2 hypercalcemia vs. Medication side effect vs. Malignancy. No improvement with hypercalcemia treatment so will get imaging today  - CT chest/abd/pel pending  - Scope tomorrow    HTN: Chronic. At home on amlodipine 10 mg daily, losartan 50 mg daily, and metoprolol XL 25 mg daily.   - Continue home amlodipine 10 mg daily and metoprolol XL 25 mg daily  - losartan increased from 50 to 100mg daily (Cr improved)  - Hydral 25mg QID  - Hydral 10mg IV q6 PRN     UTI: (treated): S/p x3 days ceftriaxone. No symptoms. Culture showing sensitive E Coli.      At-risk DOMINIK: Improving. Creatinine 1.19 (BL ~0.7). - mIVF  - Encourage PO intake      Hypokalemia: POA K 2.4. Mg 1.6. S/p 80 mEq K.  - Replete PRN     Bradycardia: (Resolved) HR 50s POA. - Continue home metoprolol     DM2: Last A1c 5.3 on 12/6/21. Not on any home medications.  - SSI ACHS w/ glucose checks     HLD: Last lipid panel 12/6/21: Tchol 122, HDL 67, LDL 45.6, trig 47. No home medications.     Hypothyroidism: TSH 0.40 (2/25/22). - Continue home Synthroid 88 mcg daily before breakfast  - Recheck TSH outpatient     CAD: MI (during surgery in 1970s) had multiple caths and stress tests. Records of cardiology visit w/ Dr. Kerr Nearing on 3/22/17. Exercise Cardiolite 7/23/15 - walked 4:39 (6.3 METS), normal stress EKG. Normal MPI. LVEF 71%. Echo 2/25/22 - LVEF 60-65%.     GERD: Chronic.  - Continue home Protonix 40 mg daily        FEN/GI - Clear liquid. NS at 100 mL/hr. Activity - As tolerated w/ assistance. DVT prophylaxis - SCDs  GI prophylaxis - Protonix  Fall prophylaxis - Fall precautions ordered. Disposition - Admit to Telemetry. Plan to d/c to TBD. Consulting PT, OT and CM  Code Status - Full. Discussed with patient / caregivers.   [place] [address] Name and Contact - Reynold Cedillo (Child) [phone] (Mobile)  Jared Guidry MD  Family Medicine Resident       For Billing    Chief Complaint   Patient presents with   [facility] Problems  Date Reviewed: 3/2/2022          Codes Class Noted POA    UTI (urinary tract infection) ICD-10-CM: N39.0  ICD-9-CM: 599.0  4/28/2022 Yes        * (Principal) Weakness generalized ICD-10-CM: R53.1  ICD-9-CM: 780.79  2/25/2022 Yes        Hypokalemia ICD-10-CM: E87.6  ICD-9-CM: 276.8  2/24/2022 Unknown        Elevated troponin ICD-10-CM: R77.8  ICD-9-CM: 790.6  2/24/2022 Yes        Urinary tract infection with hematuria ICD-10-CM: N39.0, R31.9  ICD-9-CM: 599.0, 599.70  2/24/2022 Yes        Anemia ICD-10-CM: D64.9  ICD-9-CM: 285.9  8/29/2018 Yes        Type 2 diabetes mellitus without complication, without long-term current use of insulin (HCC) ICD-10-CM: E11.9  ICD-9-CM: 250.00  4/24/2017 Yes        DDD (degenerative disc disease), lumbar ICD-10-CM: M51.36  ICD-9-CM: 722.52  9/6/2016 Yes        Coronary artery disease involving native coronary artery of native heart without angina pectoris ICD-10-CM: I25.10  ICD-9-CM: 414.01  5/4/2016 Yes        HTN (hypertension) ICD-10-CM: I10  ICD-9-CM: 401.9  Unknown Yes        Hypothyroidism, iatrogenic ICD-10-CM: E03.2  ICD-9-CM: 244. 3  Unknown Yes    Overview Signed 7/13/2015  3:16 PM by Sarah Sanderson MD     radioiodine             Hypercholesteremia ICD-10-CM: E78.00  ICD-9-CM: 272.0  Unknown Yes

## 2022-04-30 NOTE — ROUTINE PROCESS
1900  Bedside shift change report given to Akshat Latif (oncoming nurse) by Charlee Spence RN (offgoing nurse). Report included the following information SBAR, Kardex, ED Summary, Intake/Output, and Recent Results. .    1952  Patient BP was 201/90. Called Family practice and she advised to give the PRN Iv dose now and the scheduled hydralazine PO scheduled at it's scheduled due time. Will continue to monitor. 8098  Patient potassium came back at 3.0. Family practice notified. Orders received. 0345  Patient /89. Gave PRN does of Hydralazine. Recheck done at 0424 and was 185/79. Family practice notified. No further orders at this time. 0445  Attempted to get the 24 urine sample needed. Patient continues to have small BMs with each void. Contacted the lab and they said they can't take it with stool in it. Family practice notified and they said they would pass it along to day team.         0700  Bedside shift change report given to Melvin (oncoming nurse) by Akshat Latif (offgoing nurse). Report included the following information SBAR, Kardex, ED Summary, Intake/Output, and Recent Results. This patient was assisted with Intentional Toileting every 2 hours during this shift as appropriate. Documentation of ambulation and output reflected on Flowsheet as appropriate. Purposeful hourly rounding was completed using AIDET and 5Ps. Outcomes of PHR documented as they occurred. Bed alarm in use as appropriate. Dual Suction and ambubag in place.

## 2022-04-30 NOTE — PROGRESS NOTES
TRANSITION OF CARE NOTE:    Rebeka Kenyon is a 80 y. o. female with a PMHx of anemia, CAD, HTN, HLD, DM2, hypothyroidism, GERD who was initially admitted for weakness. Possible etiologies including acute on chronic macrocytic anemia (received 1u PRBC), vs asymptomatic UTI, vs hypercalcemia. Admission labs and imaging concerning for possible new diagnosis of Multiple Myeloma. MRI brain negative. Continuing to treat Hypercalcemia with fluids. UTI completed treatment with Ceftriaxone, f/u final cultures. Anemia stable, GI to scope on Monday. Heme/Onc on board for Multiple Myeloma and anemiawork up, labs pending. Also managing resistant HTN, new PO Hydralazine. PT recs for SNF, referrals sent.      Ruthie He MD

## 2022-04-30 NOTE — PROGRESS NOTES
4/30/2022  9:45 AM    Patient admitted for UTI, falls   History of: HTN, GI bleed, DDD, CAD, diabetes, UTI  COVID Vaccine:  Yes    Vaccine plus 2 boosters.     RUR 18 (Score %) moderate    Is This a Readmission NO  Is this a Bundle NO      Transition of Care Plan  1. Monitor patient status and response to treatment. 2. Patient continues to require medical management. 3. Pt is agreeable to SNF placement, referral to [facility] has been sent. 4. Family to transport at [place]. 5. CM to monitor progress and recommendations.     Gisselle Patterson

## 2022-05-01 NOTE — PROGRESS NOTES
Spiritual Care Assessment/Progress Note  [place]      NAME: Jaelyn Pan      MRN: [id number]  AGE: 80 y.o. SEX: female  Denominational Affiliation: Other   Language: English     5/1/2022     Total Time (in minutes): 7     Spiritual Assessment begun in [facility] [address] through conversation with:         []Patient        [] Family    [] Friend(s)        Reason for Consult: Initial visit     Spiritual beliefs: (Please include comment if needed)     [] Identifies with a little tradition:         [] Supported by a little community:            [] Claims no spiritual orientation:           [] Seeking spiritual identity:                [] Adheres to an individual form of spirituality:           [x] Not able to assess:                           Identified resources for coping:      [] Prayer                               [] Music                  [] Guided Imagery     [] Family/friends                 [] Pet visits     [] Devotional reading                         [x] Unknown     [] Other:                                              Interventions offered during this visit: (See comments for more details)    Patient Interventions: Initial visit           Plan of Care:     [] Support spiritual and/or cultural needs    [] Support AMD and/or advance care planning process      [] Support grieving process   [] Coordinate Rites and/or Rituals    [] Coordination with community clergy   [] No spiritual needs identified at this time   [] Detailed Plan of Care below (See Comments)  [] Make referral to Music Therapy  [] Make referral to Pet Therapy     [] Make referral to Addiction services  [] Make referral to [facility]  [] Make referral to Spiritual Care Partner  [] No future visits requested        [x] Contact Spiritual Care for further referrals     Attempted to visit pt for initial spiritual assessment. Unable to complete assessment at this time, pt sleeping and did not awake. No family present.  Pt's chart was consulted.   Chaplain Angulo MDiv, [place], [facility]

## 2022-05-01 NOTE — PROGRESS NOTES
Patient currently prepping for colonoscopy, having frequent bowel movements that are not yet clear. Patient appears to have rectal prolapse that is greater than earlier in the shift. Family practice notified and will continue to monitor.

## 2022-05-01 NOTE — PROGRESS NOTES
1900  Bedside shift change report given to Akshat Latif (oncoming nurse) by Claire Cordero  RN (offgoing nurse). Report included the following information SBAR, Kardex, ED Summary, Intake/Output, and Recent Results. 1  Family practice came to assess patient at bedside as it appears she has a prolapsed rectum. Dr. Horace Fontanaty me to hold off on bowl prep until we figure out what to do for the patient. Family and patient notified. 2037  Family practice came to bedside and reduced the prolapse momentarily. 2105  Patient using the bathroom again and rectum has come out again. Family practice notified. They advised me to continue prep for colonoscopy tomorrow and the look for worsening symptoms. Prolapse rectum is red, beefy, and moist. No ischemic changes at this time. Will continue to monitor    0000  Patient refused to finish the bowel prep. She feels nauseous and said she can not tolerate any more. There is about 10 oz of bowel prep still in cup.    0035  Family practice notified of patient refusal to finish the bowel prep. Amount noted. Will continue to monitor for clear bowel movements. 0400  Patient have morning medication due but is NPO for procedure this morning. Called family practice they said they don't think they want to give and will hold on MAR. Will follow up      [phone]  Called family practice again to verify what morning medication are to be given and held. They advised to give the synthroid and hold the Fosamax. 0630  Patient rectum prolapse is still red and moist. No ischemic changes. 6408  Dr. Samantha Rodriguez at bedside to assess patient. Dr. Samantha Rodriguez assessed prolapse and it has gone back in at this time. Explained to him about completing most but not all of the bowel prep. Dr. Samantha Rodriguez said she would be scheduled for today. 0700  Bedside shift change report given to [address] (oncoming nurse) by Akshat Latif (offgoing nurse).  Report included the following information SBAR, Kardex, ED Summary, Intake/Output, and Recent Results. This patient was assisted with Intentional Toileting every 2 hours during this shift as appropriate. Documentation of ambulation and output reflected on Flowsheet as appropriate. Purposeful hourly rounding was completed using AIDET and 5Ps. Outcomes of PHR documented as they occurred. Bed alarm in use as appropriate. Dual Suction and ambubag in place.

## 2022-05-01 NOTE — PROGRESS NOTES
US tech came to room to complete thyroid ultrasound as ordered. The patient's daughters at bedside stated that this has been worked up before and they do not want further testing.

## 2022-05-01 NOTE — PROGRESS NOTES
0830:  Patient voided in hat in [facility] but some stool present. Unable to save urine for 24 hour collection.

## 2022-05-01 NOTE — PROGRESS NOTES
1905 Patient was assisted to the bedside commode and a large bulge was noted to be coming out of the anus. Family practice was contacted to come to bedside to assess this patient. 1 Family practice was at bedside to assess and stated they will be consulting the colorectal surgeon.

## 2022-05-02 NOTE — ANESTHESIA PREPROCEDURE EVALUATION
Relevant Problems   CARDIOVASCULAR   (+) Coronary artery disease involving native coronary artery of native heart without angina pectoris   (+) HTN (hypertension)   (+) Hypertensive emergency      GASTROINTESTINAL   (+) Gastric ulcer      ENDOCRINE   (+) Hypothyroidism, iatrogenic   (+) Type 2 diabetes mellitus without complication, without long-term current use of insulin (HCC)   (+) Type 2 diabetes with nephropathy (HCC)      HEMATOLOGY   (+) Anemia       Anesthetic History   No history of anesthetic complications            Review of Systems / Medical History  Patient summary reviewed, nursing notes reviewed and pertinent labs reviewed    Pulmonary  Within defined limits                 Neuro/Psych   Within defined limits           Cardiovascular    Hypertension          Past MI and CAD    Exercise tolerance: >4 METS  Comments: Remote MI 1970s intraoperatively   GI/Hepatic/Renal           PUD     Endo/Other    Diabetes  Hypothyroidism  Arthritis and anemia     Other Findings   Comments: hgb 7.8  Hx GI bleed           Physical Exam    Airway  Mallampati: II    Neck ROM: normal range of motion   Mouth opening: Normal     Cardiovascular  Regular rate and rhythm,  S1 and S2 normal,  no murmur, click, rub, or gallop  Rhythm: regular  Rate: normal         Dental    Dentition: Edentulous     Pulmonary  Breath sounds clear to auscultation               Abdominal  GI exam deferred       Other Findings            Anesthetic Plan    ASA: 3  Anesthesia type: MAC          Induction: Intravenous  Anesthetic plan and risks discussed with: Patient

## 2022-05-02 NOTE — PROGRESS NOTES
1645: [facility], Spoke with resident. Told them that PRN Hydralazine was given at 1630. BP at 1610 is 128/50, HR 65. Resident states to continue administration with scheduled PO Hydralazine.

## 2022-05-02 NOTE — PROGRESS NOTES
5/2/2022 2:48 PM Pt transferred from [facility], hand of received from [facility].        Patient admitted for UTI, falls   History of: HTN, GI bleed, DDD, CAD, diabetes, UTI  COVID Vaccine:  Yes    Vaccine plus 2 boosters.     RUR 21 HIGH  Is This a Readmission NO  Is this a Bundle NO        Transition of Care Plan  1. Monitor patient status and response to treatment. 2. Patient continues to require medical management. 3. Pt is agreeable to SNF placement, referral to [facility] has been sent. Called and spoke with Lluvia Robertson who will review referral and follow up. 4. Family to transport at [place]. 5. CM to monitor progress and recommendations.

## 2022-05-02 NOTE — PROGRESS NOTES
[facility] [place] at [address], [address] [facility] [address] 100[address] [facility]: [phone]  F: [phone] Patient ID  Name: Bogdan Hussein  YOB: 1935  MRN: [id number]  Referring Provider:   No referring provider defined for this encounter. Primary Care Provider:   Josi Oakley MD       HEMATOLOGY/MEDICAL ONCOLOGY  NOTE   Date of Visit: 22  Reason for Evaluation:     Chief Complaint   Patient presents with    Fall     Subjective:     History of Present Illness:     Bogdan Hussein is a 80 y.o. F who presents as an inpatient consultation for anemia and question of myeloma. This is a subacute problem. Problem has occurred for months. Problem has worsened. Patient reports dealing with fatigue. She had a recent fall. She seems to have worsened over past weeks per daughters in room as patient is a limited historian. Patient reports adequate oral intake of food and hydration. Patient denies any bowel or bladder problems. Patient denies any uncontrolled pain. Patient denies any shortness of breath. Interval History:     Pt remains drowsy from EGD/colonoscopy. Denies any pain. Daughters at bedside.       -  Past Medical History:   Diagnosis Date    Diabetes ([address]     GI bleeding     workup in [address] [place] [address] 12/15 was unremarkable (Dr. Treasure Cortés) - says she had EGD, colonoscopy, and small bowel capsule endoscopy    HTN (hypertension)     Hypercholesteremia     Hypothyroidism, iatrogenic     radioiodine    MI (myocardial infarction) ([address]     During surgery in 's - she's had multiple caths and stress tests      Past Surgical History:   Procedure Laterality Date    HX  SECTION      HX HERNIA REPAIR  275    Open umbilical incisional herniorrhaphy [facility]).  HX HERNIA REPAIR Right 2020    Right inguinal herniorrhaphy with mesh.     HX ORTHOPAEDIC      Back surgery x 2.    HX BRIAN AND BSO        Social History     Tobacco Use  Smoking status: Never Smoker    Smokeless tobacco: Never Used   Substance Use Topics    Alcohol use:  Yes     Alcohol/week: 0.0 standard drinks      Family History   Problem Relation Age of Onset    Heart Failure Mother     Heart Disease Mother     Hypertension Mother      Current Facility-Administered Medications   Medication Dose Route Frequency    0.9% sodium chloride infusion  50 mL/hr IntraVENous CONTINUOUS    midazolam (VERSED) injection 0.25-5 mg  0.25-5 mg IntraVENous Multiple    fentaNYL citrate (PF) injection 100 mcg  100 mcg IntraVENous Multiple    naloxone (NARCAN) injection 0.4 mg  0.4 mg IntraVENous Multiple    flumazeniL (ROMAZICON) 0.1 mg/mL injection 0.2 mg  0.2 mg IntraVENous Multiple    simethicone (MYLICON) 57OM/2.8WZ oral drops 80 mg  1.2 mL Oral Multiple    atropine injection 0.5 mg  0.5 mg IntraVENous ONCE PRN    EPINEPHrine (ADRENALIN) 0.1 mg/mL syringe 1 mg  1 mg Endoscopically ONCE PRN    insulin lispro (HUMALOG) injection   SubCUTAneous AC&HS    glucose chewable tablet 16 g  4 Tablet Oral PRN    glucagon (GLUCAGEN) injection 1 mg  1 mg IntraMUSCular PRN    dextrose 10% infusion 0-250 mL  0-250 mL IntraVENous PRN    losartan (COZAAR) tablet 100 mg  100 mg Oral DAILY    ondansetron (ZOFRAN) injection 4 mg  4 mg IntraVENous Q6H PRN    hydrALAZINE (APRESOLINE) tablet 50 mg  50 mg Oral TID    hydrALAZINE (APRESOLINE) 20 mg/mL injection 10 mg  10 mg IntraVENous Q4H PRN    metoprolol succinate (TOPROL-XL) XL tablet 25 mg  25 mg Oral DAILY    benzonatate (TESSALON) capsule 100 mg  100 mg Oral TID PRN    cholecalciferol (VITAMIN D3) (1000 Units /25 mcg) tablet 2,000 Units  2,000 Units Oral DAILY    0.9% sodium chloride infusion 250 mL  250 mL IntraVENous PRN    alendronate (FOSAMAX) tablet 40 mg  40 mg Oral ACB    amLODIPine (NORVASC) tablet 10 mg  10 mg Oral DAILY    [Held by provider] ferrous sulfate tablet 325 mg  325 mg Oral ACB    levothyroxine (SYNTHROID) tablet 88 mcg  88 mcg Oral ACB    polyethylene glycol (MIRALAX) packet 17 g  17 g Oral DAILY    0.9% sodium chloride infusion  50 mL/hr IntraVENous CONTINUOUS      Allergies   Allergen Reactions    Celebrex [Celecoxib] Hives    Crestor [Rosuvastatin] Myalgia      Objective:     Visit Vitals  BP (!) 169/99   Pulse 76   Temp 97.8 °F (36.6 °C)   Resp 17   Ht 5' (1.524 m)   Wt 127 lb 3.3 oz (57.7 kg)   SpO2 97%   BMI 24.84 kg/m²     ECOG PS: 3- Capable of only limited selfcare; confined to bed or chair more than 50% of waking hours. Physical Exam  General: no distress  Eyes: anicteric sclerae  HENT: atraumatic  Neck: supple  Respiratory: normal respiratory effort  CV: no peripheral edema  GI: soft, nontender, nondistended, no masses, no hepatomegaly, no splenomegaly  Skin: no rashes;  no petechiae; scattered ecchymotic areas  Psych: drowsy from procedure    Results:     I personally reviewed Epic EHR labs/results below:   Lab Results   Component Value Date/Time    WBC 6.9 05/02/2022 02:59 AM    HGB 7.8 (L) 05/02/2022 02:59 AM    HCT 24.4 (L) 05/02/2022 02:59 AM    PLATELET 244 82/67/0715 02:59 AM    .7 (H) 05/02/2022 02:59 AM    ABS. NEUTROPHILS 5.0 05/02/2022 02:59 AM     Lab Results   Component Value Date/Time    Sodium 141 05/02/2022 02:59 AM    Potassium 2.9 (L) 05/02/2022 02:59 AM    Chloride 109 (H) 05/02/2022 02:59 AM    CO2 25 05/02/2022 02:59 AM    Glucose 91 05/02/2022 02:59 AM    BUN 9 05/02/2022 02:59 AM    Creatinine 1.08 (H) 05/02/2022 02:59 AM    GFR est AA 58 (L) 05/02/2022 02:59 AM    GFR est non-AA 48 (L) 05/02/2022 02:59 AM    Calcium 11.1 (H) 05/02/2022 02:59 AM    Glucose (POC) 110 05/02/2022 07:55 AM     Lab Results   Component Value Date/Time    Bilirubin, total 0.5 05/02/2022 02:59 AM    ALT (SGPT) 20 05/02/2022 02:59 AM    Alk.  phosphatase 46 05/02/2022 02:59 AM    Protein, total 10.0 (H) 05/02/2022 02:59 AM    Albumin 2.0 (L) 05/02/2022 02:59 AM    Globulin 8.0 (H) 05/02/2022 02:59 AM     ANEMIA LABS:  Hemoglobin:No components found for: HEME  Iron Studies:    Iron   Date Value Ref Range Status   2022 51 35 - 150 ug/dL Final     Vitamin B12:   Vitamin B12   Date Value Ref Range Status   2022 1,065 (H) 193 - 986 pg/mL Final     Folate: Folate   Date Value Ref Range Status   2022 29.2 (H) 5.0 - 21.0 ng/mL Final     SPEP:  No results found for: PE6T No results found for: PE9T  No results found for: KLFL3L No results found for: KLFL1L No components found for: KLFL2  Reticulocyte Count: No results found for: RET  Bilirubin:   Lab Results   Component Value Date/Time    Bilirubin, total 0.5 2022 02:59 AM    Bilirubin Negative 2022 11:15 AM     LDH:   LD   Date Value Ref Range Status   2022 242 81 - 246 U/L Final     Haptoglobin:  Haptoglobin   Date Value Ref Range Status   2022 128 30 - 200 mg/dL Final    No results found for: HGBPLT  ALEJANDRO:   ALEJANDRO Poly   Date Value Ref Range Status   2022 NEG  Final     Copper Level: No results found for: COSLT  Occult Blood Testing: No results found for: OCBLLT  Hemoglobin Fractionation:  No components found for: HGBEL1, HGBEL2, HGBEL3, HGBEL4, HGBEL5, HGBEL6, HGBEL7, HGBEL8    22 EGD/Colonoscopy/ Dr Yessi Ardon  Findings:   Esophagus:normal  Stomach: hiatal hernia and diffuse mild erythema with loss gastric folds c/w atrophic gastritis  Duodenum/jejunum: normal     Colonic - Diverticulosis      - solitary rectal ulcer; not bleeding     Assessment and Recommendations:       1. Anemia, unspecified type  Patient with severe anemia. May need a bone marrow biopsy. Will order myeloma labs but repeat imaging on mri did not mention any clear skull lesion. Cannot exclude an underlying myelodysplastic syndrome. --Gammopathy eval/ UPEP: pending   --GI followin/2  EGD/colonoscopy / Dr Calixto Aus rectal ulcer; not bleeding; recommending sucralfate/ daily Miralax, Vit B12      2.   Hypercalcemia  Cannot exclude occult malignancy. nospecific lytic lesions in hip and initially in calvarium and hips. However, brain mri without clear lesions of skull. Low PTH. Lab Results   Component Value Date/Time    Calcium 11.1 (H) 05/02/2022 02:59 AM    Phosphorus 2.6 04/29/2022 03:44 PM    PTH, Intact 8.0 (L) 04/28/2022 03:12 PM   PTH rel peptide normal in 2/2022. 3. Urinary tract infection without hematuria, site unspecified  Treatment per primary team.    4. Hypothyroidism  -on Synthroid daily; management per primary team    Plan reviewed with Dr Blue Villatoro.        Signed By:   Daniel Plummer NP

## 2022-05-02 NOTE — PROGRESS NOTES
0700 Bedside shift change report given to [address] (oncoming nurse) by John Santiago RN (offgoing nurse). Report included the following information SBAR, Kardex, ED Summary, Intake/Output, MAR and Recent Results. This patient was assisted with Intentional Toileting every 2 hours during this shift as appropriate. Documentation of ambulation and output reflected on Flowsheet as appropriate. Purposeful hourly rounding was completed using AIDET and 5Ps. Outcomes of PHR documented as they occurred. Bed alarm in use as appropriate. Dual Suction and ambubag in place. TRANSFER - OUT REPORT:    Verbal report given to 4th floor RN(name) on Arthur Garcia  being transferred to 4th floor(unit) for routine progression of care       Report consisted of patients Situation, Background, Assessment and   Recommendations(SBAR). Information from the following report(s) SBAR, Kardex, ED Summary, Intake/Output, MAR and Recent Results was reviewed with the receiving nurse. Lines:   Peripheral IV 04/30/22 Left;Posterior Forearm (Active)   Site Assessment Clean, dry, & intact 05/02/22 0744   Phlebitis Assessment 0 05/02/22 0744   Infiltration Assessment 0 05/02/22 0744   Dressing Status Clean, dry, & intact 05/02/22 0744   Dressing Type Transparent 05/02/22 0744   Hub Color/Line Status Pink; Infusing 05/02/22 0744   Action Taken Open ports on tubing capped 05/02/22 0744   Alcohol Cap Used Yes 05/02/22 0744        Opportunity for questions and clarification was provided.       Patient transported with:   [employer]

## 2022-05-02 NOTE — PERIOP NOTES
Pt on monitor x2, chux pads applied for colonoscopy, given warm blanket lights turned down call bell within reach will continue to monitor pt.

## 2022-05-02 NOTE — PROGRESS NOTES
Kasie Rodrigueso  1935  [id number]    Situation:  Verbal report received from: Skye Solorzano RN   Procedure: Procedure(s):  ESOPHAGOGASTRODUODENOSCOPY (EGD)  COLONOSCOPY  COLON BIOPSY    Background:    Preoperative diagnosis: BLOOD LOSS ANEMIA  Postoperative diagnosis: EGD-hiatal hernia gastritis  Colon-diverticulosis solitary rectal ulcer    :  Dr. Cat Khan  Assistant(s): Endoscopy Technician-1: Adia Stephens  Endoscopy RN-1: Gilbert Cantrell RN    Specimens:   ID Type Source Tests Collected by Time Destination   1 : bx Preservative Rectum  Joi Dobson MD 5/2/2022 1210 Pathology     H. Pylori  no    Assessment:    Anesthesia gave intra-procedure sedation and medications, see anesthesia flow sheet no    Intravenous fluids: NS@ KVO     Vital signs stable     Abdominal assessment: round and soft     Recommendation:  Discharge patient per MD order.   Return to floor  Family or Friend   Permission to share finding with family or friend no and n/a

## 2022-05-02 NOTE — PERIOP NOTES
Kasie Caldwell  1935  [id number]    Situation:    Scheduled Procedure: Procedure(s):  ESOPHAGOGASTRODUODENOSCOPY (EGD)  COLONOSCOPY  Verbal report received from: Hua Vargas RN  Preoperative diagnosis: BLOOD LOSS ANEMIA    Background:    Procedure: Procedure(s):  ESOPHAGOGASTRODUODENOSCOPY (EGD)  COLONOSCOPY  Physician performing procedure; Dr. Nu Aragon RN    NPO Status/Last PO Intake: mn    Pregnancy Test:Not applicable If yes, result: none    Is the patient taking Blood Thinners: YES If yes, list: aspirin and last taken at home  Is the patient diabetic:yes       If yes, what was the last BS:  110  Time taken?  0755  Anything given? no           Does the patient have a Pacemaker/Defibrillator in place?: no   Does the patient need antibiotics before/during/after procedure: n/a  If the patient is having a colon, How much prep was drank? all   What were the Colon prep results? Unknown by RN   Does the patient have SCD in place:no   Is patient on CONTACT precautions:no        If yes, what kind of CONTACT precautions: n/a    Assessment:  Are the vital signs stable prior to patient coming to ENDO? Yes, /78  Is the patient alert/oriented and able to sign consent for the procedures:yes  How does the patient's abdomen feel prior to coming to ENDO? Will assess in endo  Does the patient have a patient IV in place?  yes     Recommendation:  Family or Friend present no     Permission to share finding with Family or Friend n/a

## 2022-05-02 NOTE — PROGRESS NOTES
Comprehensive Nutrition Assessment    Type and Reason for Visit: Reassess    Nutrition Recommendations/Plan:   1. Continue minced & moist diet order  2. Provide Ensure Enlive once daily (350 kcal, 44 g carbs, 20 g P) to aid in meeting kcal/protein needs. Malnutrition Assessment:  Malnutrition Status:  Mild malnutrition (05/02/22 1401)  - findings also consistent with sarcopenia   Context:  Acute illness     Findings of the 6 clinical characteristics of malnutrition:   Energy Intake:  No significant decrease in energy intake  Weight Loss:  No significant weight loss     Body Fat Loss:  Mild body fat loss, Buccal region   Muscle Mass Loss:  No significant muscle mass loss,    Fluid Accumulation:  No significant fluid accumulation,     Strength:  Not performed     Nutrition Assessment:    5/2: Follow up. Patient had colonoscopy/EGD today. Daughters at bedside. Daughter states #, with no recent weight loss. No N/V/D. Patient states her appetite is 'coming back now'. Voice acceptance of Ensure once daily. Denies chewing/swallowing problems - has dentures at bedside. Seen by hematology NP earlier in the day per patient report. PO documented as 50-75% of all meals - if this trend continues, patient likely meeting 100% kcal and protein needs. 4/28: Pt admitted with UTI (urinary tract infection) [N39.0]  Hypokalemia [E87.6]  Anemia [D64.9]    Past Medical History:   Diagnosis Date    Diabetes ([facility]     GI bleeding     workup in [address] [place] [address] 12/15 was unremarkable (Dr. Jesse Jeong) - says she had EGD, colonoscopy, and small bowel capsule endoscopy    HTN (hypertension)     Hypercholesteremia     Hypothyroidism, iatrogenic     radioiodine    MI (myocardial infarction) ([facility]     During surgery in 1970's - she's had multiple caths and stress tests     Pt screened for MST- reported wt loss PTA 2/2 less PO intake. No need for carbohydrate restriction. A1c low but inaccurate 2/2 blood loss. A1c when Hgb WNL was 5.7.  Pt usual BW closer to 125 lbs. Currently 119 lbs with wt of 122 lbs in march and 127 lbs in Feb. Seen by GI, EGD planned for Monday if still admitted. Pt ate fairly well for breakfast this morning. No n/v reported. No known food allergies. No hx of chew/swallow difficulties. Last BM: 05/02/22, Loose,Watery    PO intake:   Patient Vitals for the past 168 hrs:   % Diet Eaten   05/01/22 1411 51 - 75%   04/30/22 1720 26 - 50%   04/30/22 1231 51 - 75%   04/30/22 0909 51 - 75%   04/29/22 1719 51 - 75%   04/29/22 1229 51 - 75%   04/29/22 0830 51 - 75%   04/28/22 1735 51 - 75%       Wt Readings from Last 30 Encounters:   05/02/22 57.7 kg (127 lb 3.3 oz)   04/05/22 53.6 kg (118 lb 3.2 oz)   03/02/22 55.3 kg (122 lb)   02/27/22 57.8 kg (127 lb 6.4 oz)   12/06/21 55.8 kg (123 lb)   05/04/21 57.4 kg (126 lb 9.6 oz)   03/30/21 57.4 kg (126 lb 7 oz)   12/01/20 57.2 kg (126 lb)   10/07/20 57.4 kg (126 lb 8 oz)   09/23/20 56.6 kg (124 lb 12.5 oz)   09/08/20 56.7 kg (125 lb)   09/02/20 56.5 kg (124 lb 9.6 oz)   07/28/20 56.3 kg (124 lb 3.2 oz)   09/16/19 58.1 kg (128 lb)   03/18/19 54.7 kg (120 lb 11.2 oz)   08/29/18 59.9 kg (132 lb)   05/29/18 60.6 kg (133 lb 11.2 oz)   04/24/18 60.3 kg (133 lb)   10/24/17 63.5 kg (140 lb)   04/24/17 64 kg (141 lb)   03/22/17 64.1 kg (141 lb 6.4 oz)   01/12/17 64.1 kg (141 lb 6 oz)   10/13/16 62.2 kg (137 lb 3 oz)   09/15/16 63 kg (139 lb)   09/07/16 64 kg (141 lb)   09/06/16 63.5 kg (140 lb)   08/17/16 65.3 kg (144 lb)   05/17/16 66.9 kg (147 lb 6.4 oz)   05/04/16 66.7 kg (147 lb)   10/28/15 67 kg (147 lb 12.8 oz)       Nutrition Related Findings:      Wound Type: None    Last Bowel Movement Date: 05/02/22  Stool Appearance: Loose,Watery  Edema:No data recorded    Nutr.  Labs:      Lab Results   Component Value Date/Time    GFR est AA 58 (L) 05/02/2022 02:59 AM    GFR est non-AA 48 (L) 05/02/2022 02:59 AM    Creatinine 1.08 (H) 05/02/2022 02:59 AM    BUN 9 05/02/2022 02:59 AM    Sodium 141 05/02/2022 02:59 AM    Potassium 2.9 (L) 05/02/2022 02:59 AM    Chloride 109 (H) 05/02/2022 02:59 AM    CO2 25 05/02/2022 02:59 AM       Lab Results   Component Value Date/Time    Glucose 91 05/02/2022 02:59 AM    Glucose (POC) 110 05/02/2022 07:55 AM       Lab Results   Component Value Date/Time    Hemoglobin A1c <3.8 (L) 04/28/2022 03:12 PM       Nutr. Meds:  Norvasc, tessalon PRN, Vit D3, humalog, synthroid, cozaar, toprol-XL, zofran PRN, miralax     Current Nutrition Intake & Therapies:  Average Meal Intake: 51-75%  Average Supplement Intake: None ordered  ADULT DIET Dysphagia - Minced & Moist  DIET ONE TIME MESSAGE    Anthropometric Measures:  Height: 5' (152.4 cm)  Ideal Body Weight (IBW): 100 lbs (45 kg)  Admission Body Weight: 119 lb  Current Body Wt:  57.7 kg (127 lb 3.3 oz), 127.2 % IBW.  Bed scale  Current BMI (kg/m2): 24.8  Usual Body Weight: 49.9 kg (110 lb)  % Weight Change (Calculated): 15.6  Weight Adjustment: No adjustment                 BMI Category: Normal weight (BMI 22.0-24.9) age over 72    Estimated Daily Nutrient Needs:  Energy Requirements Based On: Formula  Weight Used for Energy Requirements: Current  Energy (kcal/day): 1308 (MSJ x 1.2 x 1.1)  Weight Used for Protein Requirements: Current  Protein (g/day): 57 (1.0g/kg)  Method Used for Fluid Requirements: 1 ml/kcal  Fluid (ml/day): 6638    Nutrition Diagnosis:   No nutrition diagnosis at this time     Nutrition Interventions:   Food and/or Nutrient Delivery: Continue current diet,Start oral nutrition supplement  Nutrition Education/Counseling: No recommendations at this time  Coordination of Nutrition Care: Continue to monitor while inpatient,Interdisciplinary rounds  Plan of Care discussed with: family, IDR team    Goals:  Previous Goal Met: Goal(s) achieved  Goals: PO intake 50% or greater,within 7 days       Nutrition Monitoring and Evaluation:   Behavioral-Environmental Outcomes: None identified  Food/Nutrient Intake Outcomes: Food and nutrient intake,Supplement intake  Physical Signs/Symptoms Outcomes: Biochemical data,Skin,Weight    Discharge Planning:    No discharge needs at this time    Lauro Cartwright, [address] [place] [address], [place]  Contact: [phone] or via [employer]

## 2022-05-02 NOTE — PROGRESS NOTES
Received called about patient with rectal prolapse noted while Ms. Saqib Jasso was completing her  bowel prep for colonoscopy in AM. Daughter and patient states that they are unaware about this issue in the past.     Examined patient at bedside. Patient is in no acute distress. Red, beefy rectal tissue prolapsing. No signs of poor perfusion. No active bleeding. Discussed with on-call GI provider Dr. Nella Abreu by findings. He recommends that if patient wants to continue with bowel prep and colonoscopy tomorrow. We could reduce the prolapse, but it would likely be recurrent as she continues having bowel movements. Discussed with patient and daughter Jairo Sutton at bedside. They would like to continue with bowel prep. Anal prolapse was reduced without complications. Discussed that it would likely be recurrent throughout the night and we could continue reducing and proceeding with the bowel prep. They agree with the plan and express understanding. All questions were answered.       Melvina Duncan MD

## 2022-05-02 NOTE — PROCEDURES
Deb Harper M.D. May 2, 2022    Esophagogastroduodenoscopy (EGD) Colonoscopy Procedure Note  Anai Garcia  : 1935  [facility] Medical Record Number: [id number]      Indications:   chronic blood loss anemia  Referring Physician:  Juliann Nash MD  Anesthesia/Sedation: see nursing notes  Endoscopist:  Dr. Bridger Almaguer  Assistants: None  Permit:  The indications, risks, benefits and alternatives were reviewed with the patient or their decision maker who was provided an opportunity to ask questions and all questions were answered. The specific risks of esophagogastroduodenoscopy with conscious sedation were reviewed, including but not limited to anesthetic complication, bleeding, adverse drug reaction, missed lesion, infection, IV site reactions, and intestinal perforation which would lead to the need for surgical repair. Alternatives to EGD including radiographic imaging, observation without testing, or laboratory testing were reviewed as well as the limitations of those alternatives discussed. After considering the options and having all their questions answered, the patient or their decision maker provided both verbal and written consent to proceed. Procedure in Detail:  After obtaining informed consent, positioning of the patient in the left lateral decubitus position, and conduction of a pre-procedure pause or \"time out\" the endoscope was introduced into the mouth and advanced to the duodenum. A careful inspection was made, and findings or interventions are described below.     Findings:   Esophagus:normal  Stomach: hiatal hernia and diffuse mild erythema with loss gastric folds c/w atrophic gastritis  Duodenum/jejunum: normal    Complications/estimated blood loss: none    Therapies:  none    Specimens: none    Implants: none           Endoscopist:  Dr. Bridger Almaguer  Assistants: None  Complications:  None  Estimate Blood Loss:  None    Colonoscopy is to follow    Permit:  The indications, risks, benefits and alternatives were reviewed with the patient or their decision maker who was provided an opportunity to ask questions and all questions were answered. The specific risks of colonoscopy with conscious sedation were reviewed, including but not limited to anesthetic complication, bleeding, adverse drug reaction, missed lesion, infection, IV site reactions, and intestinal perforation which would lead to the need for surgical repair. Alternatives to colonoscopy including radiographic imaging, observation without testing, or laboratory testing were reviewed including the limitations of those alternatives. After considering the options and having all their questions answered, the patient or their decision maker provided both verbal and written consent to proceed. Procedure in Detail:  After obtaining informed consent, positioning of the patient in the left lateral decubitus position, and conduction of a pre-procedure pause or \"time out\" the endoscope was introduced into the anus and advanced to the cecum, which was identified by the ileocecal valve and appendiceal orifice. The quality of the colonic preparation was good. A careful inspection was made as the colonoscope was withdrawn, findings and interventions are described below. Appendiceal orifice photographed    Findings:       - Diverticulosis      - solitary rectal ulcer; not bleeding    Specimens:    ulcer biopsy    Implants:  none    Complications:   None; patient tolerated the procedure well.   Estimated blood loss: none    Findings:   Esophagus:normal  Stomach: hiatal hernia and diffuse mild erythema with loss gastric folds c/w atrophic gastritis  Duodenum/jejunum: normal     Colonic - Diverticulosis      - solitary rectal ulcer; not bleeding    Recommendations:      - sucralfate; daily Miralax, vit B 12   Because of age, routine follow up exam not recommended  From Gi perspective can DC home when convenient; will see again as needed    Thank you for entrusting me with this patient's care. Please do not hesitate to contact me with any questions or if I can be of assistance with any of your other patients' GI needs.     Signed By: Meena Sim MD                        May 2, 2022

## 2022-05-02 NOTE — PROGRESS NOTES
Physical Therapy  Patient still off the floor for colonoscopy. We will re-attempt later as able.   Thank you  Flor Starks PT,DPT,NCS

## 2022-05-02 NOTE — PROGRESS NOTES
Problem: Pressure Injury - Risk of  Goal: *Prevention of pressure injury  Description: Document Massimo Scale and appropriate interventions in the flowsheet. Outcome: Progressing Towards Goal  Note: Pressure Injury Interventions:  Sensory Interventions: Avoid rigorous massage over bony prominences,Check visual cues for pain,Maintain/enhance activity level,Minimize linen layers    Moisture Interventions: Absorbent underpads    Activity Interventions: Increase time out of bed,Pressure redistribution bed/mattress(bed type),PT/OT evaluation    Mobility Interventions: HOB 30 degrees or less,Pressure redistribution bed/mattress (bed type),PT/OT evaluation    Nutrition Interventions: Document food/fluid/supplement intake,Offer support with meals,snacks and hydration    Friction and Shear Interventions: Foam dressings/transparent film/skin sealants,Minimize layers,Lift sheet                Problem: Patient Education: Go to Patient Education Activity  Goal: Patient/Family Education  Outcome: Progressing Towards Goal     Problem: Falls - Risk of  Goal: *Absence of Falls  Description: Document Morgan Fall Risk and appropriate interventions in the flowsheet.   Outcome: Progressing Towards Goal  Note: Fall Risk Interventions:  Mobility Interventions: Bed/chair exit alarm,Communicate number of staff needed for ambulation/transfer,Patient to call before getting OOB,Utilize walker, cane, or other assistive device,Utilize gait belt for transfers/ambulation    Mentation Interventions: Adequate sleep, hydration, pain control    Medication Interventions: Bed/chair exit alarm,Patient to call before getting OOB,Teach patient to arise slowly    Elimination Interventions: Bed/chair exit alarm,Call light in reach,Patient to call for help with toileting needs    History of Falls Interventions: Bed/chair exit alarm,Investigate reason for fall,Utilize gait belt for transfer/ambulation         Problem: Patient Education: Go to Patient Education Activity  Goal: Patient/Family Education  Outcome: Progressing Towards Goal     Problem: Patient Education: Go to Patient Education Activity  Goal: Patient/Family Education  Outcome: Progressing Towards Goal     Problem: Nutrition Deficit  Goal: *Optimize nutritional status  Outcome: Progressing Towards Goal

## 2022-05-02 NOTE — PERIOP NOTES
TRANSFER - OUT REPORT:    Verbal report given to [address] [place] [address] on Azra Stanley  being transferred to [facility] for routine progression of care       Report consisted of patients Situation, Background, Assessment and   Recommendations(SBAR). Information from the following report(s) Procedure Summary was reviewed with the receiving nurse. Lines:   Peripheral IV 04/30/22 Left;Posterior Forearm (Active)   Site Assessment Clean, dry, & intact 05/02/22 0744   Phlebitis Assessment 0 05/02/22 0744   Infiltration Assessment 0 05/02/22 0744   Dressing Status Clean, dry, & intact 05/02/22 0744   Dressing Type Transparent 05/02/22 0744   Hub Color/Line Status Pink; Infusing 05/02/22 0744   Action Taken Open ports on tubing capped 05/02/22 0744   Alcohol Cap Used Yes 05/02/22 0744        Opportunity for questions and clarification was provided.       Patient transported with:   [employer]

## 2022-05-02 NOTE — PROGRESS NOTES
[address] [facility] [address]   Office [phone]  Fax [phone]            Subjective / Objective     24 Hour Events: At ~ 1900, notified by nursing of patient with suspected rectal prolapse. S/b overnight team, cased discussed with on-call GI who advised manual reduction if pt and family wish to proceed with scopes on 5/2. Received Hydral 10 mg at 0400 for /80  K 2.9 this AM > repleted with IV K. Subjective: Pt was seen and examined at bedside, hard of hearing. Patient reports feeling nauseous this AM. Additionally reports feeling thirsty, requesting for something to drink. Otherwise without any vomiting. Stools more \"clear\" this AM. No other complaints or concerns. Objective:    Respiratory:   O2 Device: None (Room air)   Visit Vitals  BP (!) 178/79   Pulse 90   Temp 98.1 °F (36.7 °C)   Resp 15   Ht 5' (1.524 m)   Wt 127 lb 3.3 oz (57.7 kg)   SpO2 94%   BMI 24.84 kg/m²     Physical Exam:  General: No acute distress. Alert. Cooperative. [place]  Respiratory: No increased work of breathing. Mild crackles appreciated to L lung base. No wheezing  Cardiovascular: Regular rate, at times irregular, but otherwise mostly sinus rhythm. No murmurs. GI: Nondistended. + bowel sounds. No significant tenderness to palpation to all quadrants. No guarding or rebound. Extremities: No LE edema. Skin: Warm, dry. I/O:  Date 05/01/22 0700 - 05/02/22 0659 05/02/22 0700 - 05/03/22 0659   Shift 1177-9733 9186-2352 24 Hour Total 6926-2553 9616-6170 24 Hour Total   INTAKE   P.O. 240 300 540        P. O. 240 300 540      I. V.(mL/kg/hr)  0(0) 0(0)        Volume (0.9% sodium chloride infusion)  0 0      Shift Total(mL/kg) 240(4.4) 300(5.2) 540(9.4)      OUTPUT   Urine(mL/kg/hr) 400(0.6) 0(0) 400(0.3)        Urine Voided 400 0 400        Urine Occurrence(s)  3 x 3 x      Stool           Stool Occurrence(s)  15 x 15 x      Shift Total(mL/kg) 400(7.4) 0(0) 400(6.9)      NET -160 300 140 Weight (kg) 54 57.7 57.7 57.7 57.7 57.7       Inpatient Medications  Current Facility-Administered Medications   Medication Dose Route Frequency    potassium chloride 10 mEq in 100 ml IVPB  10 mEq IntraVENous Q1H    insulin lispro (HUMALOG) injection   SubCUTAneous AC&HS    glucose chewable tablet 16 g  4 Tablet Oral PRN    glucagon (GLUCAGEN) injection 1 mg  1 mg IntraMUSCular PRN    dextrose 10% infusion 0-250 mL  0-250 mL IntraVENous PRN    losartan (COZAAR) tablet 100 mg  100 mg Oral DAILY    ondansetron (ZOFRAN) injection 4 mg  4 mg IntraVENous Q6H PRN    hydrALAZINE (APRESOLINE) tablet 50 mg  50 mg Oral TID    hydrALAZINE (APRESOLINE) 20 mg/mL injection 10 mg  10 mg IntraVENous Q4H PRN    metoprolol succinate (TOPROL-XL) XL tablet 25 mg  25 mg Oral DAILY    benzonatate (TESSALON) capsule 100 mg  100 mg Oral TID PRN    cholecalciferol (VITAMIN D3) (1000 Units /25 mcg) tablet 2,000 Units  2,000 Units Oral DAILY    0.9% sodium chloride infusion 250 mL  250 mL IntraVENous PRN    alendronate (FOSAMAX) tablet 40 mg  40 mg Oral ACB    amLODIPine (NORVASC) tablet 10 mg  10 mg Oral DAILY    [Held by provider] ferrous sulfate tablet 325 mg  325 mg Oral ACB    levothyroxine (SYNTHROID) tablet 88 mcg  88 mcg Oral ACB    polyethylene glycol (MIRALAX) packet 17 g  17 g Oral DAILY    0.9% sodium chloride infusion  50 mL/hr IntraVENous CONTINUOUS       Allergies  Allergies   Allergen Reactions    Celebrex [Celecoxib] Hives    Crestor [Rosuvastatin] Myalgia       CBC:  Recent Labs     05/02/22  0259 05/01/22 1743 05/01/22 0052 04/30/22 0316 04/30/22  0316   WBC 6.9  --  6.9  --  7.7   HGB 7.8* 8.4* 7.3*   < > 8.1*   HCT 24.4* 26.6* 23.0*   < > 24.8*     --  312  --  348    < > = values in this interval not displayed.        Metabolic Panel:  Recent Labs     05/02/22  0259 05/01/22  0052 04/30/22  0316 04/29/22  1544 04/29/22  1544    138 136   < > 135*   K 2.9* 3.0* 3.2*   < > 3.2*   CL 109* 108 106   < > 105   CO2 25 27 26   < > 26   BUN 9 12 14   < > 17   CREA 1.08* 1.03* 1.14*   < > 1.06*   GLU 91 97 99   < > 104*   CA 11.1* 10.6* 11.0*   < > 11.5*   MG 1.5* 1.6 1.8  --   --    PHOS  --   --   --   --  2.6   ALB 2.0* 1.8* 1.9*   < > 2.0*   ALT 20 19 19  --   --     < > = values in this interval not displayed. Assessment and Plan     Conrad Church is a 80 y.o. female with a PMHx of anemia, CAD, HTN, HLD, DM2, hypothyroidism, GERD who is admitted for weakness in the s/o acute on chronic anemia, UTI, hypokalemia, and hypercalcemia, with concern for possible multiple myeloma.      Generalized LE weakness and GLF: Likely multifactorial 2/2 acute on chronic anemia / hypokalemia / hypercalcemia / UTI. Head imaging clear. - Continue to correct electrolytes, 1/2 mIVF for hypercalcemia, tx UTI, monitor Hgb  - Awaiting SNF placement.      Acute on chronic macrocytic anemia: s/p 1u PRBC for Hgb of 6.4 (bl ~9) Concern for multiple myeloma with elevated calcium and calvarial lucencies on CT head, elevated protein. Work-up at [address] [place] [address] unremarkable. FOBT positive. - GI consulted, plan to perform EGD and colonoscopy on 5/2  - Diet: NPO  - Stable, monitor on daily CBC  - Heme/Onc consulted: SPEP/UPEP pending    Hypercalcemia: POA 12.8. C/f malignancy in s/o calvarial lucencies. PTH-related peptide low (2/26), Vit D wnl. PTH 8.0 (L)   - 1/2 MIVF  - Alendronate 40mg daily  - daily calcium checks    Lab Results   Component Value Date/Time    Calcium 11.1 (H) 05/02/2022 02:59 AM    Phosphorus 2.6 04/29/2022 03:44 PM    PTH, Intact 8.0 (L) 04/28/2022 03:12 PM       Abdominal pain: possibly 2/2 hypercalcemia vs. Medication side effect vs. Malignancy. CT Chest/abd/pelv w/ contrast: demonstrated L > R pleural effusions, mild free fluid, large L thyroid nodule and a large hiatal hernia.   - GI to scope 5/2    HTN: Chronic.  At home on amlodipine 10 mg daily, losartan 50 mg daily, and metoprolol XL 25 mg daily.  - Continue home amlodipine 10 mg daily and metoprolol XL 25 mg daily  - losartan 100mg daily   - Hydral 25mg QID  - Hydral 10mg IV q6 PRN     UTI: (treated): S/p x3 days ceftriaxone. No symptoms. Culture showing sensitive E Coli.      At-risk DOMINIK: Improving. Creatinine 1.19 (BL ~0.7). - 1/2 mIVF  - Encourage PO intake      Hypokalemia: POA K 2.4. Mg 1.6.   - Replete PRN     Bradycardia: (Resolved) HR 50s POA. - Continue home metoprolol     DM2: Last A1c 5.3 on 12/6/21. Not on any home medications.  - SSI ACHS w/ glucose checks (high sensitivity)     HLD: Last lipid panel 12/6/21: Tchol 122, HDL 67, LDL 45.6, trig 47. No home medications.     Hypothyroidism: TSH 6.26 (5/1/2022). - Continue home Synthroid 88 mcg daily before breakfast    CAD: MI (during surgery in 1970s) had multiple caths and stress tests. Records of cardiology visit w/ Dr. Ulises Healy on 3/22/17. Exercise Cardiolite 7/23/15 - walked 4:39 (6.3 METS), normal stress EKG. Normal MPI. LVEF 71%. Echo 2/25/22 - LVEF 60-65%.     GERD: Chronic.  - Continue home Protonix 40 mg daily        FEN/GI -NPO. NS 50 ml/hr. Activity - As tolerated w/ assistance. DVT prophylaxis - SCDs  GI prophylaxis - Protonix  Fall prophylaxis - Fall precautions ordered. Disposition - Admitted to Telemetry. CM c/s'd to assist with patient going to [facility]. Code Status - Full. Discussed with patient / caregivers.   Next of Kin Name and Contact - Joselin Pino (Child) [phone] (Mobile)       Coretta Galvan MD  Family Medicine Resident       For Billing    Chief Complaint   Patient presents with   [facility] Problems  Date Reviewed: 3/2/2022          Codes Class Noted POA    UTI (urinary tract infection) ICD-10-CM: N39.0  ICD-9-CM: 599.0  4/28/2022 Yes        * (Principal) Weakness generalized ICD-10-CM: R53.1  ICD-9-CM: 780.79  2/25/2022 Yes        Hypokalemia ICD-10-CM: E87.6  ICD-9-CM: 276.8  2/24/2022 Unknown        Elevated troponin ICD-10-CM: R77.8  ICD-9-CM: 790.6  2/24/2022 Yes        Urinary tract infection with hematuria ICD-10-CM: N39.0, R31.9  ICD-9-CM: 599.0, 599.70  2/24/2022 Yes        Anemia ICD-10-CM: D64.9  ICD-9-CM: 285.9  8/29/2018 Yes        Type 2 diabetes mellitus without complication, without long-term current use of insulin (HCC) ICD-10-CM: E11.9  ICD-9-CM: 250.00  4/24/2017 Yes        DDD (degenerative disc disease), lumbar ICD-10-CM: M51.36  ICD-9-CM: 722.52  9/6/2016 Yes        Coronary artery disease involving native coronary artery of native heart without angina pectoris ICD-10-CM: I25.10  ICD-9-CM: 414.01  5/4/2016 Yes        HTN (hypertension) ICD-10-CM: I10  ICD-9-CM: 401.9  Unknown Yes        Hypothyroidism, iatrogenic ICD-10-CM: E03.2  ICD-9-CM: 244. 3  Unknown Yes    Overview Signed 7/13/2015  3:16 PM by Donella Lennox, MD     radioiodine             Hypercholesteremia ICD-10-CM: E78.00  ICD-9-CM: 272.0  Unknown Yes

## 2022-05-02 NOTE — PROGRESS NOTES
Bilateral hearing aids in place. Dentures remain in denture cup on back of stretcher with correct patient label.

## 2022-05-03 NOTE — PROGRESS NOTES
[facility] at [address], [address] [place] [address] Road [address]  Phuc [phone]  F: [phone] Patient ID  Name: Army Ayala  YOB: 1935  MRN: [id number]  Referring Provider:   No referring provider defined for this encounter. Primary Care Provider:   Corrinne Ling, MD       HEMATOLOGY/MEDICAL ONCOLOGY  NOTE   Date of Visit: 22  Reason for Evaluation:     Chief Complaint   Patient presents with    Fall     Subjective:     History of Present Illness:     Army Ayala is a 80 y.o. F who presents as an inpatient consultation for anemia and question of myeloma. This is a subacute problem. Problem has occurred for months. Problem has worsened. Patient reports dealing with fatigue. She had a recent fall. She seems to have worsened over past weeks per daughters in room as patient is a limited historian. Patient reports adequate oral intake of food and hydration. Patient denies any bowel or bladder problems. Patient denies any uncontrolled pain. Patient denies any shortness of breath. Interval History:     [place]: reports some nausea this am. Agrees to bone bx for tomorrow. No family at bedside.        Current Facility-Administered Medications   Medication Dose Route Frequency    potassium chloride 10 mEq in 100 ml IVPB  10 mEq IntraVENous Q1H    [START ON 2022] pantoprazole (PROTONIX) tablet 40 mg  40 mg Oral ACB    insulin lispro (HUMALOG) injection   SubCUTAneous AC&HS    glucose chewable tablet 16 g  4 Tablet Oral PRN    glucagon (GLUCAGEN) injection 1 mg  1 mg IntraMUSCular PRN    dextrose 10% infusion 0-250 mL  0-250 mL IntraVENous PRN    cyanocobalamin (VITAMIN B12) tablet 1,000 mcg  1,000 mcg Oral DAILY    polyethylene glycol (MIRALAX) packet 17 g  17 g Oral DAILY    sucralfate (CARAFATE) tablet 1 g  1 g Oral AC&HS    losartan (COZAAR) tablet 100 mg  100 mg Oral DAILY    ondansetron (ZOFRAN) injection 4 mg  4 mg IntraVENous Q6H PRN    hydrALAZINE (APRESOLINE) tablet 50 mg  50 mg Oral TID    hydrALAZINE (APRESOLINE) 20 mg/mL injection 10 mg  10 mg IntraVENous Q4H PRN    metoprolol succinate (TOPROL-XL) XL tablet 25 mg  25 mg Oral DAILY    benzonatate (TESSALON) capsule 100 mg  100 mg Oral TID PRN    cholecalciferol (VITAMIN D3) (1000 Units /25 mcg) tablet 2,000 Units  2,000 Units Oral DAILY    0.9% sodium chloride infusion 250 mL  250 mL IntraVENous PRN    alendronate (FOSAMAX) tablet 40 mg  40 mg Oral ACB    amLODIPine (NORVASC) tablet 10 mg  10 mg Oral DAILY    [Held by provider] ferrous sulfate tablet 325 mg  325 mg Oral ACB    levothyroxine (SYNTHROID) tablet 88 mcg  88 mcg Oral ACB    polyethylene glycol (MIRALAX) packet 17 g  17 g Oral DAILY    0.9% sodium chloride infusion  50 mL/hr IntraVENous CONTINUOUS      Allergies   Allergen Reactions    Celebrex [Celecoxib] Hives    Crestor [Rosuvastatin] Myalgia      Objective:     Visit Vitals  BP (!) 177/72 (BP 1 Location: Right upper arm, BP Patient Position: At rest;Lying) Comment: Harsh RN notified   Pulse 67   Temp 97.6 °F (36.4 °C)   Resp 16   Ht 5' (1.524 m)   Wt 127 lb 3.3 oz (57.7 kg)   SpO2 94%   BMI 24.84 kg/m²     ECOG PS: 3- Capable of only limited selfcare; confined to bed or chair more than 50% of waking hours. Physical Exam  General: elderly, frail, antione cute distress  Eyes: anicteric sclerae  HENT: [place]; atraumatic  Neck: supple  Respiratory: normal respiratory effort  CV: no peripheral edema  GI: soft, nontender, nondistended, no masses, no hepatomegaly, no splenomegaly  Skin: no rashes;  no petechiae; scattered ecchymotic areas  Psych: alert. Results:     I personally reviewed Epic EHR labs/results below:   Lab Results   Component Value Date/Time    WBC 5.9 05/03/2022 03:30 AM    HGB 7.2 (L) 05/03/2022 03:30 AM    HCT 22.6 (L) 05/03/2022 03:30 AM    PLATELET 515 78/87/7601 03:30 AM    .6 (H) 05/03/2022 03:30 AM    ABS.  NEUTROPHILS 4.0 05/03/2022 03:30 AM     Lab Results   Component Value Date/Time    Sodium 137 05/03/2022 03:30 AM    Potassium 2.7 (LL) 05/03/2022 03:30 AM    Chloride 106 05/03/2022 03:30 AM    CO2 25 05/03/2022 03:30 AM    Glucose 81 05/03/2022 03:30 AM    BUN 10 05/03/2022 03:30 AM    Creatinine 0.99 05/03/2022 03:30 AM    GFR est AA >60 05/03/2022 03:30 AM    GFR est non-AA 53 (L) 05/03/2022 03:30 AM    Calcium 9.7 05/03/2022 03:30 AM    Glucose (POC) 93 05/03/2022 08:55 AM     Lab Results   Component Value Date/Time    Bilirubin, total 0.7 05/03/2022 03:30 AM    ALT (SGPT) 20 05/03/2022 03:30 AM    Alk. phosphatase 45 05/03/2022 03:30 AM    Protein, total 9.0 (H) 05/03/2022 03:30 AM    Albumin 1.8 (L) 05/03/2022 03:30 AM    Globulin 7.2 (H) 05/03/2022 03:30 AM     ANEMIA LABS:  Hemoglobin:No components found for: HEME  Iron Studies:    Iron   Date Value Ref Range Status   04/05/2022 51 35 - 150 ug/dL Final     Vitamin B12:   Vitamin B12   Date Value Ref Range Status   04/30/2022 1,065 (H) 193 - 986 pg/mL Final     Folate:    Folate   Date Value Ref Range Status   04/28/2022 29.2 (H) 5.0 - 21.0 ng/mL Final     SPEP:  No results found for: PE6T No results found for: PE9T  No results found for: KLFL3L No results found for: KLFL1L No components found for: KLFL2  Reticulocyte Count: No results found for: RET  Bilirubin:   Lab Results   Component Value Date/Time    Bilirubin, total 0.7 05/03/2022 03:30 AM    Bilirubin Negative 04/28/2022 11:15 AM     LDH:   LD   Date Value Ref Range Status   04/30/2022 242 81 - 246 U/L Final     Haptoglobin:  Haptoglobin   Date Value Ref Range Status   04/30/2022 128 30 - 200 mg/dL Final    No results found for: HGBPLT  ALEJANDRO:   ALEJANDRO Poly   Date Value Ref Range Status   04/30/2022 NEG  Final     Copper Level: No results found for: COSLT  Occult Blood Testing: No results found for: OCBLLT  Hemoglobin Fractionation:  No components found for: HGBEL1, HGBEL2, HGBEL3, HGBEL4, HGBEL5, HGBEL6, HGBEL7, HGBEL8    22 EGD/Colonoscopy/ Dr Nate Sharpe  Findings:   Esophagus:normal  Stomach: hiatal hernia and diffuse mild erythema with loss gastric folds c/w atrophic gastritis  Duodenum/jejunum: normal     Colonic - Diverticulosis      - solitary rectal ulcer; not bleeding     Assessment and Recommendations:       1. Anemia, unspecified type  Patient with severe anemia. May need a bone marrow biopsy. Will order myeloma labs but repeat imaging on mri did not mention any clear skull lesion. Cannot exclude an underlying myelodysplastic syndrome. --Gammopathy eval/ UPEP: pending   --GI followin/2  EGD/colonoscopy / Dr Alexandra Thomas rectal ulcer; not bleeding; recommending sucralfate/ daily Miralax, Vit B12  -Plan for BM bx tomorrow; NPO after midnight      2. Hypercalcemia  Cannot exclude occult malignancy. nospecific lytic lesions in hip and initially in calvarium and hips. However, brain mri without clear lesions of skull. Low PTH. Lab Results   Component Value Date/Time    Calcium 9.7 2022 03:30 AM    Phosphorus 2.6 2022 03:44 PM    PTH, Intact 8.0 (L) 2022 03:12 PM   PTH rel peptide normal in 2022.  -Calcium 9.7 this am corrects to 11.4; trending down; will hold on Zometa for  today. 3. Urinary tract infection without hematuria, site unspecified  S/p abx with ceftriaxone; Treatment per primary team.    4. Hypothyroidism  -on Synthroid daily; management per primary team    Plan reviewed with Dr Johnathon Monk.        Signed By:   Sakshi Bynum NP

## 2022-05-03 NOTE — PROGRESS NOTES
Problem: Mobility Impaired (Adult and Pediatric)  Goal: *Acute Goals and Plan of Care (Insert Text)  Description: FUNCTIONAL STATUS PRIOR TO ADMISSION: Patient was modified independent using a rollator for functional mobility. HOME SUPPORT PRIOR TO ADMISSION: The patient lived alone with daughters to provide assistance. Physical Therapy Goals  Initiated 4/29/2022  1. Patient will move from supine to sit and sit to supine  in bed with supervision/set-up within 7 day(s). 2.  Patient will transfer from bed to chair and chair to bed with supervision/set-up using the least restrictive device within 7 day(s). 3.  Patient will perform sit to stand with supervision/set-up within 7 day(s). 4.  Patient will ambulate with supervision/set-up for 150 feet with the least restrictive device within 7 day(s). Outcome: Progressing Towards Goal   PHYSICAL THERAPY TREATMENT  Patient: Steffi Martinez (11 y.o. female)  Date: 5/3/2022  Diagnosis: UTI (urinary tract infection) [N39.0]  Hypokalemia [E87.6]  Anemia [D64.9] Weakness generalized  Procedure(s) (LRB):  ESOPHAGOGASTRODUODENOSCOPY (EGD) (N/A)  COLONOSCOPY (N/A)  COLON BIOPSY (N/A) 1 Day Post-Op  Precautions: Fall,WBAT  Chart, physical therapy assessment, plan of care and goals were reviewed. ASSESSMENT  Patient continues with skilled PT services and is progressing towards goals. Patient this afternoon demonstrating increased weakness compared to initial evaluation. Patient requiring increased assistance modAx1 for transfers and 25ft ambulation in room with RW. Patient reporting lightheadedness/dizziness during ambulation - vitals elevated as below and RN aware. She requires repeat cuing and visual demonstration of sit <> stand transfer with proper hand sequencing. + for small bm on bedside commode. Recommend SNF level rehab upon d/c.     Current Level of Function Impacting Discharge (mobility/balance): modA    Other factors to consider for discharge: plan for bone marrow biopsy and potential [facility] procedure tomorrow         PLAN :  Patient continues to benefit from skilled intervention to address the above impairments. Continue treatment per established plan of care. to address goals. Recommendation for discharge: (in order for the patient to meet his/her long term goals)  Therapy up to 5 days/week in SNF setting    This discharge recommendation:  Has been made in collaboration with the attending provider and/or case management    IF patient discharges home will need the following DME: to be determined (TBD)       SUBJECTIVE:   Patient stated I have 4 grandchildren.     OBJECTIVE DATA SUMMARY:   Patient received utilizing bedside commode with nursing staff. Daughter present for session. Vitals:    05/03/22 1349   BP: (!) 181/80  RN notified   BP 1 Location: Left upper arm   BP Patient Position: Sitting   Pulse: 67   Temp:    Resp:    Height:    Weight:    SpO2: 99%             Critical Behavior:  Neurologic State: Alert  Orientation Level: Oriented X4  Cognition: Appropriate decision making,Appropriate for age attention/concentration,Appropriate safety awareness,Follows commands  Safety/Judgement: Awareness of environment  Functional Mobility Training:  Bed Mobility:           Scooting: Minimum assistance;Assist x1        Transfers:  Sit to Stand: Moderate assistance;Minimum assistance;Assist x1  Stand to Sit: Minimum assistance;Assist x1;Additional time        Bed to Chair: Moderate assistance;Assist x1;Additional time                    Balance:  Sitting: Intact; With support  Standing: Impaired; With support  Standing - Static: Fair;Constant support  Standing - Dynamic : Fair;Constant support  Ambulation/Gait Training:  Distance (ft): 25 Feet (ft)  Assistive Device: Gait belt;Walker, rolling  Ambulation - Level of Assistance:  Moderate assistance;Assist x1;Additional time        Gait Abnormalities: Shuffling gait Speed/Candy: Pace decreased (<100 feet/min)  Step Length: Right shortened;Left shortened                    Pain Rating:  Patient without reports of pain during therapy    Activity Tolerance:   Fair and requires rest breaks    After treatment patient left in no apparent distress:   Sitting in chair, Call bell within reach, Bed / chair alarm activated, and Caregiver / family present    COMMUNICATION/COLLABORATION:   The patients plan of care was discussed with: Registered nurse.      Seb Grey PT, DPT   Time Calculation: 28 mins

## 2022-05-03 NOTE — PROGRESS NOTES
Pt received a copy and signed original copy placed in chart and a copy left in the room.   Vikram Ko CMS

## 2022-05-03 NOTE — PROGRESS NOTES
5/3/2022 1:48 PM     Patient admitted for UTI, falls   History of: HTN, GI bleed, DDD, CAD, diabetes, UTI  COVID Vaccine:  Yes    Vaccine plus 2 boosters.     RUR 21 HIGH  Is This a Readmission NO  Is this a Bundle NO        Transition of Care Plan  1. Monitor patient status and response to treatment. Bone marrow biopsy scheduled for tomorrow   2. Pt is agreeable to SNF placement, [facility] has accepted pt to admit tomorrow after bone marrow biopsy   3. Family to transport at [place]. Confirmed with pt's daughter at bedside today she will be able to transport pt tomorrow to [facility]. 4. CM to monitor progress and recommendations.

## 2022-05-03 NOTE — PROGRESS NOTES
[address] [facility] [address]   Office [phone]  Fax [phone]            Subjective / Objective     24 Hour Events:   Received PRN Hydral for BP of 191/72 at 1500. Subjective: Pt was seen and examined at bedside, hard of hearing. Reports feeling well. No new complaints this AM. Denies any CP, abdominal pain, n/v.     Objective:    Respiratory:   O2 Device: None (Room air)   Visit Vitals  BP (!) 177/72 (BP 1 Location: Right upper arm, BP Patient Position: At rest;Lying)   Pulse 67   Temp 97.6 °F (36.4 °C)   Resp 16   Ht 5' (1.524 m)   Wt 127 lb 3.3 oz (57.7 kg)   SpO2 94%   BMI 24.84 kg/m²     Physical Exam:  General: No acute distress. Alert. Cooperative. [place]  Respiratory: No increased work of breathing. Mild crackles appreciated to L lung base. No wheezing  Cardiovascular: Regular rate, at times irregular, but otherwise mostly sinus rhythm. No murmurs. GI: Nondistended. + bowel sounds. No significant tenderness to palpation to all quadrants. No guarding or rebound. Extremities: No LE edema. Skin: Warm, dry. I/O:  Date 05/02/22 0700 - 05/03/22 0659 05/03/22 0700 - 05/04/22 0659   Shift 5057-6884 0590-4210 24 Hour Total 9176-6806 2004-1088 24 Hour Total   INTAKE   P.O.  60 60 20  20     P. O.  60 60 20  20   I. V.(mL/kg/hr)  200(0.3) 200(0.1) 389  389     I.V.  200 200 389  389   Shift Total(mL/kg)  260(4.5) 260(4.5) 409(7.1)  409(7.1)   OUTPUT   Urine(mL/kg/hr)    275  275     Urine Voided    275  275     Urine Occurrence(s) 1 x 3 x 4 x 1 x  1 x   Emesis/NG output           Emesis Occurrence(s) 0 x  0 x      Stool           Stool Occurrence(s) 0 x  0 x      Shift Total(mL/kg)    275(4.8)  275(4.8)   NET  260 260 134  134   Weight (kg) 57.7 57.7 57.7 57.7 57.7 57.7       Inpatient Medications  Current Facility-Administered Medications   Medication Dose Route Frequency    magnesium sulfate 1 g/100 ml IVPB (premix or compounded)  1 g IntraVENous ONCE    potassium chloride 10 mEq in 100 ml IVPB  10 mEq IntraVENous Q1H    insulin lispro (HUMALOG) injection   SubCUTAneous AC&HS    glucose chewable tablet 16 g  4 Tablet Oral PRN    glucagon (GLUCAGEN) injection 1 mg  1 mg IntraMUSCular PRN    dextrose 10% infusion 0-250 mL  0-250 mL IntraVENous PRN    cyanocobalamin (VITAMIN B12) tablet 1,000 mcg  1,000 mcg Oral DAILY    polyethylene glycol (MIRALAX) packet 17 g  17 g Oral DAILY    sucralfate (CARAFATE) tablet 1 g  1 g Oral AC&HS    losartan (COZAAR) tablet 100 mg  100 mg Oral DAILY    ondansetron (ZOFRAN) injection 4 mg  4 mg IntraVENous Q6H PRN    hydrALAZINE (APRESOLINE) tablet 50 mg  50 mg Oral TID    hydrALAZINE (APRESOLINE) 20 mg/mL injection 10 mg  10 mg IntraVENous Q4H PRN    metoprolol succinate (TOPROL-XL) XL tablet 25 mg  25 mg Oral DAILY    benzonatate (TESSALON) capsule 100 mg  100 mg Oral TID PRN    cholecalciferol (VITAMIN D3) (1000 Units /25 mcg) tablet 2,000 Units  2,000 Units Oral DAILY    0.9% sodium chloride infusion 250 mL  250 mL IntraVENous PRN    alendronate (FOSAMAX) tablet 40 mg  40 mg Oral ACB    amLODIPine (NORVASC) tablet 10 mg  10 mg Oral DAILY    [Held by provider] ferrous sulfate tablet 325 mg  325 mg Oral ACB    levothyroxine (SYNTHROID) tablet 88 mcg  88 mcg Oral ACB    polyethylene glycol (MIRALAX) packet 17 g  17 g Oral DAILY    0.9% sodium chloride infusion  50 mL/hr IntraVENous CONTINUOUS       Allergies  Allergies   Allergen Reactions    Celebrex [Celecoxib] Hives    Crestor [Rosuvastatin] Myalgia       CBC:  Recent Labs     05/03/22  0330 05/02/22  0259 05/01/22  1743 05/01/22 0052 05/01/22  0052   WBC 5.9 6.9  --   --  6.9   HGB 7.2* 7.8* 8.4*   < > 7.3*   HCT 22.6* 24.4* 26.6*   < > 23.0*    337  --   --  312    < > = values in this interval not displayed.        Metabolic Panel:  Recent Labs     05/03/22  0330 05/02/22  0259 05/01/22 0052    141 138   K 2.7* 2.9* 3.0*    109* 108   CO2 25 25 27   BUN 10 9 12   CREA 0.99 1.08* 1.03*   GLU 81 91 97   CA 9.7 11.1* 10.6*   MG 1.4* 1.5* 1.6   ALB 1.8* 2.0* 1.8*   ALT 20 20 19            Assessment and Plan     Army Ayala is a 80 y.o. female with a PMHx of anemia, CAD, HTN, HLD, DM2, hypothyroidism, GERD who is admitted for weakness in the s/o acute on chronic anemia, UTI, hypokalemia, and hypercalcemia, with concern for possible multiple myeloma.      Generalized LE weakness and GLF: Likely multifactorial 2/2 acute on chronic anemia / hypokalemia / hypercalcemia / UTI. Head imaging clear. - Continue to correct electrolytes, 1/2 mIVF for hypercalcemia, tx'd UTI, monitor Hgb  - Awaiting SNF placement - [facility]     Acute on chronic macrocytic anemia: s/p 1u PRBC for Hgb of 6.4 (bl ~9) Concern for multiple myeloma with elevated calcium and calvarial lucencies on CT head, elevated protein. Work-up at [address] [place] [address] unremarkable. FOBT positive.   - (5/2) GI signed off; added Carafate, miralax, B12 daily     - EGD: normal esophagus, hiatal hernia and diffuse mild erythema with loss gastric folds c/w atrophic gastritis, normal duodenum/jejunum     - Colonoscopy: Diverticulosis, solitary rectal ulcer (not bleeding)  - Diet: GI bland  - Stable, monitor on daily CBC  - Heme/Onc consulted; consider BM Biopsy, unclear if they would like to pursue inpatient vs outpatient. - UPEP: Elevated M-spike (62.7%) noted     - Protein Electrophoresis: Total prot 9.7 (H), albumin 3.2, gamma globulin 4.6 (H), M-spoke 4.4 (H), Globulin total 6.5 (H)    Hypercalcemia (Improved): POA 12.8. C/f malignancy in s/o calvarial lucencies. PTH-related peptide low (2/26), Vit D wnl. PTH 8.0 (L)   - 1/2 MIVF  - May consider Zometa per Heme/onc  - Alendronate 40mg daily  - daily calcium checks    Abdominal pain (Improved): possibly 2/2 hypercalcemia vs. Medication side effect vs. Malignancy.  CT Chest/abd/pelv w/ contrast: demonstrated L > R pleural effusions, mild free fluid, large L thyroid nodule and a large hiatal hernia. - Scope as above    HTN: Chronic. At home on amlodipine 10 mg daily, losartan 50 mg daily, and metoprolol XL 25 mg daily. - Continue home amlodipine 10 mg daily and metoprolol XL 25 mg daily  - losartan 100mg daily   - Hydral 25mg QID  - Hydral 10mg IV q6 PRN     UTI: (treated): S/p x3 days ceftriaxone. No symptoms. Culture showed sensitive E Coli.      At-risk DOMINIK: Improving. Creatinine 1.19 (BL ~0.7). - 1/2 mIVF  - Encourage PO intake      Hypokalemia: POA K 2.4  - Replete PRN    Hypomagnesemia: Mg 1.4 on 5/3.   - replete PRN    Bradycardia: (Resolved) HR 50s POA. - Continue home metoprolol     DM2: Last A1c 5.3 on 12/6/21. Not on any home medications.  - SSI ACHS w/ glucose checks (high sensitivity)     HLD: Last lipid panel 12/6/21: Tchol 122, HDL 67, LDL 45.6, trig 47. No home medications.     Hypothyroidism: TSH 6.26 (5/1/2022). - Continue home Synthroid 88 mcg daily before breakfast    CAD: MI (during surgery in 1970s) had multiple caths and stress tests. Records of cardiology visit w/ Dr. Kashif James on 3/22/17. Exercise Cardiolite 7/23/15 - walked 4:39 (6.3 METS), normal stress EKG. Normal MPI. LVEF 71%. Echo 2/25/22 - LVEF 60-65%.     GERD: Chronic.  - Continue home Protonix 40 mg daily        FEN/GI -GI [place]. NS 50 ml/hr. Activity - As tolerated w/ assistance. DVT prophylaxis - SCDs  GI prophylaxis - Protonix  Fall prophylaxis - Fall precautions ordered. Disposition - Admitted to Remote Telemetry. CM c/s'd to assist with patient going to [facility]. Code Status - Full. Discussed with patient / caregivers.   Next of Kin Name and Contact - Jelani Heath (Child) [phone] (Mobile)       April Cabrera MD  Family Medicine Resident       For Billing    Chief Complaint   Patient presents with   [facility] Problems  Date Reviewed: 3/2/2022          Codes Class Noted POA    UTI (urinary tract infection) ICD-10-CM: N39.0  ICD-9-CM: 599.0  4/28/2022 Yes        * (Principal) Weakness generalized ICD-10-CM: R53.1  ICD-9-CM: 780.79  2/25/2022 Yes        Hypokalemia ICD-10-CM: E87.6  ICD-9-CM: 276.8  2/24/2022 Unknown        Elevated troponin ICD-10-CM: R77.8  ICD-9-CM: 790.6  2/24/2022 Yes        Urinary tract infection with hematuria ICD-10-CM: N39.0, R31.9  ICD-9-CM: 599.0, 599.70  2/24/2022 Yes        Anemia ICD-10-CM: D64.9  ICD-9-CM: 285.9  8/29/2018 Yes        Type 2 diabetes mellitus without complication, without long-term current use of insulin ([facility] ICD-10-CM: E11.9  ICD-9-CM: 250.00  4/24/2017 Yes        DDD (degenerative disc disease), lumbar ICD-10-CM: M51.36  ICD-9-CM: 722.52  9/6/2016 Yes        Coronary artery disease involving native coronary artery of native heart without angina pectoris ICD-10-CM: I25.10  ICD-9-CM: 414.01  5/4/2016 Yes        HTN (hypertension) ICD-10-CM: I10  ICD-9-CM: 401.9  Unknown Yes        Hypothyroidism, iatrogenic ICD-10-CM: E03.2  ICD-9-CM: 244. 3  Unknown Yes    Overview Signed 7/13/2015  3:16 PM by Lyubov Ferguson MD     radioiodine             Hypercholesteremia ICD-10-CM: E78.00  ICD-9-CM: 272.0  Unknown Yes

## 2022-05-04 NOTE — PROGRESS NOTES
Patient arrived via stretcher with transport for ordered bone marrow biopsy. Patient confirmed with two identifiers. NPO status confirmed. PIV flushed and patent. Patient [place]. VSS. [address] over to consent patient with review of risks and benefits. 1153 Over to CT and placed prone on the table an connected to monitor/oxygen/fluids Time out 1209 Start time 1209 End time 1220. CDI dressing to right iliac crest. Versed 1 mg and Fentanyl 25 mcg given for sedation and patient tolerated well. 1230 Patient back to [facility] for recover. Patient Voided on bedpan without difficulty. Awake and alert and denies pain. [address] Report called to [employer], RN for room 432 for transfer back to floor with questions asked and answered.

## 2022-05-04 NOTE — PROGRESS NOTES
5/4/22  4:20 PM    Care Management Daily Progress Note:    RUR 21 HIGH  Is This a Readmission NO  Is this a Bundle NO        Transition of Care Plan  1. Monitor patient status and response to treatment. Bone marrow biopsy was today  2. Pt is agreeable to SNF placement- [facility] was able to take today but dc on hold as family and patient decide plan with hematology- SNF ([facility]) vs home with [facility] and start treatment  3. Family to transport at [place]. 4. CM to monitor progress and recommendations.     [facility]

## 2022-05-04 NOTE — PROGRESS NOTES
[address] [place] [address]   Office [phone]  Fax [phone]            Subjective / Objective     24 Hour Events:   Received PRN Hydral for BP of 197/83 at 1600 and again at 2100 for /68. Subjective: Pt was seen and examined at bedside, hard of hearing. She reports feeling nauseous this AM with mild abdominal pain. No other complaints or concerns. Objective:    Respiratory:   O2 Device: None (Room air)   Visit Vitals  BP (!) 187/87 (BP 1 Location: Right upper arm, BP Patient Position: At rest;Lying)   Pulse 76   Temp 98.6 °F (37 °C)   Resp 16   Ht 5' (1.524 m)   Wt 127 lb 3.3 oz (57.7 kg)   SpO2 93%   BMI 24.84 kg/m²     Physical Exam:  General: No acute distress. Alert. Cooperative. [place]  Respiratory: No increased work of breathing. Mild crackles appreciated to L lung base. No wheezing  Cardiovascular: Regular rate, at times irregular, but otherwise mostly sinus rhythm. No murmurs. GI: Nondistended. + bowel sounds. Mildly tender to periumbilical region without guarding or rebound tenderness. Extremities: No LE edema. Skin: Warm, dry. I/O:  Date 05/03/22 0700 - 05/04/22 0659 05/04/22 0700 - 05/05/22 0659   Shift 7436-6302 3960-2547 24 Hour Total 3646-5556 7670-3658 24 Hour Total   INTAKE   P.O. 320  320        P. O. 320  320      I. V.(mL/kg/hr) 389(0.6) 0(0) 389(0.3) 900  900     I.V. 389  389        Volume (0.9% sodium chloride infusion)  0 0 600  600     Volume (potassium chloride 10 mEq in 100 ml IVPB)  0 0 300  300   Shift Total(mL/kg) 709(12.3) 0(0) 709(12.3) 900(15.6)  900(15.6)   OUTPUT   Urine(mL/kg/hr) 275(0.4)  275(0.2)        Urine Voided 275  275        Urine Occurrence(s) 1 x 2 x 3 x      Shift Total(mL/kg) 275(4.8)  275(4.8)       0 434 900  900   Weight (kg) 57.7 57.7 57.7 57.7 57.7 57.7       Inpatient Medications  Current Facility-Administered Medications   Medication Dose Route Frequency    ondansetron (ZOFRAN ODT) tablet 4 mg  4 mg Oral Q8H PRN    pantoprazole (PROTONIX) tablet 40 mg  40 mg Oral ACB    insulin lispro (HUMALOG) injection   SubCUTAneous AC&HS    glucose chewable tablet 16 g  4 Tablet Oral PRN    glucagon (GLUCAGEN) injection 1 mg  1 mg IntraMUSCular PRN    dextrose 10% infusion 0-250 mL  0-250 mL IntraVENous PRN    cyanocobalamin (VITAMIN B12) tablet 1,000 mcg  1,000 mcg Oral DAILY    polyethylene glycol (MIRALAX) packet 17 g  17 g Oral DAILY    sucralfate (CARAFATE) tablet 1 g  1 g Oral AC&HS    losartan (COZAAR) tablet 100 mg  100 mg Oral DAILY    ondansetron (ZOFRAN) injection 4 mg  4 mg IntraVENous Q6H PRN    hydrALAZINE (APRESOLINE) tablet 50 mg  50 mg Oral TID    hydrALAZINE (APRESOLINE) 20 mg/mL injection 10 mg  10 mg IntraVENous Q4H PRN    metoprolol succinate (TOPROL-XL) XL tablet 25 mg  25 mg Oral DAILY    benzonatate (TESSALON) capsule 100 mg  100 mg Oral TID PRN    cholecalciferol (VITAMIN D3) (1000 Units /25 mcg) tablet 2,000 Units  2,000 Units Oral DAILY    0.9% sodium chloride infusion 250 mL  250 mL IntraVENous PRN    alendronate (FOSAMAX) tablet 40 mg  40 mg Oral ACB    amLODIPine (NORVASC) tablet 10 mg  10 mg Oral DAILY    [Held by provider] ferrous sulfate tablet 325 mg  325 mg Oral ACB    levothyroxine (SYNTHROID) tablet 88 mcg  88 mcg Oral ACB    polyethylene glycol (MIRALAX) packet 17 g  17 g Oral DAILY    0.9% sodium chloride infusion  50 mL/hr IntraVENous CONTINUOUS       Allergies  Allergies   Allergen Reactions    Celebrex [Celecoxib] Hives    Crestor [Rosuvastatin] Myalgia       CBC:  Recent Labs     05/04/22  0159 05/03/22  0330 05/02/22  0259   WBC 7.2 5.9 6.9   HGB 7.4* 7.2* 7.8*   HCT 22.9* 22.6* 24.4*    296 331       Metabolic Panel:  Recent Labs     05/04/22  0159 05/03/22  0330 05/02/22  0259   * 137 141   K 3.2* 2.7* 2.9*    106 109*   CO2 23 25 25   BUN 9 10 9   CREA 0.88 0.99 1.08*   GLU 92 81 91   CA 8.8 9.7 11.1*   MG 1.7 1.4* 1.5*   ALB 1.8* 1.8* 2.0* ALT 22 20 20            Assessment and Plan     Ramesh Fermin is a 80 y.o. female with a PMHx of anemia, CAD, HTN, HLD, DM2, hypothyroidism, GERD who is admitted for weakness in the s/o acute on chronic anemia, UTI, hypokalemia, and hypercalcemia, with concern for possible multiple myeloma.      Generalized LE weakness and GLF: Likely multifactorial 2/2 acute on chronic anemia / hypokalemia / hypercalcemia / UTI. Head imaging clear. - Continue to correct electrolytes, 1/2 mIVF for hypercalcemia, tx'd UTI, monitor Hgb  - SNF placement - [facility] accepted, family to transport at d/c     Acute on chronic macrocytic anemia: Concern for multiple myeloma with elevated calcium and calvarial lucencies on CT head, elevated protein. Work-up at [address] [place] [address] unremarkable. FOBT positive.   - (5/2) GI signed off; added Carafate, miralax, B12 daily     - EGD: normal esophagus, hiatal hernia and diffuse mild erythema with loss gastric folds c/w atrophic gastritis, normal duodenum/jejunum     - Colonoscopy: Diverticulosis, solitary rectal ulcer (not bleeding)  - Diet: GI bland  - Stable, monitor on daily CBC  - Heme/Onc consulted; BM biopsy on 5/4, to have family meeting at noon     - SPEP: 6766 IgG (H), 18 IgA (L), 9 IgM (L), M-spike 4.8 (H)     - UPEP: Elevated M-spike (62.7%) noted     - Protein Electrophoresis: Total prot 9.7 (H), albumin 3.2, gamma globulin 4.6 (H), M-spoke 4.4 (H), Globulin total 6.5 (H)    Hypercalcemia (Improved): POA 12.8. C/f malignancy in s/o calvarial lucencies. PTH-related peptide low (2/26), Vit D wnl. PTH 8.0 (L)   - 1/2 MIVF  - May consider Zometa per Heme/onc  - Alendronate 40mg daily  - daily calcium checks    Abdominal pain (Improved): possibly 2/2 hypercalcemia vs. Medication side effect vs. Malignancy. CT Chest/abd/pelv w/ contrast: demonstrated L > R pleural effusions, mild free fluid, large L thyroid nodule and a large hiatal hernia. - Scope as above    HTN: Chronic.  At home on amlodipine 10 mg daily, losartan 50 mg daily, and metoprolol XL 25 mg daily. - Continue home amlodipine 10 mg daily and metoprolol XL 25 mg daily  - losartan 100mg daily   - Hydral 50 mg TID  - Hydral 10mg IV q6 PRN  - Consider adding spironolactone following d/c.      UTI: (treated): S/p x3 days ceftriaxone. No symptoms. Culture showed sensitive E Coli.      At-risk DOMINIK: Improving. Creatinine 1.19 (BL ~0.7). - 1/2 mIVF  - Encourage PO intake      Hypokalemia: POA K 2.4  - Replete PRN    Hypomagnesemia: Mg 1.7 on 5/4.   - replete PRN    Bradycardia: (Resolved) HR 50s POA. - Continue home metoprolol     DM2: Last A1c 5.3 on 12/6/21. Not on any home medications.  - SSI ACHS w/ glucose checks (high sensitivity)     HLD: Last lipid panel 12/6/21: Tchol 122, HDL 67, LDL 45.6, trig 47. No home medications.     Hypothyroidism: TSH 6.26 (5/1/2022). - Continue home Synthroid 88 mcg daily before breakfast    CAD: MI (during surgery in 1970s) had multiple caths and stress tests. Records of cardiology visit w/ Dr. Alejandra Eckert on 3/22/17. Exercise Cardiolite 7/23/15 - walked 4:39 (6.3 METS), normal stress EKG. Normal MPI. LVEF 71%. Echo 2/25/22 - LVEF 60-65%.     GERD: Chronic.  - Continue home Protonix 40 mg daily     FEN/GI -[facility] [place]. NS 50 ml/hr. Activity - As tolerated w/ assistance. DVT prophylaxis - SCDs  GI prophylaxis - Protonix  Fall prophylaxis - Fall precautions ordered. Disposition - Admitted to Remote Telemetry. Accepted to [facility]; d/c pending heme/onc work-up. Code Status - Full. Discussed with patient / caregivers.   Next of Kin Name and Contact - Leilani Collazo (Child) [phone] (Mobile)  Ivy Kelley MD  Family Medicine Resident  Case and care plan discussed with Dr. Geovany Sim [facility] Attending)       For Billing    Chief Complaint   Patient presents with   [facility] Problems  Date Reviewed: 3/2/2022          Codes Class Noted POA    UTI (urinary tract infection) ICD-10-CM: N39.0  ICD-9-CM: 599.0  4/28/2022 Yes        * (Principal) Weakness generalized ICD-10-CM: R53.1  ICD-9-CM: 780.79  2/25/2022 Yes        Hypokalemia ICD-10-CM: E87.6  ICD-9-CM: 276.8  2/24/2022 Unknown        Elevated troponin ICD-10-CM: R77.8  ICD-9-CM: 790.6  2/24/2022 Yes        Urinary tract infection with hematuria ICD-10-CM: N39.0, R31.9  ICD-9-CM: 599.0, 599.70  2/24/2022 Yes        Anemia ICD-10-CM: D64.9  ICD-9-CM: 285.9  8/29/2018 Yes        Type 2 diabetes mellitus without complication, without long-term current use of insulin ([facility] ICD-10-CM: E11.9  ICD-9-CM: 250.00  4/24/2017 Yes        DDD (degenerative disc disease), lumbar ICD-10-CM: M51.36  ICD-9-CM: 722.52  9/6/2016 Yes        Coronary artery disease involving native coronary artery of native heart without angina pectoris ICD-10-CM: I25.10  ICD-9-CM: 414.01  5/4/2016 Yes        HTN (hypertension) ICD-10-CM: I10  ICD-9-CM: 401.9  Unknown Yes        Hypothyroidism, iatrogenic ICD-10-CM: E03.2  ICD-9-CM: 244. 3  Unknown Yes    Overview Signed 7/13/2015  3:16 PM by Nayeli Ramos MD     radioiodine             Hypercholesteremia ICD-10-CM: E78.00  ICD-9-CM: 272.0  Unknown Yes

## 2022-05-04 NOTE — PROGRESS NOTES
Physical Therapy    Attempted to see patient today for routine therapy session. Patient currently ABBEY undergoing liver biopsy. Will f/u as appropriate.     Kendal Rosa MS, PT

## 2022-05-04 NOTE — PROGRESS NOTES
Spiritual Care Assessment/Progress Note  [address]      NAME: Radha Olson      MRN: [id number]  AGE: 80 y.o. SEX: female  Taoist Affiliation: Other   Language: English     5/4/2022     Total Time (in minutes): 65     Spiritual Assessment begun in [facility] POST SURG ORT 2 through conversation with:         [x]Patient        [] Family    [] Friend(s)        Reason for Consult: Initial/Spiritual assessment, patient floor     Spiritual beliefs: (Please include comment if needed)     [x] Identifies with a little tradition: Alevism          [] Supported by a little community:            [] Claims no spiritual orientation:           [] Seeking spiritual identity:                [] Adheres to an individual form of spirituality:           [] Not able to assess:                           Identified resources for coping:      [] Prayer                               [] Music                  [] Guided Imagery     [x] Family/friends                 [] Pet visits     [] Devotional reading                         [] Unknown     [] Other:                                          Interventions offered during this visit: (See comments for more details)    Patient Interventions: Initial visit     Family/Friend(s):  Affirmation of emotions/emotional suffering,Affirmation of little,Catharsis/review of pertinent events in supportive environment,Prayer (assurance of),Iconic (affirming the presence of God/Higher Power),Normalization of emotional/spiritual concerns     Plan of Care:     [] Support spiritual and/or cultural needs    [] Support AMD and/or advance care planning process      [] Support grieving process   [] Coordinate Rites and/or Rituals    [] Coordination with community clergy   [] No spiritual needs identified at this time   [] Detailed Plan of Care below (See Comments)  [] Make referral to Music Therapy  [] Make referral to Pet Therapy     [] Make referral to Addiction services  [] Make referral to [facility]  [] Make referral to Spiritual Care Partner  [] No future visits requested        [x] Contact Spiritual Care for further referrals     Comments: Initial spiritual assessment in 4 Post surg. Reviewed chart prior to visit. Miss Ty Noland was out of the room, her two daughters and granddaughter were in the room. They welcomed me with smiles and began to share about their mom, grandmother. Doctor and NP came into the room, with family permission I stayed as they spoke to family about a difficult diagnosis, and a range of possibilities. After they left, family bounced a bit and got way far ahead then came back to the present. We talked about their mother choice of direction. Nurse Practitioner returned, gave them some more information and went to consult with the . Family indicated they have strong Advent belief in [address]. They welcome being kept in prayer. Provided spiritual presence and prayer. Contact Spiritual Care for any further referrals.   Kristan Adrian., MS., [address] ([phone])

## 2022-05-04 NOTE — DISCHARGE SUMMARY
[facility] [address] [place] [address]   Office [phone]  Fax [phone]       Discharge / Transfer / Off-Service Note     Name: Elif Gary MRN: [id number]  Sex: Female   YOB: 1935  Age: 80 y.o. PCP: Yudy Patino MD     Date of admission: 4/28/2022  Date of discharge/transfer: 5/5/2022    Attending physician at admission: Davin Russo MD  Attending physician at discharge/transfer: Hanna Nguyen MD  Resident physician at discharge/transfer: Jayesh Lowery MD     Consultants during hospitalization  IP CONSULT TO [facility]  IP CONSULT TO GASTROENTEROLOGY  IP CONSULT TO HEMATOLOGY  IP CONSULT TO [facility] 40 - PROVIDER     Admission diagnoses   UTI (urinary tract infection) [N39.0]  Hypokalemia [E87.6]  Anemia [D64.9]    Recommended follow-up after discharge    1. PCP-Rose, Joella Primrose, MD  2. Colorectal surgery for rectal prolapse-Dr. Ludwin Burris  3. Heme/Onc - Dr. Loy Hitchcock    Things to follow up on with PCP:   - Recheck electrolytes (K, Mg, Ca) w/ CMP  - Consider adding spironolactone for HTN  - Assist with follow-ups to Heme/Onc and Colorectal surgery   - Final results of bone marrow biopsy  - Hypercalcemia management > consider Zometa   ----------------------------------------------------------------------------------------------------------------------------    Medication Changes:  Current Discharge Medication List      START taking these medications    Details   potassium bicarb-citric acid (EFFER-K) 20 mEq tablet Take 1 Tablet by mouth two (2) times a day. Qty: 60 Each, Refills: 1  Start date: 5/5/2022      dexAMETHasone 20 mg tab Take 20 mg by mouth Daily (before breakfast) for 3 days. Qty: 3 Tablet, Refills: 0  Start date: 5/6/2022, End date: 5/9/2022      cyanocobalamin 1,000 mcg tablet Take 1 Tablet by mouth daily for 30 days.   Qty: 30 Tablet, Refills: 0  Start date: 5/5/2022, End date: 6/4/2022      benzonatate (TESSALON) 100 mg capsule Take 1 Capsule by mouth as needed for Cough for up to 14 doses. Qty: 14 Capsule, Refills: 0  Start date: 5/4/2022      hydrALAZINE (APRESOLINE) 50 mg tablet Take 1 Tablet by mouth three (3) times daily for 30 days. Qty: 90 Tablet, Refills: 0  Start date: 5/4/2022, End date: 6/3/2022      sucralfate (CARAFATE) 1 gram tablet Take 1 Tablet by mouth Before breakfast, lunch, dinner and at bedtime for 30 days. Qty: 120 Tablet, Refills: 0  Start date: 5/4/2022, End date: 6/3/2022         CONTINUE these medications which have CHANGED    Details   polyethylene glycol (MIRALAX) 17 gram packet Take 1 Packet by mouth daily. Qty: 30 Each, Refills: 2  Start date: 5/6/2022      amLODIPine (NORVASC) 10 mg tablet Take 1 Tablet by mouth daily. Qty: 90 Tablet, Refills: 1  Start date: 5/4/2022    Associated Diagnoses: Primary hypertension      cholecalciferol, vitamin D3, (Vitamin D3) 50 mcg (2,000 unit) tab Take 1 Tablet by mouth daily for 30 days. Qty: 30 Tablet, Refills: 0  Start date: 5/4/2022, End date: 6/3/2022      levothyroxine (SYNTHROID) 88 mcg tablet Take 1 Tablet by mouth Daily (before breakfast) for 30 days. Qty: 30 Tablet, Refills: 0  Start date: 5/5/2022, End date: 6/4/2022      losartan (COZAAR) 100 mg tablet Take 1 Tablet by mouth daily for 30 days. Qty: 30 Tablet, Refills: 0  Start date: 5/5/2022, End date: 6/4/2022      metoprolol succinate (TOPROL-XL) 25 mg XL tablet Take 1 Tablet by mouth daily for 30 days. Qty: 30 Tablet, Refills: 0  Start date: 5/5/2022, End date: 6/4/2022      pantoprazole (PROTONIX) 40 mg tablet Take 1 Tablet by mouth Daily (before breakfast) for 30 days. Qty: 30 Tablet, Refills: 0  Start date: 5/5/2022, End date: 6/4/2022      ferrous sulfate 325 mg (65 mg iron) tablet Take 1 Tablet by mouth Daily (before breakfast) for 30 days.   Qty: 30 Tablet, Refills: 0  Start date: 5/4/2022, End date: 6/3/2022         STOP taking these medications       valACYclovir (VALTREX) 1 gram tablet Comments:   Reason for Stopping:         vit C/E/Zn/coppr/lutein/zeaxan (PRESERVISION AREDS 2 PO) Comments:   Reason for Stopping:         OTHER Comments:   Reason for Stopping:         latanoprost (XALATAN) 0.005 % ophthalmic solution Comments:   Reason for Stopping:         krill-om3-dha-epa-om6-lip-astx (KRILL OIL, OMEGA 3 & 6,) 1,500-165-67.5 mg cap Comments:   Reason for Stopping:         MULTIVITAMIN (MULTIPLE VITAMIN PO) Comments:   Reason for Stopping:         aspirin 81 mg chewable tablet Comments:   Reason for Stopping:               History of Present Illness    As per admitting provider, Dr. Hero Hall: Shayne Fabian is a 80 y.o. female with PMHx of CAD, HTN, HLD, DM2, hypothyroidism, EDUARDO, GERD who presents to the ED complaining of bilateral lower extremity weakness and pain. She reports ongoing worsening weakness of her lower extremities. Yesterday while ambulating at home, she felt her legs give out from under her. She subsequently fell to the floor. She reports that she did not hit her head or lose consciousness. She states that she is having pain extending from her feet to her knees bilaterally. She has been unable to ambulate since yesterday secondary to generalized weakness and pain. Pt denies having any other complaints, including headache, vision changes, chest pain, SOB, nausea, vomiting, abdominal pain, hematochezia, or dysuria. Of note, pt lives alone and uses a walker to ambulate at baseline. Her daughter checks on her daily. \"      Hospital course  Inge Kenyon is a 80 y. o. female with a known history of chronic anemia, CAD, HTN, HLD, DM2, hypothyroidism, Frankey Gaul was admitted to the [facility] Medicine Service from 4/28 to 5/5 for generalized weakness of multifactorial etiology further found to have lab findings concerning for new malignancy favoring multiple myeloma.  Her initial work-up included an MRI of her brain that was negative for any acute process with likely old/chronic small vessel disease to the R inferior parietal lobe. She was found to have a UTI and treated with CTX x 3 days. GI performed EGD and colonoscopy on 5/2 which was notable for atrophic gastritis, diverticulosis, solitary rectal ulcer (not bleeding) respectively. The prep was complicated by rectal prolapse during prep requiring manual reduction without any issues. The cause of her generalized weakness was attributed to metabolic derangements and chronic anemia which in combination of renal disease and hypercalcemia was concerning for malignancy. Heme/onc was consulted, SPEP/UPEP/Protein electrophoresis were ordered. Findings were concerning for M-spike and markedly elevated IgG on SPEP. A bone marrow biopsy was performed on 5/4. Awaiting results. Hypercalcemia was primarily managed with IVF with Heme/onc advising possibly initiating Zometa, but her corrected Ca was 10.7 on day of discharge so this was not initiated. Blood pressures remained moderately elevated throughout her stay -- added hydralazine during her course in addition to adjustments to home meds as below. Hypokalemia and hypomagnesemia were repleted multiple times throughout her stay. Seen by PT/OT with recommendation to be placed in a SNF. She was accepted to [facility]. Otherwise, she remained hemodynamically stable on day of discharge.      New Multiple Myeloma: SPEP/UPEP/Protein electrophoresis with M-spike and findings as below consistent with multiple myeloma. - Heme/Onc following; s/p BM biopsy on 5/4.     - Per Dr. Kent Lr: prefer pulse steroids, stop alendronate.      - Initiated Decadron 20 mg PO daily (5/5 to 5/8 for total of 4 doses) for eventual bridge to therapy     - SPEP: 6766 IgG (H), 18 IgA (L), 9 IgM (L), M-spike 4.8 (H)     - UPEP: Elevated M-spike (62.7%) noted     - Protein Electrophoresis: Total prot 9.7 (H), albumin 3.2, gamma globulin 4.6 (H), M-spoke 4.4 (H), Globulin total 6.5 (H)  - Rest of management per SNF     Acute on chronic macrocytic anemia (Stable): FOBT positive. No evidence of acute bleed on scopes. - Seen by GI during stay:     - Carafate, miralax, B12 daily     - EGD: normal esophagus, hiatal hernia and diffuse mild erythema with loss gastric folds c/w atrophic gastritis, normal duodenum/jejunum     - Colonoscopy: Diverticulosis, solitary rectal ulcer (not bleeding)  - Rest of management per SNF    Chronic Generalized weakness: Likely multifactorial 2/2 acute on chronic anemia / hypokalemia / hypercalcemia / UTI. Head imaging clear. - Continue to correct electrolytes, tx'd UTI, monitor Hgb  - Rest of management per SNF    New Rectal prolapse: Occurred x 1 during hospitalization with manual reduction. No acute complications. - Refer to Colorectal surgery for outpatient follow-up     Hypercalcemia (Improved): POA 12.8. C/f malignancy in s/o calvarial lucencies. PTH-related peptide low (2/26), Vit D wnl. PTH 8.0 (L)   - May consider Zometa per Heme/onc  - Stopped Alendronate   - Recheck calcium regularly     Abdominal pain (Improved): possibly 2/2 hypercalcemia vs. Medication side effect vs. Malignancy. CT Chest/abd/pelv w/ contrast: demonstrated L > R pleural effusions, mild free fluid, large L thyroid nodule and a large hiatal hernia. - Scopes as above     HTN: Chronic. Please maintain a more liberal BP goal of SBP < 160 and DBP < 90 due to patient with periods of orthostasis. - Continue home amlodipine 10 mg daily and metoprolol XL 25 mg daily  - losartan 100 mg daily   - Hydral 50 mg TID  - Hydral 10mg IV q6 PRN  - Consider adding spironolactone  - Rest of management per SNF     Hypokalemia: POA K 2.4  - Added Effer-K 20 mEq BID  - Replete PRN  - Recheck regularly  - Rest of management per SNF     Hypomagnesemia: Mg 1.7 on 5/4.   - replete PRN  - Recheck regularly  - Rest of management per SNF     Bradycardia: (Resolved) HR 50s POA. - Continue home metoprolol     DM2: Diet controlled. Last A1c <3.8 (L) on 4/28/2022 .  Not on any home medications.  - SSI ACHS w/ glucose checks (high sensitivity)  - Rest of management per SNF     HLD: Last lipid panel 12/6/21: Tchol 122, HDL 67, LDL 45.6, trig 47. No home medications.     Hypothyroidism: TSH 6.26 (5/1/2022). - Continue home Synthroid 88 mcg daily before breakfast     CAD: MI (during surgery in 1970s) had multiple caths and stress tests. Records of cardiology visit w/ Dr. Tae Fishman on 3/22/17. Exercise Cardiolite 7/23/15 - walked 4:39 (6.3 METS), normal stress EKG. Normal MPI. LVEF 71%. Echo 2/25/22 - LVEF 60-65%.     GERD: Chronic.  - Continue home Protonix 40 mg daily    UTI: (treated): S/p x3 days ceftriaxone. No symptoms. Culture showed sensitive E Coli.      At-risk DOMINIK (resolved):  Creatinine 1.19 (BL ~0.7). Enzo Damon     Physical exam at discharge:    Vitals Reviewed. Patient Vitals for the past 12 hrs:   Temp Pulse Resp BP SpO2   05/05/22 1240    (!) 183/80    05/05/22 1144 98.7 °F (37.1 °C) 64 18 (!) 187/79 97 %   05/05/22 1019  63  (!) 184/75    05/05/22 0948  76  (!) 199/79    05/05/22 0935  73  (!) 173/72    05/05/22 0814 98.2 °F (36.8 °C) 66 18 (!) 204/87 97 %   05/05/22 0700  71      05/05/22 0433 98.2 °F (36.8 °C) 66 18 (!) 168/79 97 %      General: No acute distress. Alert. Cooperative. [place]  Respiratory: Clear to auscultation anteriorly. Cardiovascular: Normal rate, regular rhythm, no murmurs  GI: Nondistended. + bowel sounds. No sig TTP throughout all quadrants. Extremities: No LE edema. Skin: Warm, dry. Condition at discharge: Stable.     Labs  Recent Labs     05/05/22  0144 05/04/22  1023 05/04/22  0159   WBC 6.9 7.0 7.2   HGB 7.5* 8.0* 7.4*   HCT 23.2* 24.7* 22.9*    363 319     Recent Labs     05/05/22  0144 05/04/22  0159 05/03/22  0330   * 131* 137   K 3.2* 3.2* 2.7*    103 106   CO2 24 23 25   BUN 9 9 10   CREA 0.85 0.88 0.99   GLU 85 92 81   CA 9.0 8.8 9.7   MG 1.7 1.7 1.4*     Recent Labs     05/05/22 0144 05/04/22  0159 05/03/22  0330   ALT 20 22 20   AP 44* 46 45   TBILI 0.3 0.3 0.7   TP 9.2* 9.0* 9.0*   ALB 1.9* 1.8* 1.8*   GLOB 7.3* 7.2* 7.2*     Recent Labs     05/05/22  1140 05/05/22  0614 05/04/22  2123 05/04/22  1615 05/04/22  0629   GLUCPOC 103 87 97 123* 97       Micro:  Lab Results   Component Value Date/Time    Culture result: ESCHERICHIA COLI (A) 04/28/2022 11:16 AM    Culture result: ESCHERICHIA COLI (A) 02/24/2022 06:17 PM       Imaging:  XR CHEST SNGL V    Result Date: 4/29/2022  EXAM: XR CHEST SNGL V INDICATION: generalized weakness COMPARISON: 2/24/2022 FINDINGS: A portable AP radiograph of the chest was obtained at 1210 hours. The patient is on a cardiac monitor. There is mild coarsening of interstitial markings. The cardiac and mediastinal contours and pulmonary vascularity are stable. The bones and soft tissues are grossly within normal limits. There is a stable linear scar at the left base. No acute cardiopulmonary process identified by portable radiography    XR SPINE THORAC 3 V    Result Date: 4/28/2022  INDICATION: pain, fall Frontal, swimmers and lateral views of the thoracic spine show no apparent fracture, subluxation or destructive lesion. There is multilevel degenerative disc disease. There is scoliosis. No acute finding. XR SPINE LUMB 2 OR 3 V    Result Date: 4/28/2022  INDICATION: pain radiating down both legs EXAM: Lumbar Spine: Frontal, lateral and coned lateral L5-S1 views show no fracture or subluxation of the lumbar spine. There is previous surgical posterior decompression with instrumented fusion L3-L5 without apparent hardware failure. There is multilevel degenerative disc disease, severe at T12-L1 and L1-L2. Soft tissues show vascular calcification. No acute finding. Lumbar degenerative disc disease. Posterior decompression and instrumented fusion without apparent complication.      MRI BRAIN W WO CONT    Result Date: 4/29/2022  EXAM: MRI BRAIN W WO CONT TECHNIQUE: Brain images including sagittal and axial T1-weighted, axial FLAIR, T2-weighted, diffusion weighted, gradient echo,  susceptibility weighted, precontrast. Multiplanar postcontrast T1-weighted images. IV CONTRAST:  10 cc Dotarem INDICATION:  Old Injury/Infarct seen on CT Head from 4/28/2022 COMPARISON:  CT head of 4/28/2022 FINDINGS: BRAIN PARENCHYMA:  No acute infarct. No masses or abnormal enhancement small focus of FLAIR hyperintensity in the right inferior parietal lobe corresponding to the hypodense area on CT, which likely represents an area of encephalomalacia. Mild associated volume loss is redemonstrated on MRI. Thurl Mutton INTRACRANIAL HEMORRHAGE: None. CSF SPACES:  Mild prominence of the ventricles and cortical sulci, consistent with cerebral volume loss. BASAL CISTERNS:  Patent. MIDLINE SHIFT: None. VASCULAR SYSTEM:  Normal flow voids. PARANASAL SINUSES AND MASTOID AIR CELLS:  No significant opacification. VISUALIZED ORBITS:  No significant abnormalities. VISUALIZED UPPER CERVICAL SPINE:  No significant abnormalities. SELLA:  No enlargement or  focal abnormality. SKULL BASE:  No significant abnormalities. Cerebellar tonsils in normal position. CALVARIUM:  Intact. 1. No acute findings. No intracranial mass lesion. 2. Probable small focus of insufflation the right inferior parietal lobe. Mild confluent FLAIR/T2 hyperintensity in the cerebral white matter, likely due to intracranial small vessel disease. CT HEAD WO CONT    Result Date: 4/28/2022  EXAM:  CT HEAD WO CONT INDICATION:  fall last night, dizziness. TECHNIQUE: Thin axial images were obtained through the calvarium and saved with standard and bone window algorithm. Coronal and sagittal reconstructions were generated. CT dose reduction was achieved through use of a standardized protocol tailored for this examination and automatic exposure control for dose modulation. COMPARISON: None available.  FINDINGS: Ventricular and sulcal prominence consistent with age-related volume loss within normal limits. Focal decreased attenuation right parietal lobe has imaging characteristics suggesting old injury/infarct, however this is incompletely evaluated and should be correlated clinically. Depending on clinical circumstance, consider MRI to exclude other etiologies. Patchy decreased attenuation in the white matter suggesting mild chronic small vessel ischemic change. Atherosclerotic calcification in vertebral arteries and carotid siphons. No evidence to suggest an acute brain infarct, hemorrhage or other abnormal extra-axial fluid collection. Visualized osseous structures of the skull base are unremarkable. There are numerous small lucencies in the calvarium most prominent lead demonstrated towards the vertex. Although this may represent normal variation, possibility of early sequela of multiple myeloma cannot be excluded. 1. Decreased attenuation right parietal lobe likely related to old injury and encephalomalacia, however nonspecific on CT. Consider nonemergent MRI for confirmation. 2. Otherwise, no acute intracranial abnormality demonstrated. 3. Numerous small foci of lucency in the calvarium is nonspecific. Possibility of multiple myeloma can be excluded clinically. CT CHEST ABD PELV W CONT    Result Date: 5/1/2022  INDICATION: crackles on lung exam, diffuse worsening ab pain. rule out malignancy EXAM:  CT CHEST, ABDOMEN, PELVIS WITH CONTRAST COMPARISON: None TECHNIQUE:  CT imaging of the chest, abdomen and pelvis was performed after the uneventful intravenous administration of contrast material.  Coronal and sagittal reconstructions were generated. CT dose reduction was achieved through use of a standardized protocol tailored for this examination and automatic exposure control for dose modulation. FINDINGS: THYROID: Large rim calcified nodule left lobe 4.3 cm. MEDIASTINUM/PEÑA: No mass or lymphadenopathy. Large hiatal hernia.  HEART/PERICARDIUM: Unremarkable. LUNGS/PLEURA: Small to moderate left and small right pleural effusions. Bibasilar atelectasis. No pulmonary edema, focal consolidation, or pneumothorax. BONES: Osteopenia and degenerative changes. No destructive bone lesion. Chronic nondisplaced right rib fractures. ADDITIONAL COMMENTS: Anasarca LIVER: Subcentimeter hypodensity medial left hepatic lobe is too small to characterize. GALLBLADDER: Unremarkable. SPLEEN: No enlargement or lesion. PANCREAS: No mass or ductal dilatation. ADRENALS: No mass. KIDNEYS: No mass, calculus, or hydronephrosis. GI TRACT: No bowel obstruction or wall thickening. Diverticulosis of the colon without diverticulitis. PERITONEUM: No free air or free fluid. APPENDIX: Not clearly visualized. RETROPERITONEUM: Atherosclerosis, without aneurysm. LYMPH NODES: None enlarged. ADDITIONAL COMMENTS: N/A. URINARY BLADDER: No mass or calculus. LYMPH NODES: None enlarged. REPRODUCTIVE ORGANS: Prior hysterectomy. FREE FLUID: Presacral edema. BONES: Postoperative changes lumbar spine. ADDITIONAL COMMENTS: Anasarca. 1. Bilateral pleural effusions left greater than right. Mild free fluid. Anasarca. 2. Large left thyroid nodule. 3. Large hiatal hernia. CT SPINE CERV WO CONT    Result Date: 4/28/2022  INDICATION: fall EXAM: Axial unenhanced CT of the cervical spine is performed with 2D coronal and sagittal reformatted images provided. CT dose reduction was achieved through use of a standardized protocol tailored for this examination and automatic exposure control for dose modulation. FINDINGS: There is no fracture or significant subluxation. There is multilevel degenerative disc disease, severe from C4 to C7. There is no prevertebral soft tissue swelling. Paraspinal soft tissues show chronic left goiter. No fracture. XR HIPS BI W OR WO AP PELV    Result Date: 4/28/2022  EXAM: XR HIPS BI W OR WO AP PELV INDICATION: bilateral hip pain, legs gave out. COMPARISON: None.  FINDINGS: 3 views bilateral hips. AP view of the pelvis and frogleg lateral views of both hips demonstrate no fracture, dislocation or other acute abnormality. There is mild bilateral hip osteoarthritis. There are nonspecific osseous small lytic foci. No acute abnormality      Procedures / Diagnostic Studies  · S/p Bone Marrow biopsy on 5/4    Chronic diagnoses   Problem List as of 5/5/2022 Date Reviewed: 3/2/2022          Codes Class Noted - Resolved    UTI (urinary tract infection) ICD-10-CM: N39.0  ICD-9-CM: 599.0  4/28/2022 - Present        Hypertensive emergency ICD-10-CM: I16.1  ICD-9-CM: 401.9  2/25/2022 - Present        * (Principal) Weakness generalized ICD-10-CM: R53.1  ICD-9-CM: 780.79  2/25/2022 - Present        Hypokalemia ICD-10-CM: E87.6  ICD-9-CM: 276.8  2/24/2022 - Present        Elevated troponin ICD-10-CM: R77.8  ICD-9-CM: 790.6  2/24/2022 - Present        Urinary tract infection with hematuria ICD-10-CM: N39.0, R31.9  ICD-9-CM: 599.0, 599.70  2/24/2022 - Present        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              Nodule of left lobe of thyroid gland ICD-10-CM: E04.1  ICD-9-CM: 241.0  9/8/2020 - Present        Anemia ICD-10-CM: D64.9  ICD-9-CM: 285.9  8/29/2018 - Present        Type 2 diabetes mellitus without complication, without long-term current use of insulin (HCC) ICD-10-CM: E11.9  ICD-9-CM: 250.00  4/24/2017 - Present        Advance care planning ICD-10-CM: Z71.89  ICD-9-CM: V65.49  1/12/2017 - Present        DDD (degenerative disc disease), lumbar ICD-10-CM: M51.36  ICD-9-CM: 722.52  9/6/2016 - Present        Gastric ulcer ICD-10-CM: K25.9  ICD-9-CM: 531.90  9/6/2016 - Present        GI bleeding ICD-10-CM: K92.2  ICD-9-CM: 578.9  Unknown - Present    Overview Signed 5/4/2016 11:43 AM by Dewey Chaudhry MD     workup in [facility] 12/15 was unremarkable (Dr. Anika Summers) - says she had EGD, colonoscopy, and small bowel capsule endoscopy             Coronary artery disease involving native coronary artery of native heart without angina pectoris ICD-10-CM: I25.10  ICD-9-CM: 414.01  5/4/2016 - Present        HTN (hypertension) ICD-10-CM: I10  ICD-9-CM: 401.9  Unknown - Present        Hypothyroidism, iatrogenic ICD-10-CM: E03.2  ICD-9-CM: 244. 3  Unknown - Present    Overview Signed 7/13/2015  3:16 PM by Sharyle Callow, MD     radioiodine             Hypercholesteremia ICD-10-CM: E78.00  ICD-9-CM: 272.0  Unknown - Present        RESOLVED: Non-recurrent inguinal hernia of right side without obstruction or gangrene ICD-10-CM: K40.90  ICD-9-CM: 550.90  9/8/2020 - 10/7/2020        RESOLVED: Diabetes ([address] ICD-10-CM: E11.9  ICD-9-CM: 250.00  Unknown - 4/24/2017              Diet: Regular, oatmeal with every meal. Provide Ensure Clear once daily to increase kcal/protein intake (240 kcal, 52 g carbs, 8 g protein)    Activity:  As tolerated     Disposition: SNF - [facility]    Discharge instructions to patient/family  Please seek medical attention for any new or worsening symptoms particularly fever, chest pain, shortness of breath, abdominal pain, nausea, vomiting    Follow up plans/appointments  Follow-up Information     Follow up With Specialties Details Why Contact Info    Corrinne Ling, MD Family Medicine Schedule an appointment as soon as possible for a visit As needed [address] [facility]  [phone]      Mae Enriquez MD Hematology and Oncology Go on 5/13/2022 appt at 1 pm for labs on first floor of the [facility] and then provider appt with Dr Estephanie Thibodeaux second floor same building [address]  [address] [facility]  [phone]      Joe Guerrero MD Colon and Rectal Surgery Schedule an appointment as soon as possible for a visit for rectal prolapse assessment and evaluation [address] [facility]  [address] [facility]             Nirmal Mayer MD  Family Medicine Resident     For Billing    Chief Complaint   Patient presents with   [facility] [place] Problems  Date Reviewed: 3/2/2022          Codes Class Noted POA    UTI (urinary tract infection) ICD-10-CM: N39.0  ICD-9-CM: 599.0  4/28/2022 Yes        * (Principal) Weakness generalized ICD-10-CM: R53.1  ICD-9-CM: 780.79  2/25/2022 Yes        Hypokalemia ICD-10-CM: E87.6  ICD-9-CM: 276.8  2/24/2022 Unknown        Elevated troponin ICD-10-CM: R77.8  ICD-9-CM: 790.6  2/24/2022 Yes        Urinary tract infection with hematuria ICD-10-CM: N39.0, R31.9  ICD-9-CM: 599.0, 599.70  2/24/2022 Yes        Anemia ICD-10-CM: D64.9  ICD-9-CM: 285.9  8/29/2018 Yes        Type 2 diabetes mellitus without complication, without long-term current use of insulin ([facility] ICD-10-CM: E11.9  ICD-9-CM: 250.00  4/24/2017 Yes        DDD (degenerative disc disease), lumbar ICD-10-CM: M51.36  ICD-9-CM: 722.52  9/6/2016 Yes        Coronary artery disease involving native coronary artery of native heart without angina pectoris ICD-10-CM: I25.10  ICD-9-CM: 414.01  5/4/2016 Yes        HTN (hypertension) ICD-10-CM: I10  ICD-9-CM: 401.9  Unknown Yes        Hypothyroidism, iatrogenic ICD-10-CM: E03.2  ICD-9-CM: 244. 3  Unknown Yes    Overview Signed 7/13/2015  3:16 PM by Heidi Kimble MD     radioiodine             Hypercholesteremia ICD-10-CM: E78.00  ICD-9-CM: 272.0  Unknown Yes

## 2022-05-04 NOTE — H&P
INTERVENTIONAL RADIOLOGY  Preoperative History and Physical      Patient:  Ramesh Fermin  :  1935  Age:  80 y.o. MRN:  [id number]  Today's Date:  2022      CC / HPI   Ramesh Fermin is a 80 y.o. female with unexplained anemia and hypercalcemia with concern for multiple myeloma. who presents for CT guided bone marrow biopsy. PAST MEDICAL HISTORY  Past Medical History:   Diagnosis Date    Diabetes ([facility]     GI bleeding     workup in [address] [place] [address] 12/15 was unremarkable (Dr. Beatrice Elizabeht) - says she had EGD, colonoscopy, and small bowel capsule endoscopy    HTN (hypertension)     Hypercholesteremia     Hypothyroidism, iatrogenic     radioiodine    MI (myocardial infarction) ([facility]     During surgery in  - she's had multiple caths and stress tests       PAST SURGICAL HISTORY  Past Surgical History:   Procedure Laterality Date    COLONOSCOPY N/A 2022    COLONOSCOPY performed by Renato Ambrosio MD at [address] [facility]  [address] HX HERNIA REPAIR      Open umbilical incisional herniorrhaphy [facility]).  HX HERNIA REPAIR Right 2020    Right inguinal herniorrhaphy with mesh.  HX ORTHOPAEDIC      Back surgery x 2.    HX BRIAN AND BSO         SOCIAL HISTORY  Social History     Socioeconomic History    Marital status:      Spouse name: Not on file    Number of children: Not on file    Years of education: Not on file    Highest education level: Not on file   Occupational History    Not on file   Tobacco Use    Smoking status: Never Smoker    Smokeless tobacco: Never Used   Substance and Sexual Activity    Alcohol use:  Yes     Alcohol/week: 0.0 standard drinks    Drug use: Not Currently    Sexual activity: Not Currently   Other Topics Concern    Not on file   Social History Narrative    Not on file     Social Determinants of Health     Financial Resource Strain:     Difficulty of Paying Living Expenses: Not on file   Food Insecurity:     Worried About Running Out of Food in the Last Year: Not on file    Ran Out of Food in the Last Year: Not on file   Transportation Needs:     Lack of Transportation (Medical): Not on file    Lack of Transportation (Non-Medical):  Not on file   Physical Activity:     Days of Exercise per Week: Not on file    Minutes of Exercise per Session: Not on file   Stress:     Feeling of Stress : Not on file   Social Connections:     Frequency of Communication with Friends and Family: Not on file    Frequency of Social Gatherings with Friends and Family: Not on file    Attends [employer] Services: Not on file    Active Member of [address] [place] [address] [employer] or Organizations: Not on file    Attends Club or Organization Meetings: Not on file    Marital Status: Not on file   Intimate Partner Violence:     Fear of Current or Ex-Partner: Not on file    Emotionally Abused: Not on file    Physically Abused: Not on file    Sexually Abused: Not on file   Housing Stability:     Unable to Pay for Housing in the Last Year: Not on file    Number of [place] in the Last Year: Not on file    Unstable Housing in the Last Year: Not on file       FAMILY HISTORY  Family History   Problem Relation Age of Onset    Heart Failure Mother     Heart Disease Mother     Hypertension Mother        CURRENT MEDICATIONS  Current Facility-Administered Medications   Medication Dose Route Frequency Provider Last Rate Last Admin    ondansetron (ZOFRAN ODT) tablet 4 mg  4 mg Oral Q8H PRN Jennifer Olivarez MD        midazolam (VERSED) injection 5 mg  5 mg IntraVENous RAD PRN Kerwin Hammond MD        fentaNYL citrate (PF) injection 100 mcg  100 mcg IntraVENous RAD PRN Kerwin Hammond MD        pantoprazole (PROTONIX) tablet 40 mg  40 mg Oral ACB Wyonia Shemar Mendozaa, DO   40 mg at 05/04/22 0630    insulin lispro (HUMALOG) injection   SubCUTAneous AC&HS Jennifer Olivarez MD        glucose chewable tablet 16 g  4 Tablet Oral PRN Jennifer Olivarez MD        glucagon (GLUCAGEN) injection 1 mg  1 mg IntraMUSCular PRN Claire Muir MD        dextrose 10% infusion 0-250 mL  0-250 mL IntraVENous PRN Claire Muir MD        cyanocobalamin (VITAMIN B12) tablet 1,000 mcg  1,000 mcg Oral DAILY Ann Rodriguez MD   1,000 mcg at 05/03/22 0902    polyethylene glycol (MIRALAX) packet 17 g  17 g Oral DAILY Ann Rodriguez MD   17 g at 05/03/22 0902    sucralfate (CARAFATE) tablet 1 g  1 g Oral AC&HS Ann Rodriguez MD   1 g at 05/04/22 0631    losartan (COZAAR) tablet 100 mg  100 mg Oral DAILY Fleet Passey, DO   100 mg at 05/04/22 7492    ondansetron (ZOFRAN) injection 4 mg  4 mg IntraVENous Q6H PRN Kate Passey, DO   4 mg at 05/03/22 5414    hydrALAZINE (APRESOLINE) tablet 50 mg  50 mg Oral TID Margo Hernandez MD   50 mg at 05/04/22 7261    hydrALAZINE (APRESOLINE) 20 mg/mL injection 10 mg  10 mg IntraVENous Q4H PRN Parviz Barnes MD   10 mg at 05/04/22 8002    metoprolol succinate (TOPROL-XL) XL tablet 25 mg  25 mg Oral DAILY Parviz Barnes MD   25 mg at 05/04/22 5893    benzonatate (TESSALON) capsule 100 mg  100 mg Oral TID PRN Justa Beltran MD   100 mg at 05/01/22 1550    cholecalciferol (VITAMIN D3) (1000 Units /25 mcg) tablet 2,000 Units  2,000 Units Oral DAILY Parviz Barnes MD   2,000 Units at 05/03/22 0901    0.9% sodium chloride infusion 250 mL  250 mL IntraVENous PRN Wendy Gates MD        alendronate (FOSAMAX) tablet 40 mg  40 mg Oral ACB Felicita Roy MD   40 mg at 05/04/22 0631    amLODIPine (NORVASC) tablet 10 mg  10 mg Oral DAILY Rosaline Mckee MD   10 mg at 05/04/22 6037    [Held by provider] ferrous sulfate tablet 325 mg  325 mg Oral ACB Rosaline Mckee MD   325 mg at 05/01/22 1991    levothyroxine (SYNTHROID) tablet 88 mcg  88 mcg Oral ACB Rosaline Mckee MD   88 mcg at 05/04/22 0630    polyethylene glycol (MIRALAX) packet 17 g  17 g Oral DAILY Rosaline Mckee MD   17 g at 04/30/22 0909    0.9% sodium chloride infusion  50 mL/hr IntraVENous CONTINUOUS Zackery MD Austin 50 mL/hr at 05/03/22 1140 50 mL/hr at 05/03/22 1140       ALLERGIES  Allergies   Allergen Reactions    Celebrex [Celecoxib] Hives    Crestor [Rosuvastatin] Myalgia       DIAGNOSTIC STUDIES   IMAGING STUDIES  N/A    LABS  Lab Results   Component Value Date/Time    WBC 7.0 05/04/2022 10:23 AM    HGB 8.0 (L) 05/04/2022 10:23 AM    HCT 24.7 (L) 05/04/2022 10:23 AM    PLATELET 604 56/34/8522 10:23 AM    .5 (H) 05/04/2022 10:23 AM     Lab Results   Component Value Date/Time    Sodium 131 (L) 05/04/2022 01:59 AM    Potassium 3.2 (L) 05/04/2022 01:59 AM    Chloride 103 05/04/2022 01:59 AM    CO2 23 05/04/2022 01:59 AM    Anion gap 5 05/04/2022 01:59 AM    Glucose 92 05/04/2022 01:59 AM    BUN 9 05/04/2022 01:59 AM    Creatinine 0.88 05/04/2022 01:59 AM    BUN/Creatinine ratio 10 (L) 05/04/2022 01:59 AM    GFR est AA >60 05/04/2022 01:59 AM    GFR est non-AA >60 05/04/2022 01:59 AM    Calcium 8.8 05/04/2022 01:59 AM     No results found for: INR, PTMR, PTP, PT1, PT2, INREXT    PHYSICAL EXAM   BP (!) 189/80 (BP 1 Location: Left upper arm, BP Patient Position: At rest;Supine)   Pulse 72   Temp 98.6 °F (37 °C)   Resp 20   Ht 5' (1.524 m)   Wt 57.7 kg (127 lb 3.3 oz)   SpO2 99%   BMI 24.84 kg/m²   General:  NAD  Heart:  RRR  Lungs:  NWOB  Neurological:  AAOX3    PLAN   Procedure to be performed:  CT guided bone marrow biopsy  Plan for sedation:  moderate  Post procedure plan:  observation per protocol  Informed consent:  risks, benefits, and alternatives reviewed with the patient / family who agree to proceed  Code status:  Full Code      Tim Pennington, [address] [facility], [facility].

## 2022-05-04 NOTE — PROGRESS NOTES
Problem: Pressure Injury - Risk of  Goal: *Prevention of pressure injury  Description: Document Massimo Scale and appropriate interventions in the flowsheet.   Outcome: Progressing Towards Goal  Note: Pressure Injury Interventions:  Sensory Interventions: Assess changes in LOC,Float heels,Keep linens dry and wrinkle-free,Minimize linen layers    Moisture Interventions: Absorbent underpads,Minimize layers    Activity Interventions: Increase time out of bed,PT/OT evaluation    Mobility Interventions: HOB 30 degrees or less,PT/OT evaluation    Nutrition Interventions: Document food/fluid/supplement intake    Friction and Shear Interventions: Lift sheet,Minimize layers                Problem: Patient Education: Go to Patient Education Activity  Goal: Patient/Family Education  Outcome: Progressing Towards Goal

## 2022-05-04 NOTE — PROGRESS NOTES
Problem: Pressure Injury - Risk of  Goal: *Prevention of pressure injury  Description: Document Massimo Scale and appropriate interventions in the flowsheet. Outcome: Progressing Towards Goal  Note: Pressure Injury Interventions:  Sensory Interventions: Assess changes in LOC,Avoid rigorous massage over bony prominences,Check visual cues for pain,Minimize linen layers,Maintain/enhance activity level    Moisture Interventions: Absorbent underpads    Activity Interventions: Increase time out of bed,Pressure redistribution bed/mattress(bed type),PT/OT evaluation    Mobility Interventions: Pressure redistribution bed/mattress (bed type),PT/OT evaluation    Nutrition Interventions: Document food/fluid/supplement intake,Offer support with meals,snacks and hydration    Friction and Shear Interventions: Lift sheet,Minimize layers                Problem: Patient Education: Go to Patient Education Activity  Goal: Patient/Family Education  Outcome: Progressing Towards Goal     Problem: Falls - Risk of  Goal: *Absence of Falls  Description: Document Morgan Fall Risk and appropriate interventions in the flowsheet.   Outcome: Progressing Towards Goal  Note: Fall Risk Interventions:  Mobility Interventions: Bed/chair exit alarm,Communicate number of staff needed for ambulation/transfer,Patient to call before getting OOB    Mentation Interventions: Adequate sleep, hydration, pain control    Medication Interventions: Bed/chair exit alarm,Patient to call before getting OOB,Teach patient to arise slowly    Elimination Interventions: Bed/chair exit alarm,Call light in reach,Patient to call for help with toileting needs    History of Falls Interventions: Bed/chair exit alarm,Investigate reason for fall,Utilize gait belt for transfer/ambulation         Problem: Patient Education: Go to Patient Education Activity  Goal: Patient/Family Education  Outcome: Progressing Towards Goal     Problem: Patient Education: Go to Patient Education Activity  Goal: Patient/Family Education  Outcome: Progressing Towards Goal     Problem: Nutrition Deficit  Goal: *Optimize nutritional status  Outcome: Progressing Towards Goal

## 2022-05-04 NOTE — PROGRESS NOTES
0800: [facility], spoke with Dr. Danetta Severe. Told her patient's BP is 187/87, , HR 76. Patient is NPO d/t bone marrow biopsy at 10 am. States to give scheduled PO losartan, metoprolol, hydralazine, and amlodipine as well as PRN IV Hydralazine. Hold the rest of scheduled morning medications. [id number] Sherrie radiology nurse. Sts it is fine for patient to take scheduled BP meds with small sips of water.

## 2022-05-04 NOTE — PROGRESS NOTES
[facility] [place] at [place] [address] [address], [address] [facility] [place]: [phone]  F: [phone] Patient ID  Name: Estrellita Bae  YOB: 1935  MRN: [id number]  Referring Provider:   No referring provider defined for this encounter. Primary Care Provider:   Lorena Candelario MD       HEMATOLOGY/MEDICAL ONCOLOGY  NOTE   Date of Visit: 05/04/22  Reason for Evaluation:     Chief Complaint   Patient presents with    Fall     Subjective:     History of Present Illness:     Estrellita Bae is a 80 y.o. F who presents as an inpatient consultation for anemia and question of myeloma. This is a subacute problem. Problem has occurred for months. Problem has worsened. Patient reports dealing with fatigue. She had a recent fall. She seems to have worsened over past weeks per daughters in room as patient is a limited historian. Patient reports adequate oral intake of food and hydration. Patient denies any bowel or bladder problems. Patient denies any uncontrolled pain. Patient denies any shortness of breath. Interval History:     [place]: Sitting up in chair at bedside. Tolerated procedure well. Two daughters Cleo Velazquez and Hussein Lopez) and granddaughterat bedside. Planning on discussing with pt after she eats lunch about thoughts regarding tx and living options moving forward.        Current Facility-Administered Medications   Medication Dose Route Frequency    ondansetron (ZOFRAN ODT) tablet 4 mg  4 mg Oral Q8H PRN    lidocaine (PF) (XYLOCAINE) 10 mg/mL (1 %) injection 10 mL  10 mL IntraDERMal RAD ONCE    pantoprazole (PROTONIX) tablet 40 mg  40 mg Oral ACB    insulin lispro (HUMALOG) injection   SubCUTAneous AC&HS    glucose chewable tablet 16 g  4 Tablet Oral PRN    glucagon (GLUCAGEN) injection 1 mg  1 mg IntraMUSCular PRN    dextrose 10% infusion 0-250 mL  0-250 mL IntraVENous PRN    cyanocobalamin (VITAMIN B12) tablet 1,000 mcg  1,000 mcg Oral DAILY    polyethylene glycol (MIRALAX) packet 17 g  17 g Oral DAILY    sucralfate (CARAFATE) tablet 1 g  1 g Oral AC&HS    losartan (COZAAR) tablet 100 mg  100 mg Oral DAILY    ondansetron (ZOFRAN) injection 4 mg  4 mg IntraVENous Q6H PRN    hydrALAZINE (APRESOLINE) tablet 50 mg  50 mg Oral TID    hydrALAZINE (APRESOLINE) 20 mg/mL injection 10 mg  10 mg IntraVENous Q4H PRN    metoprolol succinate (TOPROL-XL) XL tablet 25 mg  25 mg Oral DAILY    benzonatate (TESSALON) capsule 100 mg  100 mg Oral TID PRN    cholecalciferol (VITAMIN D3) (1000 Units /25 mcg) tablet 2,000 Units  2,000 Units Oral DAILY    0.9% sodium chloride infusion 250 mL  250 mL IntraVENous PRN    alendronate (FOSAMAX) tablet 40 mg  40 mg Oral ACB    amLODIPine (NORVASC) tablet 10 mg  10 mg Oral DAILY    [Held by provider] ferrous sulfate tablet 325 mg  325 mg Oral ACB    levothyroxine (SYNTHROID) tablet 88 mcg  88 mcg Oral ACB    0.9% sodium chloride infusion  50 mL/hr IntraVENous CONTINUOUS      Allergies   Allergen Reactions    Celebrex [Celecoxib] Hives    Crestor [Rosuvastatin] Myalgia      Objective:     Visit Vitals  BP (!) 157/76 (BP 1 Location: Left upper arm, BP Patient Position: Sitting)   Pulse 64   Temp 98.3 °F (36.8 °C)   Resp 20   Ht 5' (1.524 m)   Wt 127 lb 3.3 oz (57.7 kg)   SpO2 97%   BMI 24.84 kg/m²     ECOG PS: 3- Capable of only limited selfcare; confined to bed or chair more than 50% of waking hours. Physical Exam  General: elderly, frail, no acute distress  Eyes: anicteric sclerae  HENT: [place]; atraumatic  Neck: supple  Respiratory: normal respiratory effort  CV: no peripheral edema  GI: soft, nontender, nondistended, no masses, no hepatomegaly, no splenomegaly  Skin: no rashes;  no petechiae; scattered ecchymotic areas  Psych: alert.      Results:     I personally reviewed Epic EHR labs/results below:   Lab Results   Component Value Date/Time    WBC 7.0 05/04/2022 10:23 AM    HGB 8.0 (L) 05/04/2022 10:23 AM    HCT 24.7 (L) 05/04/2022 10:23 AM    PLATELET 591 75/98/5961 10:23 AM    .5 (H) 05/04/2022 10:23 AM    ABS. NEUTROPHILS 5.3 05/04/2022 10:23 AM     Lab Results   Component Value Date/Time    Sodium 131 (L) 05/04/2022 01:59 AM    Potassium 3.2 (L) 05/04/2022 01:59 AM    Chloride 103 05/04/2022 01:59 AM    CO2 23 05/04/2022 01:59 AM    Glucose 92 05/04/2022 01:59 AM    BUN 9 05/04/2022 01:59 AM    Creatinine 0.88 05/04/2022 01:59 AM    GFR est AA >60 05/04/2022 01:59 AM    GFR est non-AA >60 05/04/2022 01:59 AM    Calcium 8.8 05/04/2022 01:59 AM    Glucose (POC) 97 05/04/2022 06:29 AM     Lab Results   Component Value Date/Time    Bilirubin, total 0.3 05/04/2022 01:59 AM    ALT (SGPT) 22 05/04/2022 01:59 AM    Alk. phosphatase 46 05/04/2022 01:59 AM    Protein, total 9.0 (H) 05/04/2022 01:59 AM    Albumin 1.8 (L) 05/04/2022 01:59 AM    Globulin 7.2 (H) 05/04/2022 01:59 AM     ANEMIA LABS:  Hemoglobin:No components found for: HEME  Iron Studies:    Iron   Date Value Ref Range Status   04/05/2022 51 35 - 150 ug/dL Final     Vitamin B12:   Vitamin B12   Date Value Ref Range Status   04/30/2022 1,065 (H) 193 - 986 pg/mL Final     Folate: Folate   Date Value Ref Range Status   04/28/2022 29.2 (H) 5.0 - 21.0 ng/mL Final     SPEP:    M-Harry   Date Value Ref Range Status   04/30/2022 4.8 (H) Not Observed g/dL Final      Immunofixation Result   Date Value Ref Range Status   04/30/2022 Comment (A)   Final     Comment:     (NOTE)  Immunofixation shows IgG monoclonal protein with kappa light chain  specificity. Please note that samples from patients receiving DARZALEX(R)  (daratumumab) or SARCLISA(R)(isatuximab-irfc) treatment can appear  as an \"IgG kappa\" and mask a complete response (CR).  If this  patient is receiving these therapies, this DANIELLE assay interference  can be removed by ordering test number [id number]-\"Immunofixation,  Daratumumab-Specific, Serum\" or [id number]-\"Immunofixation,  Isatuximab-Specific, Serum\" and submitting a new sample for  testing or by calling the lab to add this test to the current  sample. Kappa/Lambda ratio, serum   Date Value Ref Range Status   2022 329.68 (H) 0.26 - 1.65   Final     Comment:     (NOTE)  Performed At[facility]  [address] [id number]  Ruiz Marin MD HV:[id number]        Free [place] Chains, serum   Date Value Ref Range Status   2022 1,351.7 (H) 3.3 - 19.4 mg/L Final    No components found for: KLFL2  Reticulocyte Count: No results found for: RET  Bilirubin:   Lab Results   Component Value Date/Time    Bilirubin, total 0.3 2022 01:59 AM    Bilirubin Negative 2022 11:15 AM     LDH:   LD   Date Value Ref Range Status   2022 242 81 - 246 U/L Final     Haptoglobin:  Haptoglobin   Date Value Ref Range Status   2022 128 30 - 200 mg/dL Final    No results found for: HGBPLT  ALEJANDRO:   ALEJANDRO Poly   Date Value Ref Range Status   2022 NEG  Final     Copper Level: No results found for: COSLT  Occult Blood Testing: No results found for: OCBLLT  Hemoglobin Fractionation:  No components found for: HGBEL1, HGBEL2, HGBEL3, HGBEL4, HGBEL5, HGBEL6, HGBEL7, HGBEL8    22 EGD/Colonoscopy/ Dr Ra Haywood  Findings:   Esophagus:normal  Stomach: hiatal hernia and diffuse mild erythema with loss gastric folds c/w atrophic gastritis  Duodenum/jejunum: normal     Colonic - Diverticulosis      - solitary rectal ulcer; not bleeding     Assessment and Recommendations:       1. Multiple Myeloma  New dx  M spike in > 4 consistent with active myeloma. Dr Moon Miguel reviewed with 2 daughters and granddaughter tx options. Pt down for BM bx. during discussion with family. Deciding living options for pt so they can discuss with pt. May consider starting tx with Velcade in the hospital; have reached out to pharmacy. - Bone marrow bx to eval marrow involvement and obtain prognostic studies      2.  Anemia  --GI followin/2  EGD/colonoscopy / Dr Bettina Patino Diverticulosis /solitary rectal ulcer; not bleeding; recommending sucralfate/ daily Miralax, Vit B12  -5/4  BM bx today; follow results      3. Hypercalcemia  Improved with alendronate; continue to monitor.   -Calcium 8.8 today;  corrects to 10.5 ; continue to monitor    4. Bone lesions  Calvarium and nonspecific osseous small lytic foci/ hips      5. Urinary tract infection without hematuria, site unspecified  S/p abx with ceftriaxone; Treatment per primary team.    6. Hypothyroidism  -on Synthroid daily; management per primary team    Plan reviewed with Dr Emily Paul.        Signed By:   Mary Raymond NP

## 2022-05-05 NOTE — PROGRESS NOTES
[facility] [address], [address]  Ronni Barkeron: [phone]  F: [phone] Patient ID  Name: Karlene Enriquez  YOB: 1935  MRN: [id number]  Referring Provider:   No referring provider defined for this encounter. Primary Care Provider:   Logan Morfin MD       HEMATOLOGY/MEDICAL ONCOLOGY  NOTE   Date of Visit: 05/05/22  Reason for Evaluation:     Chief Complaint   Patient presents with    Fall     Subjective:     History of Present Illness:     Karlene Enriquez is a 80 y.o. F who presents as an inpatient consultation for anemia and question of myeloma. This is a subacute problem. Problem has occurred for months. Problem has worsened. Patient reports dealing with fatigue. She had a recent fall. She seems to have worsened over past weeks per daughters in room as patient is a limited historian. Patient reports adequate oral intake of food and hydration. Patient denies any bowel or bladder problems. Patient denies any uncontrolled pain. Patient denies any shortness of breath. Interval History:     [place]: Sitting up in chair at bedside. Does not recall conversation from yesterday regarding dx of MM and decision about tx. Reviewed with pt. Agrees to plan to start with steroids. No family at bedside.        Current Facility-Administered Medications   Medication Dose Route Frequency    potassium bicarb-citric acid (EFFER-K) tablet 20 mEq  20 mEq Oral BID    dexAMETHasone (DECADRON) tablet 20 mg  20 mg Oral DAILY    ondansetron (ZOFRAN ODT) tablet 4 mg  4 mg Oral Q8H PRN    enoxaparin (LOVENOX) injection 40 mg  40 mg SubCUTAneous Q24H    pantoprazole (PROTONIX) tablet 40 mg  40 mg Oral ACB    insulin lispro (HUMALOG) injection   SubCUTAneous AC&HS    glucose chewable tablet 16 g  4 Tablet Oral PRN    glucagon (GLUCAGEN) injection 1 mg  1 mg IntraMUSCular PRN    dextrose 10% infusion 0-250 mL  0-250 mL IntraVENous PRN    cyanocobalamin (VITAMIN B12) tablet 1,000 mcg  1,000 mcg Oral DAILY    polyethylene glycol (MIRALAX) packet 17 g  17 g Oral DAILY    sucralfate (CARAFATE) tablet 1 g  1 g Oral AC&HS    losartan (COZAAR) tablet 100 mg  100 mg Oral DAILY    ondansetron (ZOFRAN) injection 4 mg  4 mg IntraVENous Q6H PRN    hydrALAZINE (APRESOLINE) tablet 50 mg  50 mg Oral TID    hydrALAZINE (APRESOLINE) 20 mg/mL injection 10 mg  10 mg IntraVENous Q4H PRN    metoprolol succinate (TOPROL-XL) XL tablet 25 mg  25 mg Oral DAILY    benzonatate (TESSALON) capsule 100 mg  100 mg Oral TID PRN    cholecalciferol (VITAMIN D3) (1000 Units /25 mcg) tablet 2,000 Units  2,000 Units Oral DAILY    0.9% sodium chloride infusion 250 mL  250 mL IntraVENous PRN    amLODIPine (NORVASC) tablet 10 mg  10 mg Oral DAILY    [Held by provider] ferrous sulfate tablet 325 mg  325 mg Oral ACB    levothyroxine (SYNTHROID) tablet 88 mcg  88 mcg Oral ACB      Allergies   Allergen Reactions    Celebrex [Celecoxib] Hives    Crestor [Rosuvastatin] Myalgia      Objective:     Visit Vitals  BP (!) 184/75   Pulse 63   Temp 98.2 °F (36.8 °C)   Resp 18   Ht 5' (1.524 m)   Wt 127 lb 3.3 oz (57.7 kg)   SpO2 97%   BMI 24.84 kg/m²     ECOG PS: 3- Capable of only limited selfcare; confined to bed or chair more than 50% of waking hours. Physical Exam  General: elderly, frail, no acute distress  Eyes: anicteric sclerae  HENT: [place] atraumatic  Neck: supple  Respiratory: normal respiratory effort  CV: no peripheral edema  GI: soft, nontender, nondistended, no masses, no hepatomegaly, no splenomegaly  Skin: no rashes;  no petechiae; scattered ecchymotic areas  Psych: alert and oriented    Results:     I personally reviewed Epic InSupply labs/results below:   Lab Results   Component Value Date/Time    WBC 6.9 05/05/2022 01:44 AM    HGB 7.5 (L) 05/05/2022 01:44 AM    HCT 23.2 (L) 05/05/2022 01:44 AM    PLATELET 067 77/60/5898 01:44 AM    .2 (H) 05/05/2022 01:44 AM    ABS. NEUTROPHILS 4.7 05/05/2022 01:44 AM     Lab Results   Component Value Date/Time    Sodium 135 (L) 05/05/2022 01:44 AM    Potassium 3.2 (L) 05/05/2022 01:44 AM    Chloride 106 05/05/2022 01:44 AM    CO2 24 05/05/2022 01:44 AM    Glucose 85 05/05/2022 01:44 AM    BUN 9 05/05/2022 01:44 AM    Creatinine 0.85 05/05/2022 01:44 AM    GFR est AA >60 05/05/2022 01:44 AM    GFR est non-AA >60 05/05/2022 01:44 AM    Calcium 9.0 05/05/2022 01:44 AM    Glucose (POC) 87 05/05/2022 06:14 AM     Lab Results   Component Value Date/Time    Bilirubin, total 0.3 05/05/2022 01:44 AM    ALT (SGPT) 20 05/05/2022 01:44 AM    Alk. phosphatase 44 (L) 05/05/2022 01:44 AM    Protein, total 9.2 (H) 05/05/2022 01:44 AM    Albumin 1.9 (L) 05/05/2022 01:44 AM    Globulin 7.3 (H) 05/05/2022 01:44 AM     ANEMIA LABS:  Hemoglobin:No components found for: HEME  Iron Studies:    Iron   Date Value Ref Range Status   04/05/2022 51 35 - 150 ug/dL Final     Vitamin B12:   Vitamin B12   Date Value Ref Range Status   04/30/2022 1,065 (H) 193 - 986 pg/mL Final     Folate: Folate   Date Value Ref Range Status   04/28/2022 29.2 (H) 5.0 - 21.0 ng/mL Final     SPEP:    M-Harry   Date Value Ref Range Status   04/30/2022 4.8 (H) Not Observed g/dL Final      Immunofixation Result   Date Value Ref Range Status   04/30/2022 Comment (A)   Final     Comment:     (NOTE)  Immunofixation shows IgG monoclonal protein with kappa light chain  specificity. Please note that samples from patients receiving DARZALEX(R)  (daratumumab) or SARCLISA(R)(isatuximab-irfc) treatment can appear  as an \"IgG kappa\" and mask a complete response (CR). If this  patient is receiving these therapies, this DANIELLE assay interference  can be removed by ordering test number [id number]-\"Immunofixation,  Daratumumab-Specific, Serum\" or [id number]-\"Immunofixation,  Isatuximab-Specific, Serum\" and submitting a new sample for  testing or by calling the lab to add this test to the current  sample. Kappa/Lambda ratio, serum   Date Value Ref Range Status   2022 329.68 (H) 0.26 - 1.65   Final     Comment:     (NOTE)  Performed At: [address] [id number]  Wanda Sutton MD MY:[id number]        Free Kappa Lt Chains, serum   Date Value Ref Range Status   2022 1,351.7 (H) 3.3 - 19.4 mg/L Final    No components found for: KLFL2  Reticulocyte Count: No results found for: RET  Bilirubin:   Lab Results   Component Value Date/Time    Bilirubin, total 0.3 2022 01:44 AM    Bilirubin Negative 2022 11:15 AM     LDH:   LD   Date Value Ref Range Status   2022 242 81 - 246 U/L Final     Haptoglobin:  Haptoglobin   Date Value Ref Range Status   2022 128 30 - 200 mg/dL Final    No results found for: HGBPLT  ALEJANDRO:   ALEJANDRO Poly   Date Value Ref Range Status   2022 NEG  Final     Copper Level: No results found for: COSLT  Occult Blood Testing: No results found for: OCBLLT  Hemoglobin Fractionation:  No components found for: HGBEL1, HGBEL2, HGBEL3, HGBEL4, HGBEL5, HGBEL6, HGBEL7, HGBEL8    22 EGD/Colonoscopy/ Dr Tomlinson Novel  Findings:   Esophagus:normal  Stomach: hiatal hernia and diffuse mild erythema with loss gastric folds c/w atrophic gastritis  Duodenum/jejunum: normal     Colonic - Diverticulosis      - solitary rectal ulcer; not bleeding     Assessment and Recommendations:       1. Multiple Myeloma  New dx  M spike in > 4 consistent with active myeloma. 22 BM bx : path pending  Dr Daniel Salvador reviewed with 2 daughters and granddaughter tx options yesterday. After lengthy discussion; plan is to proceed with steroids as a bridge to tx while pt is in the rehab facility.   -Dex 20 mg po x 4 days.; reviewed with FP and CM   -follow up appt with Dr Daniel Salvador established; reviewed with pt and placed in discharge summary.        2. Anemia  --GI followin/2  EGD/colonoscopy / Dr Radha Sosa rectal ulcer; not bleeding; recommending sucralfate/ daily Miralax, Vit B12  -5/4  BM bx ; follow results      3. Hypercalcemia  Improved with alendronate; continue to monitor.   -Calcium 9.0 today;  corrects to 10.6  ; continue to monitor    4. Bone lesions  Calvarium and nonspecific osseous small lytic foci/ hips    5. Urinary tract infection without hematuria, site unspecified  S/p abx with ceftriaxone; Treatment per primary team.    6. Hypothyroidism  -on Synthroid daily; management per primary team    7. Dispo  Plan for discharge to rehab today    Plan reviewed with Dr Magda Blevins.        Signed By:   Bennet Angelucci, NP

## 2022-05-05 NOTE — PROGRESS NOTES
Problem: Pressure Injury - Risk of  Goal: *Prevention of pressure injury  Description: Document Massimo Scale and appropriate interventions in the flowsheet. Outcome: Progressing Towards Goal  Note: Pressure Injury Interventions:  Sensory Interventions: Float heels,Keep linens dry and wrinkle-free,Minimize linen layers    Moisture Interventions: Minimize layers,Absorbent underpads    Activity Interventions: Increase time out of bed,PT/OT evaluation    Mobility Interventions: HOB 30 degrees or less,PT/OT evaluation    Nutrition Interventions: Document food/fluid/supplement intake    Friction and Shear Interventions: HOB 30 degrees or less,Lift sheet,Minimize layers                Problem: Patient Education: Go to Patient Education Activity  Goal: Patient/Family Education  Outcome: Progressing Towards Goal     Problem: Falls - Risk of  Goal: *Absence of Falls  Description: Document Morgan Fall Risk and appropriate interventions in the flowsheet.   Outcome: Progressing Towards Goal  Note: Fall Risk Interventions:  Mobility Interventions: Bed/chair exit alarm,OT consult for ADLs,Patient to call before getting OOB,PT Consult for mobility concerns    Mentation Interventions: Adequate sleep, hydration, pain control,Bed/chair exit alarm,More frequent rounding    Medication Interventions: Bed/chair exit alarm,Evaluate medications/consider consulting pharmacy,Patient to call before getting OOB,Teach patient to arise slowly,Utilize gait belt for transfers/ambulation    Elimination Interventions: Bed/chair exit alarm,Call light in reach    History of Falls Interventions: Bed/chair exit alarm,Evaluate medications/consider consulting pharmacy         Problem: Patient Education: Go to Patient Education Activity  Goal: Patient/Family Education  Outcome: Progressing Towards Goal

## 2022-05-05 NOTE — PROGRESS NOTES
Nutrition Note    RD received message from Oncology NP stating that patient dislikes Ensures. RD modified ONS from Ensure Enlive to Ensure Clear.     Provide Ensure Clear once daily to increase kcal/protein intake (240 kcal, 52 g carbs, 8 g protein)     Electronically signed by Dalton Jean-Baptiste RD on 6/7/6704  Contact: [phone] or via [employer]

## 2022-05-05 NOTE — CONSULTS
Palliative Medicine Consult  [place]: [phone] ([phone])    Patient Name: Pete Andrea  YOB: 1935    Date of Initial Consult: 5/5/22  Reason for Consult: goals of care  Requesting Provider: Akilah Quinteros NP   Primary Care Physician: Marta Hodges MD     SUMMARY:   Pete Andrea is a 80 y.o. with a past history of CAD, HTN, HLD, DM2, hypothyroidism, EDUARDO, GERD, who was admitted on 4/28/2022 from home with a diagnosis of generalized weakness, UTI.     HPI:  4/28: pt presented to ED with c/o GLF: states her legs gave out when walking in the bathroom, landed on carpet and did not hit her head, but has been having increased weakness since the fall and has been unable to bear weight due to pain throughout both her legs. She also reports abdominal pain x2 days due to constipation; she hd stopped taking Miralax, but restarted it and had a couple of BMs yesterday and now denies abdominal pain. Has been recently referred to hem/onc by PCP for evaluation of anemia. GI work up in 2015 by Dr. Tia Graham with small paraesophageal hernia on EGD, diverticulosis on colonoscopy, and superficial gastric erosion that was not bleeding on capsule study. Admission course:    4/28:  -Head CT revealed numerous small foci in calvarium, possible multiple myeloma, decreased attenuation right parietal lobe likely due to an old injury and encephalomalacia, however, nonspecific on CT.  4/28:  -XR spine lumbar w/ DDD and posterior decompression and fusion w/o complication. 5/2:-EGD: esophagus and duodenum/jejunum normal.  Stomach: hiatal hernia and diffuse mild erythema with loss gastric folds c/w atrophic gastritis. 5/2: -colonoscopy: diverticulosis, solitary rectal ulcer, not bleeding. 5/4:-bone marrow biopsy to r/o mutliple myeloma    Current medical issues leading to Palliative Medicine involvement include: advanced age, frailty, new diagnosis of multiple myeloma, goals of care.     Psychosocial:  Lives alone, independent, uses walker and shower chair. Daughter Jorge Yost checks on patient daily, [place] checks on pt a couple times a week. Also has life alert. PALLIATIVE DIAGNOSES:   1. Fall  2. Weakness   3. Bradycardia   4. Acute on chronic macrocytic anemia:  Hgb 7.0 (BL ~9). FOBT pos  5. UTI   6. Hypokalemia  K 2.4. Mg 1.6  7. Hypercalcemia 12.8  8. New multiple myeloma:   9. Dizziness  10. Intermittent nausea  11. Care decisions   PLAN:   1. Prior to visit, I completed an extensive review of patient's medical records, including medical documentation, vital signs, MARs, and results of various labs and other diagnostics. .   2. Met with pt, 2 dtrs Jorge Yost and [place]: obtained history, using POST form discussed in detail code status, levels of medical interventions and feeding tubes. (see ACP note dated today). 3. Goals are clear, plans to go to rehab, will move in with dtr if unable to live independently at home following rehab.   4. Initial consult note routed to primary continuity provider and/or primary health care team members  5. Communicated plan of care with: Palliative IDT, [facility] Team     GOALS OF CARE / TREATMENT PREFERENCES:     GOALS OF CARE:  Patient/Health Care Proxy Stated Goals: Prolong life    TREATMENT PREFERENCES:   Code Status: Full Code      Advance Care Planning:  [x] [facility] Interdisciplinary Team has updated the ACP Navigator with Health Care Decision Maker and Patient Capacity      Primary Decision Maker:  Savanna Mckee  [phone]    Primary Decision Maker: Nadege Walters Child - [phone]  Advance Care Planning 5/5/2022   Patient's Healthcare Decision Maker is: Legal Next of Kin   Confirm Advance Directive None   Patient Would Like to Complete Advance Directive No   Does the patient have other document types MOST/MOLST/POST/POLST       Medical Interventions: Limited additional interventions       Other:    As far as possible, the palliative care team has discussed with patient / health care proxy about goals of care / treatment preferences for patient. HISTORY:     History obtained from: chart, patient, daughters     The patient is:   [x] Verbal and participatory:  Pt reports feeling dizzy. Nausea is better. Clinical Pain Assessment (nonverbal scale for severity on nonverbal patients):   Clinical Pain Assessment  Severity: 0     Activity (Movement): Lying quietly, normal position    Duration: for how long has pt been experiencing pain (e.g., 2 days, 1 month, years)  Frequency: how often pain is an issue (e.g., several times per day, once every few days, constant)     FUNCTIONAL ASSESSMENT:     Palliative Performance Scale (PPS):  PPS: 30       PSYCHOSOCIAL/SPIRITUAL SCREENING:     Palliative IDT has assessed this patient for cultural preferences / practices and a referral made as appropriate to needs (Cultural Services, Patient Advocacy, Ethics, etc.)    Any spiritual / Lutheran concerns:  [] Yes /  [x] No   If \"Yes\" to discuss with pastoral care during IDT     Does caregiver feel burdened by caring for their loved one:   [] Yes /  [x] No /  [] No Caregiver Present    If \"Yes\" to discuss with social work during IDT    Anticipatory grief assessment:   [x] Normal  / [] Maladaptive     If \"Maladaptive\" to discuss with social work during IDT    ESAS Anxiety: Anxiety: 0    ESAS Depression: Depression: 0        REVIEW OF SYSTEMS:     Positive and pertinent negative findings in ROS are noted above in HPI. The following systems were [x] reviewed / [] unable to be reviewed as noted in HPI  Other findings are noted below. Systems: constitutional, ears/nose/mouth/throat, respiratory, gastrointestinal, genitourinary, musculoskeletal, integumentary, neurologic, psychiatric, endocrine. Positive findings noted below.   Modified ESAS Completed by: provider   Fatigue: 3 Drowsiness: 0   Depression: 0 Pain: 0   Anxiety: 0 Nausea: 3   Anorexia: 6 Dyspnea: 0 Constipation: No     Stool Occurrence(s): 0        PHYSICAL EXAM:     From RN flowsheet:  Wt Readings from Last 3 Encounters:   05/03/22 127 lb 3.3 oz (57.7 kg)   04/05/22 118 lb 3.2 oz (53.6 kg)   03/02/22 122 lb (55.3 kg)     Blood pressure (!) 183/80, pulse 64, temperature 98.7 °F (37.1 °C), resp. rate 18, height 5' (1.524 m), weight 127 lb 3.3 oz (57.7 kg), SpO2 97 %.     Pain Scale 1: Numeric (0 - 10)  Pain Intensity 1: 0                 Last bowel movement, if known:     Constitutional: elderly, frail, AAOx3, sitting in chair at bedside  Eyes: pupils equal, anicteric  ENMT: no nasal discharge, moist mucous membranes  Cardiovascular: regular rhythm, distal pulses intact  Respiratory: breathing not labored, symmetric  Gastrointestinal: soft non-tender, +bowel sounds  Musculoskeletal: no deformity, no tenderness to palpation  Skin: warm, dry  Neurologic: following commands, moving all extremities  Psychiatric: full affect, no hallucinations        HISTORY:     Principal Problem:    Weakness generalized (2/25/2022)    Active Problems:    HTN (hypertension) ()      Hypothyroidism, iatrogenic ()      Overview: radioiodine      Hypercholesteremia ()      Coronary artery disease involving native coronary artery of native heart without angina pectoris (5/4/2016)      DDD (degenerative disc disease), lumbar (9/6/2016)      Type 2 diabetes mellitus without complication, without long-term current use of insulin ([address] (4/24/2017)      Anemia (8/29/2018)      Hypokalemia (2/24/2022)      Elevated troponin (2/24/2022)      Urinary tract infection with hematuria (2/24/2022)      UTI (urinary tract infection) (4/28/2022)      Past Medical History:   Diagnosis Date    Diabetes ([address]     GI bleeding     workup in [facility] 12/15 was unremarkable (Dr. Merline Wilkinson) - says she had EGD, colonoscopy, and small bowel capsule endoscopy    HTN (hypertension)     Hypercholesteremia     Hypothyroidism, iatrogenic     radioiodine    MI (myocardial infarction) ([facility]     During surgery in 1970's - she's had multiple caths and stress tests      Past Surgical History:   Procedure Laterality Date    COLONOSCOPY N/A 5/2/2022    COLONOSCOPY performed by Leni Michelle MD at [address]  [address] HX HERNIA REPAIR  [id number]    Open umbilical incisional herniorrhaphy [facility]).  HX HERNIA REPAIR Right 09/23/2020    Right inguinal herniorrhaphy with mesh.  HX ORTHOPAEDIC      Back surgery x 2.    HX BRIAN AND BSO        Family History   Problem Relation Age of Onset    Heart Failure Mother     Heart Disease Mother     Hypertension Mother       History reviewed, no pertinent family history. Social History     Tobacco Use    Smoking status: Never Smoker    Smokeless tobacco: Never Used   Substance Use Topics    Alcohol use:  Yes     Alcohol/week: 0.0 standard drinks     Allergies   Allergen Reactions    Celebrex [Celecoxib] Hives    Crestor [Rosuvastatin] Myalgia      Current Facility-Administered Medications   Medication Dose Route Frequency    potassium bicarb-citric acid (EFFER-K) tablet 20 mEq  20 mEq Oral BID    dexAMETHasone (DECADRON) tablet 20 mg  20 mg Oral DAILY    ondansetron (ZOFRAN ODT) tablet 4 mg  4 mg Oral Q8H PRN    enoxaparin (LOVENOX) injection 40 mg  40 mg SubCUTAneous Q24H    pantoprazole (PROTONIX) tablet 40 mg  40 mg Oral ACB    insulin lispro (HUMALOG) injection   SubCUTAneous AC&HS    glucose chewable tablet 16 g  4 Tablet Oral PRN    glucagon (GLUCAGEN) injection 1 mg  1 mg IntraMUSCular PRN    dextrose 10% infusion 0-250 mL  0-250 mL IntraVENous PRN    cyanocobalamin (VITAMIN B12) tablet 1,000 mcg  1,000 mcg Oral DAILY    polyethylene glycol (MIRALAX) packet 17 g  17 g Oral DAILY    sucralfate (CARAFATE) tablet 1 g  1 g Oral AC&HS    losartan (COZAAR) tablet 100 mg  100 mg Oral DAILY    ondansetron (ZOFRAN) injection 4 mg  4 mg IntraVENous Q6H PRN    hydrALAZINE (APRESOLINE) tablet 50 mg  50 mg Oral TID    hydrALAZINE (APRESOLINE) 20 mg/mL injection 10 mg  10 mg IntraVENous Q4H PRN    metoprolol succinate (TOPROL-XL) XL tablet 25 mg  25 mg Oral DAILY    benzonatate (TESSALON) capsule 100 mg  100 mg Oral TID PRN    cholecalciferol (VITAMIN D3) (1000 Units /25 mcg) tablet 2,000 Units  2,000 Units Oral DAILY    0.9% sodium chloride infusion 250 mL  250 mL IntraVENous PRN    amLODIPine (NORVASC) tablet 10 mg  10 mg Oral DAILY    [Held by provider] ferrous sulfate tablet 325 mg  325 mg Oral ACB    levothyroxine (SYNTHROID) tablet 88 mcg  88 mcg Oral ACB          LAB AND IMAGING FINDINGS:     Lab Results   Component Value Date/Time    WBC 6.9 05/05/2022 01:44 AM    HGB 7.5 (L) 05/05/2022 01:44 AM    PLATELET 853 86/57/2853 01:44 AM     Lab Results   Component Value Date/Time    Sodium 135 (L) 05/05/2022 01:44 AM    Potassium 3.2 (L) 05/05/2022 01:44 AM    Chloride 106 05/05/2022 01:44 AM    CO2 24 05/05/2022 01:44 AM    BUN 9 05/05/2022 01:44 AM    Creatinine 0.85 05/05/2022 01:44 AM    Calcium 9.0 05/05/2022 01:44 AM    Magnesium 1.7 05/05/2022 01:44 AM    Phosphorus 2.6 04/29/2022 03:44 PM      Lab Results   Component Value Date/Time    Alk.  phosphatase 44 (L) 05/05/2022 01:44 AM    Protein, total 9.2 (H) 05/05/2022 01:44 AM    Albumin 1.9 (L) 05/05/2022 01:44 AM    Globulin 7.3 (H) 05/05/2022 01:44 AM     No results found for: INR, PTMR, PTP, PT1, PT2, APTT, INREXT, INREXT   Lab Results   Component Value Date/Time    Iron 51 04/05/2022 10:59 AM    TIBC 252 04/05/2022 10:59 AM    Iron % saturation 20 04/05/2022 10:59 AM    Ferritin 34 04/30/2022 10:47 AM      No results found for: PH, PCO2, PO2  No components found for: [place]   Lab Results   Component Value Date/Time     04/28/2022 11:16 AM                Total time: 75  Counseling / coordination time, spent as noted above: 45  > 50% counseling / coordination?: y    Prolonged service was provided for  []30 min   []75 min in face to face time in the presence of the patient, spent as noted above. Time Start:   Time End:   Note: this can only be billed with 59955 (initial) or 41110 (follow up). If multiple start / stop times, list each separately.

## 2022-05-05 NOTE — ACP (ADVANCE CARE PLANNING)
Advance Care Planning     Advance Care Planning (ACP) Physician/NP/PA Conversation      Date of Conversation: 4/28/2022  Conducted with: Patient with Decision Making Capacity and daughters Krishna Petty Decision Maker:     Primary Decision Maker: Scarlet Pablo - [phone]    Primary Decision Maker: Gilmar Patino - Child - [phone]  Click here to complete [place] including selection of the Healthcare Decision Maker Relationship (ie \"Primary\")          Care Preferences:    Hospitalization: \"If your health worsens and it becomes clear that your chance of recovery is unlikely, what would be your preference regarding hospitalization? \"  The patient would prefer hospitalization. Ventilation: \"If you were unable to breathe on your own and your chance of recovery was unlikely, what would be your preference about the use of a ventilator (breathing machine) if it was available to you? \"   The patient would NOT desire the use of a ventilator. Resuscitation: \"In the event your heart stopped as a result of an underlying serious health condition, would you want attempts to be made to restart your heart, or would you prefer a natural death? \"   No, do NOT attempt to resuscitate.     Additional topics discussed: treatment goals, benefit/burden of treatment options, artificial nutrition, ventilation preferences, hospitalization preferences, resuscitation preferences and end of life care preferences (vegetative state/imminent death)    Conversation Outcomes / Follow-Up Plan:   ACP complete - no further action today  Reviewed DNR/DNI and patient confirms current DNR status - completed forms on file (place new order if needed)     Length of Voluntary ACP Conversation in minutes:  25 minutes    Rose Adrian NP       Per our discussion, pt elects:  1) DNR/DNI  2) limited medical interventions: she wants treatable conditions treated, incl hospital admission, IVF, IV meds, 02 NC to bipap but does not want intubation or ICU level care. If her condition deteriorates then she wants comfort care. 3) OK with feeding tube for limited time as long as it is temporary.

## 2022-05-05 NOTE — PROGRESS NOTES
Problem: Pressure Injury - Risk of  Goal: *Prevention of pressure injury  Description: Document Massimo Scale and appropriate interventions in the flowsheet. Outcome: Progressing Towards Goal  Note: Pressure Injury Interventions:  Sensory Interventions: Float heels,Keep linens dry and wrinkle-free,Minimize linen layers    Moisture Interventions: Minimize layers,Absorbent underpads    Activity Interventions: Increase time out of bed,PT/OT evaluation    Mobility Interventions: HOB 30 degrees or less,PT/OT evaluation    Nutrition Interventions: Document food/fluid/supplement intake    Friction and Shear Interventions: HOB 30 degrees or less,Lift sheet,Minimize layers                Problem: Patient Education: Go to Patient Education Activity  Goal: Patient/Family Education  Outcome: Progressing Towards Goal     Problem: Falls - Risk of  Goal: *Absence of Falls  Description: Document Morgan Fall Risk and appropriate interventions in the flowsheet.   Outcome: Progressing Towards Goal  Note: Fall Risk Interventions:  Mobility Interventions: Bed/chair exit alarm,OT consult for ADLs,Patient to call before getting OOB,PT Consult for mobility concerns    Mentation Interventions: Adequate sleep, hydration, pain control,Bed/chair exit alarm,More frequent rounding    Medication Interventions: Bed/chair exit alarm,Evaluate medications/consider consulting pharmacy,Patient to call before getting OOB,Teach patient to arise slowly,Utilize gait belt for transfers/ambulation    Elimination Interventions: Bed/chair exit alarm,Call light in reach    History of Falls Interventions: Bed/chair exit alarm,Evaluate medications/consider consulting pharmacy

## 2022-05-05 NOTE — PROGRESS NOTES
Nurse handed patient a copy of discharge instructions which have been read and explained to patient and family present. New medications were read and explained to patient, patient verbalized understanding. Patient aware that prescriptions will be available at [facility]. Opportunity for questions and clarification offered. Removed patient's IV access with no complications. Vital signs stable. Patient sent with all belongings. Gave report to [place] at [facility].

## 2022-05-05 NOTE — ACP (ADVANCE CARE PLANNING)
Primary Decision Maker: [facility] - [phone]  Primary Decision Maker: Michaelle Herman Child - [phone]  Advance Care Planning 5/5/2022   Patient's Healthcare Decision Maker is: Legal Next of Kin   Confirm Advance Directive Not on file     Pt does not have AMD on file. In absence of verified Medical POA, dtrs Cookie Lucio and Michael Gaines are legal NOK/surrogate decision makers.

## 2022-05-05 NOTE — PROGRESS NOTES
[address] [place] [address]   Office [phone]  Fax [phone]            Subjective / Objective     24 Hour Events:   S/p bone marrow biopsy on 5/4. Overnight team called d/t concern for rectal prolapse after passing a BM with noted \"bright red blood\" smears noted with wiping. Examined by overnight team, no rectal prolapse, few external hemorrhoids appreciated, no reduction performed. Subjective: Pt was seen and examined at bedside, hard of hearing. She reports feeling nauseous this AM without any abdominal pain. No new complaints this AM.     Objective:    Respiratory: O2 Flow Rate (L/min): 3 l/min O2 Device: None (Room air)   Visit Vitals  BP (!) 199/79 (BP 1 Location: Right upper arm, BP Patient Position: Sitting)   Pulse 76   Temp 98.2 °F (36.8 °C)   Resp 18   Ht 5' (1.524 m)   Wt 127 lb 3.3 oz (57.7 kg)   SpO2 97%   BMI 24.84 kg/m²     Physical Exam:  General: No acute distress. Alert. Cooperative. [place]  Respiratory: No increased work of breathing. Clear to auscultation anteriorly. Cardiovascular: Regular rate, at times irregular, but otherwise mostly sinus rhythm. No murmurs. GI: Nondistended. + bowel sounds. No significant tenderness to palpation without guarding or rebound. Extremities: No LE edema. Skin: Warm, dry. I/O:  Date 05/04/22 0700 - 05/05/22 0659 05/05/22 0700 - 05/06/22 0659   Shift [id number] 1900-0659 24 Hour Total 2728-4775 5529-9480 24 Hour Total   INTAKE   P.O. 150  150 120  120     P. O. 150  150 120  120   I. V.(mL/kg/hr) 900(1.3) 0(0) 900(0.6) 200  200     Volume (0.9% sodium chloride infusion) 600  600        Volume (potassium chloride 10 mEq in 100 ml IVPB) 300  300        Volume (potassium chloride 10 mEq in 100 ml IVPB)  0 0 200  200   Shift Total(mL/kg) 1050(18.2) 0(0) 1050(18.2) 320(5.5)  320(5.5)   OUTPUT   Urine(mL/kg/hr)           Urine Occurrence(s) 1 x 9 x 10 x      Emesis/NG output           Emesis Occurrence(s)  0 x 0 x      Stool Stool Occurrence(s)  0 x 0 x      Shift Total(mL/kg)         NET 1050 0 1050 320  320   Weight (kg) 57.7 57.7 57.7 57.7 57.7 57.7       Inpatient Medications  Current Facility-Administered Medications   Medication Dose Route Frequency    potassium bicarb-citric acid (EFFER-K) tablet 20 mEq  20 mEq Oral BID    ondansetron (ZOFRAN ODT) tablet 4 mg  4 mg Oral Q8H PRN    enoxaparin (LOVENOX) injection 40 mg  40 mg SubCUTAneous Q24H    pantoprazole (PROTONIX) tablet 40 mg  40 mg Oral ACB    insulin lispro (HUMALOG) injection   SubCUTAneous AC&HS    glucose chewable tablet 16 g  4 Tablet Oral PRN    glucagon (GLUCAGEN) injection 1 mg  1 mg IntraMUSCular PRN    dextrose 10% infusion 0-250 mL  0-250 mL IntraVENous PRN    cyanocobalamin (VITAMIN B12) tablet 1,000 mcg  1,000 mcg Oral DAILY    polyethylene glycol (MIRALAX) packet 17 g  17 g Oral DAILY    sucralfate (CARAFATE) tablet 1 g  1 g Oral AC&HS    losartan (COZAAR) tablet 100 mg  100 mg Oral DAILY    ondansetron (ZOFRAN) injection 4 mg  4 mg IntraVENous Q6H PRN    hydrALAZINE (APRESOLINE) tablet 50 mg  50 mg Oral TID    hydrALAZINE (APRESOLINE) 20 mg/mL injection 10 mg  10 mg IntraVENous Q4H PRN    metoprolol succinate (TOPROL-XL) XL tablet 25 mg  25 mg Oral DAILY    benzonatate (TESSALON) capsule 100 mg  100 mg Oral TID PRN    cholecalciferol (VITAMIN D3) (1000 Units /25 mcg) tablet 2,000 Units  2,000 Units Oral DAILY    0.9% sodium chloride infusion 250 mL  250 mL IntraVENous PRN    amLODIPine (NORVASC) tablet 10 mg  10 mg Oral DAILY    [Held by provider] ferrous sulfate tablet 325 mg  325 mg Oral ACB    levothyroxine (SYNTHROID) tablet 88 mcg  88 mcg Oral ACB       Allergies  Allergies   Allergen Reactions    Celebrex [Celecoxib] Hives    Crestor [Rosuvastatin] Myalgia       CBC:  Recent Labs     05/05/22  0144 05/04/22  1023 05/04/22  0159   WBC 6.9 7.0 7.2   HGB 7.5* 8.0* 7.4*   HCT 23.2* 24.7* 22.9*    363 439       Metabolic Panel:  Recent Labs     05/05/22  0144 05/04/22  0159 05/03/22  0330   * 131* 137   K 3.2* 3.2* 2.7*    103 106   CO2 24 23 25   BUN 9 9 10   CREA 0.85 0.88 0.99   GLU 85 92 81   CA 9.0 8.8 9.7   MG 1.7 1.7 1.4*   ALB 1.9* 1.8* 1.8*   ALT 20 22 20            Assessment and Plan     Cinthia Paul is a 80 y.o. female with a PMHx of anemia, CAD, HTN, HLD, DM2, hypothyroidism, GERD who is admitted for weakness in the s/o acute on chronic anemia, UTI, hypokalemia, and hypercalcemia found to have multiple myeloma.      New Multiple Myeloma: SPEP/UPEP/Protein electrophoresis with M-spike and findings as below consistent with multiple myeloma. - Heme/Onc following; s/p BM biopsy on 5/4.     - Per Dr. Luis Lomeli: prefer pulse steroids, stop alendronate, and start Zometa. - Family still considering initiating treatment in hospital vs outpatient     - SPEP: 6766 IgG (H), 18 IgA (L), 9 IgM (L), M-spike 4.8 (H)     - UPEP: Elevated M-spike (62.7%) noted     - Protein Electrophoresis: Total prot 9.7 (H), albumin 3.2, gamma globulin 4.6 (H), M-spoke 4.4 (H), Globulin total 6.5 (H)  - Diet: regular    Acute on chronic macrocytic anemia (Stable): FOBT positive. No evidence of acute bleed. - (5/2) GI signed off:     - Carafate, miralax, B12 daily     - EGD: normal esophagus, hiatal hernia and diffuse mild erythema with loss gastric folds c/w atrophic gastritis, normal duodenum/jejunum     - Colonoscopy: Diverticulosis, solitary rectal ulcer (not bleeding)    Chronic Generalized weakness: Likely multifactorial 2/2 acute on chronic anemia / hypokalemia / hypercalcemia / UTI. Head imaging clear. - Continue to correct electrolytes, tx'd UTI, monitor Hgb  - SNF placement - [facility] accepted, family to transport at d/c    Hypercalcemia (Improved): POA 12.8. C/f malignancy in s/o calvarial lucencies. PTH-related peptide low (2/26), Vit D wnl.  PTH 8.0 (L)   - May consider Zometa per Heme/onc  - Stopped Alendronate   - daily calcium checks    Abdominal pain (Improved): possibly 2/2 hypercalcemia vs. Medication side effect vs. Malignancy. CT Chest/abd/pelv w/ contrast: demonstrated L > R pleural effusions, mild free fluid, large L thyroid nodule and a large hiatal hernia. - Scope as above    HTN: Chronic. At home on amlodipine 10 mg daily, losartan 50 mg daily, and metoprolol XL 25 mg daily. - Continue home amlodipine 10 mg daily and metoprolol XL 25 mg daily  - losartan 100mg daily   - Hydral 50 mg TID  - Hydral 10mg IV q6 PRN  - Consider adding spironolactone following d/c.      UTI: (treated): S/p x3 days ceftriaxone. No symptoms. Culture showed sensitive E Coli.      At-risk DOMINIK (resolved):  Creatinine 1.19 (BL ~0.7). - Encourage PO intake      Hypokalemia: POA K 2.4  - Added Effer-K 20 mEq BID  - Replete PRN    Hypomagnesemia: Mg 1.7 on 5/4.   - replete PRN    Bradycardia: (Resolved) HR 50s POA. - Continue home metoprolol     DM2: Last A1c 5.3 on 12/6/21. Not on any home medications.  - SSI ACHS w/ glucose checks (high sensitivity)     HLD: Last lipid panel 12/6/21: Tchol 122, HDL 67, LDL 45.6, trig 47. No home medications.     Hypothyroidism: TSH 6.26 (5/1/2022). - Continue home Synthroid 88 mcg daily before breakfast    CAD: MI (during surgery in 1970s) had multiple caths and stress tests. Records of cardiology visit w/ Dr. David Rothman on 3/22/17. Exercise Cardiolite 7/23/15 - walked 4:39 (6.3 METS), normal stress EKG. Normal MPI. LVEF 71%. Echo 2/25/22 - LVEF 60-65%.     GERD: Chronic.  - Continue home Protonix 40 mg daily     FEN/GI -regular  Activity - As tolerated w/ assistance. DVT prophylaxis - Lovenox  GI prophylaxis - Protonix  Fall prophylaxis - Fall precautions ordered. Disposition - Admitted to Remote Telemetry. Accepted to [facility]; d/c pending heme/onc work-up. Code Status - Full. Discussed with patient / caregivers.   Next of Kin Name and Contact - Calderon Locus (Child) [phone] (Mobile)     Conrad Beltre MD  Family Medicine Resident  Case and care plan discussed with Dr. Franca Camacho [facility] Attending)       For Billing    Chief Complaint   Patient presents with   [facility] [place] Problems  Date Reviewed: 3/2/2022          Codes Class Noted POA    UTI (urinary tract infection) ICD-10-CM: N39.0  ICD-9-CM: 599.0  4/28/2022 Yes        * (Principal) Weakness generalized ICD-10-CM: R53.1  ICD-9-CM: 780.79  2/25/2022 Yes        Hypokalemia ICD-10-CM: E87.6  ICD-9-CM: 276.8  2/24/2022 Unknown        Elevated troponin ICD-10-CM: R77.8  ICD-9-CM: 790.6  2/24/2022 Yes        Urinary tract infection with hematuria ICD-10-CM: N39.0, R31.9  ICD-9-CM: 599.0, 599.70  2/24/2022 Yes        Anemia ICD-10-CM: D64.9  ICD-9-CM: 285.9  8/29/2018 Yes        Type 2 diabetes mellitus without complication, without long-term current use of insulin ([facility] [address] ICD-10-CM: E11.9  ICD-9-CM: 250.00  4/24/2017 Yes        DDD (degenerative disc disease), lumbar ICD-10-CM: M51.36  ICD-9-CM: 722.52  9/6/2016 Yes        Coronary artery disease involving native coronary artery of native heart without angina pectoris ICD-10-CM: I25.10  ICD-9-CM: 414.01  5/4/2016 Yes        HTN (hypertension) ICD-10-CM: I10  ICD-9-CM: 401.9  Unknown Yes        Hypothyroidism, iatrogenic ICD-10-CM: E03.2  ICD-9-CM: 244. 3  Unknown Yes    Overview Signed 7/13/2015  3:16 PM by Janiya Hanks MD     radioiodine             Hypercholesteremia ICD-10-CM: E78.00  ICD-9-CM: 272.0  Unknown Yes

## 2022-05-05 NOTE — PROGRESS NOTES
5/5/2022 3:12 PM   Transition of Care Plan to SNF/Rehab    SNF/Rehab Transition:  Patient has been accepted to [facility] and meets criteria for admission. Patient will transported by pt's daughters who are at bedside and expected to leave at [facility].    Communication to Patient/Family:  Met with patient and pt's daughters and they are agreeable to the transition plan. Communication to SNF/Rehab:  Bedside RN, Lance Mehta, has been notified to update the transition plan to the facility and call report [phone]. Discharge information has been updated on the AVS.     Discharge instructions to be fax'd to facility via [employer]. Nursing Please include all hard scripts for controlled substances, med rec and dc summary, and AVS in packet. Reviewed and confirmed with facility, [facility], can manage the patient care needs for the following:     Ximena German with (X) only those applicable:    Medication:  [x]  Medications will be available at the facility  []  IV Antibiotics   []  Controlled Substance - hard copy to be sent with patient   []  Weekly Labs   Documents:  [] Hard RX  [x] MAR  [] Kardex  [x] AVS  []Transfer Summary  [x]Discharge   Equipment:  []  CPAP/BiPAP  []  Wound Vacuum  []  Pichardo or Urinary Device  []  PICC/Central Line  []  Nebulizer  []  Ventilator   Treatment:  []Isolation (for MRSA, VRE, etc.)  []Surgical Drain Management  []Tracheostomy Care  []Dressing Changes  []Dialysis with transportation and chair time. []PEG Care  []Oxygen  []Daily Weights for Heart Failure   Dietary:  []Any diet limitations  []Tube Feedings   []Total Parenteral Management (TPN)   Eligible for Medicaid Long Term Services and Supports  Yes:  [] Eligible for medical assistance or will become eligible within 180 days and UAI completed. [] Provider/Patient and/or support system has requested screening. [] UAI copy provided to patient or responsible party.   [] UAI unavailable at discharge will send once processed to SNF provider. [] UAI unavailable at discharged mailed to patient  No:   [x] Private pay and is not financially eligible for Medicaid within the next 180 days. [] Reside out-of-state. [] A residents of a state owned/operated facility that is licensed  by [address] [facility] or [facility]  [] Enrollment in [facility] hospice services  [] [facility]  [] Patient /Family declines to have screening completed or provide financial information for screening     Financial Resources:  Medicaid    [] Initiated and application pending   [] Full coverage     Advanced Care Plan:  []Surrogate Decision Maker of Care  []POA  []Communicated Code Status  (DDNR\", \"Full\")    Other     Care Management Interventions  PCP Verified by CM: Yes Samina Rodriguez MD)  Mode of Transport at Discharge: Other (see comment) (TBD)  Transition of Care Consult (CM Consult): SNF  [address] No  Reason Outside [place]: Patient already serviced by other home care/hospice agency  Partner SNF: No  Reason Why Partner SNF Not Chosen: Positive previous encounter  MyChart Signup: No  Discharge Durable Medical Equipment: No  Physical Therapy Consult: Yes  Occupational Therapy Consult: Yes  Speech Therapy Consult: No  Support Systems: Child(jaclyn)  Confirm Follow Up Transport: Family  The Patient and/or Patient Representative was Provided with a Choice of Provider and Agrees with the Discharge Plan?: (S) Yes  Freedom of Choice List was Provided with Basic Dialogue that Supports the Patient's Individualized Plan of Care/Goals, Treatment Preferences and Shares the Quality Data Associated with the Providers?: (S) Yes  Discharge Location  Patient Expects to be Discharged to[de-identified] Skilled nursing facility      5/5/2022 1:05 PM [facility] pt for admission today. Will need rapid COVID done, pt's RN is aware.  Spoke with Oncology MD, pt to have xrays and steroids prior to discharge today. CM notified pt's daughter pt has been accepted to [facility] today. CM will follow up.      5/5/2022 11:44 AM CM called to pt's daughter, Nydia Isidro who reported she is at [facility] now and would like pt's referral sent to [facility]. CM sent referral via [employer]. Blanka Canales, DEMARCO      5/5/2022  11:06 AM      RUR 21 HIGH  Is This a Readmission NO  Is this a Bundle NO        Transition of Care Plan  1. Monitor patient status and response to treatment. Bone marrow biopsy was yesterday. Awaiting final clarification from pt's family on plan going forward with Oncology    2. [facility] has accepted pt for SNF. Family requesting [facility], [facility] has accepted pt.   3. Family to transport at [place].   4. CM to monitor progress and recommendations.

## 2022-05-05 NOTE — PROGRESS NOTES
Problem: Mobility Impaired (Adult and Pediatric)  Goal: *Acute Goals and Plan of Care (Insert Text)  Description: FUNCTIONAL STATUS PRIOR TO ADMISSION: Patient was modified independent using a rollator for functional mobility. HOME SUPPORT PRIOR TO ADMISSION: The patient lived alone with daughters to provide assistance. Physical Therapy Goals  Initiated 4/29/2022  1. Patient will move from supine to sit and sit to supine  in bed with supervision/set-up within 7 day(s). 2.  Patient will transfer from bed to chair and chair to bed with supervision/set-up using the least restrictive device within 7 day(s). 3.  Patient will perform sit to stand with supervision/set-up within 7 day(s). 4.  Patient will ambulate with supervision/set-up for 150 feet with the least restrictive device within 7 day(s). Outcome: Progressing Towards Goal   PHYSICAL THERAPY TREATMENT  Patient: Silvia Sandy (52 y.o. female)  Date: 5/5/2022  Diagnosis: UTI (urinary tract infection) [N39.0]  Hypokalemia [E87.6]  Anemia [D64.9] Weakness generalized  Procedure(s) (LRB):  ESOPHAGOGASTRODUODENOSCOPY (EGD) (N/A)  COLONOSCOPY (N/A)  COLON BIOPSY (N/A) 3 Days Post-Op  Precautions: Fall,WBAT  Chart, physical therapy assessment, plan of care and goals were reviewed. ASSESSMENT  Patient continues with skilled PT services and is progressing towards goals. Patient this morning with fair tolerance and participation in physical therapy session secondary to reports of dizziness. Patient reports dizziness with activity and while at rest, lying. Noted elevated blood pressure. Patient requesting use of bathroom. She requires up to modAx1 for sit > stand transfer and short distance ambulation to / from the restroom with RW. Path deviations include clipping the doorframe with her walker wheels. Patient requests staying up in the chair post treatment.   Did not pursue increased functional mobility/ambulation in light of elevated blood pressure and patient symptoms of dizziness. Current Level of Function Impacting Discharge (mobility/balance): modAx1     Other factors to consider for discharge: spoke with CM; per chart family may be deciding between SNF and HHPT in order to pursue treatment options. Alerted CM to increased functional mobility needs as family not present at this PT session to speak with. Patient currently requiring modA and constant support for upright mobility. PLAN :  Patient continues to benefit from skilled intervention to address the above impairments. Continue treatment per established plan of care. to address goals. Recommendation for discharge: (in order for the patient to meet his/her long term goals)  Therapy up to 5 days/week in SNF setting    This discharge recommendation:  Has been made in collaboration with the attending provider and/or case management    IF patient discharges home will need the following DME: gait belt       SUBJECTIVE:   Patient stated I would like that.  re: the music channel    OBJECTIVE DATA SUMMARY:   Patient received supine in bed and was agreeable to participate in PT session. Patient was cleared by nursing to participate in PT session. Vitals:    05/05/22 0814 05/05/22 0935 05/05/22 0948   BP: (!) 204/87 (!) 173/72 (!) 199/79   BP 2:   186/79   BP 1 Location: Right upper arm Right upper arm Right upper arm   BP Patient Position: At rest Sitting Sitting  Comment: post going to the bathroom   Pulse: 66 73 76   Pulse 2:   67   Temp: 98.2 °F (36.8 °C)     Resp: 18     Height:      Weight:      SpO2: 97%           Critical Behavior:  Neurologic State: Alert  Orientation Level: Oriented X4  Cognition: Follows commands  Safety/Judgement: Awareness of environment  Functional Mobility Training:  Bed Mobility:     Supine to Sit: Minimum assistance;Assist x1     Scooting: Contact guard assistance;Assist x1        Transfers:  Sit to Stand:  Moderate assistance;Assist x1  Stand to Sit: Minimum assistance;Assist x1        Bed to Chair: Moderate assistance;Assist x1                    Balance:  Sitting: Intact; With support  Standing: Impaired; With support  Standing - Static: Fair;Constant support  Standing - Dynamic : Fair;Constant support  Ambulation/Gait Training:  Distance (ft): 15 Feet (ft) (+15)  Assistive Device: Gait belt;Walker, rolling  Ambulation - Level of Assistance: Moderate assistance;Assist x1        Gait Abnormalities: Shuffling gait              Speed/Candy: Pace decreased (<100 feet/min)  Step Length: Left shortened;Right shortened                Pain Rating:  Patient without reports of pain during therapy      Activity Tolerance:   Fair and requires rest breaks    After treatment patient left in no apparent distress:   Sitting in chair, Call bell within reach, and Bed / chair alarm activated    COMMUNICATION/COLLABORATION:   The patients plan of care was discussed with: Registered nurse and Case management.      Chapincito Cabrera PT, DPT   Time Calculation: 16 mins

## 2022-05-05 NOTE — PROGRESS NOTES
Problem: Pressure Injury - Risk of  Goal: *Prevention of pressure injury  Description: Document Massimo Scale and appropriate interventions in the flowsheet. Outcome: Progressing Towards Goal  Note: Pressure Injury Interventions:  Sensory Interventions: Assess changes in LOC,Avoid rigorous massage over bony prominences,Keep linens dry and wrinkle-free    Moisture Interventions: Absorbent underpads    Activity Interventions: Increase time out of bed,Pressure redistribution bed/mattress(bed type),PT/OT evaluation    Mobility Interventions: Pressure redistribution bed/mattress (bed type),Assess need for specialty bed,PT/OT evaluation    Nutrition Interventions: Document food/fluid/supplement intake,Offer support with meals,snacks and hydration    Friction and Shear Interventions: Minimize layers                Problem: Patient Education: Go to Patient Education Activity  Goal: Patient/Family Education  Outcome: Progressing Towards Goal     Problem: Falls - Risk of  Goal: *Absence of Falls  Description: Document Morgan Fall Risk and appropriate interventions in the flowsheet.   Outcome: Progressing Towards Goal  Note: Fall Risk Interventions:  Mobility Interventions: Bed/chair exit alarm,Communicate number of staff needed for ambulation/transfer,Patient to call before getting OOB    Mentation Interventions: Adequate sleep, hydration, pain control    Medication Interventions: Bed/chair exit alarm,Patient to call before getting OOB,Teach patient to arise slowly    Elimination Interventions: Bed/chair exit alarm,Call light in reach,Patient to call for help with toileting needs    History of Falls Interventions: Bed/chair exit alarm,Investigate reason for fall,Utilize gait belt for transfer/ambulation         Problem: Patient Education: Go to Patient Education Activity  Goal: Patient/Family Education  Outcome: Progressing Towards Goal     Problem: Patient Education: Go to Patient Education Activity  Goal: Patient/Family Education  Outcome: Progressing Towards Goal     Problem: Nutrition Deficit  Goal: *Optimize nutritional status  Outcome: Progressing Towards Goal

## 2022-05-05 NOTE — DISCHARGE INSTRUCTIONS
Patient Discharge Instructions    Kasie Caldwell / [id number] : 1935    Admitted 2022 Discharged: 2022 10:12 AM     Primary care provider: Beck Quach MD    Discharging provider:  Alejandra Kang MD  - Family Medicine Resident  Africa Al MD, MD - Family Medicine, Attending    . . . . . . . . . . . . . . . . . . . . . . . . . . . . . . . . . . . . . . . . . . . . . . . . . . . . . . . . . . . . . . . . . . . . . . . . HOSPITAL COURSE AND PROBLEMS:    Genesis Kenyon is a 80 y. o. female with a known history of chronic anemia, CAD, HTN, HLD, DM2, hypothyroidism, Ike Perez was admitted to the Family Medicine Service from  to *** for generalized weakness of multifactorial etiology further found to have lab findings concerning for new malignancy favoring multiple myeloma. Her initial work-up included an MRI of her brain that was negative for any acute process with likely old/chronic small vessel disease to the R inferior parietal lobe. She was found to have a UTI and treated with CTX x 3 days. GI performed EGD and colonoscopy on  which was notable for atrophic gastritis, diverticulosis, solitary rectal ulcer (not bleeding) respectively. The prep was complicated by rectal prolapse during prep requiring manual reduction without any issues. The cause of her generalized weakness was attributed to metabolic derangements and chronic anemia which in combination of renal disease and hypercalcemia was concerning for malignancy. Heme/onc was consulted, SPEP/UPEP/Protein electrophoresis were ordered. Findings were concerning for M-spike and markedly elevated IgG on SPEP. A bone marrow biopsy was performed on . Awaiting results. Hypercalcemia was primarily managed with IVF with Heme/onc advising possibly initiating Zometa, but her corrected Ca was 10.7 on day of discharge so this was not initiated.  Blood pressures remained moderately elevated throughout her stay -- added hydralazine during her course in addition to adjustments to home meds as below. Hypokalemia and hypomagnesemia were repleted multiple times throughout her stay. Seen by PT/OT with recommendation to be placed in a SNF. She was accepted to [facility]. Otherwise, she remained hemodynamically stable on day of discharge.      New Multiple Myeloma: SPEP/UPEP/Protein electrophoresis with M-spike and findings as below consistent with multiple myeloma. - Heme/Onc following; s/p BM biopsy on 5/4.     - Per Dr. Georgiana Yen: prefer pulse steroids, stop alendronate. - Initiated Decadron 20 mg PO daily (5/5 to 5/8 for total of 4 doses) for eventual bridge to therapy     - SPEP: 6766 IgG (H), 18 IgA (L), 9 IgM (L), M-spike 4.8 (H)     - UPEP: Elevated M-spike (62.7%) noted     - Protein Electrophoresis: Total prot 9.7 (H), albumin 3.2, gamma globulin 4.6 (H), M-spoke 4.4 (H), Globulin total 6.5 (H)  - Rest of management per SNF     Acute on chronic macrocytic anemia (Stable): FOBT positive. No evidence of acute bleed on scopes. - Seen by GI during stay:     - Carafate, miralax, B12 daily     - EGD: normal esophagus, hiatal hernia and diffuse mild erythema with loss gastric folds c/w atrophic gastritis, normal duodenum/jejunum     - Colonoscopy: Diverticulosis, solitary rectal ulcer (not bleeding)  - Rest of management per SNF    Chronic Generalized weakness: Likely multifactorial 2/2 acute on chronic anemia / hypokalemia / hypercalcemia / UTI. Head imaging clear. - Continue to correct electrolytes, tx'd UTI, monitor Hgb  - Rest of management per SNF    New Rectal prolapse: Occurred x 1 during hospitalization with manual reduction. No acute complications. - Refer to Colorectal surgery for outpatient follow-up     Hypercalcemia (Improved): POA 12.8. C/f malignancy in s/o calvarial lucencies. PTH-related peptide low (2/26), Vit D wnl.  PTH 8.0 (L)   - May consider Zometa per Heme/onc  - Stopped Alendronate   - Recheck calcium regularly     Abdominal pain (Improved): possibly 2/2 hypercalcemia vs. Medication side effect vs. Malignancy. CT Chest/abd/pelv w/ contrast: demonstrated L > R pleural effusions, mild free fluid, large L thyroid nodule and a large hiatal hernia. - Scopes as above     HTN: Chronic. Please maintain a more liberal BP goal of SBP < 160 and DBP < 90 due to patient with periods of orthostasis. - Continue home amlodipine 10 mg daily and metoprolol XL 25 mg daily  - losartan 100 mg daily   - Hydral 50 mg TID  - Hydral 10mg IV q6 PRN  - Consider adding spironolactone  - Rest of management per SNF     Hypokalemia: POA K 2.4  - Added Effer-K 20 mEq BID  - Replete PRN  - Recheck regularly  - Rest of management per SNF     Hypomagnesemia: Mg 1.7 on 5/4.   - replete PRN  - Recheck regularly  - Rest of management per SNF     Bradycardia: (Resolved) HR 50s POA. - Continue home metoprolol     DM2: Diet controlled. Last A1c <3.8 (L) on 4/28/2022 . Not on any home medications.  - SSI ACHS w/ glucose checks (high sensitivity)  - Rest of management per SNF     HLD: Last lipid panel 12/6/21: Tchol 122, HDL 67, LDL 45.6, trig 47. No home medications.     Hypothyroidism: TSH 6.26 (5/1/2022). - Continue home Synthroid 88 mcg daily before breakfast     CAD: MI (during surgery in 1970s) had multiple caths and stress tests. Records of cardiology visit w/ Dr. Khalif Kowalski on 3/22/17. Exercise Cardiolite 7/23/15 - walked 4:39 (6.3 METS), normal stress EKG. Normal MPI. LVEF 71%. Echo 2/25/22 - LVEF 60-65%.     GERD: Chronic.  - Continue home Protonix 40 mg daily    UTI: (treated): S/p x3 days ceftriaxone. No symptoms. Culture showed sensitive E Coli.      At-risk DOMINIK (resolved):  Creatinine 1.19 (BL ~0.7).    - Encourage PO intake     FOLLOW-UP CARE RECOMMENDATIONS:    APPOINTMENTS:  Follow-up Information     Follow up With Specialties Details Why Contact Info    Lucho Boles MD Family Medicine Schedule an appointment as soon as possible for a visit As needed [address] [facility]  [phone]      Willam Matos MD Hematology and Oncology Go on 5/13/2022 appt at 1 pm for labs on first floor of the [facility] and then provider appt with Dr Georgiana Duarte second floor same building [address]  [address] [facility]  [phone]      Lulu Everett MD Colon and Rectal Surgery Schedule an appointment as soon as possible for a visit for rectal prolapse assessment and evaluation [address] [facility]  [phone]              It is very important that you keep follow-up appointment(s). Bring discharge papers, medication list (and/or medication bottles) to follow-up appointments for review by outpatient provider(s). FOLLOW-UP TESTS RECOMMENDED:   · Repeat complete metabolic panel to assess calcium and potassium levels  · Check magnesium level  · Recheck TSH    ONGOING TREATMENT PLAN: No ongoing treatment    PENDING TEST RESULTS:  At the time of discharge the following test results are still pending: Bone marrow biopsy results. Please review these results as they become available. Specific symptoms to watch for: chest pain,  shortness of breath, fever, chills, nausea, vomiting, diarrhea, change in mentation, falling, weakness, bleeding.     DIET: Regular, oatmeal with every meal, Provide Ensure Clear once daily to increase kcal/protein intake (240 kcal, 52 g carbs, 8 g protein)    ACTIVITY:  Activity as tolerated w/ assist    WOUND CARE: None    EQUIPMENT needed:  Walker    INCIDENTAL FINDINGS:  Known L thyroid nodule (not significantly changed in dimensions, previously worked-up, found to be benign, family declining further eval)    GOALS OF CARE:       [] Eventual return to home/independent/assisted living       [x] Long term SNF       [] Hospice    Patient condition at discharge:   Functional status       [x] Poor/requiring assistance       [] Deconditioned       [] Requiring assistance  Cognition       [x] Lucid       [] Forgetful (Some senescence)       [] Dementia  Catheters/lines (plus indication)       [] Pichardo       [] PICC           [] PEG       [x] None  Code status       [x] Full code       [] DNR  . . . . . . . . . . . . . . . . . . . . . . . . . . . . . . . . . . . . . . . . . . . . . . . . . . . . . . . . . . . . . . . . . . . . . . . Clement Abram CHRONIC MEDICAL CONDITIONS:  Problem List as of 5/5/2022 Date Reviewed: 3/2/2022          Codes Class Noted - Resolved    UTI (urinary tract infection) ICD-10-CM: N39.0  ICD-9-CM: 599.0  4/28/2022 - Present        Hypertensive emergency ICD-10-CM: I16.1  ICD-9-CM: 401.9  2/25/2022 - Present        * (Principal) Weakness generalized ICD-10-CM: R53.1  ICD-9-CM: 780.79  2/25/2022 - Present        Hypokalemia ICD-10-CM: E87.6  ICD-9-CM: 276.8  2/24/2022 - Present        Elevated troponin ICD-10-CM: R77.8  ICD-9-CM: 790.6  2/24/2022 - Present        Urinary tract infection with hematuria ICD-10-CM: N39.0, R31.9  ICD-9-CM: 599.0, 599.70  2/24/2022 - Present        Type 2 diabetes with nephropathy ([facility] [address] ICD-10-CM: E11.21  ICD-9-CM: 250.40, 583.81  12/1/2020 - Present        S/P right inguinal herniorrhaphy ICD-10-CM: Z98.890, Z87.19  ICD-9-CM: V45.89  10/7/2020 - Present    Overview Signed 10/7/2020  1:00 PM by Marbella Antonio MD     With mesh.              Nodule of left lobe of thyroid gland ICD-10-CM: E04.1  ICD-9-CM: 241.0  9/8/2020 - Present        Anemia ICD-10-CM: D64.9  ICD-9-CM: 285.9  8/29/2018 - Present        Type 2 diabetes mellitus without complication, without long-term current use of insulin (HCC) ICD-10-CM: E11.9  ICD-9-CM: 250.00  4/24/2017 - Present        Advance care planning ICD-10-CM: Z71.89  ICD-9-CM: V65.49  1/12/2017 - Present        DDD (degenerative disc disease), lumbar ICD-10-CM: M51.36  ICD-9-CM: 722.52  9/6/2016 - Present        Gastric ulcer ICD-10-CM: K25.9  ICD-9-CM: 531.90  9/6/2016 - Present        GI bleeding ICD-10-CM: K92.2  ICD-9-CM: 578.9  Unknown - Present    Overview Signed 5/4/2016 11:43 AM by Mana Ambriz MD     workup in [facility] 12/15 was unremarkable (Dr. Abdulkadir Brown) - says she had EGD, colonoscopy, and small bowel capsule endoscopy             Coronary artery disease involving native coronary artery of native heart without angina pectoris ICD-10-CM: I25.10  ICD-9-CM: 414.01  5/4/2016 - Present        HTN (hypertension) ICD-10-CM: I10  ICD-9-CM: 401.9  Unknown - Present        Hypothyroidism, iatrogenic ICD-10-CM: E03.2  ICD-9-CM: 244. 3  Unknown - Present    Overview Signed 7/13/2015  3:16 PM by Cuba Jeffrey MD     radioiodine             Hypercholesteremia ICD-10-CM: E78.00  ICD-9-CM: 272.0  Unknown - Present        RESOLVED: Non-recurrent inguinal hernia of right side without obstruction or gangrene ICD-10-CM: K40.90  ICD-9-CM: 550.90  9/8/2020 - 10/7/2020        RESOLVED: Diabetes ([facility] [address] ICD-10-CM: E11.9  ICD-9-CM: 250.00  Unknown - 4/24/2017              Information obtained by :   I understand that if any problems occur once I am at home I am to contact my physician. I understand and acknowledge receipt of the instructions indicated above.                                                                                                                                              Physician's or R.N.'s Signature                                                                  Date/Time                                                                                                                                              Patient or Representative Signature                                                          Date/Time

## 2022-05-06 NOTE — PROGRESS NOTES
Cinthia Paul  80 y.o. female  1935  [place] [id number]  [id number]   [facility] at [facility] [address] History and Physical  Sandro Cramer MD       Encounter Date: 5/6/2022  Admission Date: 5/5/2022    PCP: Xavier Ayon MD  Responsible Party:   Advance Care Planning   Healthcare Decision Maker:       Primary Decision Maker: Grace Herrera - [phone]    Primary Decision Maker: Jeanettetrent Martinez - Child - [phone]      Chief Complaint   Patient presents with    Follow Up Chronic Condition    Multiple Myeloma     History of Present Illness   Cinthia Paul is a 80 y.o. female who was seen at [facility] today for admission/readmission. They are admitted for rehabilitation. Pt with PMHx of newly diagnosed multiple myeloma, chronic anemia, CAD, HTN, HLD, DM2, hypothyroidism, Arnold Simms was admitted to the [facility] from 4/28 to 5/5 for generalized weakness of multifactorial etiology further found to have multiple myeloma. Ms. Noelle Holland received IVF for her hypercalcemia and Alendronate, later dc'd and started on Decadron per Heme-Onco recommendations. She was also treatment for a UTI with CTX. Given her anemia, GI was consulted. Patient s/p EGD and colonoscopy on 5/2 which was notable for atrophic gastritis, diverticulosis, solitary rectal ulcer (without active bleeding). [facility] admitted to [facility]. Patient remained hypertensive during admission, otherwise she remained hemodynamically stable. Functional Status/Activity   Functional Status:  Moderate assistance    ADL Assessment (1-Independent, 2-Adaptive Assist, 3-Human Assist 4-Total Dependent)   1 2 3 4   Bath/Shower [] [] [] [x]   Shaving [x] [] [] []   Hairdressing [x] [] [] []   Dressing [] [] [x] []   Eating [x] [] [] []   Transfer bed to chair [] [] [x] []   Toilet use [x] [] [] []     Activity: Ambulate with assistance    Continence:   Bladder  [] Yes  [x] No    Bowel  [] Yes  [x] No       Diet: low sodium and diabetic diet. continue prescribed diet    Review of Systems   Review of Systems   Constitutional: Negative for chills and fever. Respiratory: Negative for cough and shortness of breath. Cardiovascular: Negative for chest pain. Gastrointestinal: Positive for nausea (some morning nausea). Negative for abdominal pain, constipation, diarrhea and vomiting. Genitourinary: Negative for dysuria. Musculoskeletal: Negative for back pain, joint pain, myalgias and neck pain. Neurological: Positive for dizziness (some dizziness with standing, lightheadeness in the morning with standing). Psychiatric/Behavioral: Negative for depression. The patient is not nervous/anxious and does not have insomnia. Vitals / Objective:     Vitals:    05/06/22 1931   BP: (!) 143/73   Pulse: 71   Resp: 20   Temp: 98.2 °F (36.8 °C)   Weight: 129 lb (58.5 kg)     Body mass index is 25.19 kg/m². Physical Exam  Constitutional:       General: She is not in acute distress. Appearance: She is not ill-appearing or toxic-appearing. HENT:      Head: Normocephalic. Cardiovascular:      Rate and Rhythm: Normal rate and regular rhythm. Pulses: Normal pulses. Heart sounds: Normal heart sounds. Pulmonary:      Effort: Pulmonary effort is normal.      Breath sounds: Normal breath sounds. Abdominal:      General: Bowel sounds are normal.      Tenderness: There is no abdominal tenderness. There is no guarding. Musculoskeletal:         General: No swelling, tenderness or deformity. Right lower leg: No edema. Left lower leg: No edema. Skin:     General: Skin is warm. Neurological:      General: No focal deficit present. Mental Status: She is alert. Cranial Nerves: No cranial nerve deficit. Sensory: No sensory deficit.        Mental Status Exam / Labs     Mental Status: alert, oriented to person, place, and time. Mini-Mental Status Exam:   Orientation  Oriented to Year: Yes  Oriented to Season: Yes  Oriented to Date: Yes  Oriented to Day: Yes  Oriented to Month: Yes  Oriented to State: Yes  Oriented to Country: Yes  Oriented to Town: Yes  Oriented to Hospital: Yes  Oriented to Floor: No  Registration  Repeats Ball: Yes  Repeats Flag: Yes  Repeats Tree: Yes  Attention and Calculation  100-7=93: Yes  93-7=86: No  86-7=79: No  79-7=72: Yes  72-7=65: Yes  Recall  Recalls Ball: No  Recalls Flag: No  Recalls Tree: Yes  Language  Point to Te[place]ina, Ask Pt to Name It: Yes  Point to Watch, Ask Pt to Name It: Yes  Repeats Phrase \"NO IFS, ANDS, OR BUTS\": Yes  3 Stage Command:  Take a Paper in Your Hand: Yes  3 Stage Command:  Fold it in Half: Yes  3 Stage Command: Put it on the Floor: Yes  Reads and Obeys \"CLOSE YOUR EYES\": Yes  Writes Sentence as Instructed: Yes  Copies Design as Instructed: Yes    Score: 25     Pertinent Lab/Test Results:  Lab Results   Component Value Date/Time    WBC 6.9 05/05/2022 01:44 AM    HGB 7.5 (L) 05/05/2022 01:44 AM    HCT 23.2 (L) 05/05/2022 01:44 AM    PLATELET 670 96/42/4200 01:44 AM    .2 (H) 05/05/2022 01:44 AM     Lab Results   Component Value Date/Time    Sodium 135 (L) 05/05/2022 01:44 AM    Potassium 3.2 (L) 05/05/2022 01:44 AM    Chloride 106 05/05/2022 01:44 AM    CO2 24 05/05/2022 01:44 AM    Anion gap 5 05/05/2022 01:44 AM    Glucose 85 05/05/2022 01:44 AM    BUN 9 05/05/2022 01:44 AM    Creatinine 0.85 05/05/2022 01:44 AM    BUN/Creatinine ratio 11 (L) 05/05/2022 01:44 AM    GFR est AA >60 05/05/2022 01:44 AM    GFR est non-AA >60 05/05/2022 01:44 AM    Calcium 9.0 05/05/2022 01:44 AM    Bilirubin, total 0.3 05/05/2022 01:44 AM    Alk.  phosphatase 44 (L) 05/05/2022 01:44 AM    Protein, total 9.2 (H) 05/05/2022 01:44 AM    Albumin 1.9 (L) 05/05/2022 01:44 AM    Globulin 7.3 (H) 05/05/2022 01:44 AM    A-G Ratio 0.3 (L) 05/05/2022 01:44 AM    ALT (SGPT) 20 05/05/2022 01:44 AM    AST (SGOT) 28 05/05/2022 01:44 AM     Bone marrow, posterior iliac crest, aspirate, clot section, and   decalcified core biopsy:        Plasma cell myeloma, 80% cellularity        M-Harry Not Observed g/dL 4.8      Lab Results   Component Value Date/Time     Iron 51 04/05/2022 10:59 AM     TIBC 252 04/05/2022 10:59 AM     Iron % saturation 20 04/05/2022 10:59 AM     Ferritin 34 04/30/2022 10:47 AM     Lab Results   Component Value Date/Time    Vitamin D 25-Hydroxy 44.6 04/28/2022 03:12 PM         Assessment and Plan:     New Multiple Myeloma: SPEP/UPEP/Protein electrophoresis with M-spike and findings as below consistent with multiple myeloma.   - Continue following up with Dr. Darron Vora. - Continue  Decadron 20 mg daily until 5/8. - Heme/Onc following; s/p BM biopsy on 5/4. - Lab work scheduled f0r 05/13     Acute on chronic macrocytic anemia (Stable): FOBT positive. s/p Endoscopy and colonoscopy without evidence of acute bleeding.  - Continue follow up with Hematology  - Continue iron supplements  - Continue B12 daily    Hyponatremia: unclear etiology, has remained stable in 128 and 129 since admission.  - Will decrease Losartan dose to 50  mg daily.  - Fluid restriction to 1L  - Dc'd low sodium diet and start regular diet. - Recheck BMP on 05/16.  - Discussed changes with nurse. - Notify MD if there are any mental status changes, which will require patient to visit ED.      Chronic Generalized weakness: Likely multifactorial 2/2 acute on chronic anemia and MM  - Check CMP  - Continue working with PT/OT     New Rectal prolapse: Occurred x 1 during hospitalization with manual reduction. No acute complications. - Colorectal surgery for outpatient follow-up     Hypercalcemia (Improved): POA 12.8. C/f malignancy in s/o calvarial lucencies. PTH-related peptide low (2/26), Vit D wnl.  PTH 8.0 (L)   - Will likely start Zometa per Heme/onc  - Check CMP  - Continue with Decadron 20 mg daily until completion on 05/08.      Abdominal pain (resolved):   - Continue with Protonix and Carafate  - Scopes as above     HTN: stable  - Continue home amlodipine 10 mg daily and metoprolol XL 25 mg daily  - losartan 50 mg daily   - Hydral 50 mg TID     Dizziness: occasionally with standing.  - Avoid hypotension, will decrease Losartan dose. - Continue working with PT/OT    Hypokalemia:   - Continue Effer-K 20 mEq BID  - Check CMP     DM2: Diet controlled. Last A1c <3.8 (L) on 4/28/2022 . Not on any home medications. - Continue diabetic diet    HLD: Last lipid panel 12/6/21: Tchol 122, HDL 67, LDL 45.6, trig 47. No home medications.     Hypothyroidism: TSH 6.26 (5/1/2022). - Continue home Synthroid 88 mcg daily before breakfast     CAD: MI (during surgery in 1970s) had multiple caths and stress tests. Records of cardiology visit w/ Dr. Jessica Montoya on 3/22/17. Exercise Cardiolite 7/23/15 - walked 4:39 (6.3 METS), normal stress EKG. Normal MPI. LVEF 71%. Echo 2/25/22 - LVEF 60-65%.     GERD: Chronic, no abdominal pain on exam  - Continue home Protonix 40 mg daily  - Continue Carafate 1 g QID    Constipation:  Continue with daily Miralax     Based on my assessment of the patient's mental status, they are:  Able to comprehend patient's rights and responsibilities: Peter Cohen to assume responsibility for own affairs: YES    Code status discussed with the patient/caregivers: FULL code   I have discussed the patients medical condition and expected course with the patient and/or the responsible party. Their questions were answered concerning future plans. They patients care was discussed with the attending: Dr. Dana Barraza    Electronically Signed: Ml Vang MD     History   Patients past medical, surgical and family histories were reviewed and updated.     Past Medical History:   Diagnosis Date    Diabetes ([address]     GI bleeding     workup in [address] [place] [address] 12/15 was unremarkable (Dr. Deandre Crane) - says she had EGD, colonoscopy, and small bowel capsule endoscopy    HTN (hypertension)     Hypercholesteremia     Hypothyroidism, iatrogenic     radioiodine    MI (myocardial infarction) ([facility] [address]     During surgery in 1970's - she's had multiple caths and stress tests     Past Surgical History:   Procedure Laterality Date    COLONOSCOPY N/A 5/2/2022    COLONOSCOPY performed by Sharmaine Cuello MD at [facility]  [phone]    Open umbilical incisional herniorrhaphy [facility]).  HX HERNIA REPAIR Right 09/23/2020    Right inguinal herniorrhaphy with mesh.  HX ORTHOPAEDIC      Back surgery x 2.    HX BRIAN AND BSO       Family History   Problem Relation Age of Onset    Heart Failure Mother     Heart Disease Mother     Hypertension Mother      Social History     Socioeconomic History    Marital status:      Spouse name: Not on file    Number of children: Not on file    Years of education: Not on file    Highest education level: Not on file   Occupational History    Not on file   Tobacco Use    Smoking status: Never Smoker    Smokeless tobacco: Never Used   Substance and Sexual Activity    Alcohol use: Yes     Alcohol/week: 0.0 standard drinks    Drug use: Not Currently    Sexual activity: Not Currently   Other Topics Concern    Not on file   Social History Narrative    Not on file     Social Determinants of Health     Financial Resource Strain:     Difficulty of Paying Living Expenses: Not on file   Food Insecurity:     Worried About Running Out of Food in the Last Year: Not on file    Angel Luis of Food in the Last Year: Not on file   Transportation Needs:     Lack of Transportation (Medical): Not on file    Lack of Transportation (Non-Medical):  Not on file   Physical Activity:     Days of Exercise per Week: Not on file    Minutes of Exercise per Session: Not on file   Stress:     Feeling of Stress : Not on file   Social Connections:     Frequency of Communication with Friends and Family: Not on file    Frequency of Social Gatherings with Friends and Family: Not on file    Attends Taoism Services: Not on file    Active Member of Clubs or Organizations: Not on file    Attends Club or Organization Meetings: Not on file    Marital Status: Not on file   Intimate Partner Violence:     Fear of Current or Ex-Partner: Not on file    Emotionally Abused: Not on file    Physically Abused: Not on file    Sexually Abused: Not on file   Housing Stability:     Unable to Pay for Housing in the Last Year: Not on file    Number of [place] in the Last Year: Not on file    Unstable Housing in the Last Year: Not on file            Current Medications/Allergies     Current Outpatient Medications   Medication Sig Dispense Refill    potassium bicarb-citric acid (EFFER-K) 20 mEq tablet Take 1 Tablet by mouth two (2) times a day. 60 Each 1    polyethylene glycol (MIRALAX) 17 gram packet Take 1 Packet by mouth daily. 30 Each 2    amLODIPine (NORVASC) 10 mg tablet Take 1 Tablet by mouth daily. 90 Tablet 1    cholecalciferol, vitamin D3, (Vitamin D3) 50 mcg (2,000 unit) tab Take 1 Tablet by mouth daily for 30 days. 30 Tablet 0    levothyroxine (SYNTHROID) 88 mcg tablet Take 1 Tablet by mouth Daily (before breakfast) for 30 days. 30 Tablet 0    losartan (COZAAR) 100 mg tablet Take 1 Tablet by mouth daily for 30 days. 30 Tablet 0    metoprolol succinate (TOPROL-XL) 25 mg XL tablet Take 1 Tablet by mouth daily for 30 days. 30 Tablet 0    pantoprazole (PROTONIX) 40 mg tablet Take 1 Tablet by mouth Daily (before breakfast) for 30 days. 30 Tablet 0    ferrous sulfate 325 mg (65 mg iron) tablet Take 1 Tablet by mouth Daily (before breakfast) for 30 days. 30 Tablet 0    cyanocobalamin 1,000 mcg tablet Take 1 Tablet by mouth daily for 30 days. 30 Tablet 0    benzonatate (TESSALON) 100 mg capsule Take 1 Capsule by mouth as needed for Cough for up to 14 doses.  14 Capsule 0    hydrALAZINE (APRESOLINE) 50 mg tablet Take 1 Tablet by mouth three (3) times daily for 30 days. 90 Tablet 0    sucralfate (CARAFATE) 1 gram tablet Take 1 Tablet by mouth Before breakfast, lunch, dinner and at bedtime for 30 days.  120 Tablet 0     Allergies   Allergen Reactions    Celebrex [Celecoxib] Hives    Crestor [Rosuvastatin] Myalgia

## 2022-05-06 NOTE — PROGRESS NOTES
Transition of care outreach postponed for 14 days due to patient's discharge to SNF.   D/C to [facility] 5/5/22 f/u 14d

## 2022-05-13 PROBLEM — C90.00 MULTIPLE MYELOMA NOT HAVING ACHIEVED REMISSION (HCC): Status: ACTIVE | Noted: 2022-01-01

## 2022-05-13 PROBLEM — E83.52 HYPERCALCEMIA: Status: ACTIVE | Noted: 2022-01-01

## 2022-05-13 PROBLEM — R53.81 DEBILITY: Status: ACTIVE | Noted: 2022-01-01

## 2022-05-13 NOTE — PROGRESS NOTES
[facility] Lab Visit:    1332  Pt arrived ambulatory and in no distress, labs drawn 2 sticks 1 in left hand 1 in right ac per KK  . Departed [facility] ambulatory and in no distress. Visit Vitals  BP (!) 162/72   Pulse 61   Resp 16   SpO2 97%       Labs available in CC once resulted.

## 2022-05-13 NOTE — LETTER
5/16/2022    Patient: Radha Olson   YOB: 1935   Date of Visit: 5/13/2022     Lise Oshea, [address] [facility] [address]  [address] [place] [facility] [address]    Dear Lise Oshea MD,      Thank you for referring Ms. Dalila Dixon to [email or web address] for evaluation. My notes for this consultation are attached. If you have questions, please do not hesitate to call me. I look forward to following your patient along with you.       Sincerely,    Mari Jimenez MD

## 2022-05-13 NOTE — PROGRESS NOTES
[facility] at [address], [address] [facility]  Holli Mock: [phone]  F: [phone] Patient ID  Name: Elif Gary  YOB: 1935  MRN: [id number]  Referring Provider:   Yudy Patino, [facility],  [address] [place] [address]  Primary Care Provider:   Yudy Patino MD       HEMATOLOGY/MEDICAL ONCOLOGY  NOTE   Date of Visit: 05/13/22  Reason for Evaluation:     Chief Complaint   Patient presents with    New Patient     New patient here for anemia. She denies pain. Hematology/Oncology Summary:  Please review original records for clinical decision making. This summary highlights focused aspects of patient's ongoing care and may have a recurring section in notes with either updates or remain unchanged as a longitudinal care summary. --------------------------------------------------------------------------------------------------------------------------------------------------------------------------------------------------------------------------  DIAGNOSIS:   Multiple Myeloma    ORIGINAL STAGING:   n/a    SITES OF DISEASE:   Bone Marrow    CURRENT TREATMENT:   To further discuss care. PRIOR TREATMENT:   Pulse Dex x 4 days. 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    ad3/5/6/2022 *Electronically Signed Out By Olga De La Rosa MD*   Bone marrow aspirate: The bone marrow aspirate is cellular and adequate   for evaluation. Numerous plasma cells are present, including a subset of   large forms with prominent nucleoli. The background myeloid and erythroid   series progress through the full maturation sequence with no morphologic   abnormalities. Megakaryocytes are present in adequate numbers and show no   morphologic abnormalities. Lymphocytes and blasts do not appear increased   in number. Touch prep:  Cellular and reflects the aspirate smears. Bone marrow core biopsy: The bone marrow biopsy is 70% cellular and   adequate for evaluation. Large sheets of plasma cells are present. The   myeloid and erythroid series are reduced in number but progress through   the full maturation sequence without significant dysplasia. Megakaryocytes   are present in adequate numbers with normal morphology. Lymphocytes and   blasts do not appear increased in number. Clot section:  Particulate and reflects the core biopsy. Special stains:   Iron, aspirate: No stainable iron   Iron, clot: No stainable iron   Reticulin, core: Patchy reticulin fibrosis within sheets of plasma cells   Congo red, core: Negative for amyloid     Immunohistochemistry, core:   CD3: Highlights scattered small T cells   CD20: Highlights scattered small B cells   : Highlights marked increase in plasma cells, 80% of cellularity     Flow cytometry:   Diagnosis: Monoclonal plasma cells (31.5% of total cells), consistent with   a plasma cell neoplasm. Comments: Flow cytometry findings are consistent with a plasma cell   neoplasm (e.g. plasma cell myeloma or plasmacytoma). No immunophenotypic   evidence of a lymphoproliferative disorder, acute leukemia, or increase in   blasts is identified.  Due to their fragility, plasma cells do not survive   flow cytometric processing well; thus, the flow cytometric enumeration may   be much less than that seen morphologically. So, to properly classify this   process, correlation with the findings on the clot and core sections,   along with clinical, radiological and SPEP/UPEP information is necessary. Cytogenetic and FISH studies may provide useful prognostic information. Cytogenetics:  Pending; result will be reported in an addendum. FISH:  Pending; result will be reported in an addendum. Molecular studies:  Not performed.      PERTINENT CARE EVENTS   n/a      Subjective:     History of Present Illness:     Elif Gary is a 80 y.o. F who presents for a follow-up evaluation for multiple myeloma. She reports that she feels \"flex so-so. \"  She has been in the facility for one week. She is still participating in physical therapy. She is in a wheelchair since she is tired from her treatment sessions. She has been ambulating with the walker. She reportedly is having some bone pain. Specifically it is in her trunk. Patient overall reports feeling stable. -  Current Outpatient Medications   Medication Sig    losartan (COZAAR) 50 mg tablet Take 1 Tablet by mouth daily for 30 days.  glucagon (Glucagon Emergency Kit, human,) 1 mg solr by Injection route. 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 Inject as per sliding scale: if 201 - 250 = 2 units; 251 - 300 = 4 units; 301 - 350 = 6 units Greater than 350, CALL MD, subcutaneously before meals and at bedtime related to TYPE 2 DIABETES MELLITUS WITHOUT COMPLICATIONS    potassium bicarb-citric acid (EFFER-K) 20 mEq tablet Take 1 Tablet by mouth two (2) times a day.  polyethylene glycol (MIRALAX) 17 gram packet Take 1 Packet by mouth daily.  amLODIPine (NORVASC) 10 mg tablet Take 1 Tablet by mouth daily.     cholecalciferol, vitamin D3, (Vitamin D3) 50 mcg (2,000 unit) tab Take 1 Tablet by mouth daily for 30 days.  levothyroxine (SYNTHROID) 88 mcg tablet Take 1 Tablet by mouth Daily (before breakfast) for 30 days.  metoprolol succinate (TOPROL-XL) 25 mg XL tablet Take 1 Tablet by mouth daily for 30 days.  pantoprazole (PROTONIX) 40 mg tablet Take 1 Tablet by mouth Daily (before breakfast) for 30 days.  ferrous sulfate 325 mg (65 mg iron) tablet Take 1 Tablet by mouth Daily (before breakfast) for 30 days.  cyanocobalamin 1,000 mcg tablet Take 1 Tablet by mouth daily for 30 days.  benzonatate (TESSALON) 100 mg capsule Take 1 Capsule by mouth as needed for Cough for up to 14 doses.  hydrALAZINE (APRESOLINE) 50 mg tablet Take 1 Tablet by mouth three (3) times daily for 30 days.  sucralfate (CARAFATE) 1 gram tablet Take 1 Tablet by mouth Before breakfast, lunch, dinner and at bedtime for 30 days. No current facility-administered medications for this visit. Allergies   Allergen Reactions    Celebrex [Celecoxib] Hives    Crestor [Rosuvastatin] Myalgia      Review of Systems Provided by:  Patient  Review of Systems: A complete review of systems was obtained, reviewed. Pertinent findings reviewed above. Objective:     Visit Vitals  BP (!) 172/68   Pulse (!) 57   Temp 98.6 °F (37 °C)   Ht 5' (1.524 m)   Wt 113 lb 9.6 oz (51.5 kg)   SpO2 98%   BMI 22.19 kg/m²     ECOG PS: 3- Capable of only limited selfcare; confined to bed or chair more than 50% of waking hours. Physical Exam  Constitutional: No acute distress. , Non-toxic appearance. and Chronic ill appearance. HENT: Normocephalic and atraumatic head. and Wearing a mask during COVID-19 precautions. Eyes: Normal Conjunctivae. Anicteric sclerae. Cardiovascular: S1,S2 auscultated. No pitting edema. Pulmonary: Normal Respiratory Effort. No wheezing. No rhonchi. No rales. Abdominal: Normal bowel sounds. Soft Abdomen to palpation. No abdominal tenderness. Skin: No jaundice.  No rash.   Musculoskeletal: No muscle pain on palpation. No temporal muscle wasting on inspection. }  Neurological: Alert and oriented. No tremor on inspection. Psychiatric: mood normal. normal speech rate normal affect      Results:     I personally reviewed Epic EHR labs/results below:   Lab Results   Component Value Date/Time    WBC 5.1 05/13/2022 12:46 PM    HGB 8.2 (L) 05/13/2022 12:46 PM    HCT 25.8 (L) 05/13/2022 12:46 PM    PLATELET 007 95/06/5522 12:46 PM    .0 (H) 05/13/2022 12:46 PM    ABS. NEUTROPHILS PENDING 05/13/2022 12:46 PM     Lab Results   Component Value Date/Time    Sodium 134 (L) 05/13/2022 12:46 PM    Potassium 3.2 (L) 05/13/2022 12:46 PM    Chloride 100 05/13/2022 12:46 PM    CO2 28 05/13/2022 12:46 PM    Glucose 96 05/13/2022 12:46 PM    BUN 17 05/13/2022 12:46 PM    Creatinine 0.90 05/13/2022 12:46 PM    GFR est AA >60 05/13/2022 12:46 PM    GFR est non-AA 59 (L) 05/13/2022 12:46 PM    Calcium 8.5 05/13/2022 12:46 PM    Glucose (POC) 163 (H) 05/05/2022 04:24 PM     Lab Results   Component Value Date/Time    Bilirubin, total 0.4 05/13/2022 12:46 PM    ALT (SGPT) 19 05/13/2022 12:46 PM    Alk. phosphatase 52 05/13/2022 12:46 PM    Protein, total 10.4 (H) 05/13/2022 12:46 PM    Albumin 2.2 (L) 05/13/2022 12:46 PM    Globulin 8.2 (H) 05/13/2022 12:46 PM     ANEMIA LABS:  Hemoglobin:  Iron Studies:    Iron   Date Value Ref Range Status   04/05/2022 51 35 - 150 ug/dL Final     Vitamin B12:   Vitamin B12   Date Value Ref Range Status   04/30/2022 1,065 (H) 193 - 986 pg/mL Final     Folate: Folate   Date Value Ref Range Status   04/28/2022 29.2 (H) 5.0 - 21.0 ng/mL Final     SPEP:    M-Harry   Date Value Ref Range Status   04/30/2022 4.8 (H) Not Observed g/dL Final      Immunofixation Result   Date Value Ref Range Status   04/30/2022 Comment (A)   Final     Comment:     (NOTE)  Immunofixation shows IgG monoclonal protein with kappa light chain  specificity.   Please note that samples from patients receiving DARZALEX(R)  (daratumumab) or SARCLISA(R)(isatuximab-irfc) treatment can appear  as an \"IgG kappa\" and mask a complete response (CR). If this  patient is receiving these therapies, this DANIELLE assay interference  can be removed by ordering test number [id number]-\"Immunofixation,  Daratumumab-Specific, Serum\" or [id number]-\"Immunofixation,  Isatuximab-Specific, Serum\" and submitting a new sample for  testing or by calling the lab to add this test to the current  sample. Kappa/Lambda ratio, serum   Date Value Ref Range Status   04/30/2022 329.68 (H) 0.26 - 1.65   Final     Comment:     (NOTE)  Performed At: [facility] & [address] [id number]  Tamar Soto MD [id number]        Free Kappa Lt Chains, serum   Date Value Ref Range Status   04/30/2022 1,351.7 (H) 3.3 - 19.4 mg/L Final    No components found for: KLFL2  Reticulocyte Count: No results found for: RET  Bilirubin:   Lab Results   Component Value Date/Time    Bilirubin, total 0.3 05/05/2022 01:44 AM    Bilirubin Negative 04/28/2022 11:15 AM     LDH:   LD   Date Value Ref Range Status   04/30/2022 242 81 - 246 U/L Final        Assessment and Recommendations:     1. Multiple myeloma not having achieved remission [facility])  -Patient needs her disease treated. She still cannot start any infusional or injectable therapy while she is at the rehab facility. The hope is that she will only need about 2 more weeks. She will return to see us in about 2 weeks to further evaluate her progress. 2. Hypercalcemia  -Check patient's calcium level today and it is normal at 8.5 uncorrected. She will need to be considered for Zometa once discharged from the rehab facility. 3. Debility  -Patient reports that she feels like she is making progress. Her strength is mostly intact. Continue rehab.    4. Bone pain  -Could be related to multiple myeloma.   Would not repeat a course of pulsed Dex steroids since patient and daughter report the patient had some swelling after use and the benefits of taking were somewhat unclear without any significant improvement in symptoms. 5. Macrocytic anemia  -Patient's hemoglobin is slightly improved. No need for red blood cell transfusion. Continue to follow.         Please keep follow-up on 5/23    Sobeida Erwin MD  Hematology/Medical Oncology Provider  [facility]  P: [phone]    Signed By:   Pam Duncan MD

## 2022-05-13 NOTE — PATIENT INSTRUCTIONS
Lab Results   Component Value Date/Time    WBC 5.1 05/13/2022 12:46 PM    HGB 8.2 (L) 05/13/2022 12:46 PM    HCT 25.8 (L) 05/13/2022 12:46 PM    PLATELET 699 18/44/1854 12:46 PM    .0 (H) 05/13/2022 12:46 PM     Lab Results   Component Value Date/Time    Sodium 134 (L) 05/13/2022 12:46 PM    Potassium 3.2 (L) 05/13/2022 12:46 PM    Chloride 100 05/13/2022 12:46 PM    CO2 28 05/13/2022 12:46 PM    Anion gap 6 05/13/2022 12:46 PM    Glucose 96 05/13/2022 12:46 PM    BUN 17 05/13/2022 12:46 PM    Creatinine 0.90 05/13/2022 12:46 PM    BUN/Creatinine ratio 19 05/13/2022 12:46 PM    GFR est AA >60 05/13/2022 12:46 PM    GFR est non-AA 59 (L) 05/13/2022 12:46 PM    Calcium 8.5 05/13/2022 12:46 PM    Bilirubin, total 0.4 05/13/2022 12:46 PM    Alk.  phosphatase 52 05/13/2022 12:46 PM    Protein, total 10.4 (H) 05/13/2022 12:46 PM    Albumin 2.2 (L) 05/13/2022 12:46 PM    Globulin 8.2 (H) 05/13/2022 12:46 PM    A-G Ratio 0.3 (L) 05/13/2022 12:46 PM    ALT (SGPT) 19 05/13/2022 12:46 PM    AST (SGOT) 13 (L) 05/13/2022 12:46 PM

## 2022-05-23 PROBLEM — D53.9 MACROCYTIC ANEMIA: Status: ACTIVE | Noted: 2018-08-29

## 2022-05-23 PROBLEM — Z79.899 HIGH RISK MEDICATION USE: Status: ACTIVE | Noted: 2022-01-01

## 2022-05-23 NOTE — LETTER
5/23/2022    Patient: Angy Renteria   YOB: 1935   Date of Visit: 5/23/2022     Bahman Piper, [address] [facility] [address]  [address] [facility] [address]    Dear Bahman Piper MD,      Thank you for referring Ms. Rivas Redd to [address] [email or web address] for evaluation. My notes for this consultation are attached. If you have questions, please do not hesitate to call me. I look forward to following your patient along with you.       Sincerely,    Samantha Mccoy MD

## 2022-05-23 NOTE — TELEPHONE ENCOUNTER
[address] at [facility] [address]  [phone]    05/23/22 3:30 PM - Provided patient with a chemotherapy education packet including handouts on Zometa and Revlimid chemotherapy regimen, a handout on common side effects of chemotherapy, and a handout on how to safely handle bodily secretions and waste after chemotherapy. Also provided patient with handouts on side effect management including diarrhea, constipation, mouth sores, and nausea/vomiting. RN reviewed packet with the patient and opportunity was provided for questions and concerns.

## 2022-05-23 NOTE — PROGRESS NOTES
[facility] at [address], [address] [facility] 1007 [facility] Fat: [phone]  F: [phone] Patient ID  Name: Isadora Topete  YOB: 1935  MRN: [id number]  Referring Provider:   Fermín Dowell, [address] [facility],  [address] [place] [address]  Primary Care Provider:   Fermín Dowell MD       HEMATOLOGY/MEDICAL ONCOLOGY  NOTE   Date of Visit: 05/23/22  Reason for Evaluation:     Chief Complaint   Patient presents with    Follow-up     Patient presetns as a follow-up for multiple myeloma. She denies pain. Hematology/Oncology Summary:  Please review original records for clinical decision making. This summary highlights focused aspects of patient's ongoing care and may have a recurring section in notes with either updates or remain unchanged as a longitudinal care summary. --------------------------------------------------------------------------------------------------------------------------------------------------------------------------------------------------------------------------  DIAGNOSIS:   Multiple Myeloma    ORIGINAL STAGING:   n/a    SITES OF DISEASE:   Bone Marrow    CURRENT TREATMENT:   To further discuss care. PRIOR TREATMENT:   Pulse Dex x 4 days. 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 ad3/5/6/2022   *Electronically Signed Out By Dejah Damon MD*   Bone marrow aspirate: The bone marrow aspirate is cellular and adequate   for evaluation. Numerous plasma cells are present, including a subset of   large forms with prominent nucleoli. The background myeloid and erythroid   series progress through the full maturation sequence with no morphologic   abnormalities. Megakaryocytes are present in adequate numbers and show no   morphologic abnormalities. Lymphocytes and blasts do not appear increased   in number. Touch prep:  Cellular and reflects the aspirate smears. Bone marrow core biopsy: The bone marrow biopsy is 70% cellular and   adequate for evaluation. Large sheets of plasma cells are present. The   myeloid and erythroid series are reduced in number but progress through   the full maturation sequence without significant dysplasia. Megakaryocytes   are present in adequate numbers with normal morphology. Lymphocytes and   blasts do not appear increased in number. Clot section:  Particulate and reflects the core biopsy. Special stains:   Iron, aspirate: No stainable iron   Iron, clot: No stainable iron   Reticulin, core: Patchy reticulin fibrosis within sheets of plasma cells   Congo red, core: Negative for amyloid     Immunohistochemistry, core:   CD3: Highlights scattered small T cells   CD20: Highlights scattered small B cells   : Highlights marked increase in plasma cells, 80% of cellularity     Flow cytometry:   Diagnosis: Monoclonal plasma cells (31.5% of total cells), consistent with   a plasma cell neoplasm. Comments: Flow cytometry findings are consistent with a plasma cell   neoplasm (e.g. plasma cell myeloma or plasmacytoma). No immunophenotypic   evidence of a lymphoproliferative disorder, acute leukemia, or increase in   blasts is identified.  Due to their fragility, plasma cells do not survive   flow cytometric processing well; thus, the flow cytometric enumeration may   be much less than that seen morphologically. So, to properly classify this   process, correlation with the findings on the clot and core sections,   along with clinical, radiological and SPEP/UPEP information is necessary. Cytogenetic and FISH studies may provide useful prognostic information. Cytogenetics:  Normal  FISH:  dup1q,   t11:14   Molecular studies:  Not performed.      PERTINENT CARE EVENTS   n/a      Subjective:     History of Present Illness:     Susi Brizuela is a 80 y.o. F who presents for a follow-up evaluation for Multiple Myeloma. Patient overall reports feeling improved. Patient reportedly will find out about date of discharge on Wednesday from rehab facility. Patient going to live with one of her daughters upon discharge. Patient denies any fevers, chills. Denies any new pain complaints. -  Fatigue:Grade 1  Cough:Grade 1  Shortness of Breath:Grade 1    -  Current Outpatient Medications   Medication Sig    latanoprost (XALATAN) 0.005 % ophthalmic solution Administer 1 Drop to both eyes nightly.  losartan (COZAAR) 50 mg tablet Take 1 Tablet by mouth daily for 30 days.  glucagon (Glucagon Emergency Kit, human,) 1 mg solr by Injection route. 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 Inject as per sliding scale: if 201 - 250 = 2 units; 251 - 300 = 4 units; 301 - 350 = 6 units Greater than 350, CALL MD, subcutaneously before meals and at bedtime related to TYPE 2 DIABETES MELLITUS WITHOUT COMPLICATIONS    potassium bicarb-citric acid (EFFER-K) 20 mEq tablet Take 1 Tablet by mouth two (2) times a day.  polyethylene glycol (MIRALAX) 17 gram packet Take 1 Packet by mouth daily.  amLODIPine (NORVASC) 10 mg tablet Take 1 Tablet by mouth daily.     cholecalciferol, vitamin D3, (Vitamin D3) 50 mcg (2,000 unit) tab Take 1 Tablet by mouth daily for 30 days.  levothyroxine (SYNTHROID) 88 mcg tablet Take 1 Tablet by mouth Daily (before breakfast) for 30 days.  metoprolol succinate (TOPROL-XL) 25 mg XL tablet Take 1 Tablet by mouth daily for 30 days.  pantoprazole (PROTONIX) 40 mg tablet Take 1 Tablet by mouth Daily (before breakfast) for 30 days.  ferrous sulfate 325 mg (65 mg iron) tablet Take 1 Tablet by mouth Daily (before breakfast) for 30 days.  cyanocobalamin 1,000 mcg tablet Take 1 Tablet by mouth daily for 30 days.  benzonatate (TESSALON) 100 mg capsule Take 1 Capsule by mouth as needed for Cough for up to 14 doses.  hydrALAZINE (APRESOLINE) 50 mg tablet Take 1 Tablet by mouth three (3) times daily for 30 days.  sucralfate (CARAFATE) 1 gram tablet Take 1 Tablet by mouth Before breakfast, lunch, dinner and at bedtime for 30 days. No current facility-administered medications for this visit. Allergies   Allergen Reactions    Celebrex [Celecoxib] Hives    Crestor [Rosuvastatin] Myalgia      Review of Systems Provided by:  Patient  Review of Systems: A complete review of systems was obtained, reviewed. Pertinent findings reviewed above. Objective:     Visit Vitals  BP (!) 170/66   Pulse (!) 58   Temp 98.1 °F (36.7 °C)   Resp 16   Ht 5' (1.524 m)   Wt 110 lb (49.9 kg)   SpO2 98%   BMI 21.48 kg/m²     ECOG PS: 3- Capable of only limited selfcare; confined to bed or chair more than 50% of waking hours. Physical Exam  Constitutional: No acute distress. and Non-toxic appearance. HENT: Normocephalic and atraumatic head. Eyes: Normal Conjunctivae. Anicteric sclerae. Cardiovascular: S1,S2 auscultated. No pitting edema. No friction rub. No gallops auscultated. Pulmonary: Normal Respiratory Effort. No wheezing. No rhonchi. No rales. Abdominal: Normal bowel sounds. Soft Abdomen to palpation. No abdominal tenderness. No guarding. No rebound tenderness. Skin: No jaundice. No rash. Neurological: No tremor on inspection. Alert but does not fully participate in care conversations. Psychiatric: mood normal. normal speech rate normal affect      Results:     I personally reviewed Epic EHR labs/results below:   Lab Results   Component Value Date/Time    WBC 5.1 05/13/2022 12:46 PM    HGB 8.2 (L) 05/13/2022 12:46 PM    HCT 25.8 (L) 05/13/2022 12:46 PM    PLATELET 655 55/54/0838 12:46 PM    .0 (H) 05/13/2022 12:46 PM    ABS. NEUTROPHILS 3.4 05/13/2022 12:46 PM     Lab Results   Component Value Date/Time    Sodium 134 (L) 05/13/2022 12:46 PM    Potassium 3.2 (L) 05/13/2022 12:46 PM    Chloride 100 05/13/2022 12:46 PM    CO2 28 05/13/2022 12:46 PM    Glucose 96 05/13/2022 12:46 PM    BUN 17 05/13/2022 12:46 PM    Creatinine 0.90 05/13/2022 12:46 PM    GFR est AA >60 05/13/2022 12:46 PM    GFR est non-AA 59 (L) 05/13/2022 12:46 PM    Calcium 8.5 05/13/2022 12:46 PM    Glucose (POC) 163 (H) 05/05/2022 04:24 PM     Lab Results   Component Value Date/Time    Bilirubin, total 0.4 05/13/2022 12:46 PM    ALT (SGPT) 19 05/13/2022 12:46 PM    Alk. phosphatase 52 05/13/2022 12:46 PM    Protein, total 10.4 (H) 05/13/2022 12:46 PM    Albumin 2.2 (L) 05/13/2022 12:46 PM    Globulin 8.2 (H) 05/13/2022 12:46 PM     ANEMIA LABS:    Iron Studies:    Iron   Date Value Ref Range Status   04/05/2022 51 35 - 150 ug/dL Final     Vitamin B12:   Vitamin B12   Date Value Ref Range Status   04/30/2022 1,065 (H) 193 - 986 pg/mL Final     Folate: Folate   Date Value Ref Range Status   04/28/2022 29.2 (H) 5.0 - 21.0 ng/mL Final     SPEP:    M-Harry   Date Value Ref Range Status   04/30/2022 4.8 (H) Not Observed g/dL Final      Immunofixation Result   Date Value Ref Range Status   04/30/2022 Comment (A)   Final     Comment:     (NOTE)  Immunofixation shows IgG monoclonal protein with kappa light chain  specificity.   Please note that samples from patients receiving DARZALEX(R)  (daratumumab) or SARCLISA(R)(isatuximab-irfc) treatment can appear  as an \"IgG kappa\" and mask a complete response (CR). If this  patient is receiving these therapies, this DANIELLE assay interference  can be removed by ordering test number [id number]-\"Immunofixation,  Daratumumab-Specific, Serum\" or [id number]-\"Immunofixation,  Isatuximab-Specific, Serum\" and submitting a new sample for  testing or by calling the lab to add this test to the current  sample. Kappa/Lambda ratio, serum   Date Value Ref Range Status   04/30/2022 329.68 (H) 0.26 - 1.65   Final     Comment:     (NOTE)  Performed At: [address] [id number]  Conrad Griffin MD SL:[id number]        Free Kappa Lt Chains, serum   Date Value Ref Range Status   04/30/2022 1,351.7 (H) 3.3 - 19.4 mg/L Final    No components found for: KLFL2  Reticulocyte Count: No results found for: RET  Bilirubin:   Lab Results   Component Value Date/Time    Bilirubin, total 0.4 05/13/2022 12:46 PM    Bilirubin Negative 04/28/2022 11:15 AM     LDH:   LD   Date Value Ref Range Status   04/30/2022 242 81 - 246 U/L Final        Assessment and Recommendations:     1. Multiple myeloma not having achieved remission ([facility] [address]  -discussed plans for Revlimid,Dexamethasone with Zometa support after daughters do not think their mom wants to start with Triple therapy with either Daratumumab or Velcade.  -I recommended Daratumumab,Revlimid,Dexamethasone per [facility] trial if they wanted to be more aggressive.  -With patient's age, goals of care, Revlimid,Dexamethasone is not an unreasonable choice to try to balance toxicity and quality of life issues. Patient did not tolerate dose reduced dexamethasone 20mg pulse very well and it remains unclear what benefit she had. However, likely will try a weekly dose reduced dexamethasone with Revlimid dose reduced to start.  Surely the overall response rate will not be as high as triple therapy or more standard dosing in doublet therapy. However, it seems worth a try. 2. Hypercalcemia  Will try to arrange for zometa on 6/6/22; it remains unclear patient's official release date from physical therapy. 3. Macrocytic anemia  -secondary to 80% myeloma involvement. Lab Results   Component Value Date/Time    HGB 8.2 (L) 05/13/2022 12:46 PM     4. High risk medication use  Today, I provided a thorough discussion regarding the risks and benefits of systemic targeted therapy. Patient's daughters have provided both oral and written informed consent (see separate signed consent form). Our discussion initially focused on the generalities of systemic targeted therapy and why side effects can occur from these medications. We discussed the risk of cytopenias from these medications: We discussed that low hemoglobin places patient at risk for ischemia/infarction. We discussed that low platelets place patient at risk for spontaneous bleeding and increased bleeding risks. We discussed when these symptoms are not monitored or patient does not inform us of developing/onset of symptoms that the risk of death can be markedly increased. We discussed risk for secondary malignancy. We discussed risk of anaphylaxis. We discussed the risk for organ damage. We discussed the risk of infection. ··Revlimid-risk of secondary malignancy. Risk of VTE. Risk of Rash. Risk of anaphylaxis. Risk of infections due to above immunosuppresant effects. Risk of Secondary malignancy. Need for REMS program especially to understand the risks to women of childbearing age. ·Dexamethasone-risk of infection,immune suppression, risk of myopathy, risk of steroid dependency. Risk of cataracts, risk of skin thinning. Risk of moon facies. Risk of steroid induced shay. ·Consented patient for Zometa (discussed the risk of bone,muscle complaints, osteonecrosis of the jaw being higher in poor dentition pateints who had extractions after bisphosphonate tx).  Patient's daughters communicate no impending dental work needed. Follow-up and Dispositions    · Return in about 2 weeks (around 6/6/2022). I provided [employer] handouts on Revlimid, Dexamethasone, Zometa and Daratumumab although they do not want to start carol at least upfront. Follow-up and Dispositions    · Return in about 2 weeks (around 6/6/2022).        MD CLINIC  6/6/22   <><><> TREATMENT PLAN: [facility] on 6/6/22     Candice Mckee MD  Hematology/Medical Oncology Provider  [facility]  P: [phone]    Signed By:   Janie Hi MD

## 2022-05-23 NOTE — PROGRESS NOTES
1. Have you been to the ER, urgent care clinic since your last visit? Hospitalized since your last visit? No    2. Have you seen or consulted any other health care providers outside of the [address] [place] [address] since your last visit? Include any pap smears or colon screening. Yes Patient in [address] currently        Vitals:    05/23/22 1359   BP: (!) 170/66   Pulse: (!) 58   Resp: 16   Temp: 98.1 °F (36.7 °C)   SpO2: 98%   Weight: 110 lb (49.9 kg)   Height: 5' (1.524 m)           Chief Complaint   Patient presents with    Follow-up     Patient presetns as a follow-up for multiple myeloma. She denies pain.

## 2022-05-24 NOTE — TELEPHONE ENCOUNTER
[facility] [employer] at [place]  [phone]    05/24/22 9:36 AM - Shani Scott and spoke with Yola Cherry at [facility] [employer]. Corrected the spelling of the patient's name on the [employer] site. The patient's name is now displayed correctly. No further questions or concerns.

## 2022-05-24 NOTE — TELEPHONE ENCOUNTER
[facility]  [phone]    05/24/22 10:05 AM - Revlimid prescription pended to MD for review.

## 2022-05-26 NOTE — PROGRESS NOTES
Transition of care outreach postponed for 14 days due to patient's discharge to SNF.   [facility]

## 2022-05-27 NOTE — TELEPHONE ENCOUNTER
[facility] at [facility]  [phone]    05/27/22 3:36 PM - Received call from the patient's daughter, Landry Ho (listed on PHI). Patient's ID verified x 2. She states her mother will be discharged on Wednesday, 6/1. Advised she is scheduled for Zometa on Monday, 6/6 at 1:00 PM in the [facility] and 1:30 PM with Dr. Vilma Haley. Provided [facility]'s telephone number for the daughter to call and schedule delivery. Jayde voiced understanding and gratitude for the call. No further questions or concerns.

## 2022-05-29 NOTE — ED NOTES
Pt arrives via EMS from [facility] after unwitnessed [facility]. Per EMS the rehab facility reports she was down for approx 30 minutes. Unknown LOC    Per EMS pt has baseline dementia. Pt currently a/o x3. No open wounds or lacerations noted to head.      Pt reports left shoulder pain    Pt placed x2 on monitor, bed in low position, call bell in reach

## 2022-05-29 NOTE — ED NOTES
I have reviewed discharge instructions with the patient and daughter. The patient and daughter verbalized understanding. No further questions at this time. Patient's daughter will transport pt back to [facility].

## 2022-05-29 NOTE — ED PROVIDER NOTES
This is a 26-year-old female with a past medical history significant for diabetes, GI bleed, hypertension, hypercholesterolemia, hypothyroidism, MI with stents, dementia who presents to the ER from a nursing home after a ground-level fall which time she was found on the floor. This event was not witnessed by anyone. The patient is complaining of left shoulder pain and headache. She denies any nausea or vomiting, abdominal pain, chest pain shortness of breath, dizziness, extremity weakness or numbness, sick contact, skin rash or recent travel. Past Medical History:   Diagnosis Date    Diabetes ([facility] [address] [facility]     GI bleeding     workup in [address] [place] [address] 12/15 was unremarkable (Dr. Jazmyne Robbins) - says she had EGD, colonoscopy, and small bowel capsule endoscopy    HTN (hypertension)     Hypercholesteremia     Hypothyroidism, iatrogenic     radioiodine    MI (myocardial infarction) ([facility] [address]     During surgery in 1970's - she's had multiple caths and stress tests       Past Surgical History:   Procedure Laterality Date    COLONOSCOPY N/A 5/2/2022    COLONOSCOPY performed by Ann Rodriguez MD at [address] [facility]  [address] HX HERNIA REPAIR  [id number]    Open umbilical incisional herniorrhaphy [facility]).  HX HERNIA REPAIR Right 09/23/2020    Right inguinal herniorrhaphy with mesh.     HX ORTHOPAEDIC      Back surgery x 2.    HX BRIAN AND BSO           Family History:   Problem Relation Age of Onset    Heart Failure Mother     Heart Disease Mother     Hypertension Mother        Social History     Socioeconomic History    Marital status:      Spouse name: Not on file    Number of children: Not on file    Years of education: Not on file    Highest education level: Not on file   Occupational History    Not on file   Tobacco Use    Smoking status: Never Smoker    Smokeless tobacco: Never Used   Vaping Use    Vaping Use: Never used   Substance and Sexual Activity    Alcohol use: Not Currently Alcohol/week: 0.0 standard drinks     Comment: Occ. glass of wine    Drug use: Never    Sexual activity: Not Currently   Other Topics Concern    Not on file   Social History Narrative    Not on file     Social Determinants of Health     Financial Resource Strain:     Difficulty of Paying Living Expenses: Not on file   Food Insecurity:     Worried About Running Out of Food in the Last Year: Not on file    Angel Luis of Food in the Last Year: Not on file   Transportation Needs:     Lack of Transportation (Medical): Not on file    Lack of Transportation (Non-Medical): Not on file   Physical Activity:     Days of Exercise per Week: Not on file    Minutes of Exercise per Session: Not on file   Stress:     Feeling of Stress : Not on file   Social Connections:     Frequency of Communication with Friends and Family: Not on file    Frequency of Social Gatherings with Friends and Family: Not on file    Attends Cheondoism Services: Not on file    Active Member of [address] [place] [address] or Organizations: Not on file    Attends Club or Organization Meetings: Not on file    Marital Status: Not on file   Intimate Partner Violence:     Fear of Current or Ex-Partner: Not on file    Emotionally Abused: Not on file    Physically Abused: Not on file    Sexually Abused: Not on file   Housing Stability:     Unable to Pay for Housing in the Last Year: Not on file    Number of [place] in the Last Year: Not on file    Unstable Housing in the Last Year: Not on file         ALLERGIES: Celebrex [celecoxib] and Crestor [rosuvastatin]    Review of Systems   All other systems reviewed and are negative. Vitals:    05/29/22 0222   BP: (!) 194/81   Pulse: (!) 52   Resp: 16   Temp: 98.3 °F (36.8 °C)   SpO2: 97%   Height: 5' (1.524 m)            Physical Exam  Vitals and nursing note reviewed. Exam conducted with a chaperone present.         CONSTITUTIONAL: Frail elderly female who appears chronically ill; in no apparent distress  HEAD: Normocephalic; atraumatic  EYES: PERRL; EOM intact; conjunctiva and sclera are clear bilaterally. ENT: No rhinorrhea; normal pharynx with no tonsillar hypertrophy; mucous membranes pink/moist, no erythema, no exudate. NECK: Supple; non-tender; no cervical lymphadenopathy  CARD: Normal S1, S2; no murmurs, rubs, or gallops. Regular rate and rhythm. RESP: Normal respiratory effort; breath sounds clear and equal bilaterally; no wheezes, rhonchi, or rales. ABD: Normal bowel sounds; non-distended; non-tender; no palpable organomegaly, no masses, no bruits. Back Exam: Normal inspection; no vertebral point tenderness, no CVA tenderness. Normal range of motion. EXT: Normal ROM in all four extremities; tenderness to palpation in the left shoulder; no swelling or deformity; distal pulses are normal, no edema. SKIN: Warm; dry; no rash. NEURO:Alert and oriented x 3, coherent, BENITO-XII grossly intact, sensory and motor are non-focal.      MDM  Number of Diagnoses or Management Options  Contusion of left shoulder, initial encounter  Fall, initial encounter  Hypertensive urgency  Injury of head, initial encounter  Diagnosis management comments: Assessment: 75-year-old female who presents to the ER for evaluation after a ground-level fall with left shoulder pain and injury rule out head injury, cervical spine injury, shoulder injury/hypertensive urgency rule out electrolytes abnormality and dehydration    Plan: EKG/lab/IV fluid/hydralazine/x-ray of left shoulder/CT scan of the head and cervical spine/serial exam/ Monitor and Reevaluate.          Amount and/or Complexity of Data Reviewed  Clinical lab tests: ordered and reviewed  Tests in the radiology section of CPT®: ordered and reviewed  Tests in the medicine section of CPT®: reviewed and ordered  Discussion of test results with the performing providers: yes  Decide to obtain previous medical records or to obtain history from someone other than the patient: yes  Obtain history from someone other than the patient: yes  Review and summarize past medical records: yes  Discuss the patient with other providers: yes  Independent visualization of images, tracings, or specimens: yes    Risk of Complications, Morbidity, and/or Mortality  Presenting problems: moderate  Diagnostic procedures: moderate  Management options: moderate    Patient Progress  Patient progress: stable         Procedures    ED EKG interpretation:  Rhythm: sinus bradycardia; and regular With first-degree AV block and left anterior fascicular block; rate (approx.): 50; Axis: left axis deviation; P wave: prolonged; QRS interval: prolonged; ST/T wave: non-specific changes; in  Lead: Diffusely; Other findings: abnormal ekg. This EKG was interpreted by Lavon Davis MD,ED Provider. XRAY INTERPRETATION (ED MD)  Xray of left shoulder shows no fracture. No subluxation/dislocation. No bony abnormality. Latha Pires MD 3:03 AM    Progress Note:   Pt has been reexamined by Lavon Davis MD. Pt is feeling much better. Symptoms have improved. All available results have been reviewed with pt and any available family. Pt understands sx, dx, and tx in ED. Care plan has been outlined and questions have been answered. Pt is ready to go home. Will send home on fall with head injury, shoulder contusion and hypertension instruction. . Outpatient referral with PCP as needed. Written by Lavon Davis MD,3:05 AM    .   .

## 2022-05-30 PROBLEM — G93.40 ENCEPHALOPATHY: Status: ACTIVE | Noted: 2022-01-01

## 2022-05-30 NOTE — PROGRESS NOTES
Patient had fall this AM according to nursing. Was not witnessed, but patient denies head trauma, pain, or other injury. Per nurse she is more lethargic than usual but otherwise has no focal deficits or mental status changes. Vitals are normal, stable. Will put in for CBC, CMP, U/A. Instructed nurse to call back/send pt to ER if any further changes to status occur as she would need further work-up including imaging.      Chapincito Catalan, DO  Family Medicine Resident

## 2022-05-31 PROBLEM — E87.1 HYPONATREMIA: Status: ACTIVE | Noted: 2022-01-01

## 2022-05-31 PROBLEM — R79.89 ELEVATED BRAIN NATRIURETIC PEPTIDE (BNP) LEVEL: Status: ACTIVE | Noted: 2022-01-01

## 2022-05-31 PROBLEM — E83.42 HYPOMAGNESEMIA: Status: ACTIVE | Noted: 2022-01-01

## 2022-05-31 NOTE — H&P
[facility]   Senior Resident Admission Note    CC: AMS, recurrent falls, generalized weakness    HPI:  Bev Woods is a 80 y.o. female with PMHx Multiple Myeloma, hypercalcemia, hypokalemia, HTN, Macrocytic anemia, GERD, Bradycardia who presents to the ED from [address] [place] [address] due to recent altered mental status and recent onset of falls. Daughter reports symptoms have acutely worsened within the past 3 days. Patient has been unable to eat, drink or work with PT at [address] [place] [address]. Family members deny any other systemic symptoms. In the ED:   Vitals:   Patient Vitals for the past 4 hrs:   Pulse Resp BP SpO2   05/31/22 0431 62 16 (!) 177/69 93 %   05/31/22 0345 63 20 (!) 161/90 95 %   05/31/22 0315 (!) 58 14 (!) 205/66 92 %   05/31/22 0303 60 16 (!) 194/59 95 %   05/31/22 0230 60 20 (!) 194/65 97 %   05/31/22 0200 (!) 57 16 (!) 170/50 94 %   05/31/22 0115 61 16 (!) 183/55 95 %       Labs: Na: , K: 3, Hgb: 7.9 (BL: 8), Ca: 14.4, Cr: 1.64 (bl ~0.9). , BNP: 2,833, trop: 56-->56. CT scan head without acute abnormalities. EKG: Sinus bradycardia with 1st degree AV block unchanged from previous EKG. Pt received: 1L NS, Hydral 10mg IV    Chart reviewed. Patient seen, examined, and discussed with Dr. Ni Cedeno (PGY-1). See Resident H&P note for more details. Pertinent PE Findings:   Physical Exam see complete H&P. Altered mental status: in the setting of multiple electrolyte abnormalities, particularly acute on chronic hyponatremia, hypercalcemia 2/2 to MM, chronic hypokalemia and hypomagnesemia. Normal head CT scans. Consider possible pseudohyponatremia given untreated MM. Low concern for infectious etiologies given no leukocytosis, and UA / CXR w/o evidence of infection.   - Continue NS @ 50  ml/hr, cognizant of fluid replacement given elevated BNP w/o previous BNP for comparison. - Urine lytes  - Check TSH, Mag, Phos  - Consult Nephrology, aprec assistance.   - Check ammonia, volatiles, salicylates. - Check BMP q 4 hrs, given symptomatic presentation and to monitor trend, if stable can monitor q 6 hrs. - Fall precautions. Multiple Myeloma: recently diagnosed, currently not receiving any tx.   - Consult Heme-Oncology. Consider calcitonin and/or Zometa for hypercalcemia.    - Continue NS @50 ml/hr. I agree with remaining assessment and plan as documented in Dr. Saul Jameson note.       Pt discussed with Dr. Migel Patino (on-call attending physician)    Patricia Woodruff MD  Family Medicine Resident

## 2022-05-31 NOTE — ED PROVIDER NOTES
Pt coming from [facility] via EMS. Pt fell out of bed yesterday in bathroom hitting her head and was transported. Pt was found to be on her back in bathroom again today. Per EMS, nursing at [facility] reports she has been mentally and physically declining since the fall this morning around 5:30AM. Granddaughter came to visit and told EMS that pt is not being her normal self. Pt normally feeds herself but has not been able to today since fall. Pt hx of DM, HTN, weakness, and falls. BG en route was 124 per EMS. 49-year-old female with significant past medical history of dementia normally oriented to location and self and able to feed herself normally having decline in her mental status since her fall yesterday. Patient was seen in ER yesterday after fall and had work-up with no significant injuries found. At that time patient's kidney function slightly increased. Patient also had elevated blood pressure and has received IV hydralazine for  Today patient has been more fatigued and malaise not able to feed herself and cognitively declined and very disoriented according the staff and family. Patient not eating much today. Prehospital glucose 124 by EMS. No report of any fevers or chills. Has no complaints of pain at this time. Unable to obtain significant history from the patient due to her confusion.            Past Medical History:   Diagnosis Date    Diabetes ([address]     GI bleeding     workup in [address] [place] [address] 12/15 was unremarkable (Dr. Leatha Jovel) - says she had EGD, colonoscopy, and small bowel capsule endoscopy    HTN (hypertension)     Hypercholesteremia     Hypothyroidism, iatrogenic     radioiodine    MI (myocardial infarction) ([address]     During surgery in 1970's - she's had multiple caths and stress tests       Past Surgical History:   Procedure Laterality Date    COLONOSCOPY N/A 5/2/2022    COLONOSCOPY performed by Latia Feliz MD at [address] [address]  2018    Open umbilical incisional herniorrhaphy [facility]).  HX HERNIA REPAIR Right 09/23/2020    Right inguinal herniorrhaphy with mesh.  HX ORTHOPAEDIC      Back surgery x 2.    HX BRIAN AND BSO           Family History:   Problem Relation Age of Onset    Heart Failure Mother     Heart Disease Mother     Hypertension Mother        Social History     Socioeconomic History    Marital status:      Spouse name: Not on file    Number of children: Not on file    Years of education: Not on file    Highest education level: Not on file   Occupational History    Not on file   Tobacco Use    Smoking status: Never Smoker    Smokeless tobacco: Never Used   Vaping Use    Vaping Use: Never used   Substance and Sexual Activity    Alcohol use: Not Currently     Alcohol/week: 0.0 standard drinks     Comment: Occ. glass of wine    Drug use: Never    Sexual activity: Not Currently   Other Topics Concern    Not on file   Social History Narrative    Not on file     Social Determinants of Health     Financial Resource Strain:     Difficulty of Paying Living Expenses: Not on file   Food Insecurity:     Worried About Running Out of Food in the Last Year: Not on file    Angel Luis of Food in the Last Year: Not on file   Transportation Needs:     Lack of Transportation (Medical): Not on file    Lack of Transportation (Non-Medical):  Not on file   Physical Activity:     Days of Exercise per Week: Not on file    Minutes of Exercise per Session: Not on file   Stress:     Feeling of Stress : Not on file   Social Connections:     Frequency of Communication with Friends and Family: Not on file    Frequency of Social Gatherings with Friends and Family: Not on file    Attends Church Services: Not on file    Active Member of Clubs or Organizations: Not on file    Attends Club or Organization Meetings: Not on file    Marital Status: Not on file   Intimate Partner Violence:     Fear of Current or Ex-Partner: Not on file    Emotionally Abused: Not on file    Physically Abused: Not on file    Sexually Abused: Not on file   Housing Stability:     Unable to Pay for Housing in the Last Year: Not on file    Number of Places Lived in the Last Year: Not on file    Unstable Housing in the Last Year: Not on file         ALLERGIES: Celebrex [celecoxib] and Crestor [rosuvastatin]    Review of Systems   Unable to perform ROS: Mental status change       Vitals:    05/30/22 2148 05/30/22 2207   BP: (!) 209/68    Pulse: 63    Resp: 18    Temp: 98.5 °F (36.9 °C)    SpO2: 95% 96%   Weight: 49.9 kg (110 lb 0.2 oz)    Height: 5' (1.524 m)             Physical Exam  Constitutional:       Appearance: She is well-developed. Comments: Frail, listlessness       HENT:      Head: Normocephalic. Mouth/Throat:      Mouth: Mucous membranes are dry. Eyes:      Extraocular Movements: Extraocular movements intact. Conjunctiva/sclera: Conjunctivae normal.      Pupils: Pupils are equal, round, and reactive to light. Cardiovascular:      Rate and Rhythm: Normal rate and regular rhythm. Pulmonary:      Effort: Pulmonary effort is normal. No respiratory distress. Breath sounds: Normal breath sounds. Abdominal:      General: Bowel sounds are normal.      Palpations: Abdomen is soft. Tenderness: There is no abdominal tenderness. Musculoskeletal:         General: No tenderness or signs of injury. Normal range of motion. Cervical back: Normal range of motion and neck supple. Skin:     General: Skin is warm. Capillary Refill: Capillary refill takes less than 2 seconds. Findings: No rash. Neurological:      Mental Status: She is alert. She is disoriented. Cranial Nerves: No cranial nerve deficit. Sensory: No sensory deficit. Motor: No weakness.       Comments: No gross motor or sensory deficits          MDM  Number of Diagnoses or Management Options  Chronic anemia  Encephalopathy  Fall, initial encounter  Hypercalcemia  Hypokalemia  Hyponatremia  Multiple myeloma not having achieved remission [facility])  Diagnosis management comments: Patient presenting ER with another fall and worsening mental status. Patient afebrile. Has history of multiple myeloma having worsening kidney function and has low potassium and low sodium and elevated calcium. We will start patient IV fluids admit. Concerned of patient's multiple myeloma contributing to kidney injury as well as her hypercalcemia contributing to patient's mental status. IV fluids to help treat dehydration and hyperkalemia  No traumatic injuries from the fall  Spoke with patient's daughter and granddaughter who are at bedside and reviewed past medical records. Discussed with admitting team    Total critical care time spent exclusive of procedures:  40min         Amount and/or Complexity of Data Reviewed  Clinical lab tests: reviewed  Tests in the radiology section of CPT®: reviewed  Tests in the medicine section of CPT®: reviewed  Decide to obtain previous medical records or to obtain history from someone other than the patient: yes      ED Course as of 05/30/22 2309   Mon May 30, 2022   2245 Calcium(!!): 14.4 [ZD]   2245 Creatinine(!): 1.64 [ZD]   2245 Potassium(!): 3.0 [ZD]   2245 Sodium(!): 125 [ZD]      ED Course User Index  [ZD] Ainsley Kwok MD       Procedures          Perfect Serve Consult for Admission  11:09 PM    ED Room Number: LX28/72  Patient Name and age: Bev Woods 80 y.o.  female  Working Diagnosis:   1. Encephalopathy    2. Hypercalcemia    3. Hypokalemia    4. Hyponatremia    5. Fall, initial encounter    6. Chronic anemia    7.  Multiple myeloma not having achieved remission ([address]        COVID-19 Suspicion:  no  Sepsis present:  no  Reassessment needed: no  Code Status:  Full Code  Readmission: no  Isolation Requirements:  no  Recommended Level of Care:  telemetry  Department:[facility] ED - [phone]  Other: Recent Results (from the past 24 hour(s))   CBC WITH AUTOMATED DIFF    Collection Time: 05/30/22 10:00 PM   Result Value Ref Range    WBC 6.9 3.6 - 11.0 K/uL    RBC 2.40 (L) 3.80 - 5.20 M/uL    HGB 7.9 (L) 11.5 - 16.0 g/dL    HCT 23.2 (L) 35.0 - 47.0 %    MCV 96.7 80.0 - 99.0 FL    MCH 32.9 26.0 - 34.0 PG    MCHC 34.1 30.0 - 36.5 g/dL    RDW 13.4 11.5 - 14.5 %    PLATELET 928 477 - 232 K/uL    MPV 9.5 8.9 - 12.9 FL    NRBC 0.0 0  WBC    ABSOLUTE NRBC 0.00 0.00 - 0.01 K/uL    NEUTROPHILS 71 32 - 75 %    LYMPHOCYTES 13 12 - 49 %    MONOCYTES 13 5 - 13 %    EOSINOPHILS 2 0 - 7 %    BASOPHILS 0 0 - 1 %    IMMATURE GRANULOCYTES 1 (H) 0.0 - 0.5 %    ABS. NEUTROPHILS 4.9 1.8 - 8.0 K/UL    ABS. LYMPHOCYTES 0.9 0.8 - 3.5 K/UL    ABS. MONOCYTES 0.9 0.0 - 1.0 K/UL    ABS. EOSINOPHILS 0.1 0.0 - 0.4 K/UL    ABS. BASOPHILS 0.0 0.0 - 0.1 K/UL    ABS. IMM. GRANS. 0.1 (H) 0.00 - 0.04 K/UL    DF AUTOMATED     METABOLIC PANEL, COMPREHENSIVE    Collection Time: 05/30/22 10:00 PM   Result Value Ref Range    Sodium 125 (L) 136 - 145 mmol/L    Potassium 3.0 (L) 3.5 - 5.1 mmol/L    Chloride 92 (L) 97 - 108 mmol/L    CO2 28 21 - 32 mmol/L    Anion gap 5 5 - 15 mmol/L    Glucose 107 (H) 65 - 100 mg/dL    BUN 31 (H) 6 - 20 MG/DL    Creatinine 1.64 (H) 0.55 - 1.02 MG/DL    BUN/Creatinine ratio 19 12 - 20      GFR est AA 36 (L) >60 ml/min/1.73m2    GFR est non-AA 30 (L) >60 ml/min/1.73m2    Calcium 14.4 (HH) 8.5 - 10.1 MG/DL    Bilirubin, total 0.3 0.2 - 1.0 MG/DL    ALT (SGPT) 13 12 - 78 U/L    AST (SGOT) 22 15 - 37 U/L    Alk.  phosphatase 44 (L) 45 - 117 U/L    Protein, total 11.2 (H) 6.4 - 8.2 g/dL    Albumin 2.1 (L) 3.5 - 5.0 g/dL    Globulin 9.1 (H) 2.0 - 4.0 g/dL    A-G Ratio 0.2 (L) 1.1 - 2.2     TROPONIN-HIGH SENSITIVITY    Collection Time: 05/30/22 10:00 PM   Result Value Ref Range    Troponin-High Sensitivity 56 (H) 0 - 51 ng/L   NT-PRO BNP    Collection Time: 05/30/22 10:00 PM   Result Value Ref Range    NT pro-BNP 2,833 (H) <450 PG/ML   SAMPLES BEING HELD    Collection Time: 05/30/22 10:00 PM   Result Value Ref Range    SAMPLES BEING HELD  1SST, 1RED, 1BLU     COMMENT        Add-on orders for these samples will be processed based on acceptable specimen integrity and analyte stability, which may vary by analyte. EKG, 12 LEAD, INITIAL    Collection Time: 05/30/22 10:07 PM   Result Value Ref Range    Ventricular Rate 61 BPM    Atrial Rate 61 BPM    P-R Interval 246 ms    QRS Duration 134 ms    Q-T Interval 402 ms    QTC Calculation (Bezet) 404 ms    Calculated P Axis 54 degrees    Calculated R Axis -30 degrees    Calculated T Axis 47 degrees    Diagnosis       Sinus rhythm with 1st degree AV block  Left axis deviation  Nonspecific intraventricular block  T wave abnormality, consider lateral ischemia  Abnormal ECG  When compared with ECG of 29-MAY-2022 02:35,  Inverted T waves have replaced nonspecific T wave abnormality in Lateral   leads     URINALYSIS W/MICROSCOPIC    Collection Time: 05/30/22 10:16 PM   Result Value Ref Range    Color YELLOW/STRAW      Appearance CLEAR CLEAR      Specific gravity 1.007 1.003 - 1.030      pH (UA) 6.5 5.0 - 8.0      Protein 100 (A) NEG mg/dL    Glucose Negative NEG mg/dL    Ketone Negative NEG mg/dL    Bilirubin Negative NEG      Blood Negative NEG      Urobilinogen 0.2 0.2 - 1.0 EU/dL    Nitrites Negative NEG      Leukocyte Esterase TRACE (A) NEG      WBC 0-4 0 - 4 /hpf    RBC 0-5 0 - 5 /hpf    Epithelial cells FEW FEW /lpf    Bacteria Negative NEG /hpf    Hyaline cast 0-2 0 - 5 /lpf   URINE CULTURE HOLD SAMPLE    Collection Time: 05/30/22 10:16 PM    Specimen: Serum; Urine   Result Value Ref Range    Urine culture hold        Urine on hold in Microbiology dept for 2 days. If unpreserved urine is submitted, it cannot be used for addtional testing after 24 hours, recollection will be required.    [id number] SENSITIVITY    Collection Time: 05/31/22 12:18 AM   Result Value Ref Range Troponin-High Sensitivity 56 (H) 0 - 51 ng/L       XR PELV AP ONLY    Result Date: 5/30/2022  EXAM:  XR PELV AP ONLY INDICATION: Fall. Altered mental status. COMPARISON: CT 5/1/2022. TECHNIQUE: Portable AP supine pelvis view FINDINGS: There is no acute fracture or dislocation. The sacroiliac and hip joint spaces are maintained. Lower lumbar posterior fusion hardware is noted. The soft tissues are unremarkable. No acute abnormality. CT HEAD WO CONT    Result Date: 5/30/2022  EXAM:  CT head without contrast INDICATION: Fall. Altered mental status. COMPARISON: CT 5/29/2022 TECHNIQUE: Axial noncontrast head CT from foramen magnum to vertex. Coronal and sagittal reformatted images were obtained. CT dose reduction was achieved through use of a standardized protocol tailored for this examination and automatic exposure control for dose modulation. FINDINGS:  There is diffuse age-related parenchymal volume loss. The ventricles and sulci are age-appropriate without hydrocephalus. There is no mass effect or midline shift. There is no intracranial hemorrhage or extra-axial fluid collection. Scattered foci of low attenuation in the periventricular white matter represent stable chronic microvascular ischemic changes. The gray-white matter differentiation is maintained. The basal cisterns are patent. The osseous structures are intact. The visualized paranasal sinuses and mastoid air cells are clear. No acute intracranial abnormality. CT SPINE CERV WO CONT    Result Date: 5/30/2022  EXAM:  CT C-spine without contrast INDICATION: Fall. Altered mental status. COMPARISON: CT 5/29/2022 TECHNIQUE: Thin section axial noncontrast CT of the cervical spine with coronal and sagittal reformats. CT dose reduction was achieved through use of a standardized protocol tailored for this examination and automatic exposure control for dose modulation. FINDINGS: There is no acute fracture or subluxation.  Vertebral body heights are maintained. There is stable diffuse degenerative disc and facet changes with multilevel spinal canal and neural foraminal stenosis. There is no abnormality in alignment. The paraspinal soft tissues are unremarkable. A peripherally calcified left thyroid nodule is again demonstrated. The visualized lung apices are clear. No acute abnormality. Stable diffuse degenerative changes. XR CHEST PORT    Result Date: 5/30/2022  EXAM:  CR chest portable INDICATION: Fall. Altered mental status. COMPARISON: CT 5/1/2022. Radiograph 4/28/2022. TECHNIQUE: Portable AP semiupright chest view at 2230 hours FINDINGS: The cardiomediastinal contours are stable. The lungs and pleural spaces are clear. There is no pneumothorax. The bones and upper abdomen are stable. No acute process.

## 2022-05-31 NOTE — PROGRESS NOTES
5/31/2022  9:28 AM  Case management note  Reason for Readmission:     Encephalopathy    4/28/2022 -5/5/2022  UTI      Patient came from [facility] for [facility]. Patient was there for rehab and was to be discharged this Wednesday 6/1. She was to begin tx on Monday 6/6  Patient had been living alone prior to last admission. Her daughter Griselda Collin will be staying with her on discharge  Patient has history of multiple myeloma, bradycardia, dementia, DM and HTN. CVS @ [facility]            RUR Score/Risk Level:     22%    PCP: First and Last name:     Name of Practice:    Are you a current patient: Yes/No:yes    Approximate date of last visit: Cristina Mejia   Can you participate in a virtual visit with your PCP:     Is a Care Conference indicated: goals of care, long term planning      Did you attend your follow up appointment (s): If not, why not: no in SNF         Resources/supports as identified by patient/family:   Daughter to stay with patient on discharge       Top Challenges facing patient (as identified by patient/family and CM): Finances/Medication cost?     Medicare/ [employer]  Transportation      Family  Support system or lack thereof?   family    Living arrangements? Family to stay with patient   Self-care/ADLs/Cognition? Unable to do self care, fair health cognition       Current Advanced Directive/Advance Care Plan:   DNR on file           Plan for utilizing home health:   PT/OT to eval             Transition of Care Plan:    Based on readmission, the patient's previous Plan of Care   has been evaluated and/or modified. The current Transition of Care Plan is:        1. Unable to determine at this time  2. PCP follow up  3. Hem/ Onc follow up  4. CM to follow for discharge needs       Care Management Interventions  PCP Verified by CM:  Yes (Dr. Татьяна pang)  Support Systems: Child(jaclyn)  Confirm Follow Up Transport: Family  The Plan for Transition of Care is Related to the Following Treatment Goals : encephclopathy  Discharge Location  Patient Expects to be Discharged to[de-identified] Unable to determine at this time    Readmission Assessment  Number of days since last admission?: 8-30 days  Previous disposition: SNF  What was the patient's/caregiver's perception as to why they think they needed to return back to the hospital?: Other (Comment) (encephlopathy)  Did you visit your Primary Care Physician after you left the hospital, before you returned this time?: No (saw SNF MD)  Why weren't you able to visit your PCP?: Other (Comment) (went to SNF)  Did you see a specialist, such as Cardiac, Pulmonary, Orthopedic Physician, etc. after you left the hospital?: No  Who advised the patient to return to the hospital?: Physician  Does the patient report anything that got in the way of taking their medications?: No  Kelin FARRARRT

## 2022-05-31 NOTE — PROGRESS NOTES
[facility]   Senior Resident Admission Note     CC: AMS, recurrent falls, generalized weakness     HPI:  Genny Elizabeth is a 80 y.o. female with PMHx Multiple Myeloma, hypercalcemia, hypokalemia, HTN, Macrocytic anemia, GERD, Bradycardia who presents to the ED from [address] [place] [address] due to recent altered mental status and recent onset of falls. Daughter reports symptoms have acutely worsened within the past 3 days. Patient has been unable to eat, drink or work with PT at [address] [place] [address]. Family members deny any other systemic symptoms.      In the ED:   Vitals:   Patient Vitals for the past 4 hrs:    Pulse Resp BP SpO2   05/31/22 0431 62 16 (!) 177/69 93 %   05/31/22 0345 63 20 (!) 161/90 95 %   05/31/22 0315 (!) 58 14 (!) 205/66 92 %   05/31/22 0303 60 16 (!) 194/59 95 %   05/31/22 0230 60 20 (!) 194/65 97 %   05/31/22 0200 (!) 57 16 (!) 170/50 94 %   05/31/22 0115 61 16 (!) 183/55 95 %       Labs: Na: , K: 3, Hgb: 7.9 (BL: 8), Ca: 14.4, Cr: 1.64 (bl ~0.9). , BNP: 2,833, trop: 56-->56. CT scan head without acute abnormalities. EKG: Sinus bradycardia with 1st degree AV block unchanged from previous EKG. Pt received: 1L NS, Hydral 10mg IV     Chart reviewed. Patient seen, examined, and discussed with Dr. Lilia Aranda (PGY-1). See Resident H&P note for more details.     Pertinent PE Findings:   Physical Exam see complete H&P.     Altered mental status: in the setting of multiple electrolyte abnormalities, particularly acute on chronic hyponatremia, hypercalcemia 2/2 to MM, chronic hypokalemia and hypomagnesemia. Normal head CT scans. Consider possible pseudohyponatremia given untreated MM. Low concern for infectious etiologies given no leukocytosis, and UA / CXR w/o evidence of infection.   - Continue NS @ 50  ml/hr, cognizant of fluid replacement given elevated BNP w/o previous BNP for comparison. - Urine lytes  - Check TSH, Mag, Phos  - Consult Nephrology, aprec assistance.   - Check ammonia, volatiles, salicylates. - Check BMP q 4 hrs, given symptomatic presentation and to monitor trend, if stable can monitor q 6 hrs. - Fall precautions.     Multiple Myeloma: recently diagnosed, currently not receiving any tx.   - Consult Heme-Oncology.  Consider calcitonin and/or Zometa for hypercalcemia.    - Continue NS @50 ml/hr.         I agree with remaining assessment and plan as documented in Dr. Frankie Jim note.        Pt discussed with Dr. Asuncion Tang (on-call attending physician)  Behzad Wagoner MD  Family Medicine Resident

## 2022-05-31 NOTE — PROGRESS NOTES
Transition of Care Note:    Janay Kenyon is a 80 y. o. female with a a known history of recently diagnosed Multiple Myeloma, hypercalcemia, HTN, Macrocytic anemia, GERD, Badycardia  who is admitted for altered mental status in the setting of hyponatremia and hypercalcemia. Patient originally diagnosed with MM earlier 5/2022 and was sent to [facility] with plans to initiate chemo in the outpatient setting per Dr. Ghada Díaz (H/O). She presents with a markedly worsened hypercalcemia, gamma gap, and metabolic derangements likely contributing to her altered mentation. Case discussed with Dr. Ghada Díaz who advised considering palliative given likely advanced disease. Palliative has been consulted. She has received zometa and is on MIVF for management of her hypercalcemia. Family opting to see how Ms. Tonja Marie does over the next 24 to 48 hours before definitively pursuing palliative/hospice route.      Juana Rothman MD

## 2022-05-31 NOTE — PROGRESS NOTES
[address] [facility] [address] [place] [address]   Office [phone]  Fax [phone]            Subjective / Objective     Genny Elizabeth is a 80 y.o. female with a PMHx of recently diagnosed Multiple Myeloma, hypercalcemia, HTN, Macrocytic anemia, GERD, Badycardia  who is admitted for altered mental status in the setting of hyponatremia and hypercalcemia. 24 Hour Events:   Elevated BP, requiring 10mg IV Hydralazine at 3am.  Passed dysphagia screen. Completed 2.5 runs of IV potassium prior to passing dysphagia screen. Completed remainder of K repletion PO. Ammonia overnight elevated to 57, started on Lactulose. Subjective: Pt was seen and examined at bedside, sleeping comfortably. Very lethargic, AOx1. Continues to endorse that her lower abdomen hurts but denies any additional pain. Objective:    Tele: NSR with no events, rate 70s    Respiratory:   O2 Device: None (Room air)   Visit Vitals  BP (!) 179/55   Pulse 65   Temp 98.5 °F (36.9 °C)   Resp 16   Ht 5' (1.524 m)   Wt 110 lb 0.2 oz (49.9 kg)   SpO2 92%   BMI 21.48 kg/m²     Physical Exam:  General: No acute distress. Lethargic, no oriented  Respiratory: CTAB, no wheezes or crackles  Cardiovascular: Regular rate and rhythm, clear S1 S2. Pulses present bilaterally. GI: Nondistended. + bowel sounds. Diffuse tenderness across lower abdomen. Extremities: No LE edema. Pulses present. Skin: Warm, dry. Neuro: Lethargic, oriented only to self. Aware she is in a hospital. No focal deficits, follows commands poorly. Hard of hearing.      I/O:  Date 05/30/22 0700 - 05/31/22 0659 05/31/22 0700 - 06/01/22 0659   Shift 2884-0606 0989-3162 24 Hour Total 2212-8408 5784-2037 24 Hour Total   INTAKE   I.V.  1150(1.9) 1150(1)        Volume (0.9% sodium chloride infusion)  100 100        Volume (sodium chloride 0.9 % bolus infusion 1,000 mL)  1000 1000        Volume (potassium chloride 10 mEq in 100 ml IVPB)  50 50      Shift Total(mL/kg)  1150(23) 9922(49)      OUTPUT   Shift Total(mL/kg)         NET  1150 1150      Weight (kg)  49.9 49.9 49.9 49.9 49.9       Inpatient Medications  Current Facility-Administered Medications   Medication Dose Route Frequency    sodium chloride (NS) flush 5-40 mL  5-40 mL IntraVENous Q8H    sodium chloride (NS) flush 5-40 mL  5-40 mL IntraVENous PRN    acetaminophen (TYLENOL) tablet 650 mg  650 mg Oral Q6H PRN    Or    acetaminophen (TYLENOL) suppository 650 mg  650 mg Rectal Q6H PRN    polyethylene glycol (MIRALAX) packet 17 g  17 g Oral DAILY PRN    ondansetron (ZOFRAN ODT) tablet 4 mg  4 mg Oral Q8H PRN    Or    ondansetron (ZOFRAN) injection 4 mg  4 mg IntraVENous Q6H PRN    0.9% sodium chloride infusion  50 mL/hr IntraVENous CONTINUOUS    hydrALAZINE (APRESOLINE) 20 mg/mL injection 10 mg  10 mg IntraVENous Q6H PRN    levothyroxine (SYNTHROID) tablet 88 mcg  88 mcg Oral ACB    lactulose (CHRONULAC) 10 gram/15 mL solution 30 mL  20 g Oral BID    heparin (porcine) injection 5,000 Units  5,000 Units SubCUTAneous Q12H    magnesium sulfate 2 g/50 ml IVPB (premix or compounded)  2 g IntraVENous ONCE    ferrous sulfate tablet 325 mg  325 mg Oral ACB    latanoprost (XALATAN) 0.005 % ophthalmic solution 1 Drop  1 Drop Both Eyes QHS    sucralfate (CARAFATE) tablet 1 g  1 g Oral AC&HS    cyanocobalamin (VITAMIN B12) tablet 1,000 mcg  1,000 mcg Oral DAILY    cholecalciferol (VITAMIN D3) (1000 Units /25 mcg) tablet 2,000 Int'l Units  2,000 Int'l Units Oral DAILY     Current Outpatient Medications   Medication Sig    lenalidomide (Revlimid) 15 mg cap Take 1 Capsule by mouth See Admin Instructions. Take days 1-21, off 7 days.  latanoprost (XALATAN) 0.005 % ophthalmic solution Administer 1 Drop to both eyes nightly.  losartan (COZAAR) 50 mg tablet Take 1 Tablet by mouth daily for 30 days.  glucagon (Glucagon Emergency Kit, human,) 1 mg solr by Injection route.  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 Inject as per sliding scale: if 201 - 250 = 2 units; 251 - 300 = 4 units; 301 - 350 = 6 units Greater than 350, CALL MD, subcutaneously before meals and at bedtime related to TYPE 2 DIABETES MELLITUS WITHOUT COMPLICATIONS    potassium bicarb-citric acid (EFFER-K) 20 mEq tablet Take 1 Tablet by mouth two (2) times a day.  polyethylene glycol (MIRALAX) 17 gram packet Take 1 Packet by mouth daily.  amLODIPine (NORVASC) 10 mg tablet Take 1 Tablet by mouth daily.  cholecalciferol, vitamin D3, (Vitamin D3) 50 mcg (2,000 unit) tab Take 1 Tablet by mouth daily for 30 days.  levothyroxine (SYNTHROID) 88 mcg tablet Take 1 Tablet by mouth Daily (before breakfast) for 30 days.  metoprolol succinate (TOPROL-XL) 25 mg XL tablet Take 1 Tablet by mouth daily for 30 days.  pantoprazole (PROTONIX) 40 mg tablet Take 1 Tablet by mouth Daily (before breakfast) for 30 days.  ferrous sulfate 325 mg (65 mg iron) tablet Take 1 Tablet by mouth Daily (before breakfast) for 30 days.  cyanocobalamin 1,000 mcg tablet Take 1 Tablet by mouth daily for 30 days.  benzonatate (TESSALON) 100 mg capsule Take 1 Capsule by mouth as needed for Cough for up to 14 doses.  hydrALAZINE (APRESOLINE) 50 mg tablet Take 1 Tablet by mouth three (3) times daily for 30 days.  sucralfate (CARAFATE) 1 gram tablet Take 1 Tablet by mouth Before breakfast, lunch, dinner and at bedtime for 30 days.        Allergies  Allergies   Allergen Reactions    Celebrex [Celecoxib] Hives    Crestor [Rosuvastatin] Myalgia       CBC:  Recent Labs     05/31/22  0252 05/30/22 2200 05/29/22  0326   WBC 6.5 6.9 6.2   HGB 8.0* 7.9* 8.4*   HCT 23.8* 23.2* 25.3*    275 749       Metabolic Panel:  Recent Labs     05/31/22  0252 05/30/22 2200 05/29/22  0326   * 125* 130*   K 3.0* 3. 0* 3.4*   CL 95* 92* 95*   CO2 26 28 30   BUN 28* 31* 22*   CREA 1.52* 1.64* 1.23*   GLU 92 107* 102*   CA 13.6* 14.4* QUANTITY NOT SUFFICIENT. SUGGEST RECOLLECTION   MG 1.3*  --  1.5*   PHOS  --   --  2.9   ALB 1.9* 2.1* 2.3*   ALT 12 13 13            Assessment and Plan     Bogdan Hussein is a 80 y.o. female with a PMHx of recently diagnosed Multiple Myeloma, hypercalcemia, HTN, Macrocytic anemia, GERD, Badycardia  who is admitted for altered mental status in the setting of hyponatremia and hypercalcemia.     Encephalopathy in the setting of Acute on Chronic Hyponatremia:  No focal findings on neuro exam, CT head and CT spine with NAP. Most likely secondary to Na 125 in the setting of probable dehydration (elevated Cr). Low Na possible pseudohyponatremia in the setting of Multiple Myeloma. Continued work up for alternative etiology as below. - AMS labs including TSH, Ammonia, Salicylates, Volatiles   - Fall Precautions   - 1/2 MIVF with Normal Saline (50cc/hr), gentle due to elevated pBNP  - BMP q6, with daily CMP  - Urine lytes including Urine Sodium, Urine Cr, Urine Osm. Serum Osm.   - Strict IO  - Passed bedside dysphagia, start easy chew diet   - Nephrology consulted, appreciate recs   - Consider further neuro imaging and consult if symptoms not improving. Hyperammoniemia: Elevated Ammonia 57, possible source of AMS. - trend q6hr  - started Lactulose 20g BID      Hypertensive Emergency: /68, with elevated troponin and AMS. Chronic HTN, difficult to control  - target  (goal reduction 25%) in first 24 hours   - Hydralazine IV 10mg PRN, for SBP>180  - Continue to Hold home Amlodipine 10 mg daily and metoprolol XL 25 mg daily, Losartan 100 mg daily, and Hydral 50 mg TID      Hypercalcemia: POA Ca of 14.4, improving to 13.6 overnight. In the setting of recent diagnosis of Multiple Myeloma. Likley cause of current abdominal pain.    - 1/2 MIVF with NS at 50cc/hr  - Gentle with fluids due to elevated pro-BnP, may increase today due to no signs of fluid overload. - Daily CMP  - per Heme/Onc will be considered for Zometa.     Elevated Creatinine: Cr POA 1.64 (bl ~0.9). Likely due to dehydration. - 1/2 MIVF with NS at 50cc/hr, gentle with fluids given elevated BnP. Appears volume down on exam, will consider fluid increase.  - Daily CMP  - Urine lytes as above  - Nephrology consulted, appreciate recs      Abdominal Pain: Chronic issues, prior workup including EGD and colonoscopy negative. Prior CT showing hiatal hernia. Likely secondary to hypercalcemia, less likely hernia. UA negative for UTI, pelvic xray showing no acute process. - Expect to resolve with treatment of underlying hypercalcemia.   - Continue home Carafate   - Hold home protonix as possible contribution to HypoNa  - If fails to improve, consider further imaging.      Elevated Troponin: Trop initially 56, repeat flat at 56. No chest pain, EKG with no ischemia.   - Continue to monitor on tele     Elevated pro-BNP: POA BnP 2833. Prior Echo in February EF 60-65%. Lungs clear on exam, no edema. Likely secondary to HTN and anemia. - repeat Echo  - cautious with fluids at this time, however appears to be fluid down on exam. Consider increasing fluids.     Hypokalemia: Chronic. Received 25 meq via IV overnight, then additional 40meq PO. Will continue to follow on BMP trends. - replete IV at this time due to NPO  - Continue home PO EfferK 20 BID when taking PO  - daily CMP, replete as needed     Multiple Myeloma: Recently diagnosed on prior admission in April. Established with Dr. Daniel Salvador outpatient. Has been unable to receive transfusions while at rehab, had planned to start after transfer home. - Heme/Onc consulted, appreciate recs   - Consider Zometa outpatient for hypercalcemia     Macrocytic Anemia: Hgb 7.9 POA (bl ~8). - Heme/Onc consulted, appreciate recs  - restart home cyanocobalamin 1,000 daily      DM2: POA glucose 131.  Last A1c <3.8 (L) on 4/28/2022 . Not on any home medications. - Monitor on daily CMP, no SSI at this time      HLD: Last lipid panel 12/6/21: Tchol 122, HDL 67, LDL 45.6, trig 47. No home medications.     Hypothyroidism: TSH 6.26 (5/1/2022). - Continue home Synthroid 88 mcg daily before breakfast  - TSH 1.55     CAD: MI (during surgery in 1970s) had multiple caths and stress tests. Records of cardiology visit w/ Dr. Tae Fishman on 3/22/17. Exercise Cardiolite 7/23/15 - walked 4:39 (6.3 METS), normal stress EKG. Normal MPI. LVEF 71%. Echo 2/25/22 - LVEF 60-65%.     Rectal prolapse: Occurred x 1 during prior hospitalization, manual reduction. No acute complications. - Refer to Colorectal surgery for outpatient follow-up     GERD: Chronic.  - Hold home Protonix 40 mg daily, possible worsening of Hyponatremia  - restart home Sucralfate      Bradycardia: Chronic. EKG showing 1st deg AV block, chronic.   - holding home Metoprolol XL 25mg daily         FEN/GI - GI lite. NS at 1/2 MIVF (50cc/hr)  Activity - Ambulate with assistance  DVT prophylaxis - Heparin   GI prophylaxis - Holding home Protonix for hyponatremia  Fall prophylaxis - Fall precautions ordered. Disposition - Admit to Telemetry. Plan to d/c to TBD. Consulting PT, OT and CM  Code Status - DNR. Discussed with patient / caregivers.   Next of Rajni 69 Name and Anthony Colon (Child)   [phone]    Zakiya Rios MD  Family Medicine Resident       For Billing    Chief Complaint   Patient presents with   [facility] [place] Problems  Date Reviewed: 5/31/2022          Codes Class Noted POA    Hypomagnesemia ICD-10-CM: E83.42  ICD-9-CM: 275.2  5/31/2022 Unknown        Hyponatremia ICD-10-CM: E87.1  ICD-9-CM: 276.1  5/31/2022 Unknown        Elevated brain natriuretic peptide (BNP) level ICD-10-CM: R79.89  ICD-9-CM: 790.99  5/31/2022 Unknown        * (Principal) Encephalopathy ICD-10-CM: G93.40  ICD-9-CM: 348.30  5/30/2022 Unknown        Multiple myeloma not having achieved remission [facility]) ICD-10-CM: C90.00  ICD-9-CM: 203.00  5/13/2022 Yes        Hypercalcemia ICD-10-CM: I37.99  ICD-9-CM: 275.42  5/13/2022 Yes        Hypertensive emergency ICD-10-CM: I16.1  ICD-9-CM: 401.9  2/25/2022 Yes        Hypokalemia ICD-10-CM: E87.6  ICD-9-CM: 276.8  2/24/2022 Yes        Elevated troponin ICD-10-CM: R77.8  ICD-9-CM: 790.6  2/24/2022 Yes        Nodule of left lobe of thyroid gland ICD-10-CM: E04.1  ICD-9-CM: 241.0  9/8/2020 Yes        Macrocytic anemia ICD-10-CM: D53.9  ICD-9-CM: 281.9  8/29/2018 Yes        Type 2 diabetes mellitus without complication, without long-term current use of insulin (HCC) ICD-10-CM: E11.9  ICD-9-CM: 250.00  4/24/2017 Yes        HTN (hypertension) ICD-10-CM: I10  ICD-9-CM: 401.9  Unknown Yes        Hypothyroidism, iatrogenic ICD-10-CM: E03.2  ICD-9-CM: 244. 3  Unknown Yes    Overview Signed 7/13/2015  3:16 PM by Krystyna Cid MD     radioiodine             Hypercholesteremia ICD-10-CM: E78.00  ICD-9-CM: 272.0  Unknown Yes               [address] Medicine Residency Attending Addendum:  I saw and evaluated the patient on the day of the encounter with Dr. Guillaume Mejia, performing the key elements of the service. I discussed the findings, assessment and plan with the resident and agree with the resident's findings and plan as documented in the resident's note. Restart p.o. medications slowly and IV as needed to get blood pressure down by 25%. Appreciate nephrology and heme-onc assistance recommendations. Continue IV fluids and Zometa. We will consult palliative as well. Check renal ultrasound. Prognosis guarded.     Irving Dodge MD, FAAFP, CAQSM, RMSK

## 2022-05-31 NOTE — PROGRESS NOTES
Physical therapy note    Chart reviewed in preparation for physical therapy evaluation. Spoke with RN, patient with increased somnolence this morning, difficulty maintaining arousal and elevated blood pressure, 193/70. Will hold PT per RN recommendations and continue to follow and attempt later as able.      Thank you,  Morgan Srinivasan, PT, DPT

## 2022-05-31 NOTE — CONSULTS
Palliative Medicine Consult  [place]: [phone])    Patient Name: Arnetta Shone  YOB: 1935    Date of Initial Consult: 6/1/2022  Reason for Consult: Care Discussions  Requesting Provider: Dr. Kim Aburto  Primary Care Physician: Guero Guo MD     SUMMARY:   Arnetta Shone is a 80 y.o. with a past history of newly dx multiple myeloma (follows w/Dr. Claire Alcantar, initiation of tx pending completion of rehab),CAD, HTN, HLD, DM2, Hypothyroidism, EDUARDO, GERD who was admitted on 5/30/2022 from [facility] with a diagnosis of metabolic encephalopathy, hypercalcemia and hyponatremia. HPI: Ms. Alfredo Garcia presented to ER w/ cc rapid functional and cognitive decline over the past week of SNF. Prior to SNF, she had been hospitalized 4/28-5/5/2022 2/2  generalized weakness and UTI. During hospitalization, Hem/Onc was consulted for anemia and underwent bone marrow bx, which confirmed new diagnosis of multiple myeloma. Patient was discharged to SNF x 2 weeks, with goal of getting stronger so that she could start chemo. Per EMR, family reported patient had been making progress with therapy up until a week ago, when she began to decline, with progressive weakness and worsening confusion/     Course of Hospitalization: AMS persists, far from her baseline. Hypercalcemia is improving s/p zometa,  Ammonia levels  elevated, etiology of which  is unclear. Nephrology consulted for increasing Creatinine. PT/OT evaluated, significant decline from baseline, with decreased arousal and command following,  requiring max assist for mobility. Hem/Onc following. Current medical issues leading to Palliative Medicine involvement include: Advanced age, persistent encephalopathy, worsening weakness, poor nutrition, in setting of untreated multiple myeloma. **ADDENDUM- Corrected Date of initial service to 6/01/2022,      PALLIATIVE DIAGNOSES:   1. Palliative care encounter   2.  Altered mental status, unspecified  3. Weakness, generalized  4. Poor nutrition  5. Advanced care planning discussion  6. Goals of care Discussion       PLAN:   1. Prior to visit completed chart review and discussed with patients nurse Zainab Hernandez. 2. I met with Ms. Michell Bro, her granddaughter was at bedside, patient is lethargic, [place], able to answer yes/no questions intermittently, knows she is in hospital, but unable to participate in discussion. 3. AMS, unspecified- Unresolved, she has been mostly somnolent, intermittently responding to questions, Etiology likely 2/2 hypercalcemia, but hyperammonemia may also be contributing  4. Poor nutrition- unresolved, AMS is barrier to nutrition, she is at HR aspiration 2/2 AMS, she is unable to stay awake long enough to take oral nutrition. 5. Patient completed a POST during prior hospitalization, which confirms her wishes for  DNR/DNI. 6. NO AMD on file. In the absence of a verified medical POA, both daughters would serve together as mPOA. 7. Goals of care discussion-family ultimately remains hopeful patient will be able to pursue palliative treatment of multiple myeloma, with a goal of prolonging her life. However, they understand that if encephalopathy does not resolve over the next couple of days, palliative treatment may no longer be an option. · Plan to meet with patient's daughter Dereck Wilkinson at 15 PM on Thursday 6/2.  8. Initial consult note routed to primary continuity provider and/or primary health care team members  9. Communicated plan of care with: Palliative [facility] Team, Claire Cordero RN     GOALS OF CARE / TREATMENT PREFERENCES:     GOALS OF CARE:  Patient/Health Care Proxy Stated Goals: Prolong life    TREATMENT PREFERENCES:   Code Status: DNR    Patient and family's personal goals include:     Important upcoming milestones or family events:      The patient identifies the following as important for living well:       Advance Care Planning:  [x] [facility] Med Interdisciplinary Team has updated the ACP Navigator with Health Care Decision Maker and Patient Capacity      Primary Decision Maker: Leslie Lombardo - [phone]    Primary Decision Maker: Yelitza Hernández - [phone]  Advance Care Planning 5/31/2022   Patient's Healthcare Decision Maker is: -   Confirm Advance Directive Yes, on file   Patient Would Like to Complete Advance Directive -   Does the patient have other document types Do Not Resuscitate; Power of        Medical Interventions: Limited additional interventions       Other:    As far as possible, the palliative care team has discussed with patient / health care proxy about goals of care / treatment preferences for patient.      HISTORY:     History obtained from: medical records/nurse/family    CHIEF COMPLAINT: I want coffee    HPI/SUBJECTIVE:    The patient is:   [] Verbal and participatory  [x] Non-participatory due to:   Patient stated that she wants coffee and  with significant prompting and stimuli to have her remain awake, she was able to tell me that she was in hospital, but  unable to elicit any further verbal responses/ .    Clinical Pain Assessment (nonverbal scale for severity on nonverbal patients):   Clinical Pain Assessment  Severity: 0  Location: unable to report  Character: unable to report  Duration: unable to report  Effect: unable to report  Factors: unable to report  Frequency: unable to report     Activity (Movement): Restless, excessive activity and/or withdrawal reflexes    Duration: for how long has pt been experiencing pain (e.g., 2 days, 1 month, years)  Frequency: how often pain is an issue (e.g., several times per day, once every few days, constant)     FUNCTIONAL ASSESSMENT:     Palliative Performance Scale (PPS):  PPS: 20       PSYCHOSOCIAL/SPIRITUAL SCREENING:     Palliative IDT has assessed this patient for cultural preferences / practices and a referral made as appropriate to needs (Cultural Services, Patient Advocacy, Ethics, etc.)    Any spiritual / Scientologist concerns:  [] Yes /  [x] No   If \"Yes\" to discuss with pastoral care during IDT     Does caregiver feel burdened by caring for their loved one:   [] Yes /  [] No /  [x] No Caregiver Present    If \"Yes\" to discuss with social work during IDT    Anticipatory grief assessment:   [x] Normal  / [] Maladaptive     If \"Maladaptive\" to discuss with social work during IDT    ESAS Anxiety: Anxiety: 2    ESAS Depression:          REVIEW OF SYSTEMS:     Positive and pertinent negative findings in ROS are noted above in HPI. The following systems were [] reviewed / [x] unable to be reviewed as noted in HPI  Other findings are noted below. Systems: constitutional, ears/nose/mouth/throat, respiratory, gastrointestinal, genitourinary, musculoskeletal, integumentary, neurologic, psychiatric, endocrine. Positive findings noted below. Modified ESAS Completed by: provider   Fatigue: 10 Drowsiness: 9     Pain: 0   Anxiety: 2     Anorexia: 9                      PHYSICAL EXAM:     From RN flowsheet:  Wt Readings from Last 3 Encounters:   06/01/22 117 lb 8.1 oz (53.3 kg)   05/23/22 110 lb (49.9 kg)   05/13/22 113 lb 9.6 oz (51.5 kg)     Blood pressure (!) 139/91, pulse 68, temperature 99.1 °F (37.3 °C), resp. rate 20, height 5' (1.524 m), weight 117 lb 8.1 oz (53.3 kg), SpO2 96 %, not currently breastfeeding.     Pain Scale 1: Numeric (0 - 10)  Pain Intensity 1: 0                 Last bowel movement, if known:     Constitutional: appears stated age, thin, frail and ill appearing  Eyes: pupils equal, anicteric  ENMT: no nasal discharge, dry  mucous membranes  Cardiovascular: regular rhythm, distal pulses intact  Respiratory: breathing not labored, symmetric  Gastrointestinal: soft non-tender, +bowel sounds  Musculoskeletal: no deformity, no tenderness to palpation  Skin: warm, dry  Neurologic: somnolent, decreased command following,   Psychiatric: unable to assess  Other:       HISTORY:     Principal Problem:    Encephalopathy (5/30/2022)    Active Problems:    HTN (hypertension) ()      Hypothyroidism, iatrogenic ()      Overview: radioiodine      Hypercholesteremia ()      Type 2 diabetes mellitus without complication, without long-term current use of insulin ([address] (4/24/2017)      Macrocytic anemia (8/29/2018)      Nodule of left lobe of thyroid gland (9/8/2020)      Hypokalemia (2/24/2022)      Elevated troponin (2/24/2022)      Hypertensive emergency (2/25/2022)      Multiple myeloma not having achieved remission ([address] (5/13/2022)      Hypercalcemia (5/13/2022)      Hypomagnesemia (5/31/2022)      Hyponatremia (5/31/2022)      Elevated brain natriuretic peptide (BNP) level (5/31/2022)      Past Medical History:   Diagnosis Date    Diabetes ([address]     GI bleeding     workup in [facility] 12/15 was unremarkable (Dr. Binh Hodgson) - says she had EGD, colonoscopy, and small bowel capsule endoscopy    HTN (hypertension)     Hypercholesteremia     Hypothyroidism, iatrogenic     radioiodine    MI (myocardial infarction) ([address]     During surgery in 1970's - she's had multiple caths and stress tests      Past Surgical History:   Procedure Laterality Date    COLONOSCOPY N/A 5/2/2022    COLONOSCOPY performed by Sandro Unger MD at [address] [facility]  [address] [facility] HX HERNIA REPAIR  [id number]    Open umbilical incisional herniorrhaphy [facility]).  HX HERNIA REPAIR Right 09/23/2020    Right inguinal herniorrhaphy with mesh.  HX ORTHOPAEDIC      Back surgery x 2.    HX BRIAN AND BSO        Family History   Problem Relation Age of Onset    Heart Failure Mother     Heart Disease Mother     Hypertension Mother       History reviewed, no pertinent family history.   Social History     Tobacco Use    Smoking status: Never Smoker    Smokeless tobacco: Never Used   Substance Use Topics    Alcohol use: Not Currently     Alcohol/week: 0.0 standard drinks     Comment: Occ. glass of wine     Allergies   Allergen Reactions    Celebrex [Celecoxib] Hives    Crestor [Rosuvastatin] Myalgia      Current Facility-Administered Medications   Medication Dose Route Frequency    [Held by provider] hydrALAZINE (APRESOLINE) tablet 50 mg  50 mg Oral TID    hydrALAZINE (APRESOLINE) 20 mg/mL injection 20 mg  20 mg IntraVENous Q6H    sodium chloride (NS) flush 5-40 mL  5-40 mL IntraVENous Q8H    sodium chloride (NS) flush 5-40 mL  5-40 mL IntraVENous PRN    acetaminophen (TYLENOL) tablet 650 mg  650 mg Oral Q6H PRN    Or    acetaminophen (TYLENOL) suppository 650 mg  650 mg Rectal Q6H PRN    polyethylene glycol (MIRALAX) packet 17 g  17 g Oral DAILY PRN    ondansetron (ZOFRAN ODT) tablet 4 mg  4 mg Oral Q8H PRN    Or    ondansetron (ZOFRAN) injection 4 mg  4 mg IntraVENous Q6H PRN    0.9% sodium chloride infusion  100 mL/hr IntraVENous CONTINUOUS    levothyroxine (SYNTHROID) tablet 88 mcg  88 mcg Oral ACB    lactulose (CHRONULAC) 10 gram/15 mL solution 30 mL  20 g Oral BID    heparin (porcine) injection 5,000 Units  5,000 Units SubCUTAneous Q12H    ferrous sulfate tablet 325 mg  325 mg Oral ACB    latanoprost (XALATAN) 0.005 % ophthalmic solution 1 Drop  1 Drop Both Eyes QHS    sucralfate (CARAFATE) tablet 1 g  1 g Oral AC&HS    cyanocobalamin (VITAMIN B12) tablet 1,000 mcg  1,000 mcg Oral DAILY    [Held by provider] metoprolol succinate (TOPROL-XL) XL tablet 25 mg  25 mg Oral DAILY    amLODIPine (NORVASC) tablet 10 mg  10 mg Oral DAILY    metoprolol (LOPRESSOR) injection 5 mg  5 mg IntraVENous BID          LAB AND IMAGING FINDINGS:     Lab Results   Component Value Date/Time    WBC 5.7 06/01/2022 04:07 AM    HGB 7.6 (L) 06/01/2022 04:07 AM    PLATELET 437 44/95/2064 04:07 AM     Lab Results   Component Value Date/Time    Sodium 135 (L) 06/01/2022 03:56 PM    Potassium 3.2 (L) 06/01/2022 03:56 PM    Chloride 105 06/01/2022 03:56 PM    CO2 25 06/01/2022 03:56 PM    BUN 33 (H) 06/01/2022 03:56 PM    Creatinine 1.91 (H) 06/01/2022 03:56 PM    Calcium 11.4 (H) 06/01/2022 03:56 PM    Magnesium 2.0 06/01/2022 04:07 AM    Phosphorus 2.9 05/29/2022 03:26 AM      Lab Results   Component Value Date/Time    Alk. phosphatase 39 (L) 06/01/2022 04:07 AM    Protein, total 10.2 (H) 06/01/2022 04:07 AM    Albumin 2.0 (L) 06/01/2022 04:07 AM    Globulin 8.2 (H) 06/01/2022 04:07 AM     No results found for: INR, PTMR, PTP, PT1, PT2, APTT, INREXT, INREXT   Lab Results   Component Value Date/Time    Iron 51 04/05/2022 10:59 AM    TIBC 252 04/05/2022 10:59 AM    Iron % saturation 20 04/05/2022 10:59 AM    Ferritin 34 04/30/2022 10:47 AM      No results found for: PH, PCO2, PO2  No components found for: [place]   Lab Results   Component Value Date/Time     04/28/2022 11:16 AM                Total time: 70 min  Counseling / coordination time, spent as noted above: 50 min  > 50% counseling / coordination?: yes    Prolonged service was provided for  []30 min   []75 min in face to face time in the presence of the patient, spent as noted above. Time Start:   Time End:   Note: this can only be billed with 72694 (initial) or 58154 (follow up). If multiple start / stop times, list each separately.

## 2022-05-31 NOTE — PROGRESS NOTES
11:07 AM  MD notified of pt's BP continuing to be elevated and her now being slightly bradycardic.     12:33 PM  MD aware of pt's BP, team is evaluating options.

## 2022-05-31 NOTE — PROGRESS NOTES
[address] [facility] [address]   Office [phone]  Fax [phone]            Subjective / Objective     24 Hour Events: Retaining urine overnight. Straight cath x2 but still retaining so eng[place] was placed. BP also elevated overnight. Nitropaste applied but was removed after BP downtrended to 108/47. Prn Hydralazine was increased to 20mg. Subjective: Pt was seen and examined at bedside. Oriented to self only. Intermittently responsive to questions and commands. Objective:    Tele: NSR, rate 70s-80s, frequent PVCs     Respiratory:   O2 Device: None (Room air)   Visit Vitals  BP (!) 147/85   Pulse 81   Temp 98 °F (36.7 °C)   Resp 19   Ht 5' (1.524 m)   Wt 110 lb (49.9 kg)   SpO2 94%   Breastfeeding No   BMI 21.48 kg/m²     Physical Exam:  General: No acute distress. Frail. Elderly. Respiratory: CTAB, no wheezes or crackles  Cardiovascular: Regular rate and rhythm, clear S1 S2. Pulses present bilaterally. GI: Nondistended. + bowel sounds. Diffuse tenderness across lower abdomen. Extremities: No LE edema. Pulses present. Skin: Warm, dry. Neuro: Oriented only to self. No focal deficits. Hard of hearing. Intermittently follows commands. I/O:  Date 05/31/22 0700 - 06/01/22 0659 06/01/22 0700 - 06/02/22 0659   Shift 8214-2692 6663-7306 24 Hour Total 6735-6139 4703-1393 24 Hour Total   INTAKE   P.O. 0 0 0        P. O. 0 0 0      Shift Total(mL/kg) 0(0) 0(0) 0(0)      OUTPUT   Urine(mL/kg/hr) 800(1.3) 650(1.1) 1450(1.2)        Urine         Urine Voided  0 0      Shift Total(mL/kg) 800(16) 650(13) 1450(29.1)      NET -800 -650 -1450      Weight (kg) 49.9 49.9 49.9 49.9 49.9 49.9       Inpatient Medications  Current Facility-Administered Medications   Medication Dose Route Frequency    potassium chloride 10 mEq in 100 ml IVPB  10 mEq IntraVENous Q1H    hydrALAZINE (APRESOLINE) tablet 50 mg  50 mg Oral TID    sodium chloride (NS) flush 5-40 mL  5-40 mL IntraVENous Q8H    sodium chloride (NS) flush 5-40 mL  5-40 mL IntraVENous PRN    acetaminophen (TYLENOL) tablet 650 mg  650 mg Oral Q6H PRN    Or    acetaminophen (TYLENOL) suppository 650 mg  650 mg Rectal Q6H PRN    polyethylene glycol (MIRALAX) packet 17 g  17 g Oral DAILY PRN    ondansetron (ZOFRAN ODT) tablet 4 mg  4 mg Oral Q8H PRN    Or    ondansetron (ZOFRAN) injection 4 mg  4 mg IntraVENous Q6H PRN    0.9% sodium chloride infusion  100 mL/hr IntraVENous CONTINUOUS    levothyroxine (SYNTHROID) tablet 88 mcg  88 mcg Oral ACB    lactulose (CHRONULAC) 10 gram/15 mL solution 30 mL  20 g Oral BID    heparin (porcine) injection 5,000 Units  5,000 Units SubCUTAneous Q12H    ferrous sulfate tablet 325 mg  325 mg Oral ACB    latanoprost (XALATAN) 0.005 % ophthalmic solution 1 Drop  1 Drop Both Eyes QHS    sucralfate (CARAFATE) tablet 1 g  1 g Oral AC&HS    cyanocobalamin (VITAMIN B12) tablet 1,000 mcg  1,000 mcg Oral DAILY    cholecalciferol (VITAMIN D3) (1000 Units /25 mcg) tablet 2,000 Int'l Units  2,000 Int'l Units Oral DAILY    [Held by provider] metoprolol succinate (TOPROL-XL) XL tablet 25 mg  25 mg Oral DAILY    amLODIPine (NORVASC) tablet 10 mg  10 mg Oral DAILY    metoprolol (LOPRESSOR) injection 5 mg  5 mg IntraVENous BID    hydrALAZINE (APRESOLINE) 20 mg/mL injection 20 mg  20 mg IntraVENous Q6H PRN       Allergies  Allergies   Allergen Reactions    Celebrex [Celecoxib] Hives    Crestor [Rosuvastatin] Myalgia       CBC:  Recent Labs     06/01/22  0407 05/31/22  0252 05/30/22  2200   WBC 5.7 6.5 6.9   HGB 7.6* 8.0* 7.9*   HCT 23.2* 23.8* 23.2*    267 558       Metabolic Panel:  Recent Labs     06/01/22  0407 05/31/22  2231 05/31/22  1545 05/31/22  1044 05/31/22  0252   * 130* 129*   < > 128*   K 3.1* 3.4* 3.8   < > 3.0*    101 98   < > 95*   CO2 23 23 25   < > 26   BUN 31* 30* 31*   < > 28*   CREA 1.81* 1.74* 1.69*   < > 1.52*   GLU 92 98 84   < > 92   CA 12.5* 13.1* 14.0*   < > 13.6* MG 2.0  --   --   --  1.3*   ALB 2.0* 2.0* 2.1*  --  1.9*   ALT 11* 12 11*  --  12    < > = values in this interval not displayed. Assessment and Plan     Karlene Enriquez is a 80 y.o. female with a PMHx of recently diagnosed Multiple Myeloma, hypercalcemia, HTN, Macrocytic anemia, GERD, Badycardia  who is admitted for altered mental status in the setting of hyponatremia and hypercalcemia.     Encephalopathy likely 2/2 multiple metabolic derangements in s/o MM:  Most likely 2/2 marked hypercalcemia vs pseudohyponatremia. MM diagnosed 4/2022. Prior head and C-spine imaging negative. AMS labs negative. FeNa 0.4%. F/b Dr. Tanya Davenport outpatient. Gradual improvement in mental status. - Heme/onc consulted, appreciate rec's  - Palliative consulted, appreciate rec's. Will see today. - MIVF  - Fall Precautions   - BMP q12h   - Strict IO  - Nephrology consulted, appreciate recs    - Consider further neuro imaging and consult if symptoms not improving. Hyperammoniemia: Ammonia downtrending. Improving with lactulose. - trend daily   - Lactulose 20g BID      Hypertensive Urgency: /68, with elevated troponin (flat on recheck). BP has been labile. Discontinued nitropaste given BP after temporary downtrend to 108/47 overnight; however, now returned to SBP range 140-190.   - Hydralazine IV 20mg PRN, for SBP>180  - Home metoprolol > IV metop 5 mg BID given poor po status  - Restarted Amlodipine 10mg daily, Hydral 50mg TID   - Continue to hold Losartan 100 mg daily given hyponatremia      Hypercalcemia: Correcting for low albumin, at 14, though has been downtrending. Improving s/p zometa per H/O and calcitonin x1 dose. - MIVF   - Daily CMP     At Risk for DOMINIK: Cr POA 1.64 (bl ~0.9). Likely due to dehydration vs hypercalcemia. Trending upward. - continue MIVF  - Daily CMP  - Nephrology consulted, appreciate recs      Abdominal Pain: Chronic issues, prior workup including EGD and colonoscopy negative. Prior CT showing hiatal hernia. Likely secondary to hypercalcemia, less likely hernia. UA negative for UTI, pelvic xray showing no acute process. No complaints this morning.   - Expect to resolve with treatment of underlying hypercalcemia.   - Continue home Carafate   - Hold home protonix as possible contribution to HypoNa  - If fails to improve, consider further imaging. Malnutrition: Patient with decreased po intake. Likely secondary to enncephalopathy and MM.  - Nutrition following, appreciate rec's   - Consider nutrition supplement, favor avoiding high protein supplement given MM and elevated total protein     Elevated Troponin (Resolved): Trop initially 56, repeat flat at 56. No chest pain, EKG with no ischemia. Tele with NSR 70s-80s and PVCs. - Continue to monitor on tele     Elevated pro-BNP: POA BnP 2833. Prior Echo in February EF 60-65%. Lungs clear on exam, no edema. Likely secondary to HTN and anemia. - repeat Echo w/ EF 55-60 and G1DD.   - cautious with fluids at this time, no evidence of fluid overload      Hypokalemia: Chronic. Will continue to follow on BMP trends. - replete IV at this time given poor po status   - Continue home PO EfferK 20 BID when taking PO  - daily metabolic panel, replete as needed    Hyponatremia: Improving. Holding Losartan and protonix given possible contributing factors. Likely 2/2 IVVD vs pseudohyponatremia given elevated total protein. - continue MIVF   - trend daily      Macrocytic Anemia: Hgb 7.9 POA (bl ~8). - Heme/Onc consulted, appreciate recs  - home cyanocobalamin 1,000 daily   - Transfusion threshold Hgb 7.0 per H/O      DM2: Last A1c <3.8 (L) on 4/28/2022. Not on any home medications. Glucose range stable. - Monitor on daily metabolic panel, no SSI at this time      HLD: Last lipid panel 12/6/21: Tchol 122, HDL 67, LDL 45.6, trig 47. No home medications.     Hypothyroidism: TSH 6.26 (5/1/2022).   - Continue home Synthroid 88 mcg daily before breakfast  - rTSH 1.55     CAD: MI (during surgery in 1970s) had multiple caths and stress tests. Records of cardiology visit w/ Dr. Peace Estrada on 3/22/17. Exercise Cardiolite 7/23/15 - walked 4:39 (6.3 METS), normal stress EKG. Normal MPI. LVEF 71%. Echo 2/25/22 - LVEF 60-65%.     Rectal prolapse: Occurred x 1 during prior hospitalization, manual reduction. No acute complications. - Refer to Colorectal surgery for outpatient follow-up     GERD: Chronic.  - Hold home Protonix 40 mg daily, possible worsening of Hyponatremia  - restart home Sucralfate      Bradycardia: Chronic. EKG showing 1st deg AV block, chronic.   - hold home Metoprolol XL 25mg daily. Substitute IV Metoprolol 5mg BID         FEN/GI - Easy to chew.  ml/hr. Activity - Ambulate with assistance  DVT prophylaxis - Heparin   GI prophylaxis - Holding home Protonix for hyponatremia  Fall prophylaxis - Fall precautions ordered. Disposition - Plan to d/c to TBD. Consulting PT, OT and CM  Code Status - DNR. Discussed with patient / caregivers.   Next of Rajni 69 Name and Yessi Rader (Child)   [phone]    Halina Hashimoto, MD  Family Medicine Resident       For Billing    Chief Complaint   Patient presents with   [facility] [place] Problems  Date Reviewed: 5/31/2022          Codes Class Noted POA    Hypomagnesemia ICD-10-CM: E83.42  ICD-9-CM: 275.2  5/31/2022 Unknown        Hyponatremia ICD-10-CM: E87.1  ICD-9-CM: 276.1  5/31/2022 Unknown        Elevated brain natriuretic peptide (BNP) level ICD-10-CM: R79.89  ICD-9-CM: 790.99  5/31/2022 Unknown        * (Principal) Encephalopathy ICD-10-CM: G93.40  ICD-9-CM: 348.30  5/30/2022 Unknown        Multiple myeloma not having achieved remission ([facility] [address] ICD-10-CM: C90.00  ICD-9-CM: 203.00  5/13/2022 Yes        Hypercalcemia ICD-10-CM: W87.70  ICD-9-CM: 275.42  5/13/2022 Yes        Hypertensive emergency ICD-10-CM: I16.1  ICD-9-CM: 401.9  2/25/2022 Yes        Hypokalemia ICD-10-CM: E87.6  ICD-9-CM: 276.8 2/24/2022 Yes        Elevated troponin ICD-10-CM: R77.8  ICD-9-CM: 790.6  2/24/2022 Yes        Nodule of left lobe of thyroid gland ICD-10-CM: E04.1  ICD-9-CM: 241.0  9/8/2020 Yes        Macrocytic anemia ICD-10-CM: D53.9  ICD-9-CM: 281.9  8/29/2018 Yes        Type 2 diabetes mellitus without complication, without long-term current use of insulin (HCC) ICD-10-CM: E11.9  ICD-9-CM: 250.00  4/24/2017 Yes        HTN (hypertension) ICD-10-CM: I10  ICD-9-CM: 401.9  Unknown Yes        Hypothyroidism, iatrogenic ICD-10-CM: E03.2  ICD-9-CM: 244. 3  Unknown Yes    Overview Signed 7/13/2015  3:16 PM by Shaylee Sheth MD     radioiodine             Hypercholesteremia ICD-10-CM: E78.00  ICD-9-CM: 272.0  Unknown Yes

## 2022-05-31 NOTE — CONSULTS
Cancer [place] [address]  [address] [facility], [address] [facility] [address] [facility]  Alana Shook: [phone]  F: [phone] Patient ID  Name: Yesika Daugherty  YOB: 1935  MRN: [id number]  Referring Provider:   No referring provider defined for this encounter. Primary Care Provider:   Meghna Jovel MD       HEMATOLOGY/MEDICAL ONCOLOGY  NOTE   Date of Visit: 05/31/22  Reason for Evaluation:     Chief Complaint   Patient presents with   Karmen Kruger     Hematology/Oncology Summary:  Please review original records for clinical decision making. This summary highlights focused aspects of patient's ongoing care and may have a recurring section in notes with either updates or remain unchanged as a longitudinal care summary. --------------------------------------------------------------------------------------------------------------------------------------------------------------------------------------------------------------------------  DIAGNOSIS:   Multiple Myeloma    ORIGINAL STAGING:   n/a    SITES OF DISEASE:   Bone Marrow    CURRENT TREATMENT:   To further discuss care. PRIOR TREATMENT:   Pulse Dex x 4 days. 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 ad3/5/6/2022   *Electronically Signed Out By Luz Elena Soto MD*   Bone marrow aspirate:   The bone marrow aspirate is cellular and adequate   for evaluation. Numerous plasma cells are present, including a subset of   large forms with prominent nucleoli. The background myeloid and erythroid   series progress through the full maturation sequence with no morphologic   abnormalities. Megakaryocytes are present in adequate numbers and show no   morphologic abnormalities. Lymphocytes and blasts do not appear increased   in number. Touch prep:  Cellular and reflects the aspirate smears. Bone marrow core biopsy: The bone marrow biopsy is 70% cellular and   adequate for evaluation. Large sheets of plasma cells are present. The   myeloid and erythroid series are reduced in number but progress through   the full maturation sequence without significant dysplasia. Megakaryocytes   are present in adequate numbers with normal morphology. Lymphocytes and   blasts do not appear increased in number. Clot section:  Particulate and reflects the core biopsy. Special stains:   Iron, aspirate: No stainable iron   Iron, clot: No stainable iron   Reticulin, core: Patchy reticulin fibrosis within sheets of plasma cells   Congo red, core: Negative for amyloid     Immunohistochemistry, core:   CD3: Highlights scattered small T cells   CD20: Highlights scattered small B cells   : Highlights marked increase in plasma cells, 80% of cellularity     Flow cytometry:   Diagnosis: Monoclonal plasma cells (31.5% of total cells), consistent with   a plasma cell neoplasm. Comments: Flow cytometry findings are consistent with a plasma cell   neoplasm (e.g. plasma cell myeloma or plasmacytoma). No immunophenotypic   evidence of a lymphoproliferative disorder, acute leukemia, or increase in   blasts is identified. Due to their fragility, plasma cells do not survive   flow cytometric processing well; thus, the flow cytometric enumeration may   be much less than that seen morphologically.  So, to properly classify this   process, correlation with the findings on the clot and core sections,   along with clinical, radiological and SPEP/UPEP information is necessary. Cytogenetic and FISH studies may provide useful prognostic information. Cytogenetics:  Normal  FISH:  dup1q,   t11:14   Molecular studies:  Not performed.      PERTINENT CARE EVENTS   n/a      Subjective:     History of Present Illness:     Samantha Alonzo is a 80 y.o. female with recently diagnosed Multiple Myeloma presents to the ER after worsening decline in performance status, decreased mentation, hypercalcemia, hyponatremia, increasing total protein. She has remained at [facility] for inpatient rehabilitation. We saw her last week and planned therapy for her myeloma after goals of care discussion with family. Her family wanted to pursue Revlimid/dexamethasone/zometa alone. Per phone discussion with daughter (patient not able to provide history), patient started to have a worsening decline last Thursday at the facility that led to an ER visit after a fall. She was sent back to her facility but unfortunately had further decline and returned to the ER yesterday evening. Daughter noted that patient stopped being able to participate in her physical therapy last Thursday compared to the best activity she had earlier in the week.  She was anticipating a discharged on 6/1/22 prior to her acute decline.   -  Current Facility-Administered Medications   Medication Dose Route Frequency    sodium chloride (NS) flush 5-40 mL  5-40 mL IntraVENous Q8H    sodium chloride (NS) flush 5-40 mL  5-40 mL IntraVENous PRN    acetaminophen (TYLENOL) tablet 650 mg  650 mg Oral Q6H PRN    Or    acetaminophen (TYLENOL) suppository 650 mg  650 mg Rectal Q6H PRN    polyethylene glycol (MIRALAX) packet 17 g  17 g Oral DAILY PRN    ondansetron (ZOFRAN ODT) tablet 4 mg  4 mg Oral Q8H PRN    Or    ondansetron (ZOFRAN) injection 4 mg  4 mg IntraVENous Q6H PRN    0.9% sodium chloride infusion 50 mL/hr IntraVENous CONTINUOUS    hydrALAZINE (APRESOLINE) 20 mg/mL injection 10 mg  10 mg IntraVENous Q6H PRN    levothyroxine (SYNTHROID) tablet 88 mcg  88 mcg Oral ACB    lactulose (CHRONULAC) 10 gram/15 mL solution 30 mL  20 g Oral BID    heparin (porcine) injection 5,000 Units  5,000 Units SubCUTAneous Q12H    ferrous sulfate tablet 325 mg  325 mg Oral ACB    latanoprost (XALATAN) 0.005 % ophthalmic solution 1 Drop  1 Drop Both Eyes QHS    sucralfate (CARAFATE) tablet 1 g  1 g Oral AC&HS    cyanocobalamin (VITAMIN B12) tablet 1,000 mcg  1,000 mcg Oral DAILY    cholecalciferol (VITAMIN D3) (1000 Units /25 mcg) tablet 2,000 Int'l Units  2,000 Int'l Units Oral DAILY    metoprolol succinate (TOPROL-XL) XL tablet 25 mg  25 mg Oral DAILY    amLODIPine (NORVASC) tablet 10 mg  10 mg Oral DAILY    zoledronic acid (ZOMETA) 4 mg in 0.9% sodium chloride 100 mL IVPB  4 mg IntraVENous NOW     Current Outpatient Medications   Medication Sig    lenalidomide (Revlimid) 15 mg cap Take 1 Capsule by mouth See Admin Instructions. Take days 1-21, off 7 days. HAD NOT STARTED YET.  latanoprost (XALATAN) 0.005 % ophthalmic solution Administer 1 Drop to both eyes nightly.  losartan (COZAAR) 50 mg tablet Take 1 Tablet by mouth daily for 30 days.  glucagon (Glucagon Emergency Kit, human,) 1 mg solr by Injection route. 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 Inject as per sliding scale: if 201 - 250 = 2 units; 251 - 300 = 4 units; 301 - 350 = 6 units Greater than 350, CALL MD, subcutaneously before meals and at bedtime related to TYPE 2 DIABETES MELLITUS WITHOUT COMPLICATIONS    potassium bicarb-citric acid (EFFER-K) 20 mEq tablet Take 1 Tablet by mouth two (2) times a day.     polyethylene glycol (MIRALAX) 17 gram packet Take 1 Packet by mouth daily.  amLODIPine (NORVASC) 10 mg tablet Take 1 Tablet by mouth daily.  cholecalciferol, vitamin D3, (Vitamin D3) 50 mcg (2,000 unit) tab Take 1 Tablet by mouth daily for 30 days.  levothyroxine (SYNTHROID) 88 mcg tablet Take 1 Tablet by mouth Daily (before breakfast) for 30 days.  metoprolol succinate (TOPROL-XL) 25 mg XL tablet Take 1 Tablet by mouth daily for 30 days.  pantoprazole (PROTONIX) 40 mg tablet Take 1 Tablet by mouth Daily (before breakfast) for 30 days.  ferrous sulfate 325 mg (65 mg iron) tablet Take 1 Tablet by mouth Daily (before breakfast) for 30 days.  cyanocobalamin 1,000 mcg tablet Take 1 Tablet by mouth daily for 30 days.  benzonatate (TESSALON) 100 mg capsule Take 1 Capsule by mouth as needed for Cough for up to 14 doses.  hydrALAZINE (APRESOLINE) 50 mg tablet Take 1 Tablet by mouth three (3) times daily for 30 days.  sucralfate (CARAFATE) 1 gram tablet Take 1 Tablet by mouth Before breakfast, lunch, dinner and at bedtime for 30 days. Allergies   Allergen Reactions    Celebrex [Celecoxib] Hives    Crestor [Rosuvastatin] Myalgia      Review of Systems Provided by:  Patient in ER. Review of Systems: Patient unable to provide. Objective: Wt Readings from Last 3 Encounters:   05/30/22 110 lb 0.2 oz (49.9 kg)   05/23/22 110 lb (49.9 kg)   05/13/22 113 lb 9.6 oz (51.5 kg)     Temp Readings from Last 3 Encounters:   05/31/22 98.2 °F (36.8 °C)   05/29/22 98.5 °F (36.9 °C)   05/23/22 98.1 °F (36.7 °C)     BP Readings from Last 3 Encounters:   05/31/22 (!) 196/78   05/29/22 (!) 145/48   05/23/22 (!) 170/66     Pulse Readings from Last 3 Encounters:   05/31/22 76   05/29/22 (!) 57   05/23/22 (!) 58     ECOG PS: 4- Completely disabled; cannot carry on any selfcare; totally confined to bed or chair. Physical Exam  Constitutional: No acute distress. , Chronic ill appearance. and acutely debilitated. HENT: Normocephalic and atraumatic head.     Eyes: Anicteric sclerae. Cardiovascular: S1,S2 auscultated. No pitting edema. Pulmonary: Normal Respiratory Effort. No wheezing. No rhonchi. No rales. Abdominal: Normal bowel sounds. Soft Abdomen to palpation. No abdominal tenderness. No guarding. Skin: No jaundice. No rash. General pallor. Musculoskeletal: No temporal muscle wasting on gross inspection. No myalgias on palpation. Neurological: Decreased mental alertness. No tremor on inspection. Able to arouse on stimulation; appears sleeping in the ER. Psychiatric: difficult to assess with decreased alertness. Results:     I personally reviewed Epic EHR labs/results below:   Lab Results   Component Value Date/Time    WBC 6.5 05/31/2022 02:52 AM    HGB 8.0 (L) 05/31/2022 02:52 AM    HCT 23.8 (L) 05/31/2022 02:52 AM    PLATELET 639 60/77/4195 02:52 AM    MCV 98.8 05/31/2022 02:52 AM    ABS. NEUTROPHILS 4.6 05/31/2022 02:52 AM     Lab Results   Component Value Date/Time    Sodium 128 (L) 05/31/2022 02:52 AM    Potassium 3.0 (L) 05/31/2022 02:52 AM    Chloride 95 (L) 05/31/2022 02:52 AM    CO2 26 05/31/2022 02:52 AM    Glucose 92 05/31/2022 02:52 AM    BUN 28 (H) 05/31/2022 02:52 AM    Creatinine 1.52 (H) 05/31/2022 02:52 AM    GFR est AA 39 (L) 05/31/2022 02:52 AM    GFR est non-AA 32 (L) 05/31/2022 02:52 AM    Calcium 13.6 (HH) 05/31/2022 02:52 AM    Glucose (POC) 163 (H) 05/05/2022 04:24 PM     Lab Results   Component Value Date/Time    Bilirubin, total 0.3 05/31/2022 02:52 AM    ALT (SGPT) 12 05/31/2022 02:52 AM    Alk. phosphatase 41 (L) 05/31/2022 02:52 AM    Protein, total 10.3 (H) 05/31/2022 02:52 AM    Albumin 1.9 (L) 05/31/2022 02:52 AM    Globulin 8.4 (H) 05/31/2022 02:52 AM     ANEMIA LABS:    Iron Studies:    Iron   Date Value Ref Range Status   04/05/2022 51 35 - 150 ug/dL Final     Vitamin B12:   Vitamin B12   Date Value Ref Range Status   04/30/2022 1,065 (H) 193 - 986 pg/mL Final     Folate:    Folate   Date Value Ref Range Status 04/28/2022 29.2 (H) 5.0 - 21.0 ng/mL Final     SPEP:    M-Harry   Date Value Ref Range Status   04/30/2022 4.8 (H) Not Observed g/dL Final      Immunofixation Result   Date Value Ref Range Status   04/30/2022 Comment (A)   Final     Comment:     (NOTE)  Immunofixation shows IgG monoclonal protein with kappa light chain  specificity. Please note that samples from patients receiving DARZALEX(R)  (daratumumab) or SARCLISA(R)([facility] treatment can appear  as an \"IgG kappa\" and mask a complete response (CR). If this  patient is receiving these therapies, this DANIELLE assay interference  can be removed by ordering test number [id number]-\"Immunofixation,  Daratumumab-Specific, Serum\" or [id number]-\"Immunofixation,  Isatuximab-Specific, Serum\" and submitting a new sample for  testing or by calling the lab to add this test to the current  sample. Kappa/Lambda ratio, serum   Date Value Ref Range Status   04/30/2022 329.68 (H) 0.26 - 1.65   Final     Comment:     (NOTE)  Performed At: [facility] & [address] [id number]  Kristine Brown MD DN:[id number]        Free Kappa Lt Chains, serum   Date Value Ref Range Status   04/30/2022 1,351.7 (H) 3.3 - 19.4 mg/L Final    No components found for: KLFL2  Reticulocyte Count: No results found for: RET  Bilirubin:   Lab Results   Component Value Date/Time    Bilirubin, total 0.3 05/31/2022 02:52 AM    Bilirubin Negative 05/30/2022 10:16 PM     LDH:   LD   Date Value Ref Range Status   04/30/2022 242 81 - 246 U/L Final        Assessment and Recommendations:     1. Multiple myeloma not having achieved remission ([facility] [address]  -had planned to start outpatient Revlimid, low dose Dexamethasone and Zometa [facility]. I called and spoke with daughter,Jayde, and let her know that I would recommend improvement of electrolyte abnromalities and to decide about starting inpatient treatment with velcade,dexamethasone vs. Hospice.  We discussed that I would hope to see improvement in patient's condition within the next 48 hours. If she is not improving then best supportive care, palliative approach may be indicated. I'm not sure that she will respond briskly to revlimid/dex vs. Triple therapy approach. However, she seemed to have more standard risk disease as noted above. However, her total protein has escalated quickly this past month along with a sudden increase in calcium. Discussed with Karin Soto that patient needs to start inpatient zometa with how quickly her calcium is increasing. 2. Hypercalcemia  -had received alendronate as non-standard therapy for myeloma per primary hospital team during last hospitalization. Had planned for outpt zometa once she was discharged from facility. Calcium has briskly escalated from a more controlled level on 5/13. Give Zometa 4mg IV to improve hypercalcemia related to myeloma. 3. Macrocytic anemia  -related to myeloma as main contributor. Transfuse RBC's to keep Hgb greater than 7g/dL. 4. Hyponatremia  -likely related to dehydration but cannot ignore some pseudohyponatremia effect from worsening myeloma protein. Total protein increasing quickly over the past month. 5. Debility  -consider wide differential but surely hypercalcemia,worsening myeloma top the list. Hopeful with treatment that mentation and interaction improves.       Lluvia Hull MD  Hematology/Medical Oncology Provider  [facility]  P: [phone]    Signed By:   Dara Marroquin MD

## 2022-05-31 NOTE — PROGRESS NOTES
1837: Family practice notified of patient's labile blood pressures (sbp from 190s and 180s to 115, then back to 190s). 6pm dose of nitro paste originally held due to lower blood pressure at the time, but now physician advises to give med now. Nitro paste administered. Will continue to monitor blood pressure. 1850: Family practice notified that patient has still not voided. Bladder scan shows 351. Advised to allow patient more time to void. Will inform oncoming RN for continued monitoring. 1900:  Bedside and Verbal shift change report given to Shayy Nelson (oncoming nurse) by Tho Mendes (offgoing nurse). Report included the following information SBAR, Kardex, MAR, Accordion and Recent Results.

## 2022-05-31 NOTE — CONSULTS
Consult dict    Myeloma, new dx  DOMINIK, better. Probably some IVVD. Could be an element of myeloma kidney too  Mild hyponatremia, better with saline.    Hypercalcemia due to myeloma    Rec:  Agree with zometa  Isotonic IVF  Serial labs  Palliative care eval

## 2022-05-31 NOTE — PROGRESS NOTES
Patient known to Dr Edgardo Burris for Multiple Myeloma  Not on treatment  Has hypercalcemia  Needs hydration, calcitonin  Consider Zometa   Nephrology consult  Will be seen in am

## 2022-05-31 NOTE — PROGRESS NOTES
TRANSFER - OUT REPORT:    Verbal report given to Wendy PARRA(name) on Ban Kelley  being transferred to [facility](unit) for routine progression of care       Report consisted of patients Situation, Background, Assessment and   Recommendations(SBAR). Information from the following report(s) SBAR, Procedure Summary, Intake/Output, MAR, Recent Results and Cardiac Rhythm sinus kal was reviewed with the receiving nurse. Lines:   Peripheral IV 05/30/22 Anterior;Proximal;Right Forearm (Active)   Site Assessment Clean, dry, & intact 05/31/22 0735   Phlebitis Assessment 0 05/31/22 0735   Infiltration Assessment 0 05/31/22 0735   Dressing Status Clean, dry, & intact 05/31/22 0735   Dressing Type Transparent;Tape 05/31/22 0735   Hub Color/Line Status Flushed;Capped; Infusing 05/31/22 0735   Action Taken Open ports on tubing capped 05/31/22 0735   Alcohol Cap Used Yes 05/31/22 0735        Opportunity for questions and clarification was provided.       Patient transported with:   ACSIAN

## 2022-05-31 NOTE — ED TRIAGE NOTES
Pt coming from [facility] via EMS. Pt fell out of bed yesterday in bathroom hitting her head and was transported. Pt was found to be on her back in bathroom again today. Per EMS, nursing at [facility] reports she has been mentally and physically declining since the fall this morning around 5:30AM. Granddaughter came to visit and told EMS that pt is not being her normal self. Pt normally feeds herself but has not been able to today since fall. Pt hx of DM, HTN, weakness, and falls. BG en route was 124 per EMS.

## 2022-05-31 NOTE — PROGRESS NOTES
.This patient was assisted with Intentional Toileting every 2 hours during this shift as appropriate. Documentation of ambulation and output reflected on Flowsheet as appropriate. Purposeful hourly rounding was completed using AIDET and 5Ps. Outcomes of PHR documented as they occurred. Bed alarm in use as appropriate. Dual Suction and ambubag in place.

## 2022-05-31 NOTE — CONSULTS
[address]     Name:  Byron Cadena  MR#:  [id number]  :  1935  ACCOUNT #:  [de-identified]  DATE OF SERVICE:  2022      NEPHROLOGY CONSULTATION    REQUESTING PHYSICIAN:  Dr. Yancy Goltz.    CHIEF COMPLAINT:  Elevated creatinine. HISTORY OF PRESENT ILLNESS:  The patient is an 80-year-old white female, who resides in a nursing home. She has a recent diagnosis of multiple myeloma and has not yet started treatment. She is being admitted with progressive weakness and altered mental status. Blood pressure was elevated on admission. Lab work showed a creatinine of 1.64 up from 1.2 the previous day and 0.9 back on . She has new significant hypercalcemia with a calcium of 14.4. Total protein is elevated around 11 with an albumin of around 2. Today, the creatinine is a little better at 1.5. She has low potassium and a sodium of 128 which has improved from yesterday at 125. She is on normal saline intravenously and has been ordered Zometa by Hematology. The patient is unable to provide any history due to altered mental status. REVIEW OF SYSTEMS:  Unable to obtain due to altered mental status. PAST MEDICAL HISTORY:  1. Recent diagnosis of multiple myeloma. 2.  Type 2 diabetes mellitus. 3.  History of GI bleed. 4.  Hypertension. 5.  Hyperlipidemia. 6.  Hypothyroidism. 7.  Coronary artery disease. FAMILY HISTORY:  No documented family history of renal disease. SOCIAL HISTORY:  She does not smoke. She was in a nursing home. PHYSICAL EXAMINATION:  VITAL SIGNS:  Temperature 98.2, pulse 58, blood pressure 174/58. GENERAL:  Elderly, chronically ill white female, who is somnolent and nonverbal.  EYES:  Eyes are anicteric. Pupils are reactive. ENMT:  Mucous membranes are dry. There is no epistaxis. NECK:  Nontender. There is no JVD. HEART:  Bradycardic with no lower extremity edema. RESPIRATORY:  Lungs are clear with limited effort.   GI: Abdomen is soft, nontender. There is no guarding or rebound. SKIN:  Warm with normal turgor. There is no rash. MUSCULOSKELETAL:  There is no cyanosis or clubbing. There is no synovitis in fingers or wrists bilaterally. LABORATORY DATA:  Sodium 128, potassium 3.0, chloride 95, bicarb 26, BUN 28, creatinine 1.52, calcium 13.6, protein 10.3, albumin 1.9. White count 6.5, hemoglobin 8.0, platelets 888. RADIOGRAPHIC STUDIES:  Chest x-ray shows no acute cardiopulmonary disease. ASSESSMENT:  1. 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 She is somewhat better today with some saline. 2.  Hypercalcemia due to myeloma. This has developed rapidly over the past 2 weeks. 3.  Mild hyponatremia probably related to volume depletion and appears to be improving with saline. 4.  Hypokalemia. 5.  Debility. 6.  Recent diagnosis of multiple myeloma. PLAN:  1. Agree with Zometa as ordered by Hematology. 2.  Continue isotonic saline. 3.  Replete potassium and magnesium. 4.  Monitor serial labs. 5.  Palliative Care evaluation to assist with management and considering goals of care.       Enzo Akbar MD      [id number]  D:  05/31/2022 11:18  T:  05/31/2022 14:58  JOB #:  [id number]

## 2022-05-31 NOTE — PROGRESS NOTES
5/31/2022  4:25 PM  TRANSFER - IN REPORT:    Verbal report received  From Melissa Eldridge on Tamanna Pagan being transferred to [facility] Rm 321  for continuing care   Report consisted of patients Situation, Background, Assessment and   Recommendations(SBAR). Transition of Care Plan from [email or web address] 23 % High Risk of Readmission/Red   Is This a Readmission YES 4/28/2022 -5/5/2022  UTI  Is this a Bundle NO   1. Unable to determine at this time  2. PCP follow up  3. Hem/ Onc follow up  4.  CM to follow for discharge needs    LENI Guerrier

## 2022-05-31 NOTE — H&P
[address] [place] [address]   Office [phone]  Fax [phone]       Admission H&P     Name: Pete Andrea MRN: [id number]  Sex: Female   YOB: 1935  Age: 80 y.o. PCP: Marta Hodges MD     Source of Information: patient, medical records, family members    Chief complaint: Altered Mental Status    History of Present Illness  Pete Andrea is a 80 y.o. female with PMHx of recently diagnosed Multiple Myeloma, hypercalcemia, HTN, Macrocytic anemia, GERD, Bradycardia who presents to the ED from [address] [place] [address] due to recent altered mental status. Due to altered mentation, much of the history was obtained from daughter and granddaughter at bedside. Per family members, patient arrives from [facility] after worsening altered mentation for the past 5 days. Patient began answering questions more slowly with increasing lethargy. She participated in Physical therapy well on Friday, but the next day was very fatigued with decreased appetite and PO intake. She suffered a ground level fall on Sunday, and she presented to the ED where testing ruled out any acute process. She then suffered another fall earlier today, with no visible trauma, but mental status has continued to deteriorate. Family states she is oriented only to self at this point. They state she has not had any chest pain, fevers, recent illness, constipation or trouble breathing. On discussion with patient, she is oriented only to self but able to state she is having abdominal pain and a headache. Denies dysuria, SOB, or chest pain. In the ED:  Vitals: Temp 98.5   /68   HR 63   RR 18   SatO2  95% on RA  Labs: WBC 6.9, Hgb 7.9 (bl 8.0), Na 125, K 3.0, Cr 1.64, Calcium 14.4, UA clear, proBNP 2833, Trop 56. EKG: NSR, 1st deg AV block, HR 61, QTc 404, no ischemia  Imaging: CT head with NAP, CT spine with NAP, chest xray NAP, pelvic xray NAP.    Treatment: 1L NS, Hydral 10mg IV      Patient Vitals for the past 12 hrs:   Temp Pulse Resp BP SpO2   05/31/22 0600 -- 65 16 (!) 179/55 92 %   05/31/22 0530 -- 73 18 (!) 155/75 95 %   05/31/22 0500 -- 63 15 (!) 165/50 93 %   05/31/22 0431 -- 62 16 (!) 177/69 93 %   05/31/22 0345 -- 63 20 (!) 161/90 95 %   05/31/22 0315 -- (!) 58 14 (!) 205/66 92 %   05/31/22 0303 -- 60 16 (!) 194/59 95 %   05/31/22 0230 -- 60 20 (!) 194/65 97 %   05/31/22 0200 -- (!) 57 16 (!) 170/50 94 %   05/31/22 0115 -- 61 16 (!) 183/55 95 %   05/31/22 0046 -- 61 22 -- 94 %   05/31/22 0045 -- 61 16 (!) 182/103 96 %   05/30/22 2330 -- 64 16 (!) 183/46 96 %   05/30/22 2316 -- (!) 59 19 (!) 208/65 92 %   05/30/22 2232 -- 64 16 (!) 190/67 96 %   05/30/22 2207 -- -- -- -- 96 %   05/30/22 2148 98.5 °F (36.9 °C) 63 18 (!) 209/68 95 %       Review of Systems  Review of Systems   Constitutional: Positive for appetite change and fatigue. Negative for chills, diaphoresis and fever. HENT: Negative for congestion, rhinorrhea and sinus pain. Eyes: Negative for photophobia and pain. Respiratory: Negative for apnea, chest tightness and shortness of breath. Cardiovascular: Negative for chest pain and palpitations. Gastrointestinal: Positive for abdominal pain. Negative for constipation and nausea. Genitourinary: Negative for dysuria and hematuria. Musculoskeletal: Negative for back pain and neck pain. Skin: Negative for rash and wound. Neurological: Positive for weakness and headaches. Negative for dizziness, tremors, seizures, syncope and light-headedness. Psychiatric/Behavioral: Positive for confusion. Home Medications   Prior to Admission medications    Medication Sig Start Date End Date Taking? Authorizing Provider   lenalidomide (Revlimid) 15 mg cap Take 1 Capsule by mouth See Admin Instructions. Take days 1-21, off 7 days. 5/24/22   Tere Zapata MD   latanoprost (XALATAN) 0.005 % ophthalmic solution Administer 1 Drop to both eyes nightly.     Provider, Historical losartan (COZAAR) 50 mg tablet Take 1 Tablet by mouth daily for 30 days. 5/13/22 6/12/22  Guy Sheppard MD   glucagon (Glucagon Emergency Kit, human,) 1 mg solr by Injection route. Inject 1 mg intramuscularly every 24 hours as needed for Low Blood Glucose related to TYPE 2 DIABETES MELLITUS WITHOUT COMPLICATIONS    Provider, Historical   insulin lispro (HumaLOG U-100 Insulin) 100 unit/mL injection by SubCUTAneous route. Inject as per sliding scale: if 201 - 250 = 2 units; 251 - 300 = 4 units; 301 - 350 = 6 units Greater than 350, CALL MD, subcutaneously before meals and at bedtime related to TYPE 2 DIABETES MELLITUS WITHOUT COMPLICATIONS    Provider, Historical   potassium bicarb-citric acid (EFFER-K) 20 mEq tablet Take 1 Tablet by mouth two (2) times a day. 5/5/22   Deric Johnson MD   polyethylene glycol (MIRALAX) 17 gram packet Take 1 Packet by mouth daily. 5/6/22   Deric Johnson MD   amLODIPine (NORVASC) 10 mg tablet Take 1 Tablet by mouth daily. 5/4/22   Deric Johnson MD   cholecalciferol, vitamin D3, (Vitamin D3) 50 mcg (2,000 unit) tab Take 1 Tablet by mouth daily for 30 days. 5/4/22 6/3/22  Deric Johnson MD   levothyroxine (SYNTHROID) 88 mcg tablet Take 1 Tablet by mouth Daily (before breakfast) for 30 days. 5/5/22 6/4/22  Deric Johnson MD   metoprolol succinate (TOPROL-XL) 25 mg XL tablet Take 1 Tablet by mouth daily for 30 days. 5/5/22 6/4/22  Deric Johnson MD   pantoprazole (PROTONIX) 40 mg tablet Take 1 Tablet by mouth Daily (before breakfast) for 30 days. 5/5/22 6/4/22  Deric Johnson MD   ferrous sulfate 325 mg (65 mg iron) tablet Take 1 Tablet by mouth Daily (before breakfast) for 30 days. 5/4/22 6/3/22  Deric Johnson MD   cyanocobalamin 1,000 mcg tablet Take 1 Tablet by mouth daily for 30 days. 5/5/22 6/4/22  Deric Johnson MD   benzonatate (TESSALON) 100 mg capsule Take 1 Capsule by mouth as needed for Cough for up to 14 doses.  5/4/22   Deric Johnson MD   hydrALAZINE (APRESOLINE) 50 mg tablet Take 1 Tablet by mouth three (3) times daily for 30 days. 5/4/22 6/3/22  Chana Perez MD   sucralfate (CARAFATE) 1 gram tablet Take 1 Tablet by mouth Before breakfast, lunch, dinner and at bedtime for 30 days. 5/4/22 6/3/22  Chana Perez MD       Allergies  Allergies   Allergen Reactions    Celebrex [Celecoxib] Hives    Crestor [Rosuvastatin] Myalgia       Past Medical History  Past Medical History:   Diagnosis Date    Diabetes ([facility] [address]     GI bleeding     workup in [address] [place] [address] 12/15 was unremarkable (Dr. Shiv Jones) - says she had EGD, colonoscopy, and small bowel capsule endoscopy    HTN (hypertension)     Hypercholesteremia     Hypothyroidism, iatrogenic     radioiodine    MI (myocardial infarction) ([facility] [address]     During surgery in 1970's - she's had multiple caths and stress tests       Previous Hospitalization(s)  Past Surgical History:   Procedure Laterality Date    COLONOSCOPY N/A 5/2/2022    COLONOSCOPY performed by Alf Oliveira MD at [address]  [address]    Open umbilical incisional herniorrhaphy [facility]).  HX HERNIA REPAIR Right 09/23/2020    Right inguinal herniorrhaphy with mesh.  HX ORTHOPAEDIC      Back surgery x 2.    HX BRIAN AND BSO         Family History  Family History   Problem Relation Age of Onset    Heart Failure Mother     Heart Disease Mother     Hypertension Mother        Social History  Alcohol history: Not at all  Smoking history: Non-smoker  Illicit drug history: Not at all  Living arrangement: patient lives in a nursing home. Ambulates: Assisted walker    Vital Signs  Visit Vitals  BP (!) 179/55   Pulse 65   Temp 98.5 °F (36.9 °C)   Resp 16   Ht 5' (1.524 m)   Wt 110 lb 0.2 oz (49.9 kg)   SpO2 92%   BMI 21.48 kg/m²       Physical Exam  General: No acute distress. Lethargic   Head: Normocephalic. Atraumatic. Eyes:              Conjunctiva pink. Sclera white. PERRL. Ears:              Hearing grossly intact.    Nose:             Septum midline. Mucosa pink. Throat: Mucosa pink. Moist mucous membranes. Neck: Supple. Normal ROM. No stiffness. Respiratory: CTAB. No w/r/r/c.   Cardiovascular: Bradycardic in 46s. Normal S1,S2. No m/r/g. Pulses 2+ throughout. GI: + bowel sounds. Tenderness to lower quadrants. No rebound tenderness or guarding. Nondistended. Extremities: No LE edema. Distal pulses intact. Musculoskeletal: Moving all extremities spontaneously, follows commands poorly    Skin: Warm, dry. No rashes. Neuro: AOx1, hard of hearing, responds to voice but follows commands poorly. Exam limited by AMS, but appears to have no focal deficit. Neg pronator drift, moving all ext spontaneously. Laboratory Data  Recent Results (from the past 8 hour(s))   TROPONIN-HIGH SENSITIVITY    Collection Time: 05/31/22 12:18 AM   Result Value Ref Range    Troponin-High Sensitivity 56 (H) 0 - 51 ng/L   SAMPLES BEING HELD    Collection Time: 05/31/22  2:12 AM   Result Value Ref Range    SAMPLES BEING HELD 1UA,1UC(GRY TUBE)     COMMENT        Add-on orders for these samples will be processed based on acceptable specimen integrity and analyte stability, which may vary by analyte. METABOLIC PANEL, COMPREHENSIVE    Collection Time: 05/31/22  2:52 AM   Result Value Ref Range    Sodium 128 (L) 136 - 145 mmol/L    Potassium 3.0 (L) 3.5 - 5.1 mmol/L    Chloride 95 (L) 97 - 108 mmol/L    CO2 26 21 - 32 mmol/L    Anion gap 7 5 - 15 mmol/L    Glucose 92 65 - 100 mg/dL    BUN 28 (H) 6 - 20 MG/DL    Creatinine 1.52 (H) 0.55 - 1.02 MG/DL    BUN/Creatinine ratio 18 12 - 20      GFR est AA 39 (L) >60 ml/min/1.73m2    GFR est non-AA 32 (L) >60 ml/min/1.73m2    Calcium 13.6 (HH) 8.5 - 10.1 MG/DL    Bilirubin, total 0.3 0.2 - 1.0 MG/DL    ALT (SGPT) 12 12 - 78 U/L    AST (SGOT) 18 15 - 37 U/L    Alk.  phosphatase 41 (L) 45 - 117 U/L    Protein, total 10.3 (H) 6.4 - 8.2 g/dL    Albumin 1.9 (L) 3.5 - 5.0 g/dL    Globulin 8.4 (H) 2.0 - 4.0 g/dL    A-G Ratio 0.2 (L) 1.1 - 2.2     MAGNESIUM    Collection Time: 05/31/22  2:52 AM   Result Value Ref Range    Magnesium 1.3 (L) 1.6 - 2.4 mg/dL   CBC WITH AUTOMATED DIFF    Collection Time: 05/31/22  2:52 AM   Result Value Ref Range    WBC 6.5 3.6 - 11.0 K/uL    RBC 2.41 (L) 3.80 - 5.20 M/uL    HGB 8.0 (L) 11.5 - 16.0 g/dL    HCT 23.8 (L) 35.0 - 47.0 %    MCV 98.8 80.0 - 99.0 FL    MCH 33.2 26.0 - 34.0 PG    MCHC 33.6 30.0 - 36.5 g/dL    RDW 13.3 11.5 - 14.5 %    PLATELET 588 727 - 950 K/uL    MPV 9.4 8.9 - 12.9 FL    NRBC 0.0 0  WBC    ABSOLUTE NRBC 0.00 0.00 - 0.01 K/uL    NEUTROPHILS 70 32 - 75 %    LYMPHOCYTES 12 12 - 49 %    MONOCYTES 14 (H) 5 - 13 %    EOSINOPHILS 3 0 - 7 %    BASOPHILS 0 0 - 1 %    IMMATURE GRANULOCYTES 1 (H) 0.0 - 0.5 %    ABS. NEUTROPHILS 4.6 1.8 - 8.0 K/UL    ABS. LYMPHOCYTES 0.8 0.8 - 3.5 K/UL    ABS. MONOCYTES 0.9 0.0 - 1.0 K/UL    ABS. EOSINOPHILS 0.2 0.0 - 0.4 K/UL    ABS. BASOPHILS 0.0 0.0 - 0.1 K/UL    ABS. IMM. GRANS. 0.1 (H) 0.00 - 0.04 K/UL    DF AUTOMATED     AMMONIA    Collection Time: 05/31/22  2:52 AM   Result Value Ref Range    Ammonia, plasma 57 (H) <32 UMOL/L   SAMPLES BEING HELD    Collection Time: 05/31/22  2:52 AM   Result Value Ref Range    SAMPLES BEING HELD 1PST     COMMENT        Add-on orders for these samples will be processed based on acceptable specimen integrity and analyte stability, which may vary by analyte. Imaging  CXR Results  (Last 48 hours)               05/30/22 7565  XR CHEST PORT Final result    Impression:  No acute process. Narrative:  EXAM:  CR chest portable       INDICATION: Fall. Altered mental status. COMPARISON: CT 5/1/2022. Radiograph 4/28/2022. TECHNIQUE: Portable AP semiupright chest view at 2230 hours       FINDINGS: The cardiomediastinal contours are stable. The lungs and pleural   spaces are clear. There is no pneumothorax. The bones and upper abdomen are   stable.                CT Results  (Last 48 hours) 05/30/22 2251  CT HEAD WO CONT Final result    Impression:  No acute intracranial abnormality. Narrative:  EXAM:  CT head without contrast       INDICATION: Fall. Altered mental status. COMPARISON: CT 5/29/2022       TECHNIQUE: Axial noncontrast head CT from foramen magnum to vertex. Coronal and   sagittal reformatted images were obtained. CT dose reduction was achieved   through use of a standardized protocol tailored for this examination and   automatic exposure control for dose modulation. FINDINGS:  There is diffuse age-related parenchymal volume loss. The ventricles   and sulci are age-appropriate without hydrocephalus. There is no mass effect or   midline shift. There is no intracranial hemorrhage or extra-axial fluid   collection. Scattered foci of low attenuation in the periventricular white   matter represent stable chronic microvascular ischemic changes. The gray-white   matter differentiation is maintained. The basal cisterns are patent. The osseous structures are intact. The visualized paranasal sinuses and mastoid   air cells are clear. 05/30/22 2251  CT SPINE CERV WO CONT Final result    Impression:  No acute abnormality. Stable diffuse degenerative changes. Narrative:  EXAM:  CT C-spine without contrast       INDICATION: Fall. Altered mental status. COMPARISON: CT 5/29/2022       TECHNIQUE: Thin section axial noncontrast CT of the cervical spine with coronal   and sagittal reformats. CT dose reduction was achieved through use of a   standardized protocol tailored for this examination and automatic exposure   control for dose modulation. FINDINGS:    There is no acute fracture or subluxation. Vertebral body heights are   maintained. There is stable diffuse degenerative disc and facet changes with   multilevel spinal canal and neural foraminal stenosis. There is no abnormality   in alignment. The paraspinal soft tissues are unremarkable.  A peripherally   calcified left thyroid nodule is again demonstrated. The visualized lung apices   are clear. Assessment and Plan     Charlie Horn is a 80 y.o. female with a PMHx of recently diagnosed Multiple Myeloma, hypercalcemia, HTN, Macrocytic anemia, GERD, Bradycardia  who is admitted for altered mental status in the setting of hyponatremia and hypercalcemia. Encephalopathy in the setting of Acute on Chronic Hyponatremia:  AMS, AOx1, worsening for the past few days. No focal findings on neuro exam, CT head and CT spine with NAP, both 5/30 and 5/29 day prior in ED. Doubt infectious etiology, no SIRS and both UTI and CXR normal. Most likely secondary to Na 125 in the setting of probable dehydration (elevated Cr). Low Na possible pseudohyponatremia in the setting of Multiple Myeloma. Less likely structural cause due to unremarkable imaging. Continued work up for alternative etiology as below. - Admit to tele, vitals per protocol  - Cardiac monitoring  - AMS labs including TSH, Ammonia, Salicylates, Volatiles   - Fall Precautions   - 1/2 MIVF with Normal Saline (50cc/hr), gentle due to elevated pBNP  - BMP q6, with daily CMP  - Urine lytes including Urine Sodium, Urine Cr, Urine Osm. Serum Osm.   - Strict IO  - NPO, bedside dysphagia. Will fluid restrict to 800cc daily when passed swallow. - Nephrology consulted, appreciate recs   - Daily CMP, CBC  - Consider further neuro imaging and consult if symptoms not improving.   - heparin for DVT prophylaxis     Hypertensive Emergency: /68, with elevated troponin and AMS. Chronic HTN, difficult to control  - target  (goal reduction 25%) in first 24 hours   - Hydralazine IV 10mg PRN, for SBP>180  - Hold home Amlodipine 10 mg daily and metoprolol XL 25 mg daily, Losartan 100 mg daily, and Hydral 50 mg TID while NPO. Will add back after passing dysphagia screen. Hypercalcemia: POA Ca of 14.4.  In the setting of recent diagnosis of Multiple Myeloma. [place] cause of current abdominal pain. - 1/2 MIVF with NS at 50cc/hr  - Gentle with fluids due to elevated pro-BnP  - Daily CMP  - per Heme/Onc will be considered for Zometa outpatient     Elevated Creatinine: Cr POA 1.64 (bl ~0.9). Likely due to dehydration. - 1/2 MIVF with NS at 50cc/hr, gentle with fluids given elevated BnP  - Daily CMP  - Urine lytes as above  - Nephrology consulted, appreciate recs     Abdominal Pain: Chronic issues, prior workup including EGD and colonoscopy negative. Prior CT showing hiatal hernia. Likely secondary to hypercalcemia, less likely hernia. UA negative for UTI, pelvic xray showing no acute process. - Expect to resolve with treatment of underlying hypercalcemia.   - Continue home Carafate when taking PO  - Hold home protonix as possible contribution to HypoNa  - If fails to improve, consider further imaging. Elevated Troponin: Trop initially 56, repeat flat at 56. No chest pain, EKG with no ischemia.   - Continue to monitor on tele    Elevated pro-BNP: POA BnP 2833. Prior Echo in February EF 60-65%. Lungs clear on exam, no edema. Likely secondary to HTN and anemia. - repeat Echo  - cautious with fluids at this time    Hypokalemia: Chronic  - replete IV at this time due to NPO  - Continue home PO EfferK 20 BID when taking PO  - daily CMP, replete as needed    Multiple Myeloma: Recently diagnosed on prior admission in April. Established with Dr. Sirena Dennis outpatient. Has been unable to receive transfusions while at rehab, had planned to start after transfer home. - Heme/Onc consulted, appreciate recs   - Consider Zometa outpatient for hypercalcemia    Macrocytic Anemia: Hgb 7.9 POA (bl ~8). - Heme/Onc consulted, appreciate recs  - restart home cyanocobalamin 1,000 daily when taking PO  - restart home ferrous sulfate     DM2: POA glucose 131. Last A1c <3.8 (L) on 4/28/2022 . Not on any home medications.   - Monitor on daily CMP, no SSI at this time    HLD: Last lipid panel 12/6/21: Tchol 122, HDL 67, LDL 45.6, trig 47. No home medications.     Hypothyroidism: TSH 6.26 (5/1/2022). - Continue home Synthroid 88 mcg daily before breakfast  - checking TSH     CAD: MI (during surgery in 1970s) had multiple caths and stress tests. Records of cardiology visit w/ Dr. David Rothman on 3/22/17. Exercise Cardiolite 7/23/15 - walked 4:39 (6.3 METS), normal stress EKG. Normal MPI. LVEF 71%. Echo 2/25/22 - LVEF 60-65%. Rectal prolapse: Occurred x 1 during prior hospitalization, manual reduction. No acute complications. - Refer to Colorectal surgery for outpatient follow-up     GERD: Chronic.  - Hold home Protonix 40 mg daily, possible worsening of Hyponatremia  - restart home Sucralfate when taking PO     Bradycardia: Chronic. EKG showing 1st deg AV block, chronic.   - holding home Metoprolol XL 25mg daily       FEN/GI - NPO. NS at 1/2 MIVF (50cc/hr)  Activity - Ambulate with assistance  DVT prophylaxis - Heparin   GI prophylaxis - Holding home Protonix for hyponatremia  Fall prophylaxis - Fall precautions ordered. Disposition - Admit to Telemetry. Plan to d/c to TBD. Consulting PT, OT and CM  Code Status - DNR. Discussed with patient / caregivers.   Next of Kin Name and Darcy Ferreira (Child)   [phone]    Patient Allons Lynnette will be discussed with Dr. Porter Townsend.    1:35 AM, 05/31/22  Kashmir Campbell MD  Family Medicine Resident, PGY1       For Billing    Chief Complaint   Patient presents with   [facility] Problems  Date Reviewed: 5/31/2022          Codes Class Noted POA    Hypomagnesemia ICD-10-CM: E83.42  ICD-9-CM: 275.2  5/31/2022 Unknown        Hyponatremia ICD-10-CM: E87.1  ICD-9-CM: 276.1  5/31/2022 Unknown        Elevated brain natriuretic peptide (BNP) level ICD-10-CM: R79.89  ICD-9-CM: 790.99  5/31/2022 Unknown        * (Principal) Encephalopathy ICD-10-CM: G93.40  ICD-9-CM: 348.30  5/30/2022 Unknown        Multiple myeloma not having achieved remission [facility]) ICD-10-CM: C90.00  ICD-9-CM: 203.00  5/13/2022 Yes        Hypercalcemia ICD-10-CM: L82.74  ICD-9-CM: 275.42  5/13/2022 Yes        Hypertensive emergency ICD-10-CM: I16.1  ICD-9-CM: 401.9  2/25/2022 Yes        Hypokalemia ICD-10-CM: E87.6  ICD-9-CM: 276.8  2/24/2022 Yes        Elevated troponin ICD-10-CM: R77.8  ICD-9-CM: 790.6  2/24/2022 Yes        Nodule of left lobe of thyroid gland ICD-10-CM: E04.1  ICD-9-CM: 241.0  9/8/2020 Yes        Macrocytic anemia ICD-10-CM: D53.9  ICD-9-CM: 281.9  8/29/2018 Yes        Type 2 diabetes mellitus without complication, without long-term current use of insulin (HCC) ICD-10-CM: E11.9  ICD-9-CM: 250.00  4/24/2017 Yes        HTN (hypertension) ICD-10-CM: I10  ICD-9-CM: 401.9  Unknown Yes        Hypothyroidism, iatrogenic ICD-10-CM: E03.2  ICD-9-CM: 244. 3  Unknown Yes    Overview Signed 7/13/2015  3:16 PM by Poncho Wilde MD     radioiodine             Hypercholesteremia ICD-10-CM: E78.00  ICD-9-CM: 272.0  Unknown Yes

## 2022-05-31 NOTE — PROGRESS NOTES
Occupational therapy note:  Orders received, chart reviewed. Patient currently with elevated BP and patient RN working to correct. Will hold OT evaluation and follow up with patient at later time for OT evaluation. Tiffanie Manuel MS OTR/L

## 2022-06-01 NOTE — PROGRESS NOTES
Physician Progress Note      PATIENT:               Cher Chung  CSN #:                  [id number]  :                       1935  ADMIT DATE:       2022 9:42 PM  [address] [place] DATE:  RESPONDING  PROVIDER #:        Tanya Gandara MD          QUERY TEXT:    Good afternoon  Pt. admitted with Multiple myeloma and Encephalopathy and has malnutrition documented. Please further specify type of malnutrition with documentation in the medical record. The medical record reflects the following:  Risk Factors: multiple myeloma, DOMINIK, anemia, DM2, dehydration, CAD  Clinical Indicators: Malnutrition Status:  Severe malnutrition (22 1137)  Context:  Acute illness  Findings of the 6 clinical characteristics of malnutrition:  Energy Intake:  50% or less of est energy requirements for 5 or more days  Weight Loss:  Unable to assess  Body Fat Loss: Moderate body fat loss, Triceps,Buccal region,Fat overlying ribs  Muscle Mass Loss:  Mild muscle mass loss, Temples (temporalis),Thigh (quadriceps),Clavicles (pectoralis & deltoids)  Fluid Accumulation:  No significant fluid accumulation  Treatment: RD consult  Add oral nutritional supplement Ensure HP BID (provides: 320 kcals, 32g protein, 38g carb)  Assist with meals and feeding when safe and accepting of PO    Thank you  Patricia Rogers RN CDI  [phone]      ASPEN Criteria:  https://aspenjournals. onlinelibrary. martinez. com/doi/full/10.1177/3615341041848883  Options provided:  -- Severe Malnutrition  -- Moderate Malnutrition  -- Malnutrition not present  -- Other - I will add my own diagnosis  -- Disagree - Not applicable / Not valid  -- Disagree - Clinically unable to determine / Unknown  -- Refer to Clinical Documentation Reviewer    PROVIDER RESPONSE TEXT:    This patient has severe malnutrition.     Query created by: Akilah Sandhu on 2022 1:23 PM      Electronically signed by:  Tanya Gandara MD 2022 1:27 PM

## 2022-06-01 NOTE — PROGRESS NOTES
1900 Bedside and Verbal shift change report given to April (oncoming nurse) by Alicia Whitlock (offgoing nurse). Report included the following information SBAR, Kardex, MAR, Accordion and Recent Results. 0555 Pt lab results. K+ 2.8, Hbg 6.8.  Notified MD.

## 2022-06-01 NOTE — PROGRESS NOTES
Problem: Self Care Deficits Care Plan (Adult)  Goal: *Acute Goals and Plan of Care (Insert Text)  Description: FUNCTIONAL STATUS PRIOR TO ADMISSION: Patient was independent and active and living alone prior to recent hospital admit, discharge to rehab and was to discharge home soon, per chart review \" originally diagnosed with MM earlier 5/2022 and was sent to [facility] with plans to initiate chemo in the outpatient setting\"    HOME SUPPORT: The patient lived alone with daughter to provide assistance. Occupational Therapy Goals  Initiated 6/1/2022  1. Patient will perform self-feeding with moderate assistance  within 7 day(s). 2.  Patient will perform face washing and oral care with minimal assistance/contact guard assist seated edge of bed within 7 day(s). 3.  Patient will maintain attention to task > or = 5 minutes within 7 day(s)  4. Patient will stand pivot to bedside commode with minimal assist within 7 day(s). 6/1/2022 1010 by Stanley Everett OTR/L  Outcome: Not Met  OCCUPATIONAL THERAPY EVALUATION  Patient: Cinthia Paul (89 y.o. female)  Date: 6/1/2022  Primary Diagnosis: Encephalopathy [G93.40]        Precautions:   Fall,Seizure,Skin    ASSESSMENT  Based on the objective data described below, the patient presents with decreased arousal, maintains eyes closed throughout however able to sit edge of bed and reported need to go to the bathroom. Assisted to bedside commode and had small BM. Unable to tolerate activity at this time and return to supine. Blood pressure remains elevated. Noted plan for palliative consult. Will continue to follow unless plan of care changes. Current Level of Function Impacting Discharge (ADLs/self-care): total care at this time    Functional Outcome Measure: The patient scored 10/100 on the Barthel Index outcome measure which is indicative of total dependence.       Other factors to consider for discharge: recent dx of MM and palliative consult     Patient will benefit from skilled therapy intervention to address the above noted impairments. PLAN :  Recommendations and Planned Interventions: self care training, functional mobility training, therapeutic exercise, balance training, therapeutic activities, cognitive retraining, endurance activities, patient education, home safety training, and family training/education    Frequency/Duration: Patient will be followed by occupational therapy 2 times a week to address goals. Recommendation for discharge: (in order for the patient to meet his/her long term goals)  To be determined: per plan of care    This discharge recommendation:  Has not yet been discussed the attending provider and/or case management    IF patient discharges home will need the following DME:          SUBJECTIVE:   Patient stated yes.  when asked if she needed to go to the bathroom after sitting up    OBJECTIVE DATA SUMMARY:   HISTORY:   Past Medical History:   Diagnosis Date    Diabetes ([facility] [address]     GI bleeding     workup in [address] [place] [address] 12/15 was unremarkable (Dr. Danilo Sheikh) - says she had EGD, colonoscopy, and small bowel capsule endoscopy    HTN (hypertension)     Hypercholesteremia     Hypothyroidism, iatrogenic     radioiodine    MI (myocardial infarction) ([facility] [address]     During surgery in 1970's - she's had multiple caths and stress tests     Past Surgical History:   Procedure Laterality Date    COLONOSCOPY N/A 5/2/2022    COLONOSCOPY performed by Qasim Najera MD at [address] [place] [address]    Open umbilical incisional herniorrhaphy [facility]). HX HERNIA REPAIR Right 09/23/2020    Right inguinal herniorrhaphy with mesh. HX ORTHOPAEDIC      Back surgery x 2.     HX BRIAN AND BSO         Expanded or extensive additional review of patient history:     Home Situation  Home Environment: Rehabilitation facility  One/Two Story Residence: One story  Living Alone: No  Support Systems: Child(jaclyn)  Patient Expects to be Discharged to[de-identified] Home with one level  Current DME Used/Available at Home: Walker,Walker, rollator,Shower chair,Wheelchair    Hand dominance:     EXAMINATION OF PERFORMANCE DEFICITS:  Cognitive/Behavioral Status:  Neurologic State: Drowsy; Confused; Eyes open to voice  Orientation Level: Oriented to person;Disoriented to place; Disoriented to situation;Disoriented to time  Cognition: Decreased attention/concentration;Decreased command following;Poor safety awareness  Perception: Appears intact  Perseveration: No perseveration noted  Safety/Judgement: Decreased awareness of need for assistance;Decreased insight into deficits    Skin: intact    Edema: none    Hearing: Auditory  Auditory Impairment: Hard of hearing, bilateral  Hearing Aids/Status: Bilateral,With patient    Vision/Perceptual:       Not assessed       Range of Motion:  AROM: Generally decreased, functional        Strength:  Strength: Generally decreased, functional     Coordination:     Fine Motor Skills-Upper: Left Impaired;Right Impaired    Gross Motor Skills-Upper: Left Impaired;Right Impaired    Tone & Sensation:  Tone: Normal        Balance:  Sitting: Impaired  Sitting - Static: Fair (occasional)  Sitting - Dynamic: Poor (constant support)  Standing: Impaired; With support  Standing - Static: Constant support; Fair  Standing - Dynamic : Poor    Functional Mobility and Transfers for ADLs:  Bed Mobility:  Supine to Sit: Moderate assistance;Assist x1;Maximum assistance  Sit to Supine: Moderate assistance;Assist x1  Scooting: Moderate assistance    Transfers:  Sit to Stand: Moderate assistance;Assist x1  Stand to Sit: Moderate assistance;Assist x1  Bed to Chair: Moderate assistance;Assist x1;Additional time  Bathroom Mobility: Dependent/total assistance  Toilet Transfer : Moderate assistance;Assist x1;Additional time; Adaptive equipment (stand pivot to commode)    ADL Assessment:  Feeding: Other (comment); Total assistance (not indicated due to decresaed arousal)    Oral Facial Hygiene/Grooming: Maximum assistance    Bathing: Total assistance    Upper Body Dressing: Total assistance    Lower Body Dressing: Total assistance    Toileting: Total assistance                ADL Intervention and task modifications:        Re-oriented to place, situation, time, assisted to bedside commode as she reported need to go    Noted shivering throughout and temp checked at 99.1, RN informed           Cognitive Retraining  Safety/Judgement: Decreased awareness of need for assistance;Decreased insight into deficits     Functional Measure:    Barthel Index:  Bathin  Bladder: 0  Bowels: 5  Groomin  Dressin  Feedin  Mobility: 0  Stairs: 0  Toilet Use: 0  Transfer (Bed to Chair and Back): 5  Total: 10/100      The Barthel ADL Index: Guidelines  1. The index should be used as a record of what a patient does, not as a record of what a patient could do. 2. The main aim is to establish degree of independence from any help, physical or verbal, however minor and for whatever reason. 3. The need for supervision renders the patient not independent. 4. A patient's performance should be established using the best available evidence. Asking the patient, friends/relatives and nurses are the usual sources, but direct observation and common sense are also important. However direct testing is not needed. 5. Usually the patient's performance over the preceding 24-48 hours is important, but occasionally longer periods will be relevant. 6. Middle categories imply that the patient supplies over 50 per cent of the effort. 7. Use of aids to be independent is allowed. Score Interpretation (from [address] [facility] [address])    Independent   60-79 Minimally independent   40-59 Partially dependent   20-39 Very dependent   <20 Totally dependent     -Alf Gutierrez., Barthel, D.W. (1965). Functional evaluation: the Barthel Index. [address] [facility] ([address] ().  The Barthel activities of daily living index: self-reporting versus actual performance in the old (> or = 75 years). Journal of [address] [place] [address] 457), [address] [facility], [employer], Kristal Goel., Jason Sewell (1999). Measuring the change in disability after inpatient rehabilitation; comparison of the responsiveness of the Barthel Index and Functional [place] Measure. Journal of Neurology, Neurosurgery, and Psychiatry, 66(4), 879-507. CARLOS Schwarz, CLAUDIO Gilliam, & Alexandro Liriano M.A. (2004) Assessment of post-stroke quality of life in cost-effectiveness studies: The usefulness of the Barthel Index and the EuroQoL-5D. Quality of Life Research, 15, 640-83         Occupational Therapy Evaluation Charge Determination   History Examination Decision-Making   LOW Complexity : Brief history review  HIGH Complexity : 5 or more performance deficits relating to physical, cognitive , or psychosocial skils that result in activity limitations and / or participation restrictions HIGH Complexity : Patient presents with comorbidities that affect occupational performance. Signifigant modification of tasks or assistance (eg, physical or verbal) with assessment (s) is necessary to enable patient to complete evaluation       Based on the above components, the patient evaluation is determined to be of the following complexity level: LOW   Pain Rating:  Complaint of pain with blood pressure checks    Activity Tolerance:   Poor    After treatment patient left in no apparent distress:    Supine in bed, Heels elevated for pressure relief, Call bell within reach, and Side rails x 3    COMMUNICATION/EDUCATION:   The patients plan of care was discussed with: Physical therapist and Registered nurse. Patient is unable to participate in goal setting and plan of care. This patients plan of care is appropriate for delegation to [facility].     Thank you for this referral.  Amy Champagne OTR/RITA  Time Calculation: 27 mins

## 2022-06-01 NOTE — PROGRESS NOTES
[facility] at [address] [facility]  Holli Mock: [phone]  F: [phone] Patient ID  Name: Elif Gary  YOB: 1935  MRN: [id number]  Referring Provider:   No referring provider defined for this encounter. Primary Care Provider:   Yudy Patino MD       HEMATOLOGY/MEDICAL ONCOLOGY  NOTE   Date of Visit: 06/01/22  Reason for Evaluation:     Chief Complaint   Patient presents with   [facility] Fall     Hematology/Oncology Summary:  Please review original records for clinical decision making. This summary highlights focused aspects of patient's ongoing care and may have a recurring section in notes with either updates or remain unchanged as a longitudinal care summary. --------------------------------------------------------------------------------------------------------------------------------------------------------------------------------------------------------------------------  DIAGNOSIS:   Multiple Myeloma    ORIGINAL STAGING:   n/a    SITES OF DISEASE:   Bone Marrow    CURRENT TREATMENT:   To further discuss care. PRIOR TREATMENT:   Pulse Dex x 4 days. 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 ad3/5/6/2022   *Electronically Signed Out By Bakari Rodriguez MD*   Bone marrow aspirate:   The bone marrow aspirate is cellular and adequate   for evaluation. Numerous plasma cells are present, including a subset of   large forms with prominent nucleoli. The background myeloid and erythroid   series progress through the full maturation sequence with no morphologic   abnormalities. Megakaryocytes are present in adequate numbers and show no   morphologic abnormalities. Lymphocytes and blasts do not appear increased   in number. Touch prep:  Cellular and reflects the aspirate smears. Bone marrow core biopsy: The bone marrow biopsy is 70% cellular and   adequate for evaluation. Large sheets of plasma cells are present. The   myeloid and erythroid series are reduced in number but progress through   the full maturation sequence without significant dysplasia. Megakaryocytes   are present in adequate numbers with normal morphology. Lymphocytes and   blasts do not appear increased in number. Clot section:  Particulate and reflects the core biopsy. Special stains:   Iron, aspirate: No stainable iron   Iron, clot: No stainable iron   Reticulin, core: Patchy reticulin fibrosis within sheets of plasma cells   Congo red, core: Negative for amyloid     Immunohistochemistry, core:   CD3: Highlights scattered small T cells   CD20: Highlights scattered small B cells   : Highlights marked increase in plasma cells, 80% of cellularity     Flow cytometry:   Diagnosis: Monoclonal plasma cells (31.5% of total cells), consistent with   a plasma cell neoplasm. Comments: Flow cytometry findings are consistent with a plasma cell   neoplasm (e.g. plasma cell myeloma or plasmacytoma). No immunophenotypic   evidence of a lymphoproliferative disorder, acute leukemia, or increase in   blasts is identified. Due to their fragility, plasma cells do not survive   flow cytometric processing well; thus, the flow cytometric enumeration may   be much less than that seen morphologically.  So, to properly classify this   process, correlation with the findings on the clot and core sections,   along with clinical, radiological and SPEP/UPEP information is necessary. Cytogenetic and FISH studies may provide useful prognostic information. Cytogenetics:  Normal  FISH:  dup1q,   t11:14   Molecular studies:  Not performed.      PERTINENT CARE EVENTS   n/a      Subjective:     History of Present Illness:     Pete Andrea is a 80 y.o. female with recently diagnosed Multiple Myeloma presents to the ER after worsening decline in performance status, decreased mentation, hypercalcemia, hyponatremia, increasing total protein. She has remained at [facility] for inpatient rehabilitation. We saw her last week and planned therapy for her myeloma after goals of care discussion with family. Her family wanted to pursue Revlimid/dexamethasone/zometa alone. Per phone discussion with daughter (patient not able to provide history), patient started to have a worsening decline last Thursday at the facility that led to an ER visit after a fall. She was sent back to her facility but unfortunately had further decline and returned to the ER yesterday evening. Daughter noted that patient stopped being able to participate in her physical therapy last Thursday compared to the best activity she had earlier in the week. She was anticipating a discharged on 6/1/22 prior to her acute decline. -      Interval History:   Pt resting: aroused during visit; asked for hearing aids. Quickly fell back to sleep. Daughter at bedside. Aware of mother's decline. Teary during visit but remains positive. Natasha has not ordered medication for MM yet with concern of her mother's decline just held off ordering. Shares she wants her mother to be comfortable.        Current Facility-Administered Medications   Medication Dose Route Frequency    sodium chloride (NS) flush 5-40 mL  5-40 mL IntraVENous Q8H    sodium chloride (NS) flush 5-40 mL  5-40 mL IntraVENous PRN    acetaminophen (TYLENOL) tablet 650 mg  650 mg Oral Q6H PRN    Or    acetaminophen (TYLENOL) suppository 650 mg  650 mg Rectal Q6H PRN    polyethylene glycol (MIRALAX) packet 17 g  17 g Oral DAILY PRN    ondansetron (ZOFRAN ODT) tablet 4 mg  4 mg Oral Q8H PRN    Or    ondansetron (ZOFRAN) injection 4 mg  4 mg IntraVENous Q6H PRN    0.9% sodium chloride infusion  50 mL/hr IntraVENous CONTINUOUS    hydrALAZINE (APRESOLINE) 20 mg/mL injection 10 mg  10 mg IntraVENous Q6H PRN    levothyroxine (SYNTHROID) tablet 88 mcg  88 mcg Oral ACB    lactulose (CHRONULAC) 10 gram/15 mL solution 30 mL  20 g Oral BID    heparin (porcine) injection 5,000 Units  5,000 Units SubCUTAneous Q12H    ferrous sulfate tablet 325 mg  325 mg Oral ACB    latanoprost (XALATAN) 0.005 % ophthalmic solution 1 Drop  1 Drop Both Eyes QHS    sucralfate (CARAFATE) tablet 1 g  1 g Oral AC&HS    cyanocobalamin (VITAMIN B12) tablet 1,000 mcg  1,000 mcg Oral DAILY    cholecalciferol (VITAMIN D3) (1000 Units /25 mcg) tablet 2,000 Int'l Units  2,000 Int'l Units Oral DAILY    metoprolol succinate (TOPROL-XL) XL tablet 25 mg  25 mg Oral DAILY    amLODIPine (NORVASC) tablet 10 mg  10 mg Oral DAILY    zoledronic acid (ZOMETA) 4 mg in 0.9% sodium chloride 100 mL IVPB  4 mg IntraVENous NOW     Current Outpatient Medications   Medication Sig    lenalidomide (Revlimid) 15 mg cap Take 1 Capsule by mouth See Admin Instructions. Take days 1-21, off 7 days. HAD NOT STARTED YET.  latanoprost (XALATAN) 0.005 % ophthalmic solution Administer 1 Drop to both eyes nightly.  losartan (COZAAR) 50 mg tablet Take 1 Tablet by mouth daily for 30 days.  glucagon (Glucagon Emergency Kit, human,) 1 mg solr by Injection route. 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  Inject as per sliding scale: if 201 - 250 = 2 units; 251 - 300 = 4 units; 301 - 350 = 6 units Greater than 350, CALL MD, subcutaneously before meals and at bedtime related to TYPE 2 DIABETES MELLITUS WITHOUT COMPLICATIONS    potassium bicarb-citric acid (EFFER-K) 20 mEq tablet Take 1 Tablet by mouth two (2) times a day.  polyethylene glycol (MIRALAX) 17 gram packet Take 1 Packet by mouth daily.  amLODIPine (NORVASC) 10 mg tablet Take 1 Tablet by mouth daily.  cholecalciferol, vitamin D3, (Vitamin D3) 50 mcg (2,000 unit) tab Take 1 Tablet by mouth daily for 30 days.  levothyroxine (SYNTHROID) 88 mcg tablet Take 1 Tablet by mouth Daily (before breakfast) for 30 days.  metoprolol succinate (TOPROL-XL) 25 mg XL tablet Take 1 Tablet by mouth daily for 30 days.  pantoprazole (PROTONIX) 40 mg tablet Take 1 Tablet by mouth Daily (before breakfast) for 30 days.  ferrous sulfate 325 mg (65 mg iron) tablet Take 1 Tablet by mouth Daily (before breakfast) for 30 days.  cyanocobalamin 1,000 mcg tablet Take 1 Tablet by mouth daily for 30 days.  benzonatate (TESSALON) 100 mg capsule Take 1 Capsule by mouth as needed for Cough for up to 14 doses.  hydrALAZINE (APRESOLINE) 50 mg tablet Take 1 Tablet by mouth three (3) times daily for 30 days.  sucralfate (CARAFATE) 1 gram tablet Take 1 Tablet by mouth Before breakfast, lunch, dinner and at bedtime for 30 days. Allergies   Allergen Reactions    Celebrex [Celecoxib] Hives    Crestor [Rosuvastatin] Myalgia      Review of Systems Provided by:  Patient in ER. Review of Systems: Patient unable to provide. Objective:      Wt Readings from Last 3 Encounters:   06/01/22 117 lb 8.1 oz (53.3 kg)   05/23/22 110 lb (49.9 kg)   05/13/22 113 lb 9.6 oz (51.5 kg)     Temp Readings from Last 3 Encounters:   06/01/22 98.6 °F (37 °C)   05/29/22 98.5 °F (36.9 °C)   05/23/22 98.1 °F (36.7 °C)     BP Readings from Last 3 Encounters:   06/01/22 (!) 176/69   05/29/22 (!) 145/48 05/23/22 (!) 170/66     Pulse Readings from Last 3 Encounters:   06/01/22 75   05/29/22 (!) 57   05/23/22 (!) 58     ECOG PS: 4- Completely disabled; cannot carry on any selfcare; totally confined to bed or chair. General: no distress  Eyes: anicteric sclerae  HENT: atraumatic  Neck: supple  Respiratory: normal respiratory effort  CV: no peripheral edema  GI: soft, nontender, nondistended  Skin: no rashes; no ecchymoses; no petechiae  Psych: drowsy    Results:     I personally reviewed Epic EHR labs/results below:   Lab Results   Component Value Date/Time    WBC 5.7 06/01/2022 04:07 AM    HGB 7.6 (L) 06/01/2022 04:07 AM    HCT 23.2 (L) 06/01/2022 04:07 AM    PLATELET 519 87/48/1489 04:07 AM    .9 (H) 06/01/2022 04:07 AM    ABS. NEUTROPHILS 4.6 06/01/2022 04:07 AM     Lab Results   Component Value Date/Time    Sodium 132 (L) 06/01/2022 04:07 AM    Potassium 3.1 (L) 06/01/2022 04:07 AM    Chloride 102 06/01/2022 04:07 AM    CO2 23 06/01/2022 04:07 AM    Glucose 92 06/01/2022 04:07 AM    BUN 31 (H) 06/01/2022 04:07 AM    Creatinine 1.81 (H) 06/01/2022 04:07 AM    GFR est AA 32 (L) 06/01/2022 04:07 AM    GFR est non-AA 26 (L) 06/01/2022 04:07 AM    Calcium 12.5 (H) 06/01/2022 04:07 AM    Glucose (POC) 163 (H) 05/05/2022 04:24 PM     Lab Results   Component Value Date/Time    Bilirubin, total 0.3 06/01/2022 04:07 AM    ALT (SGPT) 11 (L) 06/01/2022 04:07 AM    Alk. phosphatase 39 (L) 06/01/2022 04:07 AM    Protein, total 10.2 (H) 06/01/2022 04:07 AM    Albumin 2.0 (L) 06/01/2022 04:07 AM    Globulin 8.2 (H) 06/01/2022 04:07 AM     ANEMIA LABS:    Iron Studies:    Iron   Date Value Ref Range Status   04/05/2022 51 35 - 150 ug/dL Final     Vitamin B12:   Vitamin B12   Date Value Ref Range Status   04/30/2022 1,065 (H) 193 - 986 pg/mL Final     Folate:    Folate   Date Value Ref Range Status   04/28/2022 29.2 (H) 5.0 - 21.0 ng/mL Final     SPEP:    M-Harry   Date Value Ref Range Status   04/30/2022 4.8 (H) Not Observed g/dL Final      Immunofixation Result   Date Value Ref Range Status   04/30/2022 Comment (A)   Final     Comment:     (NOTE)  Immunofixation shows IgG monoclonal protein with kappa light chain  specificity. Please note that samples from patients receiving DARZALEX(R)  (daratumumab) or SARCLISA(R[facility] treatment can appear  as an \"IgG kappa\" and mask a complete response (CR). If this  patient is receiving these therapies, this DANIELLE assay interference  can be removed by ordering test number [id number]-\"Immunofixation,  Daratumumab-Specific, Serum\" or [id number]-\"Immunofixation,  Isatuximab-Specific, Serum\" and submitting a new sample for  testing or by calling the lab to add this test to the current  sample. Kappa/Lambda ratio, serum   Date Value Ref Range Status   04/30/2022 329.68 (H) 0.26 - 1.65   Final     Comment:     (NOTE)  Performed At: [facility]  [address], [place] [id number]  Sarah Taylor MD KO:[id number]        Free Kappa Lt Chains, serum   Date Value Ref Range Status   04/30/2022 1,351.7 (H) 3.3 - 19.4 mg/L Final    No components found for: KLFL2  Reticulocyte Count: No results found for: RET  Bilirubin:   Lab Results   Component Value Date/Time    Bilirubin, total 0.3 06/01/2022 04:07 AM    Bilirubin Negative 05/30/2022 10:16 PM     LDH:   LD   Date Value Ref Range Status   04/30/2022 242 81 - 246 U/L Final        Assessment and Recommendations:     1. Multiple myeloma not having achieved remission ([facility] [address]  -had planned to start outpatient Revlimid, low dose Dexamethasone and Zometa [facility]. Daughter at bedside: reviewed with pt's daughter. Waiting to see improvement. If she is not improving then best supportive care, palliative approach may be indicated. 2. Hypercalcemia  -had received alendronate as non-standard therapy for myeloma per primary hospital team during last hospitalization.  Had planned for outpt zometa once she was discharged from facility. Admission Ca 14.4 / corrected to 15.9  5/31 received  Zometa 4mg IV to improve hypercalcemia related to myeloma. Ca 13.6 today corrected to 15.28  Continue to monitor; should correct over the next several days from Zometa      3. Macrocytic anemia  -related to myeloma as main contributor. Transfuse RBC's to keep Hgb greater than 7g/dL. 4. Hyponatremia  -likely related to dehydration but cannot ignore some pseudohyponatremia effect from worsening myeloma protein. Total protein increasing quickly over the past month. 5. Debility  -consider wide differential but surely hypercalcemia,worsening myeloma top the list. Hopeful with treatment that mentation and interaction improves.     6. Goals of care  Palliative team consulted      Plan reviewed with Dr Sudhakar Mars By:   Bienvenido Hitchcock NP

## 2022-06-01 NOTE — PROGRESS NOTES
6/1/2022'  1:34 PM  Pt discussed in IDR, is continuing to require medical management for encephalopathy likely 2/2 multiple metabolic derangements in s/o MM, . Hyperammoniemia, hypertensive urgency, hypercalcemia, abdominal pain,malnutrition, elevated pro-BNP, hypokalemia, hyponatremia, macrocytic anemia, T2 DM. Transitions of Care Plan:  RUR 22 % High Risk of Readmission  LOS 2 Days  1. Medical Management continues  2. Heme/Onc consult for MM  3. Nephrology consult  4. Nutrition following  5. Palliative consult for GOC  6. CM to follow through for treatment/response  7. DC when stable, dispo pending progress and palliative consult  8. Pt from [facility], would need PT, [facility] peters to determine if pt appropriate for rehab at DC   9.  CM will continue to follow and assist w/ DC needs  LENI Hernandez

## 2022-06-01 NOTE — PROGRESS NOTES
Problem: Mobility Impaired (Adult and Pediatric)  Goal: *Acute Goals and Plan of Care (Insert Text)  Description: FUNCTIONAL STATUS PRIOR TO ADMISSION: Patient was independent and active and living alone prior to recent hospital admit, discharge to rehab and was to discharge home soon, per chart review \" originally diagnosed with MM earlier 5/2022 and was sent to [facility] with plans to initiate chemo in the outpatient setting. \" Set to d/c home with her daughters assistance 6/1/2022 from rehab but was readmitted. HOME SUPPORT PRIOR TO ADMISSION: The patient lived alone with daughter to provide assistance. Has been in rehab the last month after d/c from [facility]. Physical Therapy Goals  Initiated 6/1/2022  1. Patient will move from supine to sit and sit to supine , scoot up and down, and roll side to side in bed with Min A within 7 day(s). 2.  Patient will transfer from bed to chair and chair to bed with minimal assistance/contact guard assist using the least restrictive device within 7 day(s). 3.  Patient will perform sit to stand with minimal assistance/contact guard assist within 7 day(s). 4.  Patient will ambulate with moderate assistance  for 25 feet with the least restrictive device within 7 day(s). Outcome: Progressing Towards Goal   PHYSICAL THERAPY EVALUATION  Patient: Arthur Garcia (64 y.o. female)  Date: 6/1/2022  Primary Diagnosis: Encephalopathy [G93.40]        Precautions:   Fall,Seizure,Skin    ASSESSMENT  Based on the objective data described below, the patient presents with decreased arousal, maintains eyes closed throughout however able to sit edge of bed and reported need to go to the bathroom. Pt requires Max  A to EOB, Mod A to stand and Max A to complete SPT to [facility] d/t decreased arousal and command following. Pt is at a high fall risk and with a fall in rehab facility directly prior to admission. Unable to tolerate activity at this time and return to supine.  Poor control of BP at this time. Pt also with visible shaking and c/o being cold during evaluation. Noted plan for palliative consult. Will continue to follow. At this time unsafe to d/c home with assist from daughter. Current Level of Function Impacting Discharge (mobility/balance): Max A for mobility    Functional Outcome Measure: The patient scored 10/100 on the Barthel outcome measure which is indicative of dependence with ADLs and mobility. Other factors to consider for discharge: high fall risk, MM diagnosis, from rehab with plan for d/c soon     Patient will benefit from skilled therapy intervention to address the above noted impairments. PLAN :  Recommendations and Planned Interventions: bed mobility training, transfer training, gait training, therapeutic exercises, neuromuscular re-education, patient and family training/education, and therapeutic activities      Frequency/Duration: Patient will be followed by physical therapy:  3 times a week to address goals. Recommendation for discharge: (in order for the patient to meet his/her long term goals)  Therapy up to 5 days/week in SNF setting    This discharge recommendation:  Has been made in collaboration with the attending provider and/or case management    IF patient discharges home will need the following DME: to be determined (TBD)         SUBJECTIVE:   Patient stated I need to go to the bathroom.     OBJECTIVE DATA SUMMARY:   HISTORY:    Past Medical History:   Diagnosis Date    Diabetes ([facility] [address]     GI bleeding     workup in [address] [place] [address] 12/15 was unremarkable (Dr. Jimmie Carrington) - says she had EGD, colonoscopy, and small bowel capsule endoscopy    HTN (hypertension)     Hypercholesteremia     Hypothyroidism, iatrogenic     radioiodine    MI (myocardial infarction) ([facility] [address]     During surgery in 1970's - she's had multiple caths and stress tests     Past Surgical History:   Procedure Laterality Date    COLONOSCOPY N/A 5/2/2022    COLONOSCOPY performed by Swathi Rogers MD at [facility] ENDOSCOPY    HX  SECTION      HX HERNIA REPAIR  [id number]    Open umbilical incisional herniorrhaphy [facility]). HX HERNIA REPAIR Right 2020    Right inguinal herniorrhaphy with mesh. HX ORTHOPAEDIC      Back surgery x 2. HX BRIAN AND BSO         Personal factors and/or comorbidities impacting plan of care: diabetes, MM, HTN, back surgeries, recent admission with d/c to rehab    [address]: Rehabilitation facility  One/Two Story Residence: One story  Living Alone: No  Support Systems: Child(jaclyn)  Patient Expects to be Discharged to[de-identified] Home with one level  Current DME Used/Available at Home: Walker,Walker, rollator,Shower chair,Wheelchair    EXAMINATION/PRESENTATION/DECISION MAKING:   Critical Behavior:  Neurologic State: Drowsy,Confused,Eyes open to voice  Orientation Level: Oriented to person,Disoriented to place,Disoriented to situation,Disoriented to time  Cognition: Decreased attention/concentration,Decreased command following,Poor safety awareness  Safety/Judgement: Decreased awareness of need for assistance,Decreased insight into deficits  Hearing: Auditory  Auditory Impairment: Hard of hearing, bilateral  Hearing Aids/Status: Bilateral,With patient  Skin:    Edema:   Range Of Motion:  AROM: Generally decreased, functional                       Strength:    Strength: Generally decreased, functional                    Tone & Sensation:   Tone: Normal                              Coordination:     Vision:      Functional Mobility:  Bed Mobility:     Supine to Sit: Maximum assistance;Assist x1  Sit to Supine: Maximum assistance;Assist x1  Scooting: Moderate assistance  Transfers:  Sit to Stand:  Moderate assistance;Minimum assistance;Assist x1  Stand to Sit: Moderate assistance        Bed to Chair: Moderate assistance;Maximum assistance;Assist x1              Balance:   Sitting: Impaired  Sitting - Static: Fair (occasional)  Sitting - Dynamic: Poor (constant support)  Standing: Impaired  Standing - Static: Fair;Constant support  Standing - Dynamic : Poor;Constant support  Ambulation/Gait Training:                                                         Stairs: Therapeutic Exercises:       Functional Measure:  Barthel Index:    Bathin  Bladder: 0  Bowels: 5  Groomin  Dressin  Feedin  Mobility: 0  Stairs: 0  Toilet Use: 0  Transfer (Bed to Chair and Back): 5  Total: 10/100       The Barthel ADL Index: Guidelines  1. The index should be used as a record of what a patient does, not as a record of what a patient could do. 2. The main aim is to establish degree of independence from any help, physical or verbal, however minor and for whatever reason. 3. The need for supervision renders the patient not independent. 4. A patient's performance should be established using the best available evidence. Asking the patient, friends/relatives and nurses are the usual sources, but direct observation and common sense are also important. However direct testing is not needed. 5. Usually the patient's performance over the preceding 24-48 hours is important, but occasionally longer periods will be relevant. 6. Middle categories imply that the patient supplies over 50 per cent of the effort. 7. Use of aids to be independent is allowed. Score Interpretation (from [address] [address])    Independent   60-79 Minimally independent   40-59 Partially dependent   20-39 Very dependent   <20 Totally dependent     -Alf Gutierrez., Barthel, D.W. (1965). Functional evaluation: the Barthel Index. [facility] ([address] (1997). The Barthel activities of daily living index: self-reporting versus actual performance in the old (> or = 75 years). Journal of [address] [place] [address] 45(7), [facility], J.J.M.F, Jama Vides., Dexter Evans. (1999).  Measuring the change in disability after inpatient rehabilitation; comparison of the responsiveness of the Barthel Index and Functional [place] Measure. Journal of Neurology, Neurosurgery, and Psychiatry, 664), 558-641. Leon CARLOS Cloud, CLAUDIO Gilliam, & Donn Lundy M.A. (2004) Assessment of post-stroke quality of life in cost-effectiveness studies: The usefulness of the Barthel Index and the EuroQoL-5D. Quality of Life Research, 15, 099-59            Physical Therapy Evaluation Charge Determination   History Examination Presentation Decision-Making   MEDIUM  Complexity : 1-2 comorbidities / personal factors will impact the outcome/ POC  HIGH Complexity : 4+ Standardized tests and measures addressing body structure, function, activity limitation and / or participation in recreation  MEDIUM Complexity : Evolving with changing characteristics  Other outcome measures BArthel  LOW       Based on the above components, the patient evaluation is determined to be of the following complexity level: LOW     Pain Rating:  none    Activity Tolerance:   Poor and requires rest breaks    After treatment patient left in no apparent distress:   Supine in bed, Call bell within reach, Bed / chair alarm activated, and Side rails x 3    COMMUNICATION/EDUCATION:   The patients plan of care was discussed with: Occupational therapist and Registered nurse. Patient is unable to participate in goal setting and plan of care.     Thank you for this referral.  Kennedy Yu, PT   Time Calculation: 20 mins

## 2022-06-01 NOTE — PROGRESS NOTES
Spiritual Care Assessment/Progress Note  [address]      NAME: Angy Renteria      MRN: [id number]  AGE: 80 y.o. SEX: female  Episcopalian Affiliation: Maria Esther Stanley   Language: English     6/1/2022     Total Time (in minutes): 28     Spiritual Assessment begun in [facility] [address] through conversation with:         []Patient        [x] Family    [] Friend(s)        Reason for Consult: Palliative Care, Initial/Spiritual Assessment     Spiritual beliefs: (Please include comment if needed)     [x] Identifies with a little tradition: Shinto         [] Supported by a little community:            [] Claims no spiritual orientation:           [] Seeking spiritual identity:                [] Adheres to an individual form of spirituality:           [] Not able to assess:                           Identified resources for coping:      [] Prayer                               [] Music                  [] Guided Imagery     [x] Family/friends                 [] Pet visits     [] Devotional reading                         [] Unknown     [] Other:                                               Interventions offered during this visit: (See comments for more details)          Family/Friend(s):  Affirmation of emotions/emotional suffering,Affirmation of little,Catharsis/review of pertinent events in supportive environment,Coping skills reviewed/reinforced,Iconic (affirming the presence of God/Higher Power),Normalization of emotional/spiritual concerns,Prayer (assurance of)     Plan of Care:     [] Support spiritual and/or cultural needs    [] Support AMD and/or advance care planning process      [] Support grieving process   [] Coordinate Rites and/or Rituals    [] Coordination with community clergy   [] No spiritual needs identified at this time   [] Detailed Plan of Care below (See Comments)  [] Make referral to Music Therapy  [] Make referral to Pet Therapy     [] Make referral to Addiction services  [] Make referral to [facility]  [] Make referral to Spiritual Care Partner  [] No future visits requested        [x] Follow up visits as needed     Visited patient for palliative initial spiritual assessment. She has two daughters. Daughter Henrry Montes was present. She reports that her sister Massiel Peters has had shoulder issues and is unable to be here now. Henrry Montes and her daughter are the ones providing the most support currently. Henrry Montes and other family members were hoping for the patient's discharge but she now understands that that is unlikely in the short term.    Chaplain Marbella, MDiv, MS, [facility]

## 2022-06-01 NOTE — PROGRESS NOTES
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 Palliative eval.       Subjective:   CC: f/u DOMINIK  HPI: Creatinine not better today. Pt sitting up but very weak and confused. Calcium improving. K is low.    ROS: unable to obtain due to ams  Current Facility-Administered Medications   Medication Dose Route Frequency    potassium chloride 10 mEq in 100 ml IVPB  10 mEq IntraVENous Q1H    hydrALAZINE (APRESOLINE) tablet 50 mg  50 mg Oral TID    sodium chloride (NS) flush 5-40 mL  5-40 mL IntraVENous Q8H    sodium chloride (NS) flush 5-40 mL  5-40 mL IntraVENous PRN    acetaminophen (TYLENOL) tablet 650 mg  650 mg Oral Q6H PRN    Or    acetaminophen (TYLENOL) suppository 650 mg  650 mg Rectal Q6H PRN    polyethylene glycol (MIRALAX) packet 17 g  17 g Oral DAILY PRN    ondansetron (ZOFRAN ODT) tablet 4 mg  4 mg Oral Q8H PRN    Or    ondansetron (ZOFRAN) injection 4 mg  4 mg IntraVENous Q6H PRN    0.9% sodium chloride infusion  100 mL/hr IntraVENous CONTINUOUS    levothyroxine (SYNTHROID) tablet 88 mcg  88 mcg Oral ACB    lactulose (CHRONULAC) 10 gram/15 mL solution 30 mL  20 g Oral BID    heparin (porcine) injection 5,000 Units  5,000 Units SubCUTAneous Q12H    ferrous sulfate tablet 325 mg  325 mg Oral ACB    latanoprost (XALATAN) 0.005 % ophthalmic solution 1 Drop  1 Drop Both Eyes QHS    sucralfate (CARAFATE) tablet 1 g  1 g Oral AC&HS    cyanocobalamin (VITAMIN B12) tablet 1,000 mcg  1,000 mcg Oral DAILY    cholecalciferol (VITAMIN D3) (1000 Units /25 mcg) tablet 2,000 Int'l Units  2,000 Int'l Units Oral DAILY    [Held by provider] metoprolol succinate (TOPROL-XL) XL tablet 25 mg  25 mg Oral DAILY    amLODIPine (NORVASC) tablet 10 mg  10 mg Oral DAILY    metoprolol (LOPRESSOR) injection 5 mg  5 mg IntraVENous BID    hydrALAZINE (APRESOLINE) 20 mg/mL injection 20 mg  20 mg IntraVENous Q6H PRN          Objective:     Vitals:  Blood pressure (!) 204/90, pulse 77, temperature 99.2 °F (37.3 °C), resp. rate 14, height 5' (1.524 m), weight 49.9 kg (110 lb), SpO2 94 %, not currently breastfeeding. Temp (24hrs), Av.6 °F (37 °C), Min:97.2 °F (36.2 °C), Max:99.6 °F (37.6 °C)      Intake and Output:  No intake/output data recorded.  1901 -  0700  In: 1150 [I.V.:1150]  Out: 1450 [Urine:1450]    Physical Exam:               GENERAL ASSESSMENT: frail, ill appearing WF  HEENT:Nontraumatic   CHEST: CTA  HEART: S1S2  ABDOMEN: Soft,NT,  :Pichardo:   EXTREMITY: EDEMA  NEURO:Grossly non focal          ECG/rhythm:    Data Review      No results for input(s): TNIPOC in the last 72 hours. No lab exists for component: ITNL   No results for input(s): CPK, CKMB, TROIQ in the last 72 hours. Recent Labs     22  0407 22  2231 22  1545 22  1044 22  0252 22  2200 22  2200   * 130* 129*   < > 128*   < > 125*   K 3.1* 3.4* 3.8   < > 3.0*   < > 3.0*    101 98   < > 95*   < > 92*   CO2 23 23 25   < > 26   < > 28   BUN 31* 30* 31*   < > 28*   < > 31*   CREA 1.81* 1.74* 1.69*   < > 1.52*   < > 1.64*   GLU 92 98 84   < > 92   < > 107*   MG 2.0  --   --   --  1.3*  --   --    CA 12.5* 13.1* 14.0*   < > 13.6*   < > 14.4*   ALB 2.0* 2.0* 2.1*  --  1.9*   < > 2.1*   WBC 5.7  --   --   --  6.5  --  6.9   HGB 7.6*  --   --   --  8.0*  --  7.9*   HCT 23.2*  --   --   --  23.8*  --  23.2*     --   --   --  267  --  275    < > = values in this interval not displayed. No results for input(s): INR, PTP, APTT, INREXT in the last 72 hours.   Needs: urine analysis, urine sodium, protein and creatinine  Lab Results   Component Value Date/Time Sodium,urine random 21 05/31/2022 02:12 AM    Creatinine, urine 65.00 05/30/2022 10:16 PM             : yM Steele MD  6/1/2022        [address] [place] [address] [facility]:  [email or web address]  Shantel David office:  [address] [facility] - [place] [address]  [place], [address] [place] [address]  Phone: [phone]  Fax :     [phone]    [address] [place] [address] office:  [address]  Phone - [phone]  Fax - [phone]

## 2022-06-01 NOTE — MED STUDENT NOTES
*ATTENTION:  This note has been created by a medical student for educational purposes only. Please do not refer to the content of this note for clinical decision-making, billing, or other purposes. Please see attending physicians note to obtain clinical information on this patient. *       General Daily Progress Note    Admit Date: 5/30/2022  Hospital day 2    Subjective:     24hour events: Pt with urinary retention requiring straight cath x3 and placement of england. Pt also had labile blood pressures with systolic pressures ranging 190-115. AM:  Pt seen and evaluated at bedside. Pt's mentation improved and able to vocalize \"I am not feeling too well today\" but unable to further elaborate.      Current Facility-Administered Medications   Medication Dose Route Frequency    potassium chloride 10 mEq in 100 ml IVPB  10 mEq IntraVENous Q1H    hydrALAZINE (APRESOLINE) tablet 50 mg  50 mg Oral TID    sodium chloride (NS) flush 5-40 mL  5-40 mL IntraVENous Q8H    sodium chloride (NS) flush 5-40 mL  5-40 mL IntraVENous PRN    acetaminophen (TYLENOL) tablet 650 mg  650 mg Oral Q6H PRN    Or    acetaminophen (TYLENOL) suppository 650 mg  650 mg Rectal Q6H PRN    polyethylene glycol (MIRALAX) packet 17 g  17 g Oral DAILY PRN    ondansetron (ZOFRAN ODT) tablet 4 mg  4 mg Oral Q8H PRN    Or    ondansetron (ZOFRAN) injection 4 mg  4 mg IntraVENous Q6H PRN    0.9% sodium chloride infusion  100 mL/hr IntraVENous CONTINUOUS    levothyroxine (SYNTHROID) tablet 88 mcg  88 mcg Oral ACB    lactulose (CHRONULAC) 10 gram/15 mL solution 30 mL  20 g Oral BID    heparin (porcine) injection 5,000 Units  5,000 Units SubCUTAneous Q12H    ferrous sulfate tablet 325 mg  325 mg Oral ACB    latanoprost (XALATAN) 0.005 % ophthalmic solution 1 Drop  1 Drop Both Eyes QHS    sucralfate (CARAFATE) tablet 1 g  1 g Oral AC&HS    cyanocobalamin (VITAMIN B12) tablet 1,000 mcg  1,000 mcg Oral DAILY    cholecalciferol (VITAMIN D3) (1000 Units /25 mcg) tablet 2,000 Int'l Units  2,000 Int'l Units Oral DAILY    [Held by provider] metoprolol succinate (TOPROL-XL) XL tablet 25 mg  25 mg Oral DAILY    amLODIPine (NORVASC) tablet 10 mg  10 mg Oral DAILY    metoprolol (LOPRESSOR) injection 5 mg  5 mg IntraVENous BID    hydrALAZINE (APRESOLINE) 20 mg/mL injection 20 mg  20 mg IntraVENous Q6H PRN        Review of Systems  Pertinent items are noted in HPI. Objective:     Patient Vitals for the past 8 hrs:   BP Temp Pulse Resp SpO2   06/01/22 0804 (!) 147/85 98 °F (36.7 °C) 81 19 94 %   06/01/22 0732 (!) 185/78 -- 86 24 94 %   06/01/22 0620 (!) 192/78 -- 79 15 93 %   06/01/22 0600 (!) 183/82 -- 82 17 93 %   06/01/22 0530 (!) 161/92 -- 73 20 93 %   06/01/22 0400 (!) 161/133 -- 85 21 96 %   06/01/22 0330 (!) 154/79 -- 84 20 94 %   06/01/22 0300 (!) 149/56 99.6 °F (37.6 °C) 81 16 94 %   06/01/22 0230 (!) 154/76 -- 82 15 95 %   06/01/22 0200 (!) 143/54 -- 82 14 94 %   06/01/22 0130 (!) 108/47 -- 69 15 94 %   06/01/22 0100 121/76 -- 80 23 94 %   06/01/22 0030 (!) 166/68 -- 81 14 92 %     No intake/output data recorded. 05/30 1901 - 06/01 0700  In: 1150 [I.V.:1150]  Out: 1450 [Urine:1450]    Physical Exam: General appearance: alert, cooperative, no distress, appears stated age, confused  Lungs: clear to auscultation bilaterally  Abdomen: soft, dif. Bowel sounds normal. No masses,  no organomegaly  Neurologic: A/Ox1, does not follow commands, moving all extremities spontaneously.        ECG: normal sinus rhythm, rates to 80s with PVCs    Data Review   Recent Results (from the past 24 hour(s))   ECHO ADULT COMPLETE    Collection Time: 05/31/22  9:52 AM   Result Value Ref Range    IVSd 1.2 (A) 0.6 - 0.9 cm    LVIDd 5.3 3.9 - 5.3 cm    LVIDs 3.6 cm    LVOT Diameter 2.1 cm    LVPWd 1.2 (A) 0.6 - 0.9 cm    LVOT Peak Gradient 7 mmHg    LVOT Mean Gradient 3 mmHg    LVOT SV 77.5 ml    LVOT Peak Velocity 1.3 m/s    LVOT VTI 22.4 cm    RVIDd 4.8 cm    RVOT Peak Gradient 5 mmHg    RVOT Peak Velocity 1.1 m/s    LA Diameter 4.8 cm    LA Volume A/L 73 mL    LA Volume 2C 64 (A) 22 - 52 mL    LA Volume 4C 69 (A) 22 - 52 mL    LA Volume BP 68 (A) 22 - 52 mL    AV Area by Peak Velocity 2.4 cm2    AV Area by VTI 2.4 cm2    AR .6 millisecond    AR Max Velocity PISA 4.1 m/s    AV Peak Gradient 15 mmHg    AV Mean Gradient 7 mmHg    AV Peak Velocity 1.9 m/s    AV Mean Velocity 1.2 m/s    AV VTI 33.1 cm    MV A Velocity 1.12 m/s    MV E Wave Deceleration Time 294.8 ms    MV E Velocity 0.79 m/s    LV E' Lateral Velocity 8 cm/s    LV E' Septal Velocity 5 cm/s    MR Peak Gradient 96 mmHg    MR Peak Velocity 4.9 m/s    Pulmonary Artery EDP 7 mmHg    MT Max Velocity 1.3 m/s    PV Peak Gradient 9 mmHg    PV Max Velocity 1.5 m/s    TAPSE 2.6 1.7 cm    TR Peak Gradient 26 mmHg    TR Max Velocity 2.51 m/s    Ascending Aorta 4.1 cm    Fractional Shortening 2D 32 28 - 44 %    LVIDd Index 3.66 cm/m2    LVIDs Index 2.48 cm/m2    LV RWT Ratio 0.45     LV Mass 2D 256.6 (A) 67 - 162 g    LV Mass 2D Index 176.9 (A) 43 - 95 g/m2    MV E/A 0.71     E/E' Ratio (Averaged) 12.84     E/E' Lateral 9.88     E/E' Septal 15.80     LA Volume Index BP 47 (A) 16 - 34 ml/m2    LA Volume Index A/L 50 16 - 34 mL/m2    LVOT Stroke Volume Index 53.5 mL/m2    LVOT Area 3.5 cm2    LA Volume Index 2C 44 (A) 16 - 34 mL/m2    LA Volume Index 4C 48 (A) 16 - 34 mL/m2    LA Size Index 3.31 cm/m2    Ascending Aorta Index 2.83 cm/m2    AV Velocity Ratio 0.68     LVOT:AV VTI Index 0.68     HARSHIL/BSA VTI 1.7 cm2/m2    HARSHIL/BSA Peak Velocity 1.7 OD5/S0   METABOLIC PANEL, BASIC    Collection Time: 05/31/22 10:44 AM   Result Value Ref Range    Sodium 128 (L) 136 - 145 mmol/L    Potassium 3.4 (L) 3.5 - 5.1 mmol/L    Chloride 98 97 - 108 mmol/L    CO2 24 21 - 32 mmol/L    Anion gap 6 5 - 15 mmol/L    Glucose 95 65 - 100 mg/dL    BUN 30 (H) 6 - 20 MG/DL    Creatinine 1.63 (H) 0.55 - 1.02 MG/DL    BUN/Creatinine ratio 18 12 - 20      GFR est AA 36 (L) >60 ml/min/1.73m2    GFR est non-AA 30 (L) >60 ml/min/1.73m2    Calcium 13.9 (HH) 8.5 - 10.1 MG/DL   AMMONIA    Collection Time: 05/31/22 10:44 AM   Result Value Ref Range    Ammonia, plasma 52 (H) <32 UMOL/L   AMMONIA    Collection Time: 05/31/22  3:45 PM   Result Value Ref Range    Ammonia, plasma 43 (H) <25 UMOL/L   METABOLIC PANEL, COMPREHENSIVE    Collection Time: 05/31/22  3:45 PM   Result Value Ref Range    Sodium 129 (L) 136 - 145 mmol/L    Potassium 3.8 3.5 - 5.1 mmol/L    Chloride 98 97 - 108 mmol/L    CO2 25 21 - 32 mmol/L    Anion gap 6 5 - 15 mmol/L    Glucose 84 65 - 100 mg/dL    BUN 31 (H) 6 - 20 MG/DL    Creatinine 1.69 (H) 0.55 - 1.02 MG/DL    BUN/Creatinine ratio 18 12 - 20      GFR est AA 35 (L) >60 ml/min/1.73m2    GFR est non-AA 29 (L) >60 ml/min/1.73m2    Calcium 14.0 (HH) 8.5 - 10.1 MG/DL    Bilirubin, total 0.5 0.2 - 1.0 MG/DL    ALT (SGPT) 11 (L) 12 - 78 U/L    AST (SGOT) 18 15 - 37 U/L    Alk. phosphatase 42 (L) 45 - 117 U/L    Protein, total 10.9 (H) 6.4 - 8.2 g/dL    Albumin 2.1 (L) 3.5 - 5.0 g/dL    Globulin 8.8 (H) 2.0 - 4.0 g/dL    A-G Ratio 0.2 (L) 1.1 - 2.2     AMMONIA    Collection Time: 05/31/22 10:31 PM   Result Value Ref Range    Ammonia, plasma 46 (H) <92 UMOL/L   METABOLIC PANEL, COMPREHENSIVE    Collection Time: 05/31/22 10:31 PM   Result Value Ref Range    Sodium 130 (L) 136 - 145 mmol/L    Potassium 3.4 (L) 3.5 - 5.1 mmol/L    Chloride 101 97 - 108 mmol/L    CO2 23 21 - 32 mmol/L    Anion gap 6 5 - 15 mmol/L    Glucose 98 65 - 100 mg/dL    BUN 30 (H) 6 - 20 MG/DL    Creatinine 1.74 (H) 0.55 - 1.02 MG/DL    BUN/Creatinine ratio 17 12 - 20      GFR est AA 34 (L) >60 ml/min/1.73m2    GFR est non-AA 28 (L) >60 ml/min/1.73m2    Calcium 13.1 (HH) 8.5 - 10.1 MG/DL    Bilirubin, total 0.3 0.2 - 1.0 MG/DL    ALT (SGPT) 12 12 - 78 U/L    AST (SGOT) 19 15 - 37 U/L    Alk.  phosphatase 43 (L) 45 - 117 U/L    Protein, total 10.8 (H) 6.4 - 8.2 g/dL    Albumin 2.0 (L) 3.5 - 5.0 g/dL Globulin 8.8 (H) 2.0 - 4.0 g/dL    A-G Ratio 0.2 (L) 1.1 - 2.2     METABOLIC PANEL, COMPREHENSIVE    Collection Time: 06/01/22  4:07 AM   Result Value Ref Range    Sodium 132 (L) 136 - 145 mmol/L    Potassium 3.1 (L) 3.5 - 5.1 mmol/L    Chloride 102 97 - 108 mmol/L    CO2 23 21 - 32 mmol/L    Anion gap 7 5 - 15 mmol/L    Glucose 92 65 - 100 mg/dL    BUN 31 (H) 6 - 20 MG/DL    Creatinine 1.81 (H) 0.55 - 1.02 MG/DL    BUN/Creatinine ratio 17 12 - 20      GFR est AA 32 (L) >60 ml/min/1.73m2    GFR est non-AA 26 (L) >60 ml/min/1.73m2    Calcium 12.5 (H) 8.5 - 10.1 MG/DL    Bilirubin, total 0.3 0.2 - 1.0 MG/DL    ALT (SGPT) 11 (L) 12 - 78 U/L    AST (SGOT) 18 15 - 37 U/L    Alk. phosphatase 39 (L) 45 - 117 U/L    Protein, total 10.2 (H) 6.4 - 8.2 g/dL    Albumin 2.0 (L) 3.5 - 5.0 g/dL    Globulin 8.2 (H) 2.0 - 4.0 g/dL    A-G Ratio 0.2 (L) 1.1 - 2.2     CBC WITH AUTOMATED DIFF    Collection Time: 06/01/22  4:07 AM   Result Value Ref Range    WBC 5.7 3.6 - 11.0 K/uL    RBC 2.30 (L) 3.80 - 5.20 M/uL    HGB 7.6 (L) 11.5 - 16.0 g/dL    HCT 23.2 (L) 35.0 - 47.0 %    .9 (H) 80.0 - 99.0 FL    MCH 33.0 26.0 - 34.0 PG    MCHC 32.8 30.0 - 36.5 g/dL    RDW 13.6 11.5 - 14.5 %    PLATELET 175 490 - 136 K/uL    MPV 9.4 8.9 - 12.9 FL    NRBC 0.0 0  WBC    ABSOLUTE NRBC 0.00 0.00 - 0.01 K/uL    NEUTROPHILS 81 (H) 32 - 75 %    LYMPHOCYTES 7 (L) 12 - 49 %    MONOCYTES 10 5 - 13 %    EOSINOPHILS 1 0 - 7 %    BASOPHILS 0 0 - 1 %    IMMATURE GRANULOCYTES 1 (H) 0.0 - 0.5 %    ABS. NEUTROPHILS 4.6 1.8 - 8.0 K/UL    ABS. LYMPHOCYTES 0.4 (L) 0.8 - 3.5 K/UL    ABS. MONOCYTES 0.6 0.0 - 1.0 K/UL    ABS. EOSINOPHILS 0.1 0.0 - 0.4 K/UL    ABS. BASOPHILS 0.0 0.0 - 0.1 K/UL    ABS. IMM.  GRANS. 0.0 0.00 - 0.04 K/UL    DF AUTOMATED     AMMONIA    Collection Time: 06/01/22  4:07 AM   Result Value Ref Range    Ammonia, plasma 45 (H) <32 UMOL/L   MAGNESIUM    Collection Time: 06/01/22  4:07 AM   Result Value Ref Range    Magnesium 2.0 1.6 - 2.4 mg/dL           Assessment:     Principal Problem:    Encephalopathy (5/30/2022)    Active Problems:    HTN (hypertension) ()      Hypothyroidism, iatrogenic ()      Overview: radioiodine      Hypercholesteremia ()      Type 2 diabetes mellitus without complication, without long-term current use of insulin (HCC) (4/24/2017)      Macrocytic anemia (8/29/2018)      Nodule of left lobe of thyroid gland (9/8/2020)      Hypokalemia (2/24/2022)      Elevated troponin (2/24/2022)      Hypertensive emergency (2/25/2022)      Multiple myeloma not having achieved remission ([facility] [address] (5/13/2022)      Hypercalcemia (5/13/2022)      Hypomagnesemia (5/31/2022)      Hyponatremia (5/31/2022)      Elevated brain natriuretic peptide (BNP) level (5/31/2022)        Plan:   Inge Kenyon is a 80 y. o. female with a PMHx of recently diagnosed Multiple Myeloma, hypercalcemia, HTN, Macrocytic anemia, GERD, Badycardia  who is admitted for altered mental status in the setting of hyponatremia and hypercalcemia.     Encephalopathy in the setting of Acute on Chronic Hyponatremia:   Electrolytes and mentation improving, No focal findings on neuro exam, CT head and CT spine with NAP. Low Na possible pseudohyponatremia in the setting of Multiple Myeloma.    - AMS labs including TSH, Salicylates, Volatiles unremarkable  -Ammonia downtrending (45)  - Nephrology following, currently agree with Zometa  - Fall Precautions   - 1/2 MIVF with Normal Saline (50cc/hr), gentle due to elevated pBNP  - BMPq12  - Strict IO  - Passed bedside dysphagia, start easy chew diet      Hyperammoniemia: Possible source of AMS, downtrending to 45  - trend QD  - Lactulose 20g BID      Hypertensive Emergency: /68, with elevated troponin and AMS.  Chronic HTN, difficult to control  - target  (goal reduction 25%) in first 24 hours   - Hydralazine IV 10mg PRN, for SBP>180  - Continue to Hold home Amlodipine 10 mg daily and metoprolol XL 25 mg daily, Losartan 100 mg daily, and Hydral 50 mg TID      Hypercalcemia: POA Ca of 14.4, improving to 12.5. In the setting of recent diagnosis of Multiple Myeloma. [place] cause of current abdominal pain.   - 1/2 MIVF with NS at 50cc/hr  - Gentle with fluids due to elevated pro-BnP, may increase today due to no signs of fluid overload. - Daily BMP  - per Heme/Onc continue Zometa.     Elevated Creatinine, resolved:  Cr POA 1.64 (bl ~0.9). Likely due to dehydration. - 1/2 MIVF with NS at 50cc/hr, gentle with fluids given elevated BnP. Appears volume down on exam, will consider fluid increase.  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       Abdominal Pain: Chronic issues, prior workup including EGD and colonoscopy negative. Prior CT showing hiatal hernia. Likely secondary to hypercalcemia, less likely hernia. UA negative for UTI, pelvic xray showing no acute process. - Expect to resolve with treatment of underlying hypercalcemia.   - Continue home Carafate   - Hold home protonix as possible contribution to HypoNa  - If fails to improve, consider further imaging.      Elevated Troponin: Trop initially 56, repeat flat at 56. No chest pain, EKG with no ischemia.   - Continue to monitor on tele     Elevated pro-BNP: POA BnP 2833. Prior Echo in February EF 60-65%. Lungs clear on exam, no edema. Likely secondary to HTN and anemia. - repeat Echo with EF 55-60%  - cautious with fluids at this time, however appears to be fluid down on exam. Consider increasing fluids.     Hypokalemia: Chronic. Will continue to follow on BMP trends. - replete IV at this time due to NPO  - Continue home PO EfferK 20 BID when taking PO  - daily CMP, replete as needed     Multiple Myeloma: Recently diagnosed on prior admission in April. Established with Dr. Georgiana Duarte outpatient.    - Heme/Onc consulted, plan for inpatient valcade/dex  - Consider Zometa outpatient for hypercalcemia     Macrocytic Anemia: Hgb 7. 9 POA (bl ~8). - Heme/Onc consulted, appreciate recs  - restart home cyanocobalamin 1,000 daily      DM2: POA glucose 131. Last A1c <3.8 (L) on 4/28/2022 . Not on any home medications. - Monitor on daily CMP, no SSI at this time      HLD: Last lipid panel 12/6/21: Tchol 122, HDL 67, LDL 45.6, trig 47. No home medications.     Hypothyroidism: TSH 6.26 (5/1/2022). - Continue home Synthroid 88 mcg daily before breakfast  - TSH 1.55     CAD: MI (during surgery in 1970s) had multiple caths and stress tests. Records of cardiology visit w/ Dr. Ryley Flores on 3/22/17. Exercise Cardiolite 7/23/15 - walked 4:39 (6.3 METS), normal stress EKG. Normal MPI. LVEF 71%. Echo 2/25/22 - LVEF 60-65%.     Rectal prolapse: Occurred x 1 during prior hospitalization, manual reduction. No acute complications. - Refer to Colorectal surgery for outpatient follow-up     GERD: Chronic.  - Hold home Protonix 40 mg daily, possible worsening of Hyponatremia  - restart home Sucralfate      Bradycardia: Chronic. EKG showing 1st deg AV block, chronic.   - holding home Metoprolol XL 25mg daily         FEN/GI - GI lite. NS at 1/2 MIVF (50cc/hr)  Activity - Ambulate with assistance  DVT prophylaxis - Heparin   GI prophylaxis - Holding home Protonix for hyponatremia  Fall prophylaxis - Fall precautions ordered. Disposition - Admit to Telemetry. Plan to d/c to TBD. Consulting PT, OT and CM  Code Status - DNR. Discussed with patient / caregivers. Next of Kin Name and Gage Cisneros (Child)   [phone]    To be discussed with Dr. Evelyne Mak, M4    *This is a medical student note utilized for learning and not to be used for medical decision making or billing purposes. Please refer to the attending physician's note for further clinical information on this patient. *

## 2022-06-01 NOTE — PROGRESS NOTES
Bedside and Verbal shift change report given to April (oncoming nurse) by Kathy Clifford (offgoing nurse). Report included the following information SBAR, Kardex, MAR, Accordion and Recent Results.

## 2022-06-01 NOTE — PROGRESS NOTES
1900 - Bedside and verbal shift change report given to Rani Caro RN (oncoming nurse) by Arnold Montejo RN (offgoing nurse). Report included the following information: SBAR, Kardex, Intake/Output, MAR, Recent Results, and Med Rec Status. 2213--pt not voiding. Notified family practice MD of bladder scan showing greater than 620mL. Ordered to straight cath x1. Completed with assistance from [facility].    0303-family practice called and asked nurse to remove nitro paste from chest. Medication removed. 0430-notified family practice MD of pt not voiding and bladder scan over 430mL. Ordered to place england for retention. This patient was assisted with Intentional Toileting every 2 hours during this shift as appropriate. Documentation of ambulation and output reflected on Flowsheet as appropriate. Purposeful hourly rounding was completed using AIDET and 5Ps. Outcomes of PHR documented as they occurred. Bed alarm in use as appropriate. Dual Suction and ambubag in place. 0700 - Bedside and verbal shift change report given to Arnold Montejo RN (oncoming nurse) by Rani Caro RN (offgoing nurse). Report included the following: SBAR, Kardex, Intake/Output, MAR, Recent Results, and Med Rec Status.

## 2022-06-01 NOTE — PROGRESS NOTES
Comprehensive Nutrition Assessment    Type and Reason for Visit: Positive nutrition screen for wt loss >24 lbs    Nutrition Recommendations/Plan:   1. Add oral nutritional supplement Ensure HP BID (provides: 320 kcals, 32g protein, 38g carb)  2. Assist with meals and feeding when safe and accepting of PO     Malnutrition Assessment:  Malnutrition Status:  Severe malnutrition (06/01/22 1137)    Context:  Acute illness     Findings of the 6 clinical characteristics of malnutrition:   Energy Intake:  50% or less of est energy requirements for 5 or more days  Weight Loss:  Unable to assess     Body Fat Loss: Moderate body fat loss, Triceps,Buccal region,Fat overlying ribs   Muscle Mass Loss:  Mild muscle mass loss, Temples (temporalis),Thigh (quadriceps),Clavicles (pectoralis & deltoids)  Fluid Accumulation:  No significant fluid accumulation,     Strength:  Not performed     Nutrition Assessment:      101: 80year old female admitted for Encephalopathy [G93.40] who  has a past medical history of Diabetes ([facility] [address] GI bleeding, HTN (hypertension), Hypercholesteremia, Hypothyroidism, iatrogenic, and MI (myocardial infarction) ([facility] [address] Pt is familiar to facility with recent admission earlier this month and assessed by RD. Wt this admission appears to not be measured and based on UBW. Nutrition Focused Physical Exam completed with severe malnutrition findings. Pt is not rousing much and hardly opening her eyes during RD assessment. Dentures are in her mouth. RD attended & discussed patient during interdisciplinary rounds on unit and reports awaiting palliative consult for goals of care. Nutrition Related Findings:      Wound Type: None    Last Bowel Movement Date:  (unknown)  Appetite: NPO  Edema:No data recorded    Nutr.  Labs:      Lab Results   Component Value Date/Time    GFR est AA 32 (L) 06/01/2022 04:07 AM    GFR est non-AA 26 (L) 06/01/2022 04:07 AM    Creatinine 1.81 (H) 06/01/2022 04:07 AM    BUN 31 (H) 06/01/2022 04:07 AM    Sodium 132 (L) 06/01/2022 04:07 AM    Potassium 3.1 (L) 06/01/2022 04:07 AM    Chloride 102 06/01/2022 04:07 AM    CO2 23 06/01/2022 04:07 AM     Lab Results   Component Value Date/Time    Glucose 92 06/01/2022 04:07 AM    Glucose (POC) 163 (H) 05/05/2022 04:24 PM     Lab Results   Component Value Date/Time    Hemoglobin A1c <3.8 (L) 04/28/2022 03:12 PM     Nutr. Meds:  IVF @ 100 mL/hr, Norvasc, Vit D3, Vit B12, Iron, lactulose, synthroid, lopressor, KCl (replacement), Carafate (prior to meals)  PRN: Miralax     Current Nutrition Intake & Therapies:  Average Meal Intake: 0%  Average Supplement Intake: None ordered  ADULT DIET Easy to Chew    Documented Meal intake:  Patient Vitals for the past 168 hrs:   % Diet Eaten   05/31/22 0915 0%     Documentation of supplement intake:  No data found. Anthropometric Measures:  Height: 5' (152.4 cm)  Ideal Body Weight (IBW): 100 lbs (45 kg)  Admission Body Weight: 110 lb  Current Body Wt:  49.9 kg (110 lb 0.2 oz), 110 % IBW.  Bed scale  Current BMI (kg/m2): 21.5  Usual Body Weight: 49.9 kg (110 lb)  % Weight Change (Calculated): 0  Weight Adjustment: No adjustment                 BMI Category: Underweight (BMI less than 22) age over 72     Last 3 Recorded Weights in this Encounter    05/30/22 2148 05/31/22 0951 06/01/22 1111   Weight: 49.9 kg (110 lb 0.2 oz) 49.9 kg (110 lb) 53.3 kg (117 lb 8.1 oz)     Wt Readings from Last 10 Encounters:   06/01/22 53.3 kg (117 lb 8.1 oz)   05/23/22 49.9 kg (110 lb)   05/13/22 51.5 kg (113 lb 9.6 oz)   05/06/22 58.5 kg (129 lb)   05/03/22 57.7 kg (127 lb 3.3 oz)   04/05/22 53.6 kg (118 lb 3.2 oz)   03/02/22 55.3 kg (122 lb)   02/27/22 57.8 kg (127 lb 6.4 oz)   12/06/21 55.8 kg (123 lb)   05/04/21 57.4 kg (126 lb 9.6 oz)     Estimated Daily Nutrient Needs:  Energy Requirements Based On: Kcal/kg  Weight Used for Energy Requirements: Admission  Energy (kcal/day): 1250 - 1400 kcal/d (25-28 kcal/kg)  Weight Used for Protein Requirements: Admission  Protein (g/day): 40 - 55 g (0.8-1.1 g/kg)  Method Used for Fluid Requirements: 1 ml/kcal  Fluid (ml/day): 1300 mL    Nutrition Diagnosis:   · Inadequate oral intake,Severe malnutrition related to cognitive or neurological impairment as evidenced by intake 0-25%,Criteria as identified in malnutrition assessment,poor intake prior to admission      Nutrition Interventions:   Food and/or Nutrient Delivery: Start oral nutrition supplement     Coordination of Nutrition Care: Continue to monitor while inpatient,Interdisciplinary rounds  Plan of Care discussed with: RN and team during IDRs    Goals:     Goals: PO intake 50% or greater,by next RD assessment       Nutrition Monitoring and Evaluation:   Behavioral-Environmental Outcomes: None identified  Food/Nutrient Intake Outcomes: Food and nutrient intake  Physical Signs/Symptoms Outcomes: Weight,Chewing or swallowing    Discharge Planning:    Continue oral nutrition supplement    Hailey Farooq MS, RD  Contact via [employer] or office [phone]

## 2022-06-02 NOTE — PROGRESS NOTES
CONSENT FOR BLOOD TRANSFUSION     I have discussed with the patient's daughter, Frankie Becerril who serves as decision maker for patient. The rationale for blood component transfusion; its benefits in treating or preventing fatigue, organ damage, or death; and its risk which includes mild transfusion reactions, rare risk of blood borne infection, or more serious but rare reactions. I have discussed the alternatives to transfusion, including the risk and consequences of not receiving transfusion. The patient's daughter had an opportunity to ask questions and had agreed to proceed with transfusion of blood components.     Siddharth Adler MD  PGY1 Family Medicine Resident

## 2022-06-02 NOTE — PROGRESS NOTES
[address] [facility] Kamryn Taylor [address]   Office [phone]  Fax [phone]            Subjective / Objective     24 Hour Events: Seen and evaluated by palliative care team. Family would like to evaluate patient's status over the next 24-48 hours before making decision for palliative care. Subjective: Pt was seen and examined at bedside. Was able to intermittently answer questions and follow commands. Has difficulty hearing. Objective:    Tele: NSR, rate 60s-70s, frequent PVCs     Respiratory:   O2 Device: None (Room air)   Visit Vitals  BP (!) 154/105 (BP 1 Location: Left upper arm, BP Patient Position: At rest)   Pulse 66   Temp 98.1 °F (36.7 °C)   Resp 17   Ht 5' (1.524 m)   Wt 117 lb 8.1 oz (53.3 kg)   SpO2 97%   Breastfeeding No   BMI 22.95 kg/m²     Physical Exam:  General: No acute distress. Frail. Elderly. Respiratory: CTAB, no wheezes or crackles  Cardiovascular: Regular rate and rhythm, clear S1 S2. Pulses present bilaterally. GI: Nondistended. + bowel sounds. Diffuse tenderness across lower abdomen. Extremities: No LE edema. Pulses present. Skin: Warm, dry. Neuro: Oriented only to person and time. No focal deficits. Hard of hearing. Intermittently follows commands. I/O:  Date 06/01/22 0700 - 06/02/22 0659 06/02/22 0700 - 06/03/22 0659   Shift 6398-7826 1115-7227 24 Hour Total 7144-1040 6138-7541 24 Hour Total   INTAKE   P.O. 0  0        P. O. 0  0      I. V.(mL/kg/hr) 1101.7(1.7)  1101.7(0.9) 1568.3  1568.3     Volume (0.9% sodium chloride infusion) 1101.7  1101.7 1368.3  1368.3     Volume (0.9% sodium chloride infusion 250 mL)    0  0     Volume (potassium chloride 10 mEq in 100 ml IVPB)    200  200   Shift Total(mL/kg) 1101.7(20.7)  1101.7(20.7) 6424.4(40.2)  9202.1(78.2)   OUTPUT   Urine(mL/kg/hr) 1200(1.9) 900(1.4) 2100(1.6) 200  200     Urine Voided 1200  1200        Urine Output (mL) (Urinary Catheter 06/01/22 Pichardo)  900 900 200  200   Shift Total(mL/kg) 1200(22.5) 900(16.9) 2100(39.4) 200(3.8)  200(3.8)   NET -98.3 -900 -998.3 1368.3  1368.3   Weight (kg) 53.3 53.3 53.3 53.3 53.3 53.3       Inpatient Medications  Current Facility-Administered Medications   Medication Dose Route Frequency    potassium chloride 10 mEq in 100 ml IVPB  10 mEq IntraVENous Q1H    0.9% sodium chloride infusion 250 mL  250 mL IntraVENous PRN    nitroglycerin (NITROBID) 2 % ointment 1 Inch  1 Inch Topical DAILY    [Held by provider] hydrALAZINE (APRESOLINE) tablet 50 mg  50 mg Oral TID    hydrALAZINE (APRESOLINE) 20 mg/mL injection 20 mg  20 mg IntraVENous Q6H    sodium chloride (NS) flush 5-40 mL  5-40 mL IntraVENous Q8H    sodium chloride (NS) flush 5-40 mL  5-40 mL IntraVENous PRN    acetaminophen (TYLENOL) tablet 650 mg  650 mg Oral Q6H PRN    Or    acetaminophen (TYLENOL) suppository 650 mg  650 mg Rectal Q6H PRN    polyethylene glycol (MIRALAX) packet 17 g  17 g Oral DAILY PRN    ondansetron (ZOFRAN ODT) tablet 4 mg  4 mg Oral Q8H PRN    Or    ondansetron (ZOFRAN) injection 4 mg  4 mg IntraVENous Q6H PRN    0.9% sodium chloride infusion  100 mL/hr IntraVENous CONTINUOUS    levothyroxine (SYNTHROID) tablet 88 mcg  88 mcg Oral ACB    lactulose (CHRONULAC) 10 gram/15 mL solution 30 mL  20 g Oral BID    heparin (porcine) injection 5,000 Units  5,000 Units SubCUTAneous Q12H    ferrous sulfate tablet 325 mg  325 mg Oral ACB    latanoprost (XALATAN) 0.005 % ophthalmic solution 1 Drop  1 Drop Both Eyes QHS    sucralfate (CARAFATE) tablet 1 g  1 g Oral AC&HS    cyanocobalamin (VITAMIN B12) tablet 1,000 mcg  1,000 mcg Oral DAILY    [Held by provider] metoprolol succinate (TOPROL-XL) XL tablet 25 mg  25 mg Oral DAILY    amLODIPine (NORVASC) tablet 10 mg  10 mg Oral DAILY    metoprolol (LOPRESSOR) injection 5 mg  5 mg IntraVENous BID       Allergies  Allergies   Allergen Reactions    Celebrex [Celecoxib] Hives    Crestor [Rosuvastatin] Myalgia       CBC:  Recent Labs 06/02/22  0400 06/01/22  0407 05/31/22  0252   WBC 5.1 5.7 6.5   HGB 6.8* 7.6* 8.0*   HCT 21.0* 23.2* 23.8*    297 918       Metabolic Panel:  Recent Labs     06/02/22  0400 06/01/22  1556 06/01/22  0407 05/31/22  2231 05/31/22  2231 05/31/22  1044 05/31/22  0252   * 135* 132*   < > 130*   < > 128*   K 2.8* 3.2* 3.1*   < > 3.4*   < > 3.0*    105 102   < > 101   < > 95*   CO2 20* 25 23   < > 23   < > 26   BUN 34* 33* 31*   < > 30*   < > 28*   CREA 1.87* 1.91* 1.81*   < > 1.74*   < > 1.52*   GLU 74 87 92   < > 98   < > 92   CA 10.5* 11.4* 12.5*   < > 13.1*   < > 13.6*   MG  --   --  2.0  --   --   --  1.3*   ALB 1.8*  --  2.0*  --  2.0*   < > 1.9*   ALT 9*  --  11*  --  12   < > 12    < > = values in this interval not displayed. Assessment and Plan     Rio Gutierrez is a 80 y.o. female with a PMHx of recently diagnosed Multiple Myeloma, hypercalcemia, HTN, Macrocytic anemia, GERD, Badycardia  who is admitted for altered mental status in the setting of hyponatremia and hypercalcemia.     Encephalopathy likely 2/2 multiple metabolic derangements in s/o MM:  Most likely 2/2 marked hypercalcemia vs pseudohyponatremia. MM diagnosed 4/2022. Prior head and C-spine imaging negative. AMS labs negative. FeNa 0.4%. F/b Dr. Josette Boast outpatient. Gradual improvement in mental status. - Heme/onc consulted, appreciate rec's  - Palliative consulted, appreciate rec's. Recommendation for palliative; however, family would like to evaluate patient's status over next 24-48 hours before making decision. - MIVF, BMP q12h   - Nephrology consulted, appreciate recs    - Consider further neuro imaging and consult if symptoms not improving. Hyperammoniemia: Ammonia stable. Improving with lactulose. - trend daily   - Lactulose 20g BID      Hypertensive Urgency: /68, with elevated troponin (flat on recheck). BP improving but still uncontrolled on current regimen.    - Home metoprolol > IV metop 5 mg BID given poor po status  - Cont Amlodipine 10mg daily, IV Hydral 20mg q6h given patient unable to tolerate po med at this time   - Add Nitrobid paste daily   - Continue to hold Losartan 100 mg daily given hyponatremia     Macrocytic Anemia: Hgb 7.9 POA (bl ~8). Downtrended to 6.8 overnight. Consented for transfusion.   - transfuse 1u pRBCs   - Ordered H&H for 2h post transfusion   - Heme/Onc consulted, appreciate recs  - home cyanocobalamin 1,000 daily   - Transfusion threshold Hgb 7.0 per H/O      Hypercalcemia: Correcting for low albumin, at 12.3, though has been downtrending. Improving s/p zometa per H/O and calcitonin x1 dose (5/31). - MIVF   - Daily CMP     DOMINIK: Cr POA 1.64 increased to 1.87 today (bl ~0.9). Likely due to dehydration vs hypercalcemia. - continue MIVF  - Daily CMP  - Nephrology consulted, appreciate recs      Abdominal Pain: Chronic issues, prior workup including EGD and colonoscopy negative. Prior CT showing hiatal hernia. Likely secondary to hypercalcemia, less likely hernia. UA negative for UTI, pelvic xray showing no acute process. No complaints this morning.   - Expect to resolve with treatment of underlying hypercalcemia.   - Continue home Carafate   - Hold home protonix as possible contribution to HypoNa  - If fails to improve, consider further imaging. Malnutrition: Patient with decreased po intake. Likely secondary to enncephalopathy and MM.  - Nutrition following, appreciate rec's   - continue oral nutrition supplement, favor avoiding high protein supplement given MM and elevated total protein     Elevated Troponin (Resolved): Trop initially 56, repeat flat at 56. No chest pain, EKG with no ischemia. Tele with NSR 60s-70s and PVCs. - Continue to monitor on tele     Elevated pro-BNP: POA BnP 2833. Prior Echo in February EF 60-65%. Lungs clear on exam, no edema. Likely secondary to HTN and anemia.   - repeat Echo w/ EF 55-60 and G1DD.   - cautious with fluids at this time, no evidence of fluid overload      Hypokalemia: Chronic. Will continue to follow on BMP trends. - replete IV at this time given poor po status   - Continue home PO EfferK 20 BID when taking PO  - daily metabolic panel, replete as needed    Hyponatremia: Improving. Holding Losartan and protonix given possible contributing factors. Likely 2/2 IVVD vs pseudohyponatremia given elevated total protein. - continue MIVF   - trend daily      DM2: Last A1c <3.8 (L) on 4/28/2022. Not on any home medications. Glucose range stable. - Monitor on daily metabolic panel, no SSI at this time      HLD: Last lipid panel 12/6/21: Tchol 122, HDL 67, LDL 45.6, trig 47. No home medications.     Hypothyroidism: TSH 6.26 (5/1/2022). - Continue home Synthroid 88 mcg daily before breakfast  - rTSH 1.55     CAD: MI (during surgery in 1970s) had multiple caths and stress tests. Records of cardiology visit w/ Dr. Salas Notice on 3/22/17. Exercise Cardiolite 7/23/15 - walked 4:39 (6.3 METS), normal stress EKG. Normal MPI. LVEF 71%. Echo 2/25/22 - LVEF 60-65%.     Rectal prolapse: Occurred x 1 during prior hospitalization, manual reduction. No acute complications. - Refer to Colorectal surgery for outpatient follow-up     GERD: Chronic.  - Hold home Protonix 40 mg daily, possible worsening of Hyponatremia  - restart home Sucralfate      Bradycardia: Chronic. EKG showing 1st deg AV block, chronic.   - hold home Metoprolol XL 25mg daily. Substitute IV Metoprolol 5mg BID         FEN/GI - Easy to chew.  ml/hr. Activity - Ambulate with assistance  DVT prophylaxis - Heparin   GI prophylaxis - Holding home Protonix for hyponatremia  Fall prophylaxis - Fall precautions ordered. Disposition - Plan to d/c to TBD. Consulting PT, OT and CM  Code Status - DNR. Discussed with patient / caregivers.   Next of Kin Name and [address] (Child)   [phone]    Rob Proctor MD  Family Medicine Resident       For Billing    Chief Complaint Patient presents with   [facility] AT [facility] Problems  Date Reviewed: 5/31/2022          Codes Class Noted POA    Hypomagnesemia ICD-10-CM: E83.42  ICD-9-CM: 275.2  5/31/2022 Unknown        Hyponatremia ICD-10-CM: E87.1  ICD-9-CM: 276.1  5/31/2022 Unknown        Elevated brain natriuretic peptide (BNP) level ICD-10-CM: R79.89  ICD-9-CM: 790.99  5/31/2022 Unknown        * (Principal) Encephalopathy ICD-10-CM: G93.40  ICD-9-CM: 348.30  5/30/2022 Unknown        Multiple myeloma not having achieved remission ([facility] ICD-10-CM: C90.00  ICD-9-CM: 203.00  5/13/2022 Yes        Hypercalcemia ICD-10-CM: E83.52  ICD-9-CM: 275.42  5/13/2022 Yes        Hypertensive emergency ICD-10-CM: I16.1  ICD-9-CM: 401.9  2/25/2022 Yes        Hypokalemia ICD-10-CM: E87.6  ICD-9-CM: 276.8  2/24/2022 Yes        Elevated troponin ICD-10-CM: R77.8  ICD-9-CM: 790.6  2/24/2022 Yes        Nodule of left lobe of thyroid gland ICD-10-CM: E04.1  ICD-9-CM: 241.0  9/8/2020 Yes        Macrocytic anemia ICD-10-CM: D53.9  ICD-9-CM: 281.9  8/29/2018 Yes        Type 2 diabetes mellitus without complication, without long-term current use of insulin ([facility] ICD-10-CM: E11.9  ICD-9-CM: 250.00  4/24/2017 Yes        HTN (hypertension) ICD-10-CM: I10  ICD-9-CM: 401.9  Unknown Yes        Hypothyroidism, iatrogenic ICD-10-CM: E03.2  ICD-9-CM: 244. 3  Unknown Yes    Overview Signed 7/13/2015  3:16 PM by Kan Elaine MD     radioiodine             Hypercholesteremia ICD-10-CM: E78.00  ICD-9-CM: 272.0  Unknown Yes

## 2022-06-02 NOTE — PROGRESS NOTES
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 Cr not improving much. Avoid volume overload  Replete K  Has been changed to IV BP meds  Not sure she will be a candidate for tx. Consider hospice. Subjective:   CC: f/u DOMINIK  HPI: Creat unchanged. K low. Calcium improving.     ROS: unable to obtain due to ams  Current Facility-Administered Medications   Medication Dose Route Frequency    potassium chloride 10 mEq in 100 ml IVPB  10 mEq IntraVENous Q1H    0.9% sodium chloride infusion 250 mL  250 mL IntraVENous PRN    nitroglycerin (NITROBID) 2 % ointment 1 Inch  1 Inch Topical DAILY    [Held by provider] hydrALAZINE (APRESOLINE) tablet 50 mg  50 mg Oral TID    hydrALAZINE (APRESOLINE) 20 mg/mL injection 20 mg  20 mg IntraVENous Q6H    sodium chloride (NS) flush 5-40 mL  5-40 mL IntraVENous Q8H    sodium chloride (NS) flush 5-40 mL  5-40 mL IntraVENous PRN    acetaminophen (TYLENOL) tablet 650 mg  650 mg Oral Q6H PRN    Or    acetaminophen (TYLENOL) suppository 650 mg  650 mg Rectal Q6H PRN    polyethylene glycol (MIRALAX) packet 17 g  17 g Oral DAILY PRN    ondansetron (ZOFRAN ODT) tablet 4 mg  4 mg Oral Q8H PRN    Or    ondansetron (ZOFRAN) injection 4 mg  4 mg IntraVENous Q6H PRN    0.9% sodium chloride infusion  100 mL/hr IntraVENous CONTINUOUS    levothyroxine (SYNTHROID) tablet 88 mcg  88 mcg Oral ACB    lactulose (CHRONULAC) 10 gram/15 mL solution 30 mL  20 g Oral BID    heparin (porcine) injection 5,000 Units  5,000 Units SubCUTAneous Q12H    ferrous sulfate tablet 325 mg  325 mg Oral ACB    latanoprost (XALATAN) 0.005 % ophthalmic solution 1 Drop  1 Drop Both Eyes QHS    sucralfate (CARAFATE) tablet 1 g  1 g Oral AC&HS    cyanocobalamin (VITAMIN B12) tablet 1,000 mcg  1,000 mcg Oral DAILY    [Held by provider] metoprolol succinate (TOPROL-XL) XL tablet 25 mg  25 mg Oral DAILY    amLODIPine (NORVASC) tablet 10 mg  10 mg Oral DAILY    metoprolol (LOPRESSOR) injection 5 mg  5 mg IntraVENous BID          Objective:     Vitals:  Blood pressure (!) 181/65, pulse 69, temperature 97.6 °F (36.4 °C), resp. rate 24, height 5' (1.524 m), weight 53.3 kg (117 lb 8.1 oz), SpO2 96 %, not currently breastfeeding. Temp (24hrs), Av °F (37.2 °C), Min:97.6 °F (36.4 °C), Max:100.1 °F (37.8 °C)      Intake and Output:   07 -  1900  In: 1568.3 [I.V.:1568.3]  Out: 200 [Urine:200]  1901 -  0700  In: 1101.7 [I.V.:1101.7]  Out: 2750 [Urine:2750]    Physical Exam:               GENERAL ASSESSMENT: frail, ill appearing WF  HEENT: Nontraumatic   CHEST: rhonchi  HEART: S1S2  ABDOMEN: Soft,NT,  EXTREMITY: trace EDEMA  NEURO: no verbal response today          ECG/rhythm:    Data Review      No results for input(s): TNIPOC in the last 72 hours. No lab exists for component: ITNL   No results for input(s): CPK, CKMB, TROIQ in the last 72 hours. Recent Labs     22  0400 22  1556 22  0407 22  2231 22  2231 22  1044 22  0252   * 135* 132*   < > 130*   < > 128*   K 2.8* 3.2* 3.1*   < > 3.4*   < > 3.0*    105 102   < > 101   < > 95*   CO2 20* 25 23   < > 23   < > 26   BUN 34* 33* 31*   < > 30*   < > 28*   CREA 1.87* 1.91* 1.81*   < > 1.74*   < > 1.52*   GLU 74 87 92   < > 98   < > 92   MG  --   --  2.0  --   --   --  1.3*   CA 10.5* 11.4* 12.5*   < > 13.1*   < > 13.6*   ALB 1.8*  --  2.0*  --  2.0*   < > 1.9*   WBC 5.1  --  5.7  --   --   --  6.5   HGB 6.8*  --  7.6*  --   --   --  8.0*   HCT 21.0*  --  23.2*  --   --   --  23.8*     --  297  --   --   --  267    < > = values in this interval not displayed.       No results for input(s): INR, PTP, APTT, INREXT, INREXT in the last 72 hours. Needs: urine analysis, urine sodium, protein and creatinine  Lab Results   Component Value Date/Time    Sodium,urine random 21 05/31/2022 02:12 AM    Creatinine, urine 65.00 05/30/2022 10:16 PM             : Brittany Marx MD  6/2/2022        [facility]:  [email or web address]  Gaetano Means office:  [address] [address] [place] [address]  Phone: [phone]  Fax :     [phone]    [facility] office:  [address] [address] [address]  Phone - [phone]  Fax - [phone]

## 2022-06-02 NOTE — PROGRESS NOTES
6/2/2022  10:00 AM  Pt discussed in IDR, is continuing to require medical management for encephalopathy likely 2/2 multiple metabolic derangements in s/o MM, . Hyperammoniemia, hypertensive urgency, hypercalcemia, abdominal pain,malnutrition, elevated pro-BNP, hypokalemia, hyponatremia, macrocytic anemia, T2 DM. Transitions of Care Plan:  RUR 24 % High Risk of Readmission  LOS 3 Days  1. Medical Management continues  2. Heme/Onc following   3. Nephrology following   4. Nutrition following  5. Palliative following, GOC discussion 6/2 @ 12 PM  6. SLP eval   7. PT, OT following   7. CM to follow through for treatment/response  8. DC when stable, dispo pending progress and palliative GOC discussion   9. Pt from [facility], PT, OT following to determine if pt appropriate for rehab at DC; H&P and recent MD note sent to [employer] to [facility] for review    10.  CM will continue to follow and assist w/ DC needs  LENI Ann

## 2022-06-02 NOTE — PROGRESS NOTES
..This patient was assisted with Intentional Toileting every 2 hours during this shift as appropriate. Documentation of ambulation and output reflected on Flowsheet as appropriate. Purposeful hourly rounding was completed using AIDET and 5Ps. Outcomes of PHR documented as they occurred. Bed alarm in use as appropriate. Dual Suction and ambubag in place.

## 2022-06-02 NOTE — PROGRESS NOTES
1900  Bedside and Verbal shift change report given to Lesley Faith RN (oncoming nurse) by Jie Guidry RN (offgoing nurse). Report included the following information SBAR, Kardex, Intake/Output, MAR, Recent Results and Cardiac Rhythm NS. Critera met for insert Yes  Reason for insert: Urinary obstruction/retention  Date of insert: 6/1/22  Order is current: Yes  MD or RN driven: Provider  Removal Discussed Yes  Last CHG Bath: 6/3/22 @ 1823   Pichardo Care Last Completed: Date/Time: 6/2/22 @ 2114  Pichardo Care After Each Bowel Movement: Yes  Education documented every 24 hours Yes  Care Plan (Risk for UTI, Pichardo) Updated Every 24 hours Yes  Bag below Bladder Yes        Bag off Floor Yes      Sheet Clip used Yes          Tubing free of dependant loops Yes          Seal Intact Yes            Bag less than 1/2 full Yes     This patient was assisted with Intentional Toileting every 2 hours during this shift as appropriate. Documentation of ambulation and output reflected on Flowsheet as appropriate. Purposeful hourly rounding was completed using AIDET and 5Ps. Outcomes of PHR documented as they occurred. Bed alarm in use as appropriate. Dual Suction and ambubag in place. 0700  Bedside and Verbal shift change report given to yR Mills RN (oncoming nurse) by Lesley Faith RN (offgoing nurse). Report included the following information SBAR, Kardex, Intake/Output, MAR, Recent Results and Cardiac Rhythm NSR.

## 2022-06-02 NOTE — PROGRESS NOTES
Problem: Dysphagia (Adult)  Goal: *Acute Goals and Plan of Care (Insert Text)  Description: Initiated 6/2/2022  1. Patient will tolerate regular diet, thin liquids free of sequelae of aspiration within 7 days  Outcome: Not Met   SPEECH LANGUAGE PATHOLOGY BEDSIDE SWALLOW EVALUATION  Patient: Aurelio Ibarra (89 y.o. female)  Date: 6/2/2022  Primary Diagnosis: Encephalopathy [G93.40]        Precautions:    Fall,Seizure,Skin    ASSESSMENT :  Based on the objective data described below, the patient presents with mild oral dysphagia characterized by prolonged mastication of solids with delayed oral clearance. Functional suck via straw and no anterior loss. There was intermittent throat clearing, though not consistent with any particular consistency. She is at increased aspiration risk due to dependence for feeding and lethargy. Patient in with lethargy and AMS, Chest x ray and head CT negative for acute infarct. Of note, patient's R hearing aid fell out during visit. She was not interested in putting in back in, so it was placed in its case at bedside. L hearing aid in her ear. Patient will benefit from skilled intervention to address the above impairments. Patients rehabilitation potential is considered to be Fair     PLAN :  Recommendations and Planned Interventions:  Easy to chew diet, thins  Feed only when awake and alert and accepting of PO  Frequency/Duration: Patient will be followed by speech-language pathology 3 times a week to address goals. Discharge Recommendations: To Be Determined     SUBJECTIVE:   Patient stated I can't swallow Patient unable to characterize difficulty, but did swallow numerous times during our visit.     OBJECTIVE:     Past Medical History:   Diagnosis Date    Diabetes ([address]     GI bleeding     workup in [address] [place] [address] 12/15 was unremarkable (Dr. Leatha Jovel) - says she had EGD, colonoscopy, and small bowel capsule endoscopy    HTN (hypertension)     Hypercholesteremia     Hypothyroidism, iatrogenic     radioiodine    MI (myocardial infarction) ([facility]     During surgery in 1970's - she's had multiple caths and stress tests     Past Surgical History:   Procedure Laterality Date    COLONOSCOPY N/A 5/2/2022    COLONOSCOPY performed by Elsi Pichardo MD at [address] [place] [address]  [phone]    Open umbilical incisional herniorrhaphy [facility]). HX HERNIA REPAIR Right 09/23/2020    Right inguinal herniorrhaphy with mesh. HX ORTHOPAEDIC      Back surgery x 2. HX BRIAN AND BSO       Prior Level of Function/Home Situation:    Home Situation  Home Environment: Rehabilitation facility  One/Two Story Residence: One story  Living Alone: No  Support Systems: Child(jaclyn)  Patient Expects to be Discharged to[de-identified] Home with one level  Current DME Used/Available at Home: Walker,Walker, rollator,Shower chair,Wheelchair  Diet prior to admission: unknown  Current Diet:  easy to chew, thins   Cognitive and Communication Status:  Neurologic State: Drowsy  Orientation Level: Oriented to person  Cognition: Follows commands  Perception: Appears intact  Perseveration: No perseveration noted  Safety/Judgement: Decreased awareness of need for assistance,Decreased insight into deficits  Oral Assessment:  Oral Assessment  Labial: No impairment  Dentition: Intact  Oral Hygiene: moist, clean  Lingual: No impairment  Mandible: No impairment  P.O. Trials:  Patient Position: semi upright in bed, leaning to R  Vocal quality prior to P.O.: No impairment  Consistency Presented: Thin liquid;Puree; Solid; Ice chips  How Presented: SLP-fed/presented;Spoon;Straw;Successive swallows     Bolus Acceptance: No impairment  Bolus Formation/Control:  (slow)     Propulsion: No impairment           Aspiration Signs/Symptoms:  (intermittent throat clear)                Oral Phase Severity: Mild             Pain:  Pain Scale 1: Adult Nonverbal Pain Scale  Pain Intensity 1: 0       After treatment:   Patient left in no apparent distress in bed, Call bell within reach, and Nursing notified    COMMUNICATION/EDUCATION:   Patient was educated regarding purpose of SLP visit. She agreed to participate. The patient's plan of care including recommendations, planned interventions, and recommended diet changes were discussed with: Registered nurse. Patient understands intent and goals of therapy, but is neutral about his/her participation.     Thank you for this referral.  OBED Mejia  Time Calculation: 15 mins

## 2022-06-02 NOTE — PROGRESS NOTES
Palliative Medicine Consult  [place]: [phone] ([phone])    Patient Name: Ban Kelley  YOB: 1935    Date of Initial Consult: 6/1/2022  Reason for Consult: Care Discussions  Requesting Provider: Dr. Jose J Rowan  Primary Care Physician: Lara Gardner MD     SUMMARY:   Ban Kelley is a 80 y.o. with a past history of newly dx multiple myeloma (follows w/Dr. George Shay, initiation of tx pending completion of rehab),CAD, HTN, HLD, DM2, Hypothyroidism, EDUARDO, GERD who was admitted on 5/30/2022 from [facility] SNF with a diagnosis of metabolic encephalopathy, hypercalcemia and hyponatremia. HPI: Ms. Karina Fuchs presented to ER w/ cc rapid functional and cognitive decline over the past week of SNF. Prior to SNF, she had been hospitalized 4/28-5/5/2022 2/2  generalized weakness and UTI. During hospitalization, Hem/Onc was consulted for anemia and underwent bone marrow bx, which confirmed new diagnosis of multiple myeloma. Patient was discharged to SNF x 2 weeks, with goal of getting stronger so that she could start chemo. Per EMR, family reported patient had been making progress with therapy up until a week ago, when she began to decline, with progressive weakness and worsening confusion/     Course of Hospitalization: AMS persists, far from her baseline. Hypercalcemia is improving s/p zometa,  Ammonia levels  elevated, etiology of which  is unclear. Nephrology consulted for increasing Creatinine. PT/OT evaluated, significant decline from baseline, with decreased arousal and command following,  requiring max assist for mobility. Hem/Onc following. Current medical issues leading to Palliative Medicine involvement include: Advanced age, persistent encephalopathy, worsening weakness, poor nutrition, in setting of untreated multiple myeloma. PALLIATIVE DIAGNOSES:   1. Palliative care encounter   2. Concern about end of life  3. Hypercalcemia   4. Hyperammonemia  5.  Altered mental status, unspecified  6. Poor nutrition  7. Hypoalbuminemia  8. Weakness, generalized  9. Advanced care planning discussion  10. Goals of care Discussion       PLAN:   1. Prior to visit completed chart review for updated information. Discussed with Oncology NP Gerald Adams. .   2. During initial visit today, I found patient to be a little more awake then yesterday, able to answer simple questions and she acknowledged having cancer, but unable to provide info. 3. Once daughter [facility] at bedside, I saw patient again along with  Jeannie Cochran, Dr. Anthony Soliz and her fellow family medicine resident. 4. We provided brief update and discussed ongoing medical concerns. [facility] has a good understanding of hospitalization, with AMS, poor nutrition, worsening weakness and DOMINIK. 5.   6. Goals of care discussion-ultimately family trying to remain hopeful patient will have significant improvement in cognition and function so that she will be able to begin treatment. In this scenario, she would likely return to SNF for therapy. · Discussed scenario where patient does poorly or no significant improvement and treatment is no longer an option. Daughter told us that in a day or two, if treatment is no longer an option, family plans to have patient  discharged home with Hospice, with [facility] providing atc care. 7. AMS, unspecified- Improved some, but still pretty far from baseline. Hypercalcemia improving, down to 10.5. Ammonia down to 46 (hyperammonemia with unclear etiology), urine CX with no growth. 8. Poor nutrition-unresolved 2/2 AMS. Albumin 1.8. Patient noted to be taking mitten bites and sips. SLP noted patient to have mild oral dysphagia. She is at HR aspiration 2/2 AMS and dependence on feeding. 9. Advance care planning discussion-[facility] brought in copy of patient's AMD.  Patient designated both both [facility] and [facility] to serve together as her mPOA.   Copy placed in paper chart to be scanned in EMR.      10. Initial consult note routed to primary continuity provider and/or primary health care team members  11. Communicated plan of care with: Palliative [facility] Team, Juana Desai RN, noe Ding , family medicine. GOALS OF CARE / TREATMENT PREFERENCES:     GOALS OF CARE:  Patient/Health Care Proxy Stated Goals: Prolong life    TREATMENT PREFERENCES:   Code Status: DNR    Patient and family's personal goals include:     Important upcoming milestones or family events: The patient identifies the following as important for living well:       Advance Care Planning:  [x] The [facility] Interdisciplinary Team has updated the ACP Navigator with [place] and Patient Capacity      Primary Decision Maker: [facility] - [phone]    Primary Decision Maker: Michaelle Herman Child - [phone]  Advance Care Planning 5/31/2022   Patient's Healthcare Decision Maker is: -   Confirm Advance Directive Yes, on file   Patient Would Like to Complete Advance Directive -   Does the patient have other document types Do Not Resuscitate; Power of        Medical Interventions: Limited additional interventions       Other:    As far as possible, the palliative care team has discussed with patient / health care proxy about goals of care / treatment preferences for patient.      HISTORY:     History obtained from: medical records/nurse/family    CHIEF COMPLAINT: Patient unable to participate in     HPI/SUBJECTIVE:    The patient is:   [] Verbal and participatory  [x] Non-participatory due to:   Patient denied complaints,    Clinical Pain Assessment (nonverbal scale for severity on nonverbal patients):   Clinical Pain Assessment  Severity: 0  Location: unable to report  Character: unable to report  Duration: unable to report  Effect: unable to report  Factors: unable to report  Frequency: unable to report     Activity (Movement): Lying quietly, normal position    Duration: for how long has pt been experiencing pain (e.g., 2 days, 1 month, years)  Frequency: how often pain is an issue (e.g., several times per day, once every few days, constant)     FUNCTIONAL ASSESSMENT:     Palliative Performance Scale (PPS):  PPS: 20       PSYCHOSOCIAL/SPIRITUAL SCREENING:     Palliative IDT has assessed this patient for cultural preferences / practices and a referral made as appropriate to needs (Cultural Services, Patient Advocacy, Ethics, etc.)    Any spiritual / [employer]al concerns:  [] Yes /  [x] No   If \"Yes\" to discuss with pastoral care during IDT     Does caregiver feel burdened by caring for their loved one:   [] Yes /  [] No /  [x] No Caregiver Present    If \"Yes\" to discuss with social work during IDT    Anticipatory grief assessment:   [x] Normal  / [] Maladaptive     If \"Maladaptive\" to discuss with social work during IDT    ESAS Anxiety: Anxiety: 2    ESAS Depression:          REVIEW OF SYSTEMS:     Positive and pertinent negative findings in ROS are noted above in HPI. The following systems were [] reviewed / [x] unable to be reviewed as noted in HPI  Other findings are noted below. Systems: constitutional, ears/nose/mouth/throat, respiratory, gastrointestinal, genitourinary, musculoskeletal, integumentary, neurologic, psychiatric, endocrine. Positive findings noted below. Modified ESAS Completed by: provider   Fatigue: 10 Drowsiness: 9     Pain: 0   Anxiety: 2     Anorexia: 9                      PHYSICAL EXAM:     From RN flowsheet:  Wt Readings from Last 3 Encounters:   06/01/22 117 lb 8.1 oz (53.3 kg)   05/23/22 110 lb (49.9 kg)   05/13/22 113 lb 9.6 oz (51.5 kg)     Blood pressure (!) 165/83, pulse 69, temperature 97.9 °F (36.6 °C), resp. rate 20, height 5' (1.524 m), weight 117 lb 8.1 oz (53.3 kg), SpO2 96 %, not currently breastfeeding.     Pain Scale 1: Visual  Pain Intensity 1: 0                 Last bowel movement, if known:     Constitutional: appears stated age, thin, frail and ill appearing  Eyes: pupils equal, anicteric  ENMT: no nasal discharge, dry  mucous membranes  Cardiovascular: regular rhythm, distal pulses intact  Respiratory: breathing not labored, symmetric  Gastrointestinal: soft non-tender, +bowel sounds  Musculoskeletal: no deformity, no tenderness to palpation  Skin: warm, dry  Neurologic: somnolent, decreased command following,   Psychiatric: unable to assess  Other:       HISTORY:     Principal Problem:    Encephalopathy (2022)    Active Problems:    HTN (hypertension) ()      Hypothyroidism, iatrogenic ()      Overview: radioiodine      Hypercholesteremia ()      Type 2 diabetes mellitus without complication, without long-term current use of insulin (HCC) (2017)      Macrocytic anemia (2018)      Nodule of left lobe of thyroid gland (2020)      Hypokalemia (2022)      Elevated troponin (2022)      Hypertensive emergency (2022)      Multiple myeloma not having achieved remission ([address] (2022)      Hypercalcemia (2022)      Hypomagnesemia (2022)      Hyponatremia (2022)      Elevated brain natriuretic peptide (BNP) level (2022)      Past Medical History:   Diagnosis Date    Diabetes ([address]     GI bleeding     workup in [address] [place] [address] 12/15 was unremarkable (Dr. Beatrice Elizabeth) - says she had EGD, colonoscopy, and small bowel capsule endoscopy    HTN (hypertension)     Hypercholesteremia     Hypothyroidism, iatrogenic     radioiodine    MI (myocardial infarction) ([address]     During surgery in s - she's had multiple caths and stress tests      Past Surgical History:   Procedure Laterality Date    COLONOSCOPY N/A 2022    COLONOSCOPY performed by Renato Ambrosio MD at [address] [facility] HX  SECTION      HX HERNIA REPAIR  3703    Open umbilical incisional herniorrhaphy [facility]).  HX HERNIA REPAIR Right 2020    Right inguinal herniorrhaphy with mesh.     HX ORTHOPAEDIC Back surgery x 2.    HX BRIAN AND BSO        Family History   Problem Relation Age of Onset    Heart Failure Mother     Heart Disease Mother     Hypertension Mother       History reviewed, no pertinent family history.   Social History     Tobacco Use    Smoking status: Never Smoker    Smokeless tobacco: Never Used   Substance Use Topics    Alcohol use: Not Currently     Alcohol/week: 0.0 standard drinks     Comment: Occ. glass of wine     Allergies   Allergen Reactions    Celebrex [Celecoxib] Hives    Crestor [Rosuvastatin] Myalgia      Current Facility-Administered Medications   Medication Dose Route Frequency    0.9% sodium chloride infusion 250 mL  250 mL IntraVENous PRN    nitroglycerin (NITROBID) 2 % ointment 1 Inch  1 Inch Topical DAILY    [Held by provider] hydrALAZINE (APRESOLINE) tablet 50 mg  50 mg Oral TID    hydrALAZINE (APRESOLINE) 20 mg/mL injection 20 mg  20 mg IntraVENous Q6H    sodium chloride (NS) flush 5-40 mL  5-40 mL IntraVENous Q8H    sodium chloride (NS) flush 5-40 mL  5-40 mL IntraVENous PRN    acetaminophen (TYLENOL) tablet 650 mg  650 mg Oral Q6H PRN    Or    acetaminophen (TYLENOL) suppository 650 mg  650 mg Rectal Q6H PRN    polyethylene glycol (MIRALAX) packet 17 g  17 g Oral DAILY PRN    ondansetron (ZOFRAN ODT) tablet 4 mg  4 mg Oral Q8H PRN    Or    ondansetron (ZOFRAN) injection 4 mg  4 mg IntraVENous Q6H PRN    levothyroxine (SYNTHROID) tablet 88 mcg  88 mcg Oral ACB    lactulose (CHRONULAC) 10 gram/15 mL solution 30 mL  20 g Oral BID    heparin (porcine) injection 5,000 Units  5,000 Units SubCUTAneous Q12H    ferrous sulfate tablet 325 mg  325 mg Oral ACB    latanoprost (XALATAN) 0.005 % ophthalmic solution 1 Drop  1 Drop Both Eyes QHS    sucralfate (CARAFATE) tablet 1 g  1 g Oral AC&HS    cyanocobalamin (VITAMIN B12) tablet 1,000 mcg  1,000 mcg Oral DAILY    [Held by provider] metoprolol succinate (TOPROL-XL) XL tablet 25 mg  25 mg Oral DAILY    amLODIPine (NORVASC) tablet 10 mg  10 mg Oral DAILY    metoprolol (LOPRESSOR) injection 5 mg  5 mg IntraVENous BID          LAB AND IMAGING FINDINGS:     Lab Results   Component Value Date/Time    WBC 5.1 06/02/2022 04:00 AM    HGB 6.8 (L) 06/02/2022 04:00 AM    PLATELET 178 53/64/2750 04:00 AM     Lab Results   Component Value Date/Time    Sodium 135 (L) 06/02/2022 04:00 AM    Potassium 2.8 (L) 06/02/2022 04:00 AM    Chloride 106 06/02/2022 04:00 AM    CO2 20 (L) 06/02/2022 04:00 AM    BUN 34 (H) 06/02/2022 04:00 AM    Creatinine 1.87 (H) 06/02/2022 04:00 AM    Calcium 10.5 (H) 06/02/2022 04:00 AM    Magnesium 2.0 06/01/2022 04:07 AM    Phosphorus 2.9 05/29/2022 03:26 AM      Lab Results   Component Value Date/Time    Alk. phosphatase 35 (L) 06/02/2022 04:00 AM    Protein, total 9.4 (H) 06/02/2022 04:00 AM    Albumin 1.8 (L) 06/02/2022 04:00 AM    Globulin 7.6 (H) 06/02/2022 04:00 AM     No results found for: INR, PTMR, PTP, PT1, PT2, APTT, INREXT, INREXT   Lab Results   Component Value Date/Time    Iron 51 04/05/2022 10:59 AM    TIBC 252 04/05/2022 10:59 AM    Iron % saturation 20 04/05/2022 10:59 AM    Ferritin 34 04/30/2022 10:47 AM      No results found for: PH, PCO2, PO2  No components found for: [place]   Lab Results   Component Value Date/Time     04/28/2022 11:16 AM                Total time: 65min  Counseling / coordination time, spent as noted above: 50 min  > 50% counseling / coordination?: yes    Prolonged service was provided for  []30 min   []75 min in face to face time in the presence of the patient, spent as noted above. Time Start:   Time End:   Note: this can only be billed with 35446 (initial) or 48203 (follow up). If multiple start / stop times, list each separately.

## 2022-06-02 NOTE — MED STUDENT NOTES
*ATTENTION:  This note has been created by a medical student for educational purposes only. Please do not refer to the content of this note for clinical decision-making, billing, or other purposes. Please see attending physicians note to obtain clinical information on this patient. *       General Daily Progress Note    Admit Date: 5/30/2022  Hospital day 2    Subjective:     24hour events: NAOE. AM:  Pt seen and evaluated at bedside. Pt intermittently answering questions, but continues to be difficult to rouse. Denies current pain. Daughter/medical decision maker consented for 1 unit RBC.      Current Facility-Administered Medications   Medication Dose Route Frequency    potassium chloride 10 mEq in 100 ml IVPB  10 mEq IntraVENous Q1H    0.9% sodium chloride infusion 250 mL  250 mL IntraVENous PRN    nitroglycerin (NITROBID) 2 % ointment 1 Inch  1 Inch Topical DAILY    [Held by provider] hydrALAZINE (APRESOLINE) tablet 50 mg  50 mg Oral TID    hydrALAZINE (APRESOLINE) 20 mg/mL injection 20 mg  20 mg IntraVENous Q6H    sodium chloride (NS) flush 5-40 mL  5-40 mL IntraVENous Q8H    sodium chloride (NS) flush 5-40 mL  5-40 mL IntraVENous PRN    acetaminophen (TYLENOL) tablet 650 mg  650 mg Oral Q6H PRN    Or    acetaminophen (TYLENOL) suppository 650 mg  650 mg Rectal Q6H PRN    polyethylene glycol (MIRALAX) packet 17 g  17 g Oral DAILY PRN    ondansetron (ZOFRAN ODT) tablet 4 mg  4 mg Oral Q8H PRN    Or    ondansetron (ZOFRAN) injection 4 mg  4 mg IntraVENous Q6H PRN    0.9% sodium chloride infusion  100 mL/hr IntraVENous CONTINUOUS    levothyroxine (SYNTHROID) tablet 88 mcg  88 mcg Oral ACB    lactulose (CHRONULAC) 10 gram/15 mL solution 30 mL  20 g Oral BID    heparin (porcine) injection 5,000 Units  5,000 Units SubCUTAneous Q12H    ferrous sulfate tablet 325 mg  325 mg Oral ACB    latanoprost (XALATAN) 0.005 % ophthalmic solution 1 Drop  1 Drop Both Eyes QHS    sucralfate (CARAFATE) tablet 1 g  1 g Oral AC&HS    cyanocobalamin (VITAMIN B12) tablet 1,000 mcg  1,000 mcg Oral DAILY    [Held by provider] metoprolol succinate (TOPROL-XL) XL tablet 25 mg  25 mg Oral DAILY    amLODIPine (NORVASC) tablet 10 mg  10 mg Oral DAILY    metoprolol (LOPRESSOR) injection 5 mg  5 mg IntraVENous BID        Review of Systems  Pertinent items are noted in HPI. Objective:     Patient Vitals for the past 8 hrs:   BP Temp Pulse Resp SpO2   06/02/22 0755 (!) 154/105 98.1 °F (36.7 °C) 66 17 97 %   06/02/22 0700 -- -- 60 -- --   06/02/22 0333 (!) 165/53 98.8 °F (37.1 °C) 73 19 --     06/02 0701 - 06/02 1900  In: 1568.3 [I.V.:1568.3]  Out: 200 [Urine:200]  05/31 1901 - 06/02 0700  In: 1101.7 [I.V.:1101.7]  Out: 2750 [Urine:2750]    Physical Exam: General appearance: lethargic, intermittently moaning, appears stated age, confused  Lungs: clear to auscultation bilaterally  Abdomen: soft, dif. Bowel sounds normal. No masses,  no organomegaly  Neurologic: A/Ox1, does not follow commands, moving all extremities spontaneously.        ECG: normal sinus rhythm, rates to 70s     Data Review   Recent Results (from the past 24 hour(s))   METABOLIC PANEL, BASIC    Collection Time: 06/01/22  3:56 PM   Result Value Ref Range    Sodium 135 (L) 136 - 145 mmol/L    Potassium 3.2 (L) 3.5 - 5.1 mmol/L    Chloride 105 97 - 108 mmol/L    CO2 25 21 - 32 mmol/L    Anion gap 5 5 - 15 mmol/L    Glucose 87 65 - 100 mg/dL    BUN 33 (H) 6 - 20 MG/DL    Creatinine 1.91 (H) 0.55 - 1.02 MG/DL    BUN/Creatinine ratio 17 12 - 20      GFR est AA 30 (L) >60 ml/min/1.73m2    GFR est non-AA 25 (L) >60 ml/min/1.73m2    Calcium 11.4 (H) 8.5 - 40.8 MG/DL   METABOLIC PANEL, COMPREHENSIVE    Collection Time: 06/02/22  4:00 AM   Result Value Ref Range    Sodium 135 (L) 136 - 145 mmol/L    Potassium 2.8 (L) 3.5 - 5.1 mmol/L    Chloride 106 97 - 108 mmol/L    CO2 20 (L) 21 - 32 mmol/L    Anion gap 9 5 - 15 mmol/L    Glucose 74 65 - 100 mg/dL    BUN 34 (H) 6 - 20 MG/DL Creatinine 1.87 (H) 0.55 - 1.02 MG/DL    BUN/Creatinine ratio 18 12 - 20      GFR est AA 31 (L) >60 ml/min/1.73m2    GFR est non-AA 25 (L) >60 ml/min/1.73m2    Calcium 10.5 (H) 8.5 - 10.1 MG/DL    Bilirubin, total 0.5 0.2 - 1.0 MG/DL    ALT (SGPT) 9 (L) 12 - 78 U/L    AST (SGOT) 15 15 - 37 U/L    Alk. phosphatase 35 (L) 45 - 117 U/L    Protein, total 9.4 (H) 6.4 - 8.2 g/dL    Albumin 1.8 (L) 3.5 - 5.0 g/dL    Globulin 7.6 (H) 2.0 - 4.0 g/dL    A-G Ratio 0.2 (L) 1.1 - 2.2     CBC WITH AUTOMATED DIFF    Collection Time: 06/02/22  4:00 AM   Result Value Ref Range    WBC 5.1 3.6 - 11.0 K/uL    RBC 2.13 (L) 3.80 - 5.20 M/uL    HGB 6.8 (L) 11.5 - 16.0 g/dL    HCT 21.0 (L) 35.0 - 47.0 %    MCV 98.6 80.0 - 99.0 FL    MCH 31.9 26.0 - 34.0 PG    MCHC 32.4 30.0 - 36.5 g/dL    RDW 13.9 11.5 - 14.5 %    PLATELET 186 014 - 523 K/uL    MPV 9.4 8.9 - 12.9 FL    NRBC 0.0 0  WBC    ABSOLUTE NRBC 0.00 0.00 - 0.01 K/uL    NEUTROPHILS 72 32 - 75 %    LYMPHOCYTES 11 (L) 12 - 49 %    MONOCYTES 15 (H) 5 - 13 %    EOSINOPHILS 2 0 - 7 %    BASOPHILS 0 0 - 1 %    IMMATURE GRANULOCYTES 1 (H) 0.0 - 0.5 %    ABS. NEUTROPHILS 3.7 1.8 - 8.0 K/UL    ABS. LYMPHOCYTES 0.6 (L) 0.8 - 3.5 K/UL    ABS. MONOCYTES 0.8 0.0 - 1.0 K/UL    ABS. EOSINOPHILS 0.1 0.0 - 0.4 K/UL    ABS. BASOPHILS 0.0 0.0 - 0.1 K/UL    ABS. IMM. GRANS. 0.0 0.00 - 0.04 K/UL    DF AUTOMATED     AMMONIA    Collection Time: 06/02/22  4:00 AM   Result Value Ref Range    Ammonia, plasma 46 (H) <32 UMOL/L   RBC, ALLOCATE    Collection Time: 06/02/22  6:30 AM   Result Value Ref Range    HISTORY CHECKED?  Historical check performed    TYPE & SCREEN    Collection Time: 06/02/22  6:36 AM   Result Value Ref Range    Crossmatch Expiration 06/05/2022,2359     ABO/Rh(D) Rica Marus POSITIVE     Antibody screen NEG     Unit number [id number]     Blood component type RC LR     Unit division 00     Status of unit ALLOCATED     Crossmatch result Compatible            Assessment:     Principal Problem:    Encephalopathy (5/30/2022)    Active Problems:    HTN (hypertension) ()      Hypothyroidism, iatrogenic ()      Overview: radioiodine      Hypercholesteremia ()      Type 2 diabetes mellitus without complication, without long-term current use of insulin (HCC) (4/24/2017)      Macrocytic anemia (8/29/2018)      Nodule of left lobe of thyroid gland (9/8/2020)      Hypokalemia (2/24/2022)      Elevated troponin (2/24/2022)      Hypertensive emergency (2/25/2022)      Multiple myeloma not having achieved remission ([address] (5/13/2022)      Hypercalcemia (5/13/2022)      Hypomagnesemia (5/31/2022)      Hyponatremia (5/31/2022)      Elevated brain natriuretic peptide (BNP) level (5/31/2022)        Plan:   Inge Kenyon is a 80 y. o. female with a PMHx of recently diagnosed Multiple Myeloma, hypercalcemia, HTN, Macrocytic anemia, GERD, Badycardia  who is admitted for altered mental status in the setting of hyponatremia and hypercalcemia.     Encephalopathy in the setting of Acute on Chronic Hyponatremia:   Electrolytes and mentation improving, No focal findings on neuro exam, CT head and CT spine with NAP. Low Na possible pseudohyponatremia in the setting of Multiple Myeloma.    - AMS labs including TSH, Salicylates, Volatiles unremarkable  -S/p 1 unit pRBC 6/2 for hgb 6.8, post transfusion H&H pending  -Palliative to resume goals of care discussion 6/2  -Ammonia downtrending   - Nephrology following, currently agree with Zometa  - Fall Precautions   - 1/2 MIVF with Normal Saline (50cc/hr), gentle due to elevated pBNP  - BMPq12  - Strict IO  - Passed bedside dysphagia, start easy chew diet      Hyperammoniemia: Possible source of AMS, downtrending to 46  - trend QD  - Lactulose 20g BID      Hypertensive Emergency: /68, with elevated troponin and AMS.  Chronic HTN, difficult to control  - target  (goal reduction 25%) in first 24 hours   - Hydralazine IV 10mg q6, add nitro patch 2mg every day, Metoprolol 5mg IM prn  - Continue to Hold home Amlodipine 10 mg daily and metoprolol XL 25 mg daily, Losartan 100 mg daily, and Hydral 50 mg TID      Hypercalcemia: POA Ca of 14.4, improving to 12.5. In the setting of recent diagnosis of Multiple Myeloma. Likley cause of current abdominal pain.   - 1/2 MIVF with NS at 50cc/hr  - Gentle with fluids due to elevated pro-BnP, may increase today due to no signs of fluid overload. - Daily BMP  - per Heme/Onc continue Zometa.     Elevated Creatinine, resolved:  Cr POA 1.64 (bl ~0.9). Likely due to dehydration. - 1/2 MIVF with NS at 50cc/hr, gentle with fluids given elevated BnP. Appears volume down on exam, will consider fluid increase.  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       Abdominal Pain: Chronic issues, prior workup including EGD and colonoscopy negative. Prior CT showing hiatal hernia. Likely secondary to hypercalcemia, less likely hernia. UA negative for UTI, pelvic xray showing no acute process. - Expect to resolve with treatment of underlying hypercalcemia.   - Continue home Carafate   - Hold home protonix as possible contribution to HypoNa  - If fails to improve, consider further imaging.      Elevated Troponin: Trop initially 56, repeat flat at 56. No chest pain, EKG with no ischemia.   - Continue to monitor on tele     Elevated pro-BNP: POA BnP 2833. Prior Echo in February EF 60-65%. Lungs clear on exam, no edema. Likely secondary to HTN and anemia. - repeat Echo with EF 55-60%  - cautious with fluids at this time, however appears to be fluid down on exam. Consider increasing fluids.     Hypokalemia: Chronic. Will continue to follow on BMP trends. - replete IV at this time due to NPO  - Continue home PO EfferK 20 BID when taking PO  - daily CMP, replete as needed     Multiple Myeloma: Recently diagnosed on prior admission in April. Established with Dr. Luis Lomeli outpatient.    - Heme/Onc consulted, plan for inpatient valcade/dex  - Consider Zometa outpatient for hypercalcemia     Macrocytic Anemia: Hgb 7.9 POA (bl ~8). - Heme/Onc consulted, appreciate recs  - restart home cyanocobalamin 1,000 daily      DM2: POA glucose 131. Last A1c <3.8 (L) on 4/28/2022 . Not on any home medications. - Monitor on daily CMP, no SSI at this time      HLD: Last lipid panel 12/6/21: Tchol 122, HDL 67, LDL 45.6, trig 47. No home medications.     Hypothyroidism: TSH 6.26 (5/1/2022). - Continue home Synthroid 88 mcg daily before breakfast  - TSH 1.55     CAD: MI (during surgery in 1970s) had multiple caths and stress tests. Records of cardiology visit w/ Dr. Ulises Healy on 3/22/17. Exercise Cardiolite 7/23/15 - walked 4:39 (6.3 METS), normal stress EKG. Normal MPI. LVEF 71%. Echo 2/25/22 - LVEF 60-65%.     Rectal prolapse: Occurred x 1 during prior hospitalization, manual reduction. No acute complications. - Refer to Colorectal surgery for outpatient follow-up     GERD: Chronic.  - Hold home Protonix 40 mg daily, possible worsening of Hyponatremia  - restart home Sucralfate      Bradycardia: Chronic. EKG showing 1st deg AV block, chronic.   - holding home Metoprolol XL 25mg daily         FEN/GI - GI lite. NS at 1/2 MIVF (50cc/hr)  Activity - Ambulate with assistance  DVT prophylaxis - Heparin   GI prophylaxis - Holding home Protonix for hyponatremia  Fall prophylaxis - Fall precautions ordered. Disposition - Admit to Telemetry. Plan to d/c to TBD. Consulting PT, OT and CM  Code Status - DNR. Discussed with patient / caregivers. Next of Kin Name and Dhaval Cohen (Child)   [phone]    To be discussed with Dr. Clarence Brock, M4    *This is a medical student note utilized for learning and not to be used for medical decision making or billing purposes. Please refer to the attending physician's note for further clinical information on this patient. *

## 2022-06-02 NOTE — PROGRESS NOTES
Problem: Falls - Risk of  Goal: *Absence of Falls  Description: Document Ruthie Kimble Fall Risk and appropriate interventions in the flowsheet. Outcome: Progressing Towards Goal  Note: Fall Risk Interventions:       Mentation Interventions: Bed/chair exit alarm,Adequate sleep, hydration, pain control,Door open when patient unattended,More frequent rounding,Reorient patient,Room close to nurse's station    Medication Interventions: Bed/chair exit alarm,Teach patient to arise slowly    Elimination Interventions: Bed/chair exit alarm,Call light in reach    History of Falls Interventions: Bed/chair exit alarm,Investigate reason for fall         Problem: Patient Education: Go to Patient Education Activity  Goal: Patient/Family Education  Outcome: Progressing Towards Goal     Problem: Pressure Injury - Risk of  Goal: *Prevention of pressure injury  Description: Document Massimo Scale and appropriate interventions in the flowsheet.   Outcome: Progressing Towards Goal  Note: Pressure Injury Interventions:  Sensory Interventions: Assess need for specialty bed,Avoid rigorous massage over bony prominences,Float heels,Keep linens dry and wrinkle-free,Minimize linen layers,Maintain/enhance activity level,Monitor skin under medical devices    Moisture Interventions: Check for incontinence Q2 hours and as needed,Internal/External urinary devices,Minimize layers    Activity Interventions: Assess need for specialty bed,Pressure redistribution bed/mattress(bed type),PT/OT evaluation    Mobility Interventions: HOB 30 degrees or less,PT/OT evaluation    Nutrition Interventions: Document food/fluid/supplement intake    Friction and Shear Interventions: HOB 30 degrees or less,Apply protective barrier, creams and emollients,Lift sheet,Lift team/patient mobility team,Minimize layers                Problem: Patient Education: Go to Patient Education Activity  Goal: Patient/Family Education  Outcome: Progressing Towards Goal     Problem: Hypertension  Goal: *Blood pressure within specified parameters  Outcome: Progressing Towards Goal  Goal: *Fluid volume balance  Outcome: Progressing Towards Goal  Goal: *Labs within defined limits  Outcome: Progressing Towards Goal     Problem: Patient Education: Go to Patient Education Activity  Goal: Patient/Family Education  Outcome: Progressing Towards Goal     Problem: Patient Education: Go to Patient Education Activity  Goal: Patient/Family Education  Outcome: Progressing Towards Goal

## 2022-06-02 NOTE — PROGRESS NOTES
Palliative Medicine      Code Status: DNR    Advance Care Planning:  Advance Care Planning 2022   Patient's Healthcare Decision Maker is: Named in scanned ACP document   Confirm Advance Directive Yes, on file   Patient Would Like to Complete Advance Directive Already in place   Does the patient have other document types MOST/MOLST/POST/POLST       Patient / Family Encounter Documentation    Participants (names): Pt, dtr Nicki Carmona, Family Practice (Dr. Israel Macedo), Palliative Medicine (NP [place], [address])    Narrative: Pt was awake in bed, hard of hearing, able to engage in only limited conversation. Per review of chart, pt is originally from [place], worked for [employer], is , has two adult d[facility]s, [facility] both of whom live locally. Pt was living alone prior to May 2022 hospitalization, has since been receiving care at [facility]. Dtr shared that, per her discussion with Oncology, plan is to give a little more time for improvement to determine if pt will be a candidate for treatment of her multiple myeloma; if condition does not improve as hoped, plan would be for comfort/hospice. [facility] and would not be able to provide hands on assistance but dtr Nicki Carmona plans to move into pt's home to provide 24/7 care if pt is not able to pursue treatment. Dtr provided copy of pt's AMD dated 2021 which appoints dtrs Fozia Cherry and Krupa Stout as joint medical POAs. Pt completed a POST document with a Palliative Medicine provider on 2022. Psychosocial Issues Identified/ Resilience Factors: Caregiver stress/grief. Dtr shared that her mother-in-law  with multiple myeloma 15 yrs ago, shared that she has some familiarity with hospice care from her father's end of life experience. No spiritual concerns expressed.      Caregiver [place]: Pt had previously lived alone and has since been in a SNF; dtr Nicki Carmona will be stepping into full time cg role if pt returns home  Does the caregiver feel confident administering medication? Yes  Does the caregiver need any help connecting with community resources? To be determined based on pt's progress or lack thereof  Does the caregiver feel confident assisting with activities of daily living? Yes, though will likely need assistance    Goals of Care / Plan: Dtr was pleased that pt was able to eat a few bites of her lunch today, is hopeful for continued improvement. Should pt not be deemed a candidate for cancer treatment, dtr is open to having hospice support at home. Thank you for including Palliative Medicine in the care of Ms. Qamar Rucker.      Kera Main LCSW, [facility]-  [phone])

## 2022-06-02 NOTE — ACP (ADVANCE CARE PLANNING)
Advance Care Planning 6/2/2022   Patient's Healthcare Decision Maker is: Named in scanned ACP document   Confirm Advance Directive Yes, on file   Patient Would Like to Complete Advance Directive Already in place   Does the patient have other document types MOST/MOLST/POST/POLST     Dtr provided copy of pt's AMD dated 2/2/2021 which appoints dtrs Alisson Casas and Betsy Young as joint medical POAs. Pt completed a POST document with a Palliative Medicine provider on 5/5/2022.

## 2022-06-02 NOTE — PROGRESS NOTES
[facility] at [address], [address] [facility]  Alfredo Ship: [phone]  F: [phone] Patient ID  Name: Steffi Martinez  YOB: 1935  MRN: [id number]  Referring Provider:   No referring provider defined for this encounter. Primary Care Provider:   Sergio Olmstead MD       HEMATOLOGY/MEDICAL ONCOLOGY  NOTE   Date of Visit: 06/02/22  Reason for Evaluation:     Chief Complaint   Patient presents with   Kashif Kruger     Hematology/Oncology Summary:  Please review original records for clinical decision making. This summary highlights focused aspects of patient's ongoing care and may have a recurring section in notes with either updates or remain unchanged as a longitudinal care summary. --------------------------------------------------------------------------------------------------------------------------------------------------------------------------------------------------------------------------  DIAGNOSIS:   Multiple Myeloma    ORIGINAL STAGING:   n/a    SITES OF DISEASE:   Bone Marrow    CURRENT TREATMENT:   To further discuss care. PRIOR TREATMENT:   Pulse Dex x 4 days. 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 ad3/5/6/2022   *Electronically Signed Out By Dejah Damon MD*   Bone marrow aspirate:   The bone marrow aspirate is cellular and adequate   for evaluation. Numerous plasma cells are present, including a subset of   large forms with prominent nucleoli. The background myeloid and erythroid   series progress through the full maturation sequence with no morphologic   abnormalities. Megakaryocytes are present in adequate numbers and show no   morphologic abnormalities. Lymphocytes and blasts do not appear increased   in number. Touch prep:  Cellular and reflects the aspirate smears. Bone marrow core biopsy: The bone marrow biopsy is 70% cellular and   adequate for evaluation. Large sheets of plasma cells are present. The   myeloid and erythroid series are reduced in number but progress through   the full maturation sequence without significant dysplasia. Megakaryocytes   are present in adequate numbers with normal morphology. Lymphocytes and   blasts do not appear increased in number. Clot section:  Particulate and reflects the core biopsy. Special stains:   Iron, aspirate: No stainable iron   Iron, clot: No stainable iron   Reticulin, core: Patchy reticulin fibrosis within sheets of plasma cells   Congo red, core: Negative for amyloid     Immunohistochemistry, core:   CD3: Highlights scattered small T cells   CD20: Highlights scattered small B cells   : Highlights marked increase in plasma cells, 80% of cellularity     Flow cytometry:   Diagnosis: Monoclonal plasma cells (31.5% of total cells), consistent with   a plasma cell neoplasm. Comments: Flow cytometry findings are consistent with a plasma cell   neoplasm (e.g. plasma cell myeloma or plasmacytoma). No immunophenotypic   evidence of a lymphoproliferative disorder, acute leukemia, or increase in   blasts is identified. Due to their fragility, plasma cells do not survive   flow cytometric processing well; thus, the flow cytometric enumeration may   be much less than that seen morphologically.  So, to properly classify this   process, correlation with the findings on the clot and core sections,   along with clinical, radiological and SPEP/UPEP information is necessary. Cytogenetic and FISH studies may provide useful prognostic information. Cytogenetics:  Normal  FISH:  dup1q,   t11:14   Molecular studies:  Not performed.      PERTINENT CARE EVENTS   n/a      Subjective:     History of Present Illness:     Kenyon Alcantar is a 80 y.o. female with recently diagnosed Multiple Myeloma presents to the ER after worsening decline in performance status, decreased mentation, hypercalcemia, hyponatremia, increasing total protein. She has remained at [facility] for inpatient rehabilitation. We saw her last week and planned therapy for her myeloma after goals of care discussion with family. Her family wanted to pursue Revlimid/dexamethasone/zometa alone. Per phone discussion with daughter (patient not able to provide history), patient started to have a worsening decline last Thursday at the facility that led to an ER visit after a fall. She was sent back to her facility but unfortunately had further decline and returned to the ER yesterday evening. Daughter noted that patient stopped being able to participate in her physical therapy last Thursday compared to the best activity she had earlier in the week. She was anticipating a discharged on 6/1/22 prior to her acute decline. -  Interval History:   88yo f with slight improvement in mentation. Daughter at bedside and noted that patient ate small meal earlier. However, patient notes dealing with some abdominal pain. Also has had periodic cough.     Current Facility-Administered Medications   Medication Dose Route Frequency    sodium chloride (NS) flush 5-40 mL  5-40 mL IntraVENous Q8H    sodium chloride (NS) flush 5-40 mL  5-40 mL IntraVENous PRN    acetaminophen (TYLENOL) tablet 650 mg  650 mg Oral Q6H PRN    Or    acetaminophen (TYLENOL) suppository 650 mg  650 mg Rectal Q6H PRN    polyethylene glycol (MIRALAX) packet 17 g  17 g Oral DAILY PRN    ondansetron (ZOFRAN ODT) tablet 4 mg  4 mg Oral Q8H PRN    Or    ondansetron (ZOFRAN) injection 4 mg  4 mg IntraVENous Q6H PRN    0.9% sodium chloride infusion  50 mL/hr IntraVENous CONTINUOUS    hydrALAZINE (APRESOLINE) 20 mg/mL injection 10 mg  10 mg IntraVENous Q6H PRN    levothyroxine (SYNTHROID) tablet 88 mcg  88 mcg Oral ACB    lactulose (CHRONULAC) 10 gram/15 mL solution 30 mL  20 g Oral BID    heparin (porcine) injection 5,000 Units  5,000 Units SubCUTAneous Q12H    ferrous sulfate tablet 325 mg  325 mg Oral ACB    latanoprost (XALATAN) 0.005 % ophthalmic solution 1 Drop  1 Drop Both Eyes QHS    sucralfate (CARAFATE) tablet 1 g  1 g Oral AC&HS    cyanocobalamin (VITAMIN B12) tablet 1,000 mcg  1,000 mcg Oral DAILY    cholecalciferol (VITAMIN D3) (1000 Units /25 mcg) tablet 2,000 Int'l Units  2,000 Int'l Units Oral DAILY    metoprolol succinate (TOPROL-XL) XL tablet 25 mg  25 mg Oral DAILY    amLODIPine (NORVASC) tablet 10 mg  10 mg Oral DAILY    zoledronic acid (ZOMETA) 4 mg in 0.9% sodium chloride 100 mL IVPB  4 mg IntraVENous NOW     Current Outpatient Medications   Medication Sig    lenalidomide (Revlimid) 15 mg cap Take 1 Capsule by mouth See Admin Instructions. Take days 1-21, off 7 days. HAD NOT STARTED YET.  latanoprost (XALATAN) 0.005 % ophthalmic solution Administer 1 Drop to both eyes nightly.  losartan (COZAAR) 50 mg tablet Take 1 Tablet by mouth daily for 30 days.  glucagon (Glucagon Emergency Kit, human,) 1 mg solr by Injection route. 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  Inject as per sliding scale: if 201 - 250 = 2 units; 251 - 300 = 4 units; 301 - 350 = 6 units Greater than 350, CALL MD, subcutaneously before meals and at bedtime related to TYPE 2 DIABETES MELLITUS WITHOUT COMPLICATIONS    potassium bicarb-citric acid (EFFER-K) 20 mEq tablet Take 1 Tablet by mouth two (2) times a day.  polyethylene glycol (MIRALAX) 17 gram packet Take 1 Packet by mouth daily.  amLODIPine (NORVASC) 10 mg tablet Take 1 Tablet by mouth daily.  cholecalciferol, vitamin D3, (Vitamin D3) 50 mcg (2,000 unit) tab Take 1 Tablet by mouth daily for 30 days.  levothyroxine (SYNTHROID) 88 mcg tablet Take 1 Tablet by mouth Daily (before breakfast) for 30 days.  metoprolol succinate (TOPROL-XL) 25 mg XL tablet Take 1 Tablet by mouth daily for 30 days.  pantoprazole (PROTONIX) 40 mg tablet Take 1 Tablet by mouth Daily (before breakfast) for 30 days.  ferrous sulfate 325 mg (65 mg iron) tablet Take 1 Tablet by mouth Daily (before breakfast) for 30 days.  cyanocobalamin 1,000 mcg tablet Take 1 Tablet by mouth daily for 30 days.  benzonatate (TESSALON) 100 mg capsule Take 1 Capsule by mouth as needed for Cough for up to 14 doses.  hydrALAZINE (APRESOLINE) 50 mg tablet Take 1 Tablet by mouth three (3) times daily for 30 days.  sucralfate (CARAFATE) 1 gram tablet Take 1 Tablet by mouth Before breakfast, lunch, dinner and at bedtime for 30 days. Allergies   Allergen Reactions    Celebrex [Celecoxib] Hives    Crestor [Rosuvastatin] Myalgia      Review of Systems Provided by:  Patient in ER. Review of Systems: Patient unable to provide. Objective:      Wt Readings from Last 3 Encounters:   06/01/22 117 lb 8.1 oz (53.3 kg)   05/23/22 110 lb (49.9 kg)   05/13/22 113 lb 9.6 oz (51.5 kg)     Temp Readings from Last 3 Encounters:   06/02/22 97.5 °F (36.4 °C)   05/29/22 98.5 °F (36.9 °C)   05/23/22 98.1 °F (36.7 °C)     BP Readings from Last 3 Encounters:   06/02/22 (!) 210/78   05/29/22 (!) 145/48   05/23/22 (!) 170/66     Pulse Readings from Last 3 Encounters:   06/02/22 86   05/29/22 (!) 57   05/23/22 (!) 58     ECOG PS: 4- Completely disabled; cannot carry on any selfcare; totally confined to bed or chair. Physical Exam  Constitutional: No acute distress. and Non-toxic appearance. HENT: Normocephalic and atraumatic head. Eyes: Normal Conjunctivae. Anicteric sclerae. Cardiovascular: S1,S2 auscultated. No pitting edema. No friction rub. No gallops auscultated. Pulmonary: Normal Respiratory Effort. No wheezing. No rhonchi. Abdominal: Normal bowel sounds. Soft Abdomen to palpation. No abdominal tenderness. No guarding. No rebound tenderness. Skin: No jaundice. No rash. Pallor present. Musculoskeletal: No muscle pain on palpation. No temporal muscle wasting on inspection. Neurological: More alert. No tremor on inspection. Psychiatric: showing more personality. Results:     I personally reviewed Epic EHR labs/results below:   Lab Results   Component Value Date/Time    WBC 5.1 06/02/2022 04:00 AM    HGB 8.9 (L) 06/02/2022 03:35 PM    HCT 26.1 (L) 06/02/2022 03:35 PM    PLATELET 002 64/90/1827 04:00 AM    MCV 98.6 06/02/2022 04:00 AM    ABS. NEUTROPHILS 3.7 06/02/2022 04:00 AM     Lab Results   Component Value Date/Time    Sodium 135 (L) 06/02/2022 04:00 AM    Potassium 2.8 (L) 06/02/2022 04:00 AM    Chloride 106 06/02/2022 04:00 AM    CO2 20 (L) 06/02/2022 04:00 AM    Glucose 74 06/02/2022 04:00 AM    BUN 34 (H) 06/02/2022 04:00 AM    Creatinine 1.87 (H) 06/02/2022 04:00 AM    GFR est AA 31 (L) 06/02/2022 04:00 AM    GFR est non-AA 25 (L) 06/02/2022 04:00 AM    Calcium 10.5 (H) 06/02/2022 04:00 AM    Glucose (POC) 163 (H) 05/05/2022 04:24 PM     Lab Results   Component Value Date/Time    Bilirubin, total 0.5 06/02/2022 04:00 AM    ALT (SGPT) 9 (L) 06/02/2022 04:00 AM    Alk.  phosphatase 35 (L) 06/02/2022 04:00 AM    Protein, total 9.4 (H) 06/02/2022 04:00 AM    Albumin 1.8 (L) 06/02/2022 04:00 AM    Globulin 7.6 (H) 06/02/2022 04:00 AM     ANEMIA LABS:    Iron Studies:    Iron   Date Value Ref Range Status   04/05/2022 51 35 - 150 ug/dL Final     Vitamin B12:   Vitamin B12   Date Value Ref Range Status   04/30/2022 1,065 (H) 193 - 986 pg/mL Final     Folate: Folate   Date Value Ref Range Status   04/28/2022 29.2 (H) 5.0 - 21.0 ng/mL Final     SPEP:    M-Harry   Date Value Ref Range Status   04/30/2022 4.8 (H) Not Observed g/dL Final      Immunofixation Result   Date Value Ref Range Status   04/30/2022 Comment (A)   Final     Comment:     (NOTE)  Immunofixation shows IgG monoclonal protein with kappa light chain  specificity. Please note that samples from patients receiving DARZALEX(R)  (daratumumab) or SARCLISA(R)(isatuximab-irfc) treatment can appear  as an \"IgG kappa\" and mask a complete response (CR). If this  patient is receiving these therapies, this DANIELLE assay interference  can be removed by ordering test number [id number]-\"Immunofixation,  Daratumumab-Specific, Serum\" or [id number]-\"Immunofixation,  Isatuximab-Specific, Serum\" and submitting a new sample for  testing or by calling the lab to add this test to the current  sample. Kappa/Lambda ratio, serum   Date Value Ref Range Status   04/30/2022 329.68 (H) 0.26 - 1.65   Final     Comment:     (NOTE)  Performed At: [address] [id number]  Giovanni Rowan MD FO:[id number]        Free Kappa Lt Chains, serum   Date Value Ref Range Status   04/30/2022 1,351.7 (H) 3.3 - 19.4 mg/L Final    No components found for: KLFL2  Reticulocyte Count: No results found for: RET  Bilirubin:   Lab Results   Component Value Date/Time    Bilirubin, total 0.5 06/02/2022 04:00 AM    Bilirubin Negative 05/30/2022 10:16 PM     LDH:   LD   Date Value Ref Range Status   04/30/2022 242 81 - 246 U/L Final        Assessment and Recommendations:     1. Multiple myeloma not having achieved remission ([facility] [address]  -concern that debility will prevent any meaningful attempt at therapy. Palliative following. Discussed with daughter, hospitals, concern for sluggish recovery. Ultimately, hospice may be the best route. 2. Hypercalcemia  -significantly improved but zometa and other treatment only temporizing measures at best.    3. Macrocytic anemia  -secondary to myeloma. Transfuse RBC's to keep Hgb greater than 7g/dL. 4. Debility  -sluggish recovery. Performance status still poor. Concern about tolerability of therapy especially as an outpatient. She likely would need rehab again which would mitigate outpatient infusions.  -unless miraculous recovery, favor discussion tomorrow focused on best supportive care/hospice.       Mily Scott MD  Hematology/Medical Oncology Provider  [facility]  P: [phone]    Signed By:   Jhony La MD

## 2022-06-02 NOTE — PROGRESS NOTES
Nutrition Note    Brief check on pt. Spoke with daughter at bedside- changed Ensure to Clear - apple flavor. Pt prefers decaf coffee - does not like milk, or any milky Ensures. Pt ate ~10% of lunch. Daughter was excited about this. Pt asked for \"tartar sauce. \"     Electronically signed by Michael Singh RD on 6/2/2022    Contact: [phone]

## 2022-06-02 NOTE — PROGRESS NOTES
1110: Bedside shift change report given to Ron Huddleston (oncoming nurse) by Monica Cerda (offgoing nurse). Report included the following information SBAR, Kardex, Procedure Summary, Intake/Output, MAR, Accordion and Cardiac Rhythm NSR. This patient was assisted with Intentional Toileting every 2 hours during this shift as appropriate. Documentation of ambulation and output reflected on Flowsheet as appropriate. Purposeful hourly rounding was completed using AIDET and 5Ps. Outcomes of PHR documented as they occurred. Bed alarm in use as appropriate. Dual Suction and ambubag in place. Critera met for insert Yes  Reason for insert: Urinary obstruction  Date of insert: 6/1/22  Order is current: Yes  MD or RN driven: Provider  Removal Discussed Yes  Last CHG Bath: 6/2/22 0509  Pichardo Care Last Completed: Date/Time: 6/2/22  0800  Pichardo Care After Each Bowel Movement: Yes  Education documented every 24 hours Yes  Care Plan (Risk for UTI, Pichardo) Updated Every 24 hours Yes  Bag below Bladder Yes        Bag off Floor Yes      Sheet Clip used Yes          Tubing free of dependant loops Yes          Seal Intact Yes            Bag less than 1/2 full Yes              1745: Notified Family practice of patients / 90 despite iv hydralazine and metoprolol. No new orders.

## 2022-06-03 NOTE — PROGRESS NOTES
Problem: Dysphagia (Adult)  Goal: *Acute Goals and Plan of Care (Insert Text)  Description: Initiated 6/2/2022  1. Patient will tolerate regular diet, thin liquids free of sequelae of aspiration within 7 days  Outcome: Not Progressing Towards Goal   SPEECH LANGUAGE PATHOLOGY DYSPHAGIA TREATMENT  Patient: Susi Brizuela (66 y.o. female)  Date: 6/3/2022  Diagnosis: Encephalopathy [G93.40] Encephalopathy       Precautions: aspiration Fall,Seizure,Skin    ASSESSMENT:  Aspiration risk due to agitation, confusion. PLAN:  Recommendations and Planned Interventions:  Downgrade diet to Soft and bite sized. Feed only when awake,alert and upright. Patient continues to benefit from skilled intervention to address the above impairments. Continue treatment per established plan of care. Discharge Recommendations:  TBD     SUBJECTIVE:   Patient agitated in this session. Difficulty focusing on PO, but receptive to it when she realized food was near. OBJECTIVE:   Cognitive and Communication Status:  Neurologic State:  (patient agitated/turning herself on her side and trying to get OOB)  Orientation Level: Unable to verbalize  Cognition: Follows commands  Perception: Appears intact  Perseveration: Perseverates during mobility,Perseverates during conversation  Safety/Judgement: Lack of insight into deficits  Dysphagia Treatment:  Oral Assessment:  Oral Assessment  Dentition: Upper & lower dentures  P.O. Trials:  Patient Position: upright in bed  Vocal quality prior to P.O.: No impairment  Consistency Presented: Mechanical soft; Thin liquid  How Presented: SLP-fed/presented;Spoon;Straw;Successive swallows   ORAL PHASE:   Bolus Acceptance: Impaired (distracted with agitation trying to get OOB)  Bolus Formation/Control: Impaired (SLP minced up meat finely and fed in purees)  Type of Impairment: Mastication;Piecemeal  Propulsion: Delayed (# of seconds) (slow clearance)  Oral Residue:  (mild on tongue)  PHARYNGEAL PHASE:   Initiation of Swallow:  (appeared to have diffiuctly coordinating swallow with more than 2 sips at ones. uanble to drink and swallowat the same time)                            Exercises:  Laryngeal Exercises:                                                                                                                                   Pain:  Pain Scale 1: Adult Nonverbal Pain Scale          After treatment:   Patient left in no apparent distress in bed, Nursing notified, and Caregiver / family present    COMMUNICATION/EDUCATION:   Patient was educated regarding her deficit(s) of potential dysphagia as this relates to her diagnosis of encephalopathy. She demonstrated Guarded understanding as evidenced by   AMS. The patient's plan of care including recommendations, planned interventions, and recommended diet changes were discussed with: Registered nurse.      Jose Martinez, SLP  Time Calculation: 15 mins

## 2022-06-03 NOTE — PROGRESS NOTES
[address] [place] [address]   Office [phone]  Fax [phone]            Subjective / Objective     24 Hour Events: No acute events. Subjective: Pt was seen and examined at bedside. Mentation improved. Patient w/o complaints at this time reports overall feeling improved. Objective:    Respiratory:   O2 Device: None (Room air)   Visit Vitals  BP (!) 183/93 (BP 1 Location: Left upper arm, BP Patient Position: At rest)   Pulse 91   Temp 98 °F (36.7 °C)   Resp 21   Ht 5' (1.524 m)   Wt 121 lb 14.6 oz (55.3 kg)   SpO2 96%   Breastfeeding No   BMI 23.81 kg/m²     Physical Exam:  General: No acute distress. Frail. Elderly. Respiratory: CTAB, no wheezes or crackles  Cardiovascular: Regular rate and rhythm, clear S1 S2. Pulses present bilaterally. GI: Nondistended. + bowel sounds. Diffuse tenderness across lower abdomen. Extremities: No LE edema. Pulses present. Skin: Warm, dry. Neuro: Oriented to person and time. No focal deficits. Hard of hearing. I/O:  Date 06/02/22 0700 - 06/03/22 0659 06/03/22 0700 - 06/04/22 0659   Shift 3348-0490 2197-5350 24 Hour Total 3533-1952 9412-9055 24 Hour Total   INTAKE   P.O. 0 0 0        P. O. 0 0 0      I. V.(mL/kg/hr) 3940.6(1.3)  1230.7(3.8)        Volume (0.9% sodium chloride infusion) 1368.3  1368.3        Volume (0.9% sodium chloride infusion 250 mL) 0  0        Volume (potassium chloride 10 mEq in 100 ml IVPB) 200  200      Blood 310  310        Volume (TRANSFUSE PACKED RBC'S) 310  310      Shift Total(mL/kg) 1878. 3(35.2) 0(0) 1878.3(34)      OUTPUT   Urine(mL/kg/hr) 200(0.3) 1900(2.9) 2100(1.6)        Urine Voided  1900 1900        Urine Output (mL) (Urinary Catheter 06/01/22 Pichardo) 200  200      Shift Total(mL/kg) 200(3.8) 1900(34.4) 2100(38)      NET 1678.3 -1900 -221.7      Weight (kg) 53.3 55.3 55.3 55.3 55.3 55.3       Inpatient Medications  Current Facility-Administered Medications   Medication Dose Route Frequency    potassium chloride 10 mEq in 100 ml IVPB  10 mEq IntraVENous Q1H    0.9% sodium chloride infusion 250 mL  250 mL IntraVENous PRN    nitroglycerin (NITROBID) 2 % ointment 1 Inch  1 Inch Topical DAILY    [Held by provider] hydrALAZINE (APRESOLINE) tablet 50 mg  50 mg Oral TID    hydrALAZINE (APRESOLINE) 20 mg/mL injection 20 mg  20 mg IntraVENous Q6H    sodium chloride (NS) flush 5-40 mL  5-40 mL IntraVENous Q8H    sodium chloride (NS) flush 5-40 mL  5-40 mL IntraVENous PRN    acetaminophen (TYLENOL) tablet 650 mg  650 mg Oral Q6H PRN    Or    acetaminophen (TYLENOL) suppository 650 mg  650 mg Rectal Q6H PRN    polyethylene glycol (MIRALAX) packet 17 g  17 g Oral DAILY PRN    ondansetron (ZOFRAN ODT) tablet 4 mg  4 mg Oral Q8H PRN    Or    ondansetron (ZOFRAN) injection 4 mg  4 mg IntraVENous Q6H PRN    levothyroxine (SYNTHROID) tablet 88 mcg  88 mcg Oral ACB    lactulose (CHRONULAC) 10 gram/15 mL solution 30 mL  20 g Oral BID    heparin (porcine) injection 5,000 Units  5,000 Units SubCUTAneous Q12H    ferrous sulfate tablet 325 mg  325 mg Oral ACB    latanoprost (XALATAN) 0.005 % ophthalmic solution 1 Drop  1 Drop Both Eyes QHS    sucralfate (CARAFATE) tablet 1 g  1 g Oral AC&HS    cyanocobalamin (VITAMIN B12) tablet 1,000 mcg  1,000 mcg Oral DAILY    [Held by provider] metoprolol succinate (TOPROL-XL) XL tablet 25 mg  25 mg Oral DAILY    amLODIPine (NORVASC) tablet 10 mg  10 mg Oral DAILY    metoprolol (LOPRESSOR) injection 5 mg  5 mg IntraVENous BID       Allergies  Allergies   Allergen Reactions    Celebrex [Celecoxib] Hives    Crestor [Rosuvastatin] Myalgia       CBC:  Recent Labs     06/03/22  0354 06/02/22  1535 06/02/22  0400 06/01/22  0407 06/01/22  0407   WBC 6.7  --  5.1  --  5.7   HGB 9.1* 8.9* 6.8*   < > 7.6*   HCT 26.8* 26.1* 21.0*   < > 23.2*     --  258  --  297    < > = values in this interval not displayed.        Metabolic Panel:  Recent Labs     06/03/22  0354 06/02/22  0400 06/01/22  1556 06/01/22  0407 06/01/22  0407   * 135* 135*   < > 132*   K 2.9* 2.8* 3.2*   < > 3.1*    106 105   < > 102   CO2 18* 20* 25   < > 23   BUN 38* 34* 33*   < > 31*   CREA 1.89* 1.87* 1.91*   < > 1.81*   GLU 73 74 87   < > 92   CA 10.1 10.5* 11.4*   < > 12.5*   MG  --   --   --   --  2.0   ALB 1.9* 1.8*  --   --  2.0*   ALT 10* 9*  --   --  11*    < > = values in this interval not displayed. Assessment and Plan     Anai Garcia is a 80 y.o. female with a PMHx of recently diagnosed Multiple Myeloma, hypercalcemia, HTN, Macrocytic anemia, GERD, Badycardia  who is admitted for altered mental status in the setting of hyponatremia and hypercalcemia.     Encephalopathy likely 2/2 multiple metabolic derangements in s/o MM:  Most likely 2/2 marked hypercalcemia vs pseudohyponatremia. MM diagnosed 4/2022. Prior head and C-spine imaging negative. AMS labs negative. FeNa 0.4%. F/b Dr. Sanchez [place] outpatient. Mentation significantly improving.   - Heme/onc consulted, appreciate rec's  - Palliative consulted, appreciate rec's. Likely recommendation for transition to hospice given patient's nutritional status and prognosis. Plan for discussion today regarding decision making.   - Nephrology consulted, appreciate recs    - Consider further neuro imaging and consult if symptoms not improving. Hyperammoniemia: Ammonia stable. Improving with lactulose. - trend daily   - Lactulose 20g BID      Hypertensive Urgency: /68, with elevated troponin (flat on recheck). BP uncontrolled but has not been receiving Norvasc given poor po intake; however, favor restarting given improved mentation and po intake. Will adjust regimen as needed pending trend.    - Home metoprolol > IV metop 5 mg BID given poor po status  - Cont Amlodipine 10mg daily, IV Hydral 20mg q6h (sub for home po Hydral)   - Nitrobid paste daily   - Continue to hold Losartan 100 mg daily given hyponatremia     Macrocytic Anemia: Hgb 7. 9 POA (bl ~8). Improved s/p 1u pRBCs. - daily cbc   - Heme/Onc consulted, appreciate recs  - home cyanocobalamin 1,000 daily   - Transfusion threshold Hgb 7.0 per H/O      Hypercalcemia: Correcting for low albumin, at 12.3, though has been downtrending. Improving s/p zometa per H/O and calcitonin x1 dose (5/31). - MIVF   - Daily CMP     DOMINIK: Cr POA 1.64 increased to 1.87 today (bl ~0.9). Likely due to dehydration vs hypercalcemia. - DC'ed IV fluids per nephro given limited improvement in renal function   - Daily CMP  - Nephrology consulted, appreciate recs      Abdominal Pain: Chronic issues, prior workup including EGD and colonoscopy negative. Prior CT showing hiatal hernia. Likely secondary to hypercalcemia, less likely hernia. UA negative for UTI, pelvic xray showing no acute process. No complaints this morning.   - Expect to resolve with treatment of underlying hypercalcemia.   - Continue home Carafate   - Hold home protonix as possible contribution to HypoNa  - If fails to improve, consider further imaging. Malnutrition: Patient with decreased po intake. Likely secondary to enncephalopathy and MM.  - Nutrition following, appreciate rec's   - continue oral nutrition supplement, favor avoiding high protein supplement given MM and elevated total protein     Elevated Troponin (Resolved): Trop initially 56, repeat flat at 56. No chest pain, EKG with no ischemia. Tele with NSR 60s-70s and PVCs. - Continue to monitor on tele     Elevated pro-BNP: POA BnP 2833. Prior Echo in February EF 60-65%. Lungs clear on exam, no edema. Likely secondary to HTN and anemia. - repeat Echo w/ EF 55-60 and G1DD.   - cautious with fluids at this time, no evidence of fluid overload      Hypokalemia: Chronic. Will continue to follow on BMP trends. - replete prn   - Continue home PO EfferK 20 BID when taking PO  - daily metabolic panel, replete as needed    Hyponatremia: Improving.  Holding Losartan and protonix given possible contributing factors. Likely 2/2 IVVD vs pseudohyponatremia given elevated total protein. - continue MIVF   - trend daily      DM2: Last A1c <3.8 (L) on 4/28/2022. Not on any home medications. Glucose range stable. - Monitor on daily metabolic panel, no SSI at this time      HLD: Last lipid panel 12/6/21: Tchol 122, HDL 67, LDL 45.6, trig 47. No home medications.     Hypothyroidism: TSH 6.26 (5/1/2022). - Continue home Synthroid 88 mcg daily before breakfast  - rTSH 1.55     CAD: MI (during surgery in 1970s) had multiple caths and stress tests. Records of cardiology visit w/ Dr. Corina Sena on 3/22/17. Exercise Cardiolite 7/23/15 - walked 4:39 (6.3 METS), normal stress EKG. Normal MPI. LVEF 71%. Echo 2/25/22 - LVEF 60-65%.     Rectal prolapse: Occurred x 1 during prior hospitalization, manual reduction. No acute complications. - Refer to Colorectal surgery for outpatient follow-up     GERD: Chronic.  - Hold home Protonix 40 mg daily, possible worsening of Hyponatremia  - restart home Sucralfate      Bradycardia: Chronic. EKG showing 1st deg AV block, chronic.   - hold home Metoprolol XL 25mg daily. Substitute IV Metoprolol 5mg BID         FEN/GI - Easy to chew.  ml/hr. Activity - Ambulate with assistance  DVT prophylaxis - Heparin   GI prophylaxis - Holding home Protonix for hyponatremia  Fall prophylaxis - Fall precautions ordered. Disposition - Plan to d/c to TBD. Consulting PT, OT and CM  Code Status - DNR. Discussed with patient / caregivers.   Next of Rajni [address] Name and Jaylan Krueger (Child)   [phone]    Lise Pruitt MD  Family Medicine Resident       For Billing    Chief Complaint   Patient presents with   [facility] [place] Problems  Date Reviewed: 5/31/2022          Codes Class Noted POA    Hypomagnesemia ICD-10-CM: E83.42  ICD-9-CM: 275.2  5/31/2022 Unknown        Hyponatremia ICD-10-CM: E87.1  ICD-9-CM: 276.1  5/31/2022 Unknown        Elevated brain natriuretic peptide (BNP) level ICD-10-CM: R79.89  ICD-9-CM: 790.99  5/31/2022 Unknown        * (Principal) Encephalopathy ICD-10-CM: G93.40  ICD-9-CM: 348.30  5/30/2022 Unknown        [facility] not [facility] ICD-10-CM: C90.00  ICD-9-CM: 203.00  5/13/2022 Yes        Hypercalcemia ICD-10-CM: E83.52  ICD-9-CM: 275.42  5/13/2022 Yes        Hypertensive emergency ICD-10-CM: I16.1  ICD-9-CM: 401.9  2/25/2022 Yes        Hypokalemia ICD-10-CM: E87.6  ICD-9-CM: 276.8  2/24/2022 Yes        Elevated troponin ICD-10-CM: R77.8  ICD-9-CM: 790.6  2/24/2022 Yes        Nodule of left lobe of thyroid gland ICD-10-CM: E04.1  ICD-9-CM: 241.0  9/8/2020 Yes        Macrocytic anemia ICD-10-CM: D53.9  ICD-9-CM: 281.9  8/29/2018 Yes        Type 2 diabetes mellitus without complication, without long-term current use of insulin (HCC) ICD-10-CM: E11.9  ICD-9-CM: 250.00  4/24/2017 Yes        HTN (hypertension) ICD-10-CM: I10  ICD-9-CM: 401.9  Unknown Yes        Hypothyroidism, iatrogenic ICD-10-CM: E03.2  ICD-9-CM: 244. 3  Unknown Yes    Overview Signed 7/13/2015  3:16 PM by Loly Smiley MD     radioiodine             Hypercholesteremia ICD-10-CM: E78.00  ICD-9-CM: 272.0  Unknown Yes

## 2022-06-03 NOTE — PROGRESS NOTES
Transitions of Care Plan - RUR 23%:  1. Medical Management continues  2. Heme/Onc following   3. Nephrology following   4. Nutrition following  5. Palliative following  6. SLP eval   7. PT/OT following   8. Dispo: TBD - possibly home with hospice vs return to [facility]    9.  CM will continue to follow and assist w/ discharge needs    Riddhi Bai LCSW

## 2022-06-03 NOTE — PROGRESS NOTES
[address] [facility] [address] [facility]   Office [phone]  Fax [phone]            Subjective / Objective     24 Hour Events: Restless and fidgety overnight, received one dose of Haldol and one of Zyprexa, with improvement of agitation. Also required Labetalol x 1 dose for BP of 192/93. Subjective: Improved mentation today - able to tell me her name, year, and location. Denies any pain or discomfort anywhere. Objective:    Respiratory:   O2 Device: None (Room air)   Visit Vitals  BP (!) 148/84 (BP 1 Location: Left upper arm, BP Patient Position: At rest)   Pulse 90   Temp 98.5 °F (36.9 °C)   Resp 22   Ht 5' (1.524 m)   Wt 121 lb 14.6 oz (55.3 kg)   SpO2 94%   Breastfeeding No   BMI 23.81 kg/m²     Physical Exam:   General: No acute distress. Frail. Elderly. Respiratory: CTAB, no wheezes or crackles  Cardiovascular: Regular rate and rhythm, clear S1 S2. Pulses present bilaterally. GI: Nondistended. + bowel sounds. Abdomen nontender to palpation   Extremities: No LE edema. Pulses present. Skin: Warm, dry. Neuro: Oriented to person, place, and time. No focal deficits. Hard of hearing. I/O:  Date 06/02/22 1900 - 06/03/22 0659 06/03/22 0700 - 06/04/22 0659   Shift 0464-0470 24 Hour Total 7171-8036 7383-9845 24 Hour Total   INTAKE   P.O. 0 0 100  100     P. O. 0 0 100  100   I. V.(mL/kg/hr)  1568.3 0(0)  0     I.V.   0  0     Volume (0.9% sodium chloride infusion)  1368.3        Volume (0.9% sodium chloride infusion 250 mL)  0        Volume (potassium chloride 10 mEq in 100 ml IVPB)  200      Blood  310 0  0     Autotransfused   0  0     Volume (TRANSFUSE PACKED RBC'S)  310      Other   0  0     Other   0  0   Shift Total(mL/kg) 0(0) 1878.3(34) 100(1.8)  100(1.8)   OUTPUT   Urine(mL/kg/hr) 1900 2100 700(1.1)  700     Urine Voided 1900 1900        Urine Output (mL) (Urinary Catheter 06/01/22 Pichardo)  200 700  700   Shift Total(mL/kg) 1900(34.4) 8473(55) 700(12.7)  700(12.7)   NET -1900 -221.7 -600  -600   Weight (kg) 55.3 55.3 55.3 55.3 55.3       Inpatient Medications  Current Facility-Administered Medications   Medication Dose Route Frequency    labetaloL (NORMODYNE;TRANDATE) 20 mg/4 mL (5 mg/mL) injection 10 mg  10 mg IntraVENous Q6H PRN    hydrALAZINE (APRESOLINE) tablet 50 mg  50 mg Oral TID    metoprolol succinate (TOPROL-XL) XL tablet 25 mg  25 mg Oral DAILY    OLANZapine (ZyPREXA zydis) disintegrating tablet 5 mg  5 mg Oral Q12H PRN    0.9% sodium chloride infusion 250 mL  250 mL IntraVENous PRN    nitroglycerin (NITROBID) 2 % ointment 1 Inch  1 Inch Topical DAILY    sodium chloride (NS) flush 5-40 mL  5-40 mL IntraVENous Q8H    sodium chloride (NS) flush 5-40 mL  5-40 mL IntraVENous PRN    acetaminophen (TYLENOL) tablet 650 mg  650 mg Oral Q6H PRN    Or    acetaminophen (TYLENOL) suppository 650 mg  650 mg Rectal Q6H PRN    polyethylene glycol (MIRALAX) packet 17 g  17 g Oral DAILY PRN    ondansetron (ZOFRAN ODT) tablet 4 mg  4 mg Oral Q8H PRN    Or    ondansetron (ZOFRAN) injection 4 mg  4 mg IntraVENous Q6H PRN    levothyroxine (SYNTHROID) tablet 88 mcg  88 mcg Oral ACB    lactulose (CHRONULAC) 10 gram/15 mL solution 30 mL  20 g Oral BID    heparin (porcine) injection 5,000 Units  5,000 Units SubCUTAneous Q12H    ferrous sulfate tablet 325 mg  325 mg Oral ACB    latanoprost (XALATAN) 0.005 % ophthalmic solution 1 Drop  1 Drop Both Eyes QHS    sucralfate (CARAFATE) tablet 1 g  1 g Oral AC&HS    cyanocobalamin (VITAMIN B12) tablet 1,000 mcg  1,000 mcg Oral DAILY    amLODIPine (NORVASC) tablet 10 mg  10 mg Oral DAILY       Allergies  Allergies   Allergen Reactions    Celebrex [Celecoxib] Hives    Crestor [Rosuvastatin] Myalgia       CBC:  Recent Labs     06/03/22  0354 06/02/22  1535 06/02/22  0400 06/01/22  0407 06/01/22  0407   WBC 6.7  --  5.1  --  5.7   HGB 9.1* 8.9* 6.8*   < > 7.6*   HCT 26.8* 26.1* 21.0*   < > 23.2*     --  258  --  297    < > = values in this interval not displayed. Metabolic Panel:  Recent Labs     06/03/22  1745 06/03/22  0354 06/02/22  0400 06/01/22  1556 06/01/22  0407   * 135* 135*   < > 132*   K 3.0* 2.9* 2.8*   < > 3.1*    107 106   < > 102   CO2 19* 18* 20*   < > 23   BUN 45* 38* 34*   < > 31*   CREA 1.94* 1.89* 1.87*   < > 1.81*   * 73 74   < > 92   CA 10.0 10.1 10.5*   < > 12.5*   MG  --   --   --   --  2.0   PHOS 1.4*  --   --   --   --    ALB 2.1* 1.9* 1.8*  --  2.0*   ALT  --  10* 9*  --  11*    < > = values in this interval not displayed. Assessment and Plan     Steffi Martinez is a 80 y.o. female with a PMHx of recently diagnosed Multiple Myeloma, hypercalcemia, HTN, Macrocytic anemia, GERD, Badycardia  who is admitted for altered mental status in the setting of hyponatremia and hypercalcemia.     Encephalopathy likely 2/2 multiple metabolic derangements in s/o MM:  Most likely 2/2 marked hypercalcemia vs pseudohyponatremia. MM diagnosed 4/2022. Prior head and C-spine imaging negative. AMS labs negative. FeNa 0.4%. F/b Dr. Vilma [place] outpatient. Mentation significantly improving.   - Heme/onc consulted, appreciate rec's  - Palliative consulted, appreciate rec's. Likely recommendation for transition to hospice given patient's nutritional status and prognosis. Heme/onc planning on speaking with family today in regards ot   - Nephrology consulted, appreciate recs    - Consider further neuro imaging and consult if symptoms not improving.      Uncontrolled HTN: /68, with elevated troponin (flat on recheck). BP uncontrolled. Will adjust regimen as needed pending trend. - Home metoprolol > IV metop 5 mg BID given poor po status  - Cont Amlodipine 10mg daily, IV Hydral 20mg q6h (sub for home po Hydral)   - Nitrobid paste daily   - Continue to hold Losartan 100 mg daily given poor renal function    Macrocytic Anemia: Hgb 7.9 POA (bl ~8). S/p 1u pRBCs.    - daily cbc   - Heme/Onc consulted, appreciate recs  - home cyanocobalamin 1,000 daily   - Transfusion threshold Hgb 7.0 per H/O      Hypercalcemia: Correcting for low albumin, at 11.4, improving. Improving s/p zometa per H/O and calcitonin x1 dose (5/31). - Stopped mIVF   - Daily CMP     DOMINIK: Cr POA 1.64 increased to 1.87 today (bl ~0.9). Likely due to dehydration vs hypercalcemia. - DC'ed IV fluids per nephro given limited improvement in renal function   - Daily CMP  - Nephrology consulted, appreciate recs      Abdominal Pain: Chronic issues, prior workup including EGD and colonoscopy negative. Prior CT showing hiatal hernia. Likely secondary to hypercalcemia, less likely hernia. UA negative for UTI, pelvic xray showing no acute process. No complaints this morning.   - Expect to resolve with treatment of underlying hypercalcemia.   - Continue home Carafate   - Hold home protonix as possible contribution to HypoNa  - If fails to improve, consider further imaging. Malnutrition: Patient with decreased po intake. Likely secondary to enncephalopathy and MM.  - Nutrition following, appreciate rec's   - continue oral nutrition supplement, favor avoiding high protein supplement given MM and elevated total protein     Elevated Troponin (Resolved): Trop initially 56, repeat flat at 56. No chest pain, EKG with no ischemia. Tele with NSR 60s-70s and PVCs. - Continue to monitor on tele     Elevated pro-BNP: POA BnP 2833. Prior Echo in February EF 60-65%. Lungs clear on exam, no edema. Likely secondary to HTN and anemia. - repeat Echo w/ EF 55-60 and G1DD.   - cautious with fluids at this time, no evidence of fluid overload      Hypokalemia: Chronic. Will continue to follow on BMP trends. - Repleted with 5 runs of 10 mEq K  - Daily metabolic panel, replete as needed    Hyponatremia: Improving. Holding Losartan and protonix given possible contributing factors. Likely 2/2 IVVD vs pseudohyponatremia given elevated total protein.    - s/p mIVF, Na improving  - trend daily      DM2: Last A1c <3.8 (L) on 4/28/2022. Not on any home medications. Glucose range stable. - Monitor on daily metabolic panel, no SSI at this time      HLD: Last lipid panel 12/6/21: Tchol 122, HDL 67, LDL 45.6, trig 47. No home medications.     Hypothyroidism: TSH 6.26 (5/1/2022). - Continue home Synthroid 88 mcg daily before breakfast  - rTSH 1.55     CAD: MI (during surgery in 1970s) had multiple caths and stress tests. Records of cardiology visit w/ Dr. Gareth Earl on 3/22/17. Exercise Cardiolite 7/23/15 - walked 4:39 (6.3 METS), normal stress EKG. Normal MPI. LVEF 71%. Echo 2/25/22 - LVEF 60-65%.     Rectal prolapse: Occurred x 1 during prior hospitalization, manual reduction. No acute complications. - Refer to Colorectal surgery for outpatient follow-up     GERD: Chronic.  - Hold home Protonix 40 mg daily, possible worsening of Hyponatremia  - restart home Sucralfate      Bradycardia: Chronic. EKG showing 1st deg AV block, chronic.   - hold home Metoprolol XL 25mg daily. Substitute IV Metoprolol 5mg BID      Hyperammoniemia (stable): Ammonia stable. S/p lactulose. - Will stop trending daily      FEN/GI - Easy to chew. Activity - Ambulate with assistance  DVT prophylaxis - Heparin   GI prophylaxis - Holding home Protonix for hyponatremia  Fall prophylaxis - Fall precautions ordered. Disposition - Plan to d/c to TBD. Consulting PT, OT and CM  Code Status - DNR. Discussed with patient / caregivers.   Next of Rajni [address] Name and [address] (Child)   [phone]    Janay Hurtado MD  Family Medicine Resident       For Billing    Chief Complaint   Patient presents with   [facility] Problems  Date Reviewed: 5/31/2022          Codes Class Noted POA    Hypomagnesemia ICD-10-CM: E83.42  ICD-9-CM: 275.2  5/31/2022 Unknown        Hyponatremia ICD-10-CM: E87.1  ICD-9-CM: 276.1  5/31/2022 Unknown        Elevated brain natriuretic peptide (BNP) level ICD-10-CM: R79.89  ICD-9-CM: 790.99  5/31/2022 Unknown        * (Principal) Encephalopathy ICD-10-CM: G93.40  ICD-9-CM: 348.30  5/30/2022 Unknown        Multiple myeloma not having achieved remission ([facility] ICD-10-CM: C90.00  ICD-9-CM: 203.00  5/13/2022 Yes        Hypercalcemia ICD-10-CM: K58.87  ICD-9-CM: 275.42  5/13/2022 Yes        Hypertensive emergency ICD-10-CM: I16.1  ICD-9-CM: 401.9  2/25/2022 Yes        Hypokalemia ICD-10-CM: E87.6  ICD-9-CM: 276.8  2/24/2022 Yes        Elevated troponin ICD-10-CM: R77.8  ICD-9-CM: 790.6  2/24/2022 Yes        Nodule of left lobe of thyroid gland ICD-10-CM: E04.1  ICD-9-CM: 241.0  9/8/2020 Yes        Macrocytic anemia ICD-10-CM: D53.9  ICD-9-CM: 281.9  8/29/2018 Yes        Type 2 diabetes mellitus without complication, without long-term current use of insulin (HCC) ICD-10-CM: E11.9  ICD-9-CM: 250.00  4/24/2017 Yes        HTN (hypertension) ICD-10-CM: I10  ICD-9-CM: 401.9  Unknown Yes        Hypothyroidism, iatrogenic ICD-10-CM: E03.2  ICD-9-CM: 244. 3  Unknown Yes    Overview Signed 7/13/2015  3:16 PM by Jair Jeter MD     radioiodine             Hypercholesteremia ICD-10-CM: E78.00  ICD-9-CM: 272.0  Unknown Yes

## 2022-06-03 NOTE — PROGRESS NOTES
..                              [address]  YOB: 1935          Assessment & Plan:   DOMINIK  Hypokalemia  HyperCa++  MM  AMS  Anemia  Malnutrition    Replete   Repeat K  Hospice is very appropriate        Subjective:   CC: DOMINIK  HPI: Patient seen. Family at bedside. K is low but Creat stable. Doesn't seem to be doing well.  Hospice is being considered  ROS:limited 2nd to AMS  Current Facility-Administered Medications   Medication Dose Route Frequency    labetaloL (NORMODYNE;TRANDATE) 20 mg/4 mL (5 mg/mL) injection 10 mg  10 mg IntraVENous Q6H PRN    hydrALAZINE (APRESOLINE) tablet 50 mg  50 mg Oral TID    metoprolol succinate (TOPROL-XL) XL tablet 25 mg  25 mg Oral DAILY    0.9% sodium chloride infusion 250 mL  250 mL IntraVENous PRN    nitroglycerin (NITROBID) 2 % ointment 1 Inch  1 Inch Topical DAILY    sodium chloride (NS) flush 5-40 mL  5-40 mL IntraVENous Q8H    sodium chloride (NS) flush 5-40 mL  5-40 mL IntraVENous PRN    acetaminophen (TYLENOL) tablet 650 mg  650 mg Oral Q6H PRN    Or    acetaminophen (TYLENOL) suppository 650 mg  650 mg Rectal Q6H PRN    polyethylene glycol (MIRALAX) packet 17 g  17 g Oral DAILY PRN    ondansetron (ZOFRAN ODT) tablet 4 mg  4 mg Oral Q8H PRN    Or    ondansetron (ZOFRAN) injection 4 mg  4 mg IntraVENous Q6H PRN    levothyroxine (SYNTHROID) tablet 88 mcg  88 mcg Oral ACB    lactulose (CHRONULAC) 10 gram/15 mL solution 30 mL  20 g Oral BID    heparin (porcine) injection 5,000 Units  5,000 Units SubCUTAneous Q12H    ferrous sulfate tablet 325 mg  325 mg Oral ACB    latanoprost (XALATAN) 0.005 % ophthalmic solution 1 Drop  1 Drop Both Eyes QHS    sucralfate (CARAFATE) tablet 1 g  1 g Oral AC&HS    cyanocobalamin (VITAMIN B12) tablet 1,000 mcg  1,000 mcg Oral DAILY    amLODIPine (NORVASC) tablet 10 mg  10 mg Oral DAILY          Objective:     Vitals:  Blood pressure (!) 148/84, pulse 90, temperature 98.5 °F (36.9 °C), resp. rate 22, height 5' (1.524 m), weight 55.3 kg (121 lb 14.6 oz), SpO2 94 %, not currently breastfeeding. Temp (24hrs), Av.3 °F (36.8 °C), Min:97.9 °F (36.6 °C), Max:98.7 °F (37.1 °C)      Intake and Output:  701 - 1900  In: 100 [P.O.:100]  Out: 700 [Urine:700]  1901 -  07  In: 1878.3 [I.V.:1568.3]  Out: 3000 [Urine:3000]    Physical Exam:                Patient is intubated:  No    Physical Examination:   GENERAL ASSESSMENT: NAD  HEENT:Nontraumatic   CHEST: CTA  HEART: S1S2  ABDOMEN: Soft,NT,  :Pichardo:   EXTREMITY: EDEMA  NEURO:ams          ECG/rhythm:    Data Review      No results for input(s): TNIPOC in the last 72 hours. No lab exists for component: ITNL   No results for input(s): CPK, CKMB, TROIQ in the last 72 hours. Recent Labs     22  0354 22  1535 22  0400 22  1556 22  0407 22  0407   *  --  135* 135*   < > 132*   K 2.9*  --  2.8* 3.2*   < > 3.1*     --  106 105   < > 102   CO2 18*  --  20* 25   < > 23   BUN 38*  --  34* 33*   < > 31*   CREA 1.89*  --  1.87* 1.91*   < > 1.81*   GLU 73  --  74 87   < > 92   MG  --   --   --   --   --  2.0   CA 10.1  --  10.5* 11.4*   < > 12.5*   ALB 1.9*  --  1.8*  --   --  2.0*   WBC 6.7  --  5.1  --   --  5.7   HGB 9.1* 8.9* 6.8*  --    < > 7.6*   HCT 26.8* 26.1* 21.0*  --    < > 23.2*     --  258  --   --  297    < > = values in this interval not displayed. No results for input(s): INR, PTP, APTT, INREXT in the last 72 hours. Needs: urine analysis, urine sodium, protein and creatinine  Lab Results   Component Value Date/Time    Sodium,urine random 21 2022 02:12 AM    Creatinine, urine 65.00 2022 10:16 PM         Discussed with: pt family    : Cara Wetzel MD  6/3/2022        [facility]:  [email or web address]  Jacquenette Duane office:  [address] [place] [address],[address]  [place], [address] [place] [address]  Phone: [phone]  Fax :     [phone]    [place] office:  [address] [place] [address]  Jose Enrique Guerrero  Phone - [phone]  Fax - [phone]

## 2022-06-03 NOTE — MED STUDENT NOTES
*ATTENTION:  This note has been created by a medical student for educational purposes only. Please do not refer to the content of this note for clinical decision-making, billing, or other purposes. Please see attending physicians note to obtain clinical information on this patient. *       General Daily Progress Note    Admit Date: 5/30/2022  Hospital day 2    Subjective:     24hour events:NAOE. AM:  Pt seen and evaluated at bedside. Pt's mentation further improved, able to answer questions and follow directions. Pt denies abdominal pain or headaches.      Current Facility-Administered Medications   Medication Dose Route Frequency    potassium chloride 10 mEq in 100 ml IVPB  10 mEq IntraVENous Q1H    0.9% sodium chloride infusion 250 mL  250 mL IntraVENous PRN    nitroglycerin (NITROBID) 2 % ointment 1 Inch  1 Inch Topical DAILY    [Held by provider] hydrALAZINE (APRESOLINE) tablet 50 mg  50 mg Oral TID    hydrALAZINE (APRESOLINE) 20 mg/mL injection 20 mg  20 mg IntraVENous Q6H    sodium chloride (NS) flush 5-40 mL  5-40 mL IntraVENous Q8H    sodium chloride (NS) flush 5-40 mL  5-40 mL IntraVENous PRN    acetaminophen (TYLENOL) tablet 650 mg  650 mg Oral Q6H PRN    Or    acetaminophen (TYLENOL) suppository 650 mg  650 mg Rectal Q6H PRN    polyethylene glycol (MIRALAX) packet 17 g  17 g Oral DAILY PRN    ondansetron (ZOFRAN ODT) tablet 4 mg  4 mg Oral Q8H PRN    Or    ondansetron (ZOFRAN) injection 4 mg  4 mg IntraVENous Q6H PRN    levothyroxine (SYNTHROID) tablet 88 mcg  88 mcg Oral ACB    lactulose (CHRONULAC) 10 gram/15 mL solution 30 mL  20 g Oral BID    heparin (porcine) injection 5,000 Units  5,000 Units SubCUTAneous Q12H    ferrous sulfate tablet 325 mg  325 mg Oral ACB    latanoprost (XALATAN) 0.005 % ophthalmic solution 1 Drop  1 Drop Both Eyes QHS    sucralfate (CARAFATE) tablet 1 g  1 g Oral AC&HS    cyanocobalamin (VITAMIN B12) tablet 1,000 mcg  1,000 mcg Oral DAILY    [Held by provider] metoprolol succinate (TOPROL-XL) XL tablet 25 mg  25 mg Oral DAILY    amLODIPine (NORVASC) tablet 10 mg  10 mg Oral DAILY    metoprolol (LOPRESSOR) injection 5 mg  5 mg IntraVENous BID        Review of Systems  Pertinent items are noted in HPI. Objective:     Patient Vitals for the past 8 hrs:   BP Temp Pulse Resp SpO2 Weight   06/03/22 0608 (!) 184/92 -- 95 -- -- --   06/03/22 0343 (!) 164/77 98.6 °F (37 °C) 95 20 97 % 121 lb 14.6 oz (55.3 kg)   06/03/22 0039 (!) 160/73 -- -- -- -- --   06/02/22 2314 (!) 183/95 -- 98 -- -- --     06/02 1901 - 06/03 0700  In: 0   Out: 1900 [Urine:1900]  06/01 0701 - 06/02 1900  In: 2980 [I.V.:2670]  Out: 2300 [Urine:2300]    Physical Exam: General appearance: heard of hearing, cooperative, no distress, appears stated age, mildly confused  Lungs: clear to auscultation bilaterally  Abdomen: soft, dif. Bowel sounds normal. No masses,  no organomegaly  Neurologic: A/Ox2, follows commands, moving all extremities spontaneously. ECG: normal sinus rhythm, rates to 90s with PVCs    Data Review   Recent Results (from the past 24 hour(s))   HGB & HCT    Collection Time: 06/02/22  3:35 PM   Result Value Ref Range    HGB 8.9 (L) 11.5 - 16.0 g/dL    HCT 26.1 (L) 35.0 - 30.0 %   METABOLIC PANEL, COMPREHENSIVE    Collection Time: 06/03/22  3:54 AM   Result Value Ref Range    Sodium 135 (L) 136 - 145 mmol/L    Potassium 2.9 (L) 3.5 - 5.1 mmol/L    Chloride 107 97 - 108 mmol/L    CO2 18 (L) 21 - 32 mmol/L    Anion gap 10 5 - 15 mmol/L    Glucose 73 65 - 100 mg/dL    BUN 38 (H) 6 - 20 MG/DL    Creatinine 1.89 (H) 0.55 - 1.02 MG/DL    BUN/Creatinine ratio 20 12 - 20      GFR est AA 30 (L) >60 ml/min/1.73m2    GFR est non-AA 25 (L) >60 ml/min/1.73m2    Calcium 10.1 8.5 - 10.1 MG/DL    Bilirubin, total 0.6 0.2 - 1.0 MG/DL    ALT (SGPT) 10 (L) 12 - 78 U/L    AST (SGOT) 19 15 - 37 U/L    Alk.  phosphatase 40 (L) 45 - 117 U/L    Protein, total 9.8 (H) 6.4 - 8.2 g/dL    Albumin 1.9 (L) 3.5 - 5.0 g/dL    Globulin 7.9 (H) 2.0 - 4.0 g/dL    A-G Ratio 0.2 (L) 1.1 - 2.2     CBC WITH AUTOMATED DIFF    Collection Time: 06/03/22  3:54 AM   Result Value Ref Range    WBC 6.7 3.6 - 11.0 K/uL    RBC 2.80 (L) 3.80 - 5.20 M/uL    HGB 9.1 (L) 11.5 - 16.0 g/dL    HCT 26.8 (L) 35.0 - 47.0 %    MCV 95.7 80.0 - 99.0 FL    MCH 32.5 26.0 - 34.0 PG    MCHC 34.0 30.0 - 36.5 g/dL    RDW 14.5 11.5 - 14.5 %    PLATELET 438 518 - 529 K/uL    MPV 9.1 8.9 - 12.9 FL    NRBC 0.0 0  WBC    ABSOLUTE NRBC 0.00 0.00 - 0.01 K/uL    NEUTROPHILS 71 32 - 75 %    LYMPHOCYTES 11 (L) 12 - 49 %    MONOCYTES 15 (H) 5 - 13 %    EOSINOPHILS 3 0 - 7 %    BASOPHILS 0 0 - 1 %    IMMATURE GRANULOCYTES 1 (H) 0.0 - 0.5 %    ABS. NEUTROPHILS 4.7 1.8 - 8.0 K/UL    ABS. LYMPHOCYTES 0.7 (L) 0.8 - 3.5 K/UL    ABS. MONOCYTES 1.0 0.0 - 1.0 K/UL    ABS. EOSINOPHILS 0.2 0.0 - 0.4 K/UL    ABS. BASOPHILS 0.0 0.0 - 0.1 K/UL    ABS. IMM. GRANS. 0.1 (H) 0.00 - 0.04 K/UL    DF AUTOMATED     AMMONIA    Collection Time: 06/03/22  3:54 AM   Result Value Ref Range    Ammonia, plasma 40 (H) <32 UMOL/L           Assessment:     Principal Problem:    Encephalopathy (5/30/2022)    Active Problems:    HTN (hypertension) ()      Hypothyroidism, iatrogenic ()      Overview: radioiodine      Hypercholesteremia ()      Type 2 diabetes mellitus without complication, without long-term current use of insulin ([facility] [address] [address] (4/24/2017)      Macrocytic anemia (8/29/2018)      Nodule of left lobe of thyroid gland (9/8/2020)      Hypokalemia (2/24/2022)      Elevated troponin (2/24/2022)      Hypertensive emergency (2/25/2022)      Multiple myeloma not having achieved remission ([facility] [facility] (5/13/2022)      Hypercalcemia (5/13/2022)      Hypomagnesemia (5/31/2022)      Hyponatremia (5/31/2022)      Elevated brain natriuretic peptide (BNP) level (5/31/2022)        Plan:   Inge Kenyon is a 80 y. o. female with a PMHx of recently diagnosed Multiple Myeloma, hypercalcemia, HTN, Macrocytic anemia, GERD, Badycardia  who is admitted for altered mental status in the setting of hyponatremia and hypercalcemia.     Encephalopathy in the setting of Acute on Chronic Hyponatremia:   Electrolytes and mentation improving, No focal findings on neuro exam, CT head and CT spine with NAP. Low Na possible pseudohyponatremia in the setting of Multiple Myeloma.    - AMS labs including TSH, Salicylates, Volatiles unremarkable  -Ammonia downtrending   - Nephrology following, currently agree with Zometa  - Fall Precautions   - 1/2 MIVF with Normal Saline (50cc/hr), gentle due to elevated pBNP  - BMPq12  - Strict IO  - Passed bedside dysphagia, easy chew diet      Hyperammoniemia: Possible source of AMS, downtrending  - trend QD  - Lactulose 20g BID      Hypertensive Emergency: /68, with elevated troponin and AMS. Chronic HTN, difficult to control  - target  (goal reduction 25%) in first 24 hours   - Continue Hydralazine IV 20mg q6, metop 5mg IMqd, nitro patch 2% every day  -Given pt's improved mental status, encourage PO norvasc 10 for more optimal BP control   - Hold home Amlodipine 10 mg daily and metoprolol XL 25 mg daily, Losartan 100 mg daily, and Hydral 50 mg TID      Hypercalcemia: POA Ca of 14.4, improving to 12.5. In the setting of recent diagnosis of Multiple Myeloma. Likley cause of current abdominal pain. -s/p Zometa and Calcitonin   - 1/2 MIVF with NS at 50cc/hr  - Daily BMP  - per Heme/Onc continue Zometa.     Elevated Creatinine, resolved:  Cr POA 1.64 (bl ~0.9). Likely due to dehydration. - 1/2 MIVF with NS at 50cc/hr, gentle with fluids given elevated BnP. Appears volume down on exam, will consider fluid increase.  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       Abdominal Pain: Chronic issues, prior workup including EGD and colonoscopy negative. Prior CT showing hiatal hernia. Likely secondary to hypercalcemia, less likely hernia. UA negative for UTI, pelvic xray showing no acute process. - Expect to resolve with treatment of underlying hypercalcemia.   - Continue home Carafate   - Hold home protonix as possible contribution to HypoNa  - If fails to improve, consider further imaging.      Elevated Troponin: Trop initially 56, repeat flat at 56. No chest pain, EKG with no ischemia.   - Continue to monitor on tele     Elevated pro-BNP: POA BnP 2833. Prior Echo in February EF 60-65%. Lungs clear on exam, no edema. Likely secondary to HTN and anemia. - repeat Echo with EF 55-60%  - cautious with fluids at this time, however appears to be fluid down on exam. Consider increasing fluids.     Hypokalemia: Chronic. Will continue to follow on BMP trends. - replete IV at this time due to NPO  - Continue home PO EfferK 20 BID when taking PO  - daily CMP, replete as needed     Multiple Myeloma: Recently diagnosed on prior admission in April. Established with Dr. Korey Fish following; recommending hospice  -Palliative following; ongoing discussions regarding hospice     Macrocytic Anemia: Hgb 7.9 POA (bl ~8). -S/p 1u pRBC  - Heme/Onc consulted, appreciate recs  - restart home cyanocobalamin 1,000 daily      DM2: POA glucose 131. Last A1c <3.8 (L) on 4/28/2022 . Not on any home medications. - Monitor on daily CMP, no SSI at this time      HLD: Last lipid panel 12/6/21: Tchol 122, HDL 67, LDL 45.6, trig 47. No home medications.     Hypothyroidism: TSH 6.26 (5/1/2022). - Continue home Synthroid 88 mcg daily before breakfast  - TSH 1.55     CAD: MI (during surgery in 1970s) had multiple caths and stress tests. Records of cardiology visit w/ Dr. Lowry [facility] on 3/22/17. Exercise Cardiolite 7/23/15 - walked 4:39 (6.3 METS), normal stress EKG. Normal MPI. LVEF 71%. Echo 2/25/22 - LVEF 60-65%.     Rectal prolapse: Occurred x 1 during prior hospitalization, manual reduction. No acute complications.   - Refer to Colorectal surgery for outpatient follow-up     GERD: Chronic.  - Hold home Protonix 40 mg daily, possible worsening of Hyponatremia  - restart home Sucralfate      Bradycardia: Chronic. EKG showing 1st deg AV block, chronic.   - holding home Metoprolol XL 25mg daily         FEN/GI - GI lite. NS at 1/2 MIVF (50cc/hr)  Activity - Ambulate with assistance  DVT prophylaxis - Heparin   GI prophylaxis - Holding home Protonix for hyponatremia  Fall prophylaxis - Fall precautions ordered. Disposition - Admit to Telemetry. Plan to d/c to TBD. Consulting PT, OT and CM  Code Status - DNR. Discussed with patient / caregivers. Next of Rajni 69 Name and Reba Martinez (Child)   [phone]    To be discussed with Dr. Inez Harper, M4    *This is a medical student note utilized for learning and not to be used for medical decision making or billing purposes. Please refer to the attending physician's note for further clinical information on this patient. *

## 2022-06-03 NOTE — DISCHARGE SUMMARY
Discharge / Transfer / Off-Service Note                          [facility]   [address], [place] [address]    Office [phone]  Fax [phone]            Name: Susi Brizuela MRN: [id number]  Sex: Female   YOB: 1935  Age: 80 y.o. PCP: Fe Hermosillo MD     Date of admission: 5/30/2022  Date of discharge/transfer: 6/7/2022    Attending physician at admission: Nikkie Krishnamurthy MD\",. Attending physician at discharge/transfer: Minoo Bush MD  Resident physician at discharge/transfer: Niecy Beltrán DO     Consultants during hospitalization  IP CONSULT  [address] TO [address]     Admission diagnoses   Encephalopathy [G93.40]    Recommended follow-up after discharge    1. Home Hospice      Further management per home hospice    Medication Changes:  Current Discharge Medication List      CONTINUE these medications which have CHANGED    Details   sucralfate (CARAFATE) 1 gram tablet Take 1 Tablet by mouth Before breakfast, lunch, dinner and at bedtime for 30 days. Qty: 120 Tablet, Refills: 0  Start date: 6/7/2022, End date: 7/7/2022      metoprolol succinate (TOPROL-XL) 25 mg XL tablet Take 1 Tablet by mouth daily for 30 days. Qty: 30 Tablet, Refills: 0  Start date: 6/7/2022, End date: 7/7/2022      levothyroxine (SYNTHROID) 88 mcg tablet Take 1 Tablet by mouth Daily (before breakfast) for 30 days. Qty: 30 Tablet, Refills: 0  Start date: 6/7/2022, End date: 7/7/2022      pantoprazole (PROTONIX) 40 mg tablet Take 1 Tablet by mouth Daily (before breakfast) for 30 days. Qty: 30 Tablet, Refills: 0  Start date: 6/7/2022, End date: 7/7/2022      ferrous sulfate 325 mg (65 mg iron) tablet Take 1 Tablet by mouth Daily (before breakfast) for 30 days. Qty: 30 Tablet, Refills: 0  Start date: 6/7/2022, End date: 7/7/2022      cyanocobalamin 1,000 mcg tablet Take 1 Tablet by mouth daily for 30 days.   Qty: 30 Tablet, Refills: 0  Start date: 6/7/2022, End date: 7/7/2022      cholecalciferol, vitamin D3, (Vitamin D3) 50 mcg (2,000 unit) tab Take 1 Tablet by mouth daily for 30 days. Qty: 30 Tablet, Refills: 0  Start date: 6/7/2022, End date: 7/7/2022      hydrALAZINE (APRESOLINE) 50 mg tablet Take 1 Tablet by mouth three (3) times daily for 30 days. Qty: 90 Tablet, Refills: 0  Start date: 6/7/2022, End date: 7/7/2022         CONTINUE these medications which have NOT CHANGED    Details   latanoprost (XALATAN) 0.005 % ophthalmic solution Administer 1 Drop to both eyes nightly. losartan (COZAAR) 50 mg tablet Take 1 Tablet by mouth daily for 30 days. Qty: 30 Tablet, Refills: 0    Associated Diagnoses: Hypertension, unspecified type      insulin lispro (HumaLOG U-100 Insulin) 100 unit/mL injection by SubCUTAneous route. Inject as per sliding scale: if 201 - 250 = 2 units; 251 - 300 = 4 units; 301 - 350 = 6 units Greater than 350, CALL MD, subcutaneously before meals and at bedtime related to TYPE 2 DIABETES MELLITUS WITHOUT COMPLICATIONS      potassium bicarb-citric acid (EFFER-K) 20 mEq tablet Take 1 Tablet by mouth two (2) times a day. Qty: 60 Each, Refills: 1      polyethylene glycol (MIRALAX) 17 gram packet Take 1 Packet by mouth daily. Qty: 30 Each, Refills: 2      amLODIPine (NORVASC) 10 mg tablet Take 1 Tablet by mouth daily. Qty: 90 Tablet, Refills: 1    Associated Diagnoses: Primary hypertension      benzonatate (TESSALON) 100 mg capsule Take 1 Capsule by mouth as needed for Cough for up to 14 doses. Qty: 14 Capsule, Refills: 0      lenalidomide (Revlimid) 15 mg cap Take 1 Capsule by mouth See Admin Instructions. Take days 1-21, off 7 days. Qty: 21 Capsule, Refills: 0    Comments: Take 1 Capsule by mouth daily for 21 days. Take on Days 1-21. Hold for 7 days for one full cycle. Israel Hwang #[id number], Risk category: Adult female      glucagon (Glucagon Emergency Kit, human,) 1 mg solr by Injection route.  Inject 1 mg intramuscularly every 24 hours as needed for Low Blood Glucose related to TYPE 2 DIABETES MELLITUS WITHOUT COMPLICATIONS               History of Present Illness     Charlie Horn is a 80 y.o. female with PMHx of recently diagnosed Multiple Myeloma, hypercalcemia, HTN, Macrocytic anemia, GERD, Bradycardia who presents to the ED from [address] [place] [address] due to recent altered mental status.      Due to altered mentation, much of the history was obtained from daughter and granddaughter at bedside. Per family members, patient arrives from [facility] after worsening altered mentation for the past 5 days. Patient began answering questions more slowly with increasing lethargy. She participated in Physical therapy well on Friday, but the next day was very fatigued with decreased appetite and PO intake. She suffered a ground level fall on Sunday, and she presented to the ED where testing ruled out any acute process. She then suffered another fall earlier today, with no visible trauma, but mental status has continued to deteriorate. Family states she is oriented only to self at this point. They state she has not had any chest pain, fevers, recent illness, constipation or trouble breathing.      On discussion with patient, she is oriented only to self but able to state she is having abdominal pain and a headache. Denies dysuria, SOB, or chest pain.      Hospital course    Inge Kenyon is a 80 y. o. female with a PMHx of recently diagnosed Multiple Myeloma, hypercalcemia, HTN, Macrocytic anemia, GERD, Badycardia  who is admitted for altered mental status in the setting of hyponatremia and hypercalcemia.     Encephalopathy likely 2/2 multiple metabolic derangements in s/o MM:  Most likely 2/2 marked hypercalcemia vs pseudohyponatremia. MM diagnosed 4/2022. Prior head and C-spine imaging negative. AMS labs negative. FeNa 0.4%. F/b Dr. Bj Bledsoe outpatient. Mentation significantly improving. Decision made by family to transition to home hospice.    - Further management per home hospice     Hyperammoniemia: Ammonia stable. Improving with lactulose. - Further management per home hospice     Hypertensive Urgency: /68, with elevated troponin (flat on recheck). Controlled on home meds Norvasc 10mg daily, Hydral 50mg TID, Nitrobid paste daily. Losartan 100mg held given hyponatremia and renal function   - Further management per home hospice     Macrocytic Anemia: Hgb 7.9 POA (bl ~8). Improved s/p 1u pRBCs. - Further management per home hospice     Hypercalcemia: Improving s/p zometa per H/O and calcitonin x1 dose (5/31). - Further management per home hospice     DOMINIK: Likely due to dehydration vs hypercalcemia. Minimal improvement with IV fluids     - Further management per home hospice     Abdominal Pain: Chronic issues, prior workup including EGD and colonoscopy negative. Prior CT showing hiatal hernia. Likely secondary to hypercalcemia, less likely hernia. UA negative for UTI, pelvic xray showing no acute process. - Recommend continued carafate, bowel regimen   - Further management per home hospice     Malnutrition: Patient with decreased po intake. Likely secondary to enncephalopathy and MM.  - Further management per home hospice     Elevated Troponin (Resolved): Trop initially 56, repeat flat at 56. No chest pain, EKG with no ischemia. Tele with NSR 60s-70s and PVCs. - Further management per home hospice     Elevated pro-BNP: POA BnP 2833. Prior Echo in February EF 60-65%. Lungs clear on exam, no edema. Likely secondary to HTN and anemia. Repeat Echo w/ EF 55-60 and G1DD.   - Further management per home hospice     Hypokalemia: Chronic. S/p repletion.   - Further management per home hospice     Hyponatremia: Improving. Likely 2/2 IVVD vs pseudohyponatremia given elevated total protein. - Further management per home hospice    DM2: Last A1c <3.8 (L) on 4/28/2022. Not on any home medications. Glucose range stable.    - Further management per home hospice     HLD: Last lipid panel 12/6/21: Tchol 122, HDL 67, LDL 45.6, trig 47. No home medications. - Further management per home hospice     Hypothyroidism: TSH 6.26 (5/1/2022). On Synthroid 88 mcg daily before breakfast. rTSH 1.55  - Further management per home hospice     CAD: MI (during surgery in 1970s) had multiple caths and stress tests. Records of cardiology visit w/ Dr. Adri Cassidy on 3/22/17. Exercise Cardiolite 7/23/15 - walked 4:39 (6.3 METS), normal stress EKG. Normal MPI. LVEF 71%. Echo 2/25/22 - LVEF 60-65%. - Further management per home hospice     Rectal prolapse: Occurred x 1 during prior hospitalization, manual reduction. No acute complications. - Further management per home hospice     GERD: Chronic. home Protonix 40 mg daily.   - Further management per home hospice     Bradycardia: Chronic. EKG showing 1st deg AV block, chronic. home Metoprolol XL 25mg daily.   - Further management per home hospice    Physical exam at discharge:    Vitals Reviewed. Patient Vitals for the past 12 hrs:   Temp Pulse Resp BP SpO2   06/07/22 0337 98.2 °F (36.8 °C) 87 17 (!) 163/56 96 %   06/07/22 0007 -- 78 -- -- --   06/06/22 2244 98.7 °F (37.1 °C) 69 18 (!) 168/87 96 %   06/06/22 2011 -- -- -- -- 96 %   06/06/22 1924 98.2 °F (36.8 °C) 72 18 (!) 164/75 96 %      Physical Exam:   General: No acute distress. Frail. Elderly. Respiratory: CTAB, no wheezes or crackles  Cardiovascular: Regular rate and rhythm, clear S1 S2. Pulses present bilaterally. GI: Nondistended. + bowel sounds. Abdomen nontender to palpation   Extremities: No LE edema. Pulses present. Skin: Warm, dry. Neuro: Oriented to person, place, and time. No focal deficits. Hard of hearing.      Condition at discharge: Hospice    Labs  Recent Labs     06/06/22  0422   WBC 5.8   HGB 8.6*   HCT 25.6*        Recent Labs     06/06/22  0422 06/05/22  0106   * 138   K 2.9* 2.4*    109*   CO2 21 20*   BUN 43* 48*   CREA 1.63* 1.79*   GLU 76 81 CA 8.5 9.0     Recent Labs     06/06/22  0422 06/05/22  0106   ALT 14 12   AP 36* 35*   TBILI 0.4 0.4   TP 9.3* 9.8*   ALB 1.7* 1.9*   GLOB 7.6* 7.9*     No results for input(s): PH, PCO2, PO2, TNIPOC, TROIQ, INR, PTP, APTT, FE, TIBC, PSAT, FERR, GLUCPOC, INREXT, INREXT in the last 72 hours. No lab exists for component: GLPOC    Micro:  Lab Results   Component Value Date/Time    Culture result: No significant growth, <10,000 CFU/mL 05/31/2022 02:12 AM    Culture result: ESCHERICHIA COLI (A) 04/28/2022 11:16 AM    Culture result: ESCHERICHIA COLI (A) 02/24/2022 06:17 PM       Imaging:  XR PELV AP ONLY    Result Date: 5/30/2022  EXAM:  XR PELV AP ONLY INDICATION: Fall. Altered mental status. COMPARISON: CT 5/1/2022. TECHNIQUE: Portable AP supine pelvis view FINDINGS: There is no acute fracture or dislocation. The sacroiliac and hip joint spaces are maintained. Lower lumbar posterior fusion hardware is noted. The soft tissues are unremarkable. No acute abnormality. XR SHOULDER LT AP/LAT MIN 2 V    Result Date: 5/29/2022  EXAM: XR SHOULDER LT AP/LAT MIN 2 V INDICATION: Trauma. COMPARISON: None. FINDINGS: Three views of the left shoulder demonstrate severe degenerative changes of the glenohumeral joint. No evidence of dislocation. Severe degenerative changes of the acromioclavicular joint. Degenerative changes of the glenohumeral and acromioclavicular joints. No fracture or dislocation. XR BONE SURVEY COMP    Result Date: 5/5/2022  EXAM:  XR BONE SURVEY COMP INDICATION: Multiple myeloma COMPARISON: CT chest, abdomen, and pelvis on 5/1/2022. CT cervical spine on 4/28/2022. Lumbar and thoracic spine on 4/28/2022. Chest view and bilateral hip views on 4/28/2022. CT head on 4/20/2022. Right shoulder views on 2/24/2022. TECHNIQUE: Skeletal plain film survey. AP and lateral skull, C-spine, T-spine and L-spine. AP bilateral humeri and femurs. AP pelvis.  PA chest. FINDINGS: Permeative innumerable subcentimeter lytic lesions in the skull are unchanged since one week ago. Limited by osteopenia. Ill-defined lucent lesions in the spine are more conspicuous on recent CT. Extensive degenerative disc disease in the thoracic spine. Lumbar spinal hardware is unchanged. Subcentimeter lytic lesion is in the medial cortex of the proximal right femoral diaphysis. Multiple subcentimeter lytic lesions are in the left femoral diaphysis and right humeral diaphysis. Multifocal arthritis. Cardiomegaly. No pneumothorax. Bilateral shoulder arthritis and rotator cuff arthropathy. Numerous small lytic lesions throughout the skeleton are compatible with the provided diagnosis of multiple myeloma. No significant change since comparison imaging studies from earlier this year given difference in technique. CT HEAD WO CONT    Result Date: 5/30/2022  EXAM:  CT head without contrast INDICATION: Fall. Altered mental status. COMPARISON: CT 5/29/2022 TECHNIQUE: Axial noncontrast head CT from foramen magnum to vertex. Coronal and sagittal reformatted images were obtained. CT dose reduction was achieved through use of a standardized protocol tailored for this examination and automatic exposure control for dose modulation. FINDINGS:  There is diffuse age-related parenchymal volume loss. The ventricles and sulci are age-appropriate without hydrocephalus. There is no mass effect or midline shift. There is no intracranial hemorrhage or extra-axial fluid collection. Scattered foci of low attenuation in the periventricular white matter represent stable chronic microvascular ischemic changes. The gray-white matter differentiation is maintained. The basal cisterns are patent. The osseous structures are intact. The visualized paranasal sinuses and mastoid air cells are clear. No acute intracranial abnormality. CT HEAD WO CONT    Result Date: 5/29/2022  EXAM: CT HEAD WO CONT INDICATION: Head injury COMPARISON: April 2022.  CONTRAST: None. TECHNIQUE: Unenhanced CT of the head was performed using 5 mm images. Brain and bone windows were generated. Coronal and sagittal reformats. CT dose reduction was achieved through use of a standardized protocol tailored for this examination and automatic exposure control for dose modulation. FINDINGS: The ventricles and sulci are normal in size, shape and configuration. [facility] Extensive periventricular white matter hypodensities indicative of chronic microvascular angiopathy. There is no intracranial hemorrhage, extra-axial collection, or mass effect. The basilar cisterns are open. No CT evidence of acute infarct. The bone windows demonstrate no abnormalities. The visualized portions of the paranasal sinuses and mastoid air cells are clear. Chronic microvascular angiopathy. No acute hemorrhage or infarct. CT SPINE CERV WO CONT    Result Date: 5/30/2022  EXAM:  CT C-spine without contrast INDICATION: Fall. Altered mental status. COMPARISON: CT 5/29/2022 TECHNIQUE: Thin section axial noncontrast CT of the cervical spine with coronal and sagittal reformats. CT dose reduction was achieved through use of a standardized protocol tailored for this examination and automatic exposure control for dose modulation. FINDINGS: There is no acute fracture or subluxation. Vertebral body heights are maintained. There is stable diffuse degenerative disc and facet changes with multilevel spinal canal and neural foraminal stenosis. There is no abnormality in alignment. The paraspinal soft tissues are unremarkable. A peripherally calcified left thyroid nodule is again demonstrated. The visualized lung apices are clear. No acute abnormality. Stable diffuse degenerative changes. CT SPINE CERV WO CONT    Result Date: 5/29/2022  EXAM:  CT CERVICAL SPINE WITHOUT CONTRAST INDICATION: Trauma. COMPARISON: April 28, 2022 CONTRAST:  None.  TECHNIQUE: Multislice helical CT of the cervical spine was performed without intravenous contrast administration. Sagittal and coronal reformats were generated. CT dose reduction was achieved through use of a standardized protocol tailored for this examination and automatic exposure control for dose modulation. FINDINGS: Multilevel degenerative changes of the cervical spine without fracture or malalignment. No bony canal stenosis. Multilevel foraminal narrowing due to uncovertebral joint arthropathy. Cervical soft tissues are within normal limits. There is a large calcified left lobe thyroid nodule measuring 4.5 x 3.8 cm. Imaged lung apices are clear. Multilevel cervical spine degenerative changes without fracture or traumatic malalignment. XR CHEST PORT    Result Date: 5/30/2022  EXAM:  CR chest portable INDICATION: Fall. Altered mental status. COMPARISON: CT 5/1/2022. Radiograph 4/28/2022. TECHNIQUE: Portable AP semiupright chest view at 2230 hours FINDINGS: The cardiomediastinal contours are stable. The lungs and pleural spaces are clear. There is no pneumothorax. The bones and upper abdomen are stable. No acute process. ECHO ADULT COMPLETE    Result Date: 5/31/2022    Left Ventricle: Normal left ventricular systolic function with a visually estimated EF of 55 - 60%. Left ventricle size is normal. Mildly increased wall thickness. Normal wall motion. Grade I diastolic dysfunction with normal LAP.   Aortic Valve: Mild regurgitation.   Mitral Valve: Mild annular calcification of the mitral valve.   Left Atrium: Left atrium is moderately dilated.   Aorta: Normal sized aortic root. Mildly dilated ascending aorta.        Chronic diagnoses   Problem List as of 6/7/2022 Date Reviewed: 5/31/2022          Codes Class Noted - Resolved    Unspecified severe protein-calorie malnutrition ([facility] ICD-10-CM: E43  ICD-9-CM: 262  6/1/2022 - Present        Hypomagnesemia ICD-10-CM: E83.42  ICD-9-CM: 275.2  5/31/2022 - Present        Hyponatremia ICD-10-CM: E87.1  ICD-9-CM: 276.1  5/31/2022 - Present Elevated brain natriuretic peptide (BNP) level ICD-10-CM: R79.89  ICD-9-CM: 790.99  5/31/2022 - Present        * (Principal) Encephalopathy ICD-10-CM: G93.40  ICD-9-CM: 348.30  5/30/2022 - Present        High risk medication use ICD-10-CM: Z79.899  ICD-9-CM: V58.69  5/23/2022 - Present        Multiple myeloma not having achieved remission ([facility] ICD-10-CM: C90.00  ICD-9-CM: 203.00  5/13/2022 - Present        Hypercalcemia ICD-10-CM: E83.52  ICD-9-CM: 275.42  5/13/2022 - Present        Debility ICD-10-CM: R53.81  ICD-9-CM: 799.3  5/13/2022 - Present        UTI (urinary tract infection) ICD-10-CM: N39.0  ICD-9-CM: 599.0  4/28/2022 - Present        Hypertensive emergency ICD-10-CM: I16.1  ICD-9-CM: 401.9  2/25/2022 - Present        Weakness generalized ICD-10-CM: R53.1  ICD-9-CM: 780.79  2/25/2022 - Present        Hypokalemia ICD-10-CM: E87.6  ICD-9-CM: 276.8  2/24/2022 - Present        Elevated troponin ICD-10-CM: R77.8  ICD-9-CM: 790.6  2/24/2022 - Present        Urinary tract infection with hematuria ICD-10-CM: N39.0, R31.9  ICD-9-CM: 599.0, 599.70  2/24/2022 - Present        Type 2 diabetes with nephropathy ([facility] ICD-10-CM: E11.21  ICD-9-CM: 250.40, 583.81  12/1/2020 - Present        S/P right inguinal herniorrhaphy ICD-10-CM: Z98.890, Z87.19  ICD-9-CM: V45.89  10/7/2020 - Present    Overview Signed 10/7/2020  1:00 PM by Miguelangel Casillas MD     With mesh.              Nodule of left lobe of thyroid gland ICD-10-CM: E04.1  ICD-9-CM: 241.0  9/8/2020 - Present        Macrocytic anemia ICD-10-CM: D53.9  ICD-9-CM: 281.9  8/29/2018 - Present        Type 2 diabetes mellitus without complication, without long-term current use of insulin ([facility] ICD-10-CM: E11.9  ICD-9-CM: 250.00  4/24/2017 - Present        Advance care planning ICD-10-CM: Z71.89  ICD-9-CM: V65.49  1/12/2017 - Present        DDD (degenerative disc disease), lumbar ICD-10-CM: M51.36  ICD-9-CM: 722.52  9/6/2016 - Present        Gastric ulcer ICD-10-CM: K25.9  ICD-9-CM: 531.90  9/6/2016 - Present        GI bleeding ICD-10-CM: K92.2  ICD-9-CM: 578.9  Unknown - Present    Overview Signed 5/4/2016 11:43 AM by Wilbert Puri MD     workup in [address] [place] [address] 12/15 was unremarkable (Dr. Leatha Jovel) - says she had EGD, colonoscopy, and small bowel capsule endoscopy             Coronary artery disease involving native coronary artery of native heart without angina pectoris ICD-10-CM: I25.10  ICD-9-CM: 414.01  5/4/2016 - Present        HTN (hypertension) ICD-10-CM: I10  ICD-9-CM: 401.9  Unknown - Present        Hypothyroidism, iatrogenic ICD-10-CM: E03.2  ICD-9-CM: 244. 3  Unknown - Present    Overview Signed 7/13/2015  3:16 PM by Gwyn Horn MD     radioiodine             Hypercholesteremia ICD-10-CM: E78.00  ICD-9-CM: 272.0  Unknown - Present        RESOLVED: Non-recurrent inguinal hernia of right side without obstruction or gangrene ICD-10-CM: K40.90  ICD-9-CM: 550.90  9/8/2020 - 10/7/2020        RESOLVED: Diabetes ([facility] ICD-10-CM: E11.9  ICD-9-CM: 250.00  Unknown - 4/24/2017               Diet:  Regular diet.     Activity:  As tolerated     Disposition: Home or Self Care    Discharge instructions to patient/family  Please seek medical attention for any new or worsening symptoms particularly fever, chest pain, shortness of breath, abdominal pain, nausea, vomiting    Follow up plans/appointments  Follow-up Information    None          Jamia Ross DO  Family Medicine Resident  6:25 AM       For Billing    Chief Complaint   Patient presents with   [facility] Problems  Date Reviewed: 5/31/2022          Codes Class Noted POA    Unspecified severe protein-calorie malnutrition ([facility] ICD-10-CM: E43  ICD-9-CM: 217  6/1/2022 Yes        Hypomagnesemia ICD-10-CM: G39.01  ICD-9-CM: 275.2  5/31/2022 Unknown        Hyponatremia ICD-10-CM: E87.1  ICD-9-CM: 276.1  5/31/2022 Unknown        Elevated brain natriuretic peptide (BNP) level ICD-10-CM: R79.89  ICD-9-CM: 790.99  5/31/2022 Unknown        * (Principal) Encephalopathy ICD-10-CM: G93.40  ICD-9-CM: 348.30  5/30/2022 Unknown        Multiple myeloma not having achieved remission ([facility] ICD-10-CM: C90.00  ICD-9-CM: 203.00  5/13/2022 Yes        Hypercalcemia ICD-10-CM: J05.40  ICD-9-CM: 275.42  5/13/2022 Yes        Hypertensive emergency ICD-10-CM: I16.1  ICD-9-CM: 401.9  2/25/2022 Yes        Hypokalemia ICD-10-CM: E87.6  ICD-9-CM: 276.8  2/24/2022 Yes        Elevated troponin ICD-10-CM: R77.8  ICD-9-CM: 790.6  2/24/2022 Yes        Nodule of left lobe of thyroid gland ICD-10-CM: E04.1  ICD-9-CM: 241.0  9/8/2020 Yes        Macrocytic anemia ICD-10-CM: D53.9  ICD-9-CM: 281.9  8/29/2018 Yes        Type 2 diabetes mellitus without complication, without long-term current use of insulin ([facility]) ICD-10-CM: E11.9  ICD-9-CM: 250.00  4/24/2017 Yes        HTN (hypertension) ICD-10-CM: I10  ICD-9-CM: 401.9  Unknown Yes        Hypothyroidism, iatrogenic ICD-10-CM: E03.2  ICD-9-CM: 244. 3  Unknown Yes    Overview Signed 7/13/2015  3:16 PM by Josie Cosby MD     radioiodine             Hypercholesteremia ICD-10-CM: E78.00  ICD-9-CM: 272.0  Unknown Yes

## 2022-06-03 NOTE — PROGRESS NOTES
0700 Bedside shift change report given to [address] (oncoming nurse) by Ashley Lieberman RN (offgoing nurse). Report included the following information SBAR, Kardex, ED Summary, Intake/Output, MAR and Recent Results. This patient was assisted with Intentional Toileting every 2 hours during this shift as appropriate. Documentation of ambulation and output reflected on Flowsheet as appropriate. Purposeful hourly rounding was completed using AIDET and 5Ps. Outcomes of PHR documented as they occurred. Bed alarm in use as appropriate. Dual Suction and ambubag in place. Critera met for insert Yes  Reason for insert: Urinary obstruction  Date of insert: 6/1/22   Order is current: Yes  MD or RN driven: Provider  Removal Discussed No   Last CHG Bath: 6/3/22   Pichardo Care Last Completed: Date/Time: 6/3/22 0856  Pichardo Care After Each Bowel Movement: Yes  Education documented every 24 hours Yes  Care Plan (Risk for UTI, Pichardo) Updated Every 24 hours Yes  Bag below Bladder Yes        Bag off Floor Yes      Sheet Clip used Yes          Tubing free of dependant loops Yes          Seal Intact Yes            Bag less than 1/2 full Yes                  1930   Bedside shift change report given to [address] (oncoming nurse) by Anjum Ayala RN (offgoing nurse). Report included the following information SBAR, Kardex, ED Summary, Intake/Output, MAR and Recent Results.

## 2022-06-04 NOTE — PROGRESS NOTES
Stable renal function   K low and getting IV replacement, ordered po to start tomorrow   Daily labs over the w/e  We will check back again on Monday   Please call if any renal issues over the w/e

## 2022-06-04 NOTE — PROGRESS NOTES
[facility] at [address], [address] [facility]  Marge Millet: [phone]  F: [phone] Patient ID  Name: Cinthia Paul  YOB: 1935  MRN: [id number]  Referring Provider:   No referring provider defined for this encounter. Primary Care Provider:   Xavier Ayon MD       HEMATOLOGY/MEDICAL ONCOLOGY  NOTE   Date of Visit: 06/04/22  Reason for Evaluation:     Chief Complaint   Patient presents with   Christianson Fall     Hematology/Oncology Summary:  Please review original records for clinical decision making. This summary highlights focused aspects of patient's ongoing care and may have a recurring section in notes with either updates or remain unchanged as a longitudinal care summary. --------------------------------------------------------------------------------------------------------------------------------------------------------------------------------------------------------------------------  DIAGNOSIS:   Multiple Myeloma    ORIGINAL STAGING:   n/a    SITES OF DISEASE:   Bone Marrow    CURRENT TREATMENT:   To further discuss care. PRIOR TREATMENT:   Pulse Dex x 4 days. 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 ad3/5/6/2022   *Electronically Signed Out By Robbie Burrows MD*   Bone marrow aspirate:   The bone marrow aspirate is cellular and adequate   for evaluation. Numerous plasma cells are present, including a subset of   large forms with prominent nucleoli. The background myeloid and erythroid   series progress through the full maturation sequence with no morphologic   abnormalities. Megakaryocytes are present in adequate numbers and show no   morphologic abnormalities. Lymphocytes and blasts do not appear increased   in number. Touch prep:  Cellular and reflects the aspirate smears. Bone marrow core biopsy: The bone marrow biopsy is 70% cellular and   adequate for evaluation. Large sheets of plasma cells are present. The   myeloid and erythroid series are reduced in number but progress through   the full maturation sequence without significant dysplasia. Megakaryocytes   are present in adequate numbers with normal morphology. Lymphocytes and   blasts do not appear increased in number. Clot section:  Particulate and reflects the core biopsy. Special stains:   Iron, aspirate: No stainable iron   Iron, clot: No stainable iron   Reticulin, core: Patchy reticulin fibrosis within sheets of plasma cells   Congo red, core: Negative for amyloid     Immunohistochemistry, core:   CD3: Highlights scattered small T cells   CD20: Highlights scattered small B cells   : Highlights marked increase in plasma cells, 80% of cellularity     Flow cytometry:   Diagnosis: Monoclonal plasma cells (31.5% of total cells), consistent with   a plasma cell neoplasm. Comments: Flow cytometry findings are consistent with a plasma cell   neoplasm (e.g. plasma cell myeloma or plasmacytoma). No immunophenotypic   evidence of a lymphoproliferative disorder, acute leukemia, or increase in   blasts is identified. Due to their fragility, plasma cells do not survive   flow cytometric processing well; thus, the flow cytometric enumeration may   be much less than that seen morphologically.  So, to properly classify this   process, correlation with the findings on the clot and core sections,   along with clinical, radiological and SPEP/UPEP information is necessary. Cytogenetic and FISH studies may provide useful prognostic information. Cytogenetics:  Normal  FISH:  dup1q,   t11:14   Molecular studies:  Not performed.      PERTINENT CARE EVENTS   n/a      Subjective:     History of Present Illness:     Angy Renteria is a 80 y.o. female with recently diagnosed Multiple Myeloma presents to the ER after worsening decline in performance status, decreased mentation, hypercalcemia, hyponatremia, increasing total protein. She has remained at [facility] for inpatient rehabilitation. We saw her last week and planned therapy for her myeloma after goals of care discussion with family. Her family wanted to pursue Revlimid/dexamethasone/zometa alone. Per phone discussion with daughter (patient not able to provide history), patient started to have a worsening decline last Thursday at the facility that led to an ER visit after a fall. She was sent back to her facility but unfortunately had further decline and returned to the ER yesterday evening. Daughter noted that patient stopped being able to participate in her physical therapy last Thursday compared to the best activity she had earlier in the week. She was anticipating a discharged on 6/1/22 prior to her acute decline. -  Interval History:   Patient sleeping. Dereck Wilkinson (daughter) notes patient appears more comfortable. Only time she was communicating she was confused asking for a stranger. Dereck Wilkinson notes that she is meeting with hospice tomorrow.   -    Current Facility-Administered Medications:     labetaloL (NORMODYNE;TRANDATE) 20 mg/4 mL (5 mg/mL) injection 10 mg, 10 mg, IntraVENous, Q6H PRN, Zee Cabral MD, 10 mg at 06/03/22 0497    hydrALAZINE (APRESOLINE) tablet 50 mg, 50 mg, Oral, TID, Zee Cabral MD, 50 mg at 06/03/22 2102    metoprolol succinate (TOPROL-XL) XL tablet 25 mg, 25 mg, Oral, DAILY, Zee Cabral MD, 25 mg at 06/03/22 1433    OLANZapine (ZyPREXA zydis) disintegrating tablet 5 mg, 5 mg, Oral, Q12H PRN, Gloria Wright MD, 5 mg at 06/03/22 1821    0.9% sodium chloride infusion 250 mL, 250 mL, IntraVENous, PRN, Michelle Chamberlain MD    nitroglycerin (NITROBID) 2 % ointment 1 Inch, 1 Inch, Topical, DAILY, Zee Cabral MD, 1 Inch at 06/04/22 0930    sodium chloride (NS) flush 5-40 mL, 5-40 mL, IntraVENous, Q8H, Neetu Thrasher MD, 10 mL at 06/04/22 0645    sodium chloride (NS) flush 5-40 mL, 5-40 mL, IntraVENous, PRN, Neetu Thrasher MD    acetaminophen (TYLENOL) tablet 650 mg, 650 mg, Oral, Q6H PRN, 650 mg at 06/03/22 1433 **OR** acetaminophen (TYLENOL) suppository 650 mg, 650 mg, Rectal, Q6H PRN, Neetu Thrasher MD, 650 mg at 06/01/22 2330    polyethylene glycol (MIRALAX) packet 17 g, 17 g, Oral, DAILY PRN, Neetu Thrasher MD    ondansetron (ZOFRAN ODT) tablet 4 mg, 4 mg, Oral, Q8H PRN **OR** ondansetron (ZOFRAN) injection 4 mg, 4 mg, IntraVENous, Q6H PRN, Neetu Thrasher MD, 4 mg at 05/31/22 0511    levothyroxine (SYNTHROID) tablet 88 mcg, 88 mcg, Oral, ACB, Neetu Thrasher MD, 88 mcg at 06/03/22 0608    heparin (porcine) injection 5,000 Units, 5,000 Units, SubCUTAneous, Q12H, Neetu Thrasher MD, 5,000 Units at 06/04/22 3805    ferrous sulfate tablet 325 mg, 325 mg, Oral, ACB, Luz Maria Colby MD, 325 mg at 06/03/22 0608    latanoprost (XALATAN) 0.005 % ophthalmic solution 1 Drop, 1 Drop, Both Eyes, QHS, Luz Maria Colby MD, 1 Drop at 06/03/22 2110    sucralfate (CARAFATE) tablet 1 g, 1 g, Oral, AC&HS, Mira Zelaya MD, 1 g at 06/03/22 2102    cyanocobalamin (VITAMIN B12) tablet 1,000 mcg, 1,000 mcg, Oral, DAILY, Luz Maria Colby MD, 1,000 mcg at 06/03/22 0948    amLODIPine (NORVASC) tablet 10 mg, 10 mg, Oral, DAILY, Olivia Dean MD, 10 mg at 06/03/22 2865    Allergies   Allergen Reactions    Celebrex [Celecoxib] Hives    Crestor [Rosuvastatin] Myalgia        Objective: Wt Readings from Last 3 Encounters:   06/04/22 114 lb 10.2 oz (52 kg)   05/23/22 110 lb (49.9 kg)   05/13/22 113 lb 9.6 oz (51.5 kg)     Temp Readings from Last 3 Encounters:   06/04/22 98 °F (36.7 °C)   05/29/22 98.5 °F (36.9 °C)   05/23/22 98.1 °F (36.7 °C)     BP Readings from Last 3 Encounters:   06/04/22 (!) 156/86   05/29/22 (!) 145/48   05/23/22 (!) 170/66     Pulse Readings from Last 3 Encounters:   06/04/22 62   05/29/22 (!) 57   05/23/22 (!) 58     ECOG PS: 4- Completely disabled; cannot carry on any selfcare; totally confined to bed or chair. Physical Exam  Constitutional: resting comfortably. HENT: Normocephalic and atraumatic head. Cardiovascular: S1,S2 auscultated. No pitting edema. Pulmonary: Normal Respiratory Effort. No wheezing. Abdominal: Normal bowel sounds. Soft Abdomen to palpation. No guarding. Skin: No jaundice. No rash. Musculoskeletal: No temporal muscle wasting on inspection. Neurological: No tremor on inspection. Sleeping. Results:     I personally reviewed Epic EHR labs/results below:   Lab Results   Component Value Date/Time    WBC 8.9 06/04/2022 12:43 AM    HGB 9.7 (L) 06/04/2022 12:43 AM    HCT 27.9 (L) 06/04/2022 12:43 AM    PLATELET 198 83/73/2698 12:43 AM    MCV 95.2 06/04/2022 12:43 AM    ABS.  NEUTROPHILS 7.1 06/04/2022 12:43 AM     Lab Results   Component Value Date/Time    Sodium 133 (L) 06/04/2022 12:43 AM    Potassium 2.9 (L) 06/04/2022 12:43 AM    Chloride 108 06/04/2022 12:43 AM    CO2 19 (L) 06/04/2022 12:43 AM    Glucose 153 (H) 06/04/2022 12:43 AM    BUN 43 (H) 06/04/2022 12:43 AM    Creatinine 1.88 (H) 06/04/2022 12:43 AM    GFR est AA 31 (L) 06/04/2022 12:43 AM    GFR est non-AA 25 (L) 06/04/2022 12:43 AM    Calcium 9.9 06/04/2022 12:43 AM    Glucose (POC) 163 (H) 05/05/2022 04:24 PM     Lab Results   Component Value Date/Time    Bilirubin, total 0.4 06/04/2022 12:43 AM    ALT (SGPT) 13 06/04/2022 12:43 AM Alk. phosphatase 40 (L) 06/04/2022 12:43 AM    Protein, total 10.7 (H) 06/04/2022 12:43 AM    Albumin 2.1 (L) 06/04/2022 12:43 AM    Globulin 8.6 (H) 06/04/2022 12:43 AM     ANEMIA LABS:    Iron Studies:    Iron   Date Value Ref Range Status   04/05/2022 51 35 - 150 ug/dL Final     Vitamin B12:   Vitamin B12   Date Value Ref Range Status   04/30/2022 1,065 (H) 193 - 986 pg/mL Final     Folate: Folate   Date Value Ref Range Status   04/28/2022 29.2 (H) 5.0 - 21.0 ng/mL Final     SPEP:    M-Harry   Date Value Ref Range Status   04/30/2022 4.8 (H) Not Observed g/dL Final      Immunofixation Result   Date Value Ref Range Status   04/30/2022 Comment (A)   Final     Comment:     (NOTE)  Immunofixation shows IgG monoclonal protein with kappa light chain  specificity. Please note that samples from patients receiving DARZALEX(R)  (daratumumab) or SARCLISA(R)(isatuximab-[facility]) treatment can appear  as an \"IgG kappa\" and mask a complete response (CR). If this  patient is receiving these therapies, this DANIELLE assay interference  can be removed by ordering test number [id number]-\"Immunofixation,  Daratumumab-Specific, Serum\" or [id number]-\"Immunofixation,  Isatuximab-Specific, Serum\" and submitting a new sample for  testing or by calling the lab to add this test to the current  sample.          Kappa/Lambda ratio, serum   Date Value Ref Range Status   04/30/2022 329.68 (H) 0.26 - 1.65   Final     Comment:     (NOTE)  Performed At: [address], [address] [place], [place] [id number]  Chi Longoria MD DT:[id number]        Free Kappa Lt Chains, serum   Date Value Ref Range Status   04/30/2022 1,351.7 (H) 3.3 - 19.4 mg/L Final    No components found for: KLFL2  Reticulocyte Count: No results found for: RET  Bilirubin:   Lab Results   Component Value Date/Time    Bilirubin, total 0.4 06/04/2022 12:43 AM    Bilirubin Negative 05/30/2022 10:16 PM     LDH:   LD   Date Value Ref Range Status   04/30/2022 242 81 - 246 U/L Final Assessment and Recommendations:     1. Multiple myeloma not having achieved remission ([facility] [address]  -progressive disease prior to starting therapy. Her disease briskly worsened during rehabilitation. -recommend best supportive care hospice. Pt already DNR. 2. Hypercalcemia  -significant improvement but patient still debilitated and chronically ill. 3. Macrocytic anemia  -while improved, her overall picture has declined. Hgb stable in 9 range. 4. Debility  -too sick for any therapy and too debilitated. Best supportive care hospice recommendation reaffirmed with daughter [facility].  -They will meet with hospice tomorrow and are still determining outpatient vs. Inpatient. Follow-Up:  -will no longer follow daily with formal consultation notes but may stop by tomorrow if possible to see if daughter has any questions. Please reconsult if any questions.       Jorge Luis Douglass MD  Hematology/Medical Oncology Provider  Helen Cook  P: [phone]    Signed By:   Devyn Means MD

## 2022-06-04 NOTE — PROGRESS NOTES
9:36 am:  Hospice referral received. Referral sent to [facility].     Junaid Salgado LCSW

## 2022-06-04 NOTE — HOSPICE
[facility]  [phone]     Patient Name: Arthur Garcia  YOB: 1935  Age: 80 y.o. Jhonatan Medrano RN Note:   I spoke with daughter, Darris Canavan. Hospice information session set up for Sunday between 12-1pm. She did tell me she does not want her to go back to [facility] and would like to talk with hospice about options. Thank you for the opportunity to be of service to this patient.     Antonia Nelson RN  Clinical Nurse Liaison   Jhonatan Medrano  (R)[phone] (D) [phone]

## 2022-06-04 NOTE — PROGRESS NOTES
Medicare pt has received, reviewed, and signed 2nd IM letter informing them of their right to appeal the discharge. Signed copy has been placed on pt bedside chart.   Kyara Kong CMS

## 2022-06-04 NOTE — PROGRESS NOTES
[facility] at [address], [address] [facility]  Alicia Rashidy: [phone]  F: [phone] Patient ID  Name: Kenyon Alcantar  YOB: 1935  MRN: [id number]  Referring Provider:   No referring provider defined for this encounter. Primary Care Provider:   Jenny Larry MD       HEMATOLOGY/MEDICAL ONCOLOGY  NOTE   Date of Visit: 06/03/22  Reason for Evaluation:     Chief Complaint   Patient presents with   Christianson Fall     Hematology/Oncology Summary:  Please review original records for clinical decision making. This summary highlights focused aspects of patient's ongoing care and may have a recurring section in notes with either updates or remain unchanged as a longitudinal care summary. --------------------------------------------------------------------------------------------------------------------------------------------------------------------------------------------------------------------------  DIAGNOSIS:   Multiple Myeloma    ORIGINAL STAGING:   n/a    SITES OF DISEASE:   Bone Marrow    CURRENT TREATMENT:   To further discuss care. PRIOR TREATMENT:   Pulse Dex x 4 days. 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 ad3/5/6/2022   *Electronically Signed Out By Lillie Brown MD*   Bone marrow aspirate:   The bone marrow aspirate is cellular and adequate   for evaluation. Numerous plasma cells are present, including a subset of   large forms with prominent nucleoli. The background myeloid and erythroid   series progress through the full maturation sequence with no morphologic   abnormalities. Megakaryocytes are present in adequate numbers and show no   morphologic abnormalities. Lymphocytes and blasts do not appear increased   in number. Touch prep:  Cellular and reflects the aspirate smears. Bone marrow core biopsy: The bone marrow biopsy is 70% cellular and   adequate for evaluation. Large sheets of plasma cells are present. The   myeloid and erythroid series are reduced in number but progress through   the full maturation sequence without significant dysplasia. Megakaryocytes   are present in adequate numbers with normal morphology. Lymphocytes and   blasts do not appear increased in number. Clot section:  Particulate and reflects the core biopsy. Special stains:   Iron, aspirate: No stainable iron   Iron, clot: No stainable iron   Reticulin, core: Patchy reticulin fibrosis within sheets of plasma cells   Congo red, core: Negative for amyloid     Immunohistochemistry, core:   CD3: Highlights scattered small T cells   CD20: Highlights scattered small B cells   : Highlights marked increase in plasma cells, 80% of cellularity     Flow cytometry:   Diagnosis: Monoclonal plasma cells (31.5% of total cells), consistent with   a plasma cell neoplasm. Comments: Flow cytometry findings are consistent with a plasma cell   neoplasm (e.g. plasma cell myeloma or plasmacytoma). No immunophenotypic   evidence of a lymphoproliferative disorder, acute leukemia, or increase in   blasts is identified. Due to their fragility, plasma cells do not survive   flow cytometric processing well; thus, the flow cytometric enumeration may   be much less than that seen morphologically.  So, to properly classify this   process, correlation with the findings on the clot and core sections,   along with clinical, radiological and SPEP/UPEP information is necessary. Cytogenetic and FISH studies may provide useful prognostic information. Cytogenetics:  Normal  FISH:  dup1q,   t11:14   Molecular studies:  Not performed.      PERTINENT CARE EVENTS   n/a      Subjective:     History of Present Illness:     Kasie Caldwell is a 80 y.o. female with recently diagnosed Multiple Myeloma presents to the ER after worsening decline in performance status, decreased mentation, hypercalcemia, hyponatremia, increasing total protein. She has remained at [facility] for inpatient rehabilitation. We saw her last week and planned therapy for her myeloma after goals of care discussion with family. Her family wanted to pursue Revlimid/dexamethasone/zometa alone. Per phone discussion with daughter (patient not able to provide history), patient started to have a worsening decline last Thursday at the facility that led to an ER visit after a fall. She was sent back to her facility but unfortunately had further decline and returned to the ER yesterday evening. Daughter noted that patient stopped being able to participate in her physical therapy last Thursday compared to the best activity she had earlier in the week. She was anticipating a discharged on 6/1/22 prior to her acute decline. -  Interval History:   86yo f without any significant improvement in her performance status or mentation despite correction of hypercalcemia. Daughter, Fernando Babin, at bedside. Patient reportedly wanting to be in her bed at home and has seemed to be at unease this afternoon and agitated at times per daughter. Reportedly just past some stool. Still with some intermittent abdominal pain.       Current Facility-Administered Medications:     labetaloL (NORMODYNE;TRANDATE) 20 mg/4 mL (5 mg/mL) injection 10 mg, 10 mg, IntraVENous, Q6H PRN, Chayo Portillo MD, 10 mg at 06/03/22 7839   hydrALAZINE (APRESOLINE) tablet 50 mg, 50 mg, Oral, TID, Zee Cabral MD, 50 mg at 06/03/22 2102    metoprolol succinate (TOPROL-XL) XL tablet 25 mg, 25 mg, Oral, DAILY, Zee Cabral MD, 25 mg at 06/03/22 1433    OLANZapine (ZyPREXA zydis) disintegrating tablet 5 mg, 5 mg, Oral, Q12H PRN, Patti Lao MD, 5 mg at 06/03/22 1821    0.9% sodium chloride infusion 250 mL, 250 mL, IntraVENous, PRN, Ebenezer Lemus MD    nitroglycerin (NITROBID) 2 % ointment 1 Inch, 1 Inch, Topical, DAILY, Myron Cabral MD, 1 Inch at 06/03/22 0900    sodium chloride (NS) flush 5-40 mL, 5-40 mL, IntraVENous, Q8H, Edyta Canchola MD, 10 mL at 06/03/22 2102    sodium chloride (NS) flush 5-40 mL, 5-40 mL, IntraVENous, PRN, Edyta Canchola MD    acetaminophen (TYLENOL) tablet 650 mg, 650 mg, Oral, Q6H PRN, 650 mg at 06/03/22 1433 **OR** acetaminophen (TYLENOL) suppository 650 mg, 650 mg, Rectal, Q6H PRN, Edyta Canchola MD, 650 mg at 06/01/22 2330    polyethylene glycol (MIRALAX) packet 17 g, 17 g, Oral, DAILY PRN, Edyta Canchola MD    ondansetron (ZOFRAN ODT) tablet 4 mg, 4 mg, Oral, Q8H PRN **OR** ondansetron (ZOFRAN) injection 4 mg, 4 mg, IntraVENous, Q6H PRN, Edyta Canchola MD, 4 mg at 05/31/22 0511    levothyroxine (SYNTHROID) tablet 88 mcg, 88 mcg, Oral, ACB, Edyta Canchola MD, 88 mcg at 06/03/22 0608    lactulose (CHRONULAC) 10 gram/15 mL solution 30 mL, 20 g, Oral, BID, Luz Maria Red MD, 30 mL at 06/03/22 1825    heparin (porcine) injection 5,000 Units, 5,000 Units, SubCUTAneous, Q12H, Edyta Canchola MD, 5,000 Units at 06/03/22 1824    ferrous sulfate tablet 325 mg, 325 mg, Oral, ACB, Luz Maria Red MD, 325 mg at 06/03/22 0608    latanoprost (XALATAN) 0.005 % ophthalmic solution 1 Drop, 1 Drop, Both Eyes, QHS, Luz Maria Red MD, 1 Drop at 06/03/22 2110    sucralfate (CARAFATE) tablet 1 g, 1 g, Oral, AC&HS, Nayeli Rob MD, 1 g at 06/03/22 2102    cyanocobalamin (VITAMIN B12) tablet 1,000 mcg, 1,000 mcg, Oral, DAILY, Luz Maria Urbano MD, 1,000 mcg at 06/03/22 0948    amLODIPine (NORVASC) tablet 10 mg, 10 mg, Oral, DAILY, Debbie Quiroz MD, 10 mg at 06/03/22 1177    Allergies   Allergen Reactions    Celebrex [Celecoxib] Hives    Crestor [Rosuvastatin] Myalgia        Objective: Wt Readings from Last 3 Encounters:   06/03/22 121 lb 14.6 oz (55.3 kg)   05/23/22 110 lb (49.9 kg)   05/13/22 113 lb 9.6 oz (51.5 kg)     Temp Readings from Last 3 Encounters:   06/03/22 98.6 °F (37 °C)   05/29/22 98.5 °F (36.9 °C)   05/23/22 98.1 °F (36.7 °C)     BP Readings from Last 3 Encounters:   06/03/22 (!) 192/93   05/29/22 (!) 145/48   05/23/22 (!) 170/66     Pulse Readings from Last 3 Encounters:   06/03/22 98   05/29/22 (!) 57   05/23/22 (!) 58     ECOG PS: 4- Completely disabled; cannot carry on any selfcare; totally confined to bed or chair. Physical Exam  Constitutional: No acute distress. , Non-toxic appearance. , Chronic ill appearance. and appears uncomfortable at times. HENT: Normocephalic and atraumatic head. Eyes: Normal Conjunctivae. Anicteric sclerae. Cardiovascular: S1,S2 auscultated. No pitting edema. Pulmonary: Normal Respiratory Effort. No wheezing. No rhonchi. No rales. Abdominal: Normal bowel sounds. Soft Abdomen to palpation. No abdominal tenderness. No guarding. Slight distensino. Skin: No jaundice. No rash. No petechiae. Musculoskeletal: No muscle pain on palpation. No temporal muscle wasting on inspection. Neurological: Decreased mental alertness. No tremor on inspection. Psychiatric: difficult to assess. Results:     I personally reviewed Epic EHR labs/results below:   Lab Results   Component Value Date/Time    WBC 6.7 06/03/2022 03:54 AM    HGB 9.1 (L) 06/03/2022 03:54 AM    HCT 26.8 (L) 06/03/2022 03:54 AM    PLATELET 710 46/97/1580 03:54 AM    MCV 95.7 06/03/2022 03:54 AM    ABS.  NEUTROPHILS 4.7 06/03/2022 03:54 AM     Lab Results   Component Value Date/Time    Sodium 134 (L) 06/03/2022 05:45 PM    Potassium 3.0 (L) 06/03/2022 05:45 PM    Chloride 106 06/03/2022 05:45 PM    CO2 19 (L) 06/03/2022 05:45 PM    Glucose 132 (H) 06/03/2022 05:45 PM    BUN 45 (H) 06/03/2022 05:45 PM    Creatinine 1.94 (H) 06/03/2022 05:45 PM    GFR est AA 30 (L) 06/03/2022 05:45 PM    GFR est non-AA 24 (L) 06/03/2022 05:45 PM    Calcium 10.0 06/03/2022 05:45 PM    Glucose (POC) 163 (H) 05/05/2022 04:24 PM     Lab Results   Component Value Date/Time    Bilirubin, total 0.6 06/03/2022 03:54 AM    ALT (SGPT) 10 (L) 06/03/2022 03:54 AM    Alk. phosphatase 40 (L) 06/03/2022 03:54 AM    Protein, total 9.8 (H) 06/03/2022 03:54 AM    Albumin 2.1 (L) 06/03/2022 05:45 PM    Globulin 7.9 (H) 06/03/2022 03:54 AM     ANEMIA LABS:    Iron Studies:    Iron   Date Value Ref Range Status   04/05/2022 51 35 - 150 ug/dL Final     Vitamin B12:   Vitamin B12   Date Value Ref Range Status   04/30/2022 1,065 (H) 193 - 986 pg/mL Final     Folate: Folate   Date Value Ref Range Status   04/28/2022 29.2 (H) 5.0 - 21.0 ng/mL Final     SPEP:    M-Harry   Date Value Ref Range Status   04/30/2022 4.8 (H) Not Observed g/dL Final      Immunofixation Result   Date Value Ref Range Status   04/30/2022 Comment (A)   Final     Comment:     (NOTE)  Immunofixation shows IgG monoclonal protein with kappa light chain  specificity. Please note that samples from patients receiving DARZALEX(R)  (daratumumab) or SARCLISA(R)(isatuximab-irfc) treatment can appear  as an \"IgG kappa\" and mask a complete response (CR). If this  patient is receiving these therapies, this DANIELLE assay interference  can be removed by ordering test number [id number]-\"Immunofixation,  Daratumumab-Specific, Serum\" or [id number]-\"Immunofixation,  Isatuximab-Specific, Serum\" and submitting a new sample for  testing or by calling the lab to add this test to the current  sample.          Kappa/Lambda ratio, serum   Date Value Ref Range Status   04/30/2022 329.68 (H) 0.26 - 1.65   Final     Comment:     (NOTE)  Performed At: [facility] & [address] [id number]  Stevie Gutierrez MD DQ:[id number]        Free Kappa Lt Chains, serum   Date Value Ref Range Status   04/30/2022 1,351.7 (H) 3.3 - 19.4 mg/L Final    No components found for: KLFL2  Reticulocyte Count: No results found for: RET  Bilirubin:   Lab Results   Component Value Date/Time    Bilirubin, total 0.6 06/03/2022 03:54 AM    Bilirubin Negative 05/30/2022 10:16 PM     LDH:   LD   Date Value Ref Range Status   04/30/2022 242 81 - 246 U/L Final        Assessment and Recommendations:     1. Multiple myeloma not having achieved remission ([facility] [address]  -communicate in clear terms that patient is not a great candidate for any treatment intervention and I have recommended hospice care. Daughter, Griselda Collin, seems focused on supportive care. Patient having a difficult time and we will plan more thorough hospice discussion tomorrow but family seems to agree that this is a reasonable approach. 2. Hypercalcemia  -significant improvement but patient without functional improvement. 3. Macrocytic anemia  -while better, her overall picture has declined. 4. Debility  -too sick for any therapy and too debilitated. Best supportive care hospice recommended. Follow-Up:  -will follow-up tomorrow to help facilitate/finalize the discussion for hospice. I let daughter know that I'm concerned patient has only days to weeks.     Arthur Morales MD  Hematology/Medical Oncology Provider  [facility]  P: [phone]    Signed By:   Marie Garcia MD

## 2022-06-04 NOTE — PROGRESS NOTES
0700: Bedside shift change report given to Romina Casas RN (oncoming nurse) by Ely nelson RN (offgoing nurse). Report included the following information SBAR, Kardex, ED Summary, Procedure Summary, Intake/Output, MAR, Recent Results and Med Rec Status. 8004: Spoke with Dr. Rey Martinez, [facility]. Patient failed bedside swallow this AM. Verbal orders received to hold all PO AM meds, still apply the nitro paste. Verbal orders received that they were going to discontinue the scheduled IV potassium due to patient going Hospice. 1900: Bedside shift change report given to Ely nelson RN (oncoming nurse) by Romina Casas RN (offgoing nurse). Report included the following information SBAR, Kardex, ED Summary, Procedure Summary, Intake/Output, MAR, Recent Results and Med Rec Status.

## 2022-06-04 NOTE — PROGRESS NOTES
1900 - Bedside and verbal shift change report given to Reymundo Shah RN (oncoming nurse) by Uriah Poon RN (offgoing nurse). Report included the following information: SBAR, Kardex, Intake/Output, MAR, Recent Results, and Med Rec Status. 1900--dual skin assessment completed with Uriah Poon RN. 2030--pt agitated, restless, trying to get out of bed. Attempted to reorient pt by RN and family. Notified Family practice, ordered 2mg IM haldol and sitter if needed, no sitters available at this time. Bed alarm on and door to room open to visual pt from nurses station. 2355--notified family practice of MEWS score going from 0 to 3 then to 2. No new orders received at this time. PRN IV labetalol given. MD stated to recheck VS in 20 mins. 0015--VS reassessed. MD notified. No new orders at this time. In to do 0400 reassessment. Pt continues to be restless. Asked patient if she was hurting anywhere, pt stated \"no I just need some help. \" asked pt what was wrong and what she needed help with, pt stated \"nothing\". 0645-in pt room to give AM meds. Attempted to give PO meds, medications held due to pt being drowsy, not alert enough to swallow safely. MD currently at bedside and is aware, stated she would change PO potassium replacement to IV.      Critera met for insert Yes  Reason for insert: Urinary obstruction/urinary retention   Date of insert: 6/1/2022  Order is current: Yes  MD or RN driven: Provider  Removal Discussed Yes  Last CHG Bath: 6/4/2022 0413   Pichardo Care Last Completed: Date/Time: 6/4/2022 0413  Pichardo Care After Each Bowel Movement: Yes  Education documented every 24 hours Yes  Care Plan (Risk for UTI, Pichardo) Updated Every 24 hours Yes  Bag below Bladder Yes        Bag off Floor Yes      Sheet Clip used Yes          Tubing free of dependant loops Yes          Seal Intact Yes            Bag less than 1/2 full Yes              This patient was assisted with Intentional Toileting every 2 hours during this shift as appropriate. Documentation of ambulation and output reflected on Flowsheet as appropriate. Purposeful hourly rounding was completed using AIDET and 5Ps. Outcomes of PHR documented as they occurred. Bed alarm in use as appropriate. Dual Suction and ambubag in place. 0700 - Bedside and verbal shift change report given to Obdulio Etienne (oncoming nurse) by Cameron Canela RN (offgoing nurse). Report included the following: SBAR, Kardex, Intake/Output, MAR, Recent Results, and Med Rec Status.

## 2022-06-04 NOTE — HOSPICE
[facility]  [phone]     Patient Name: Elif Gary  YOB: 1935  Age: 80 y.o. [address] RN Note:  Hospice consult received, reviewing chart. Will follow up with Unit Nurse and Care Manager to discuss plan of care, patient status and discharge disposition within the hour. Thank you for the opportunity to be of service to this patient.   Jayda Feliz RN  Clinical Nurse Liaison   [address]  (u)[phone] (f) [phone]

## 2022-06-05 NOTE — PROGRESS NOTES
1900 - Bedside and verbal shift change report given to Arthur Baker RN (oncoming nurse) by Kristina Sandhu RN (offgoing nurse). Report included the following information: SBAR, Kardex, Intake/Output, MAR, Recent Results, and Med Rec Status. Critera met for insert Yes  Reason for insert: Urinary obstruction  Date of insert: 6/1/2022  Order is current: Yes  MD or RN driven: Provider  Removal Discussed Yes  Last CHG Bath: 6/5/2022 0405   Pichardo Care Last Completed: Date/Time: 6/5/2022 0405  Pichardo Care After Each Bowel Movement: Yes  Education documented every 24 hours Yes  Care Plan (Risk for UTI, Pichardo) Updated Every 24 hours Yes  Bag below Bladder Yes        Bag off Floor Yes      Sheet Clip used Yes          Tubing free of dependant loops Yes          Seal Intact Yes            Bag less than 1/2 full Yes              This patient was assisted with Intentional Toileting every 2 hours during this shift as appropriate. Documentation of ambulation and output reflected on Flowsheet as appropriate. Purposeful hourly rounding was completed using AIDET and 5Ps. Outcomes of PHR documented as they occurred. Bed alarm in use as appropriate. Dual Suction and ambubag in place. 0700 - Bedside and verbal shift change report given to Bernardo Jackson RN (oncoming nurse) by Arthur Baker RN (offgoing nurse). Report included the following: SBAR, Kardex, Intake/Output, MAR, Recent Results, and Med Rec Status.

## 2022-06-05 NOTE — HOSPICE
Hospice RN Note: info session with pt, daughter Breana Loving and her daughter. Pt is alert and oriented, in no apparent distress. Discussed hospice philosophy and levels of care ie, inpatient vs routine hospice and explained the benefits of respite and continuous care as well. At this time patient does not qualify for inpatient hospice as she lacks symptom burden and is able to tolerate oral/sublingual dosing which can be done at home or in a facility. Breana Loving and her daughter are leaning towards home hospice but Breana Loving needs to talk with her other sister Shonda Bond and will follow up with me tomorrow. [address] with Breana Loving after discussion with sister, Shonda Bond and they are both in agreement of home hospice. Planning for home hospice admission on Tuesday to allow time for family to move furniture to receive necessary DME. DME to be delivered via [employer] Monday 4-7PM: Bed, 3/4 rails, gel overlay, OBT, 5L 02. [address] [facility] holding Tuesday AM admission slot - will update CM with transport time tomorrow.                Richard Leiva, RN, BSN, [facility]  Clinical Nurse Liaison  [phone] (I)  [phone] (o)

## 2022-06-05 NOTE — PROGRESS NOTES
Stable renal function   K low and agree w/ replacement orders  We will check back tomorrow     [facility]  Office :[phone]  Fax: [phone]

## 2022-06-05 NOTE — PROGRESS NOTES
0700: Bedside and Verbal shift change report given to [id number] [facility] (oncoming nurse) by Karen Benavidez (offgoing nurse). Report included the following information SBAR, Kardex, Accordion and Cardiac Rhythm NSR. This patient was assisted with Intentional Toileting every 2 hours during this shift as appropriate. Documentation of ambulation and output reflected on Flowsheet as appropriate. Purposeful hourly rounding was completed using AIDET and 5Ps. Outcomes of PHR documented as they occurred. Bed alarm in use as appropriate. Dual Suction and ambubag in place.

## 2022-06-05 NOTE — PROGRESS NOTES
3:35 PM  CM met with patients daughter, Anneliese Lawson. She has been in contact with patients other daughter, Meek Bowden and they are all in agreement to move forward with home with hospice. Anneliese Lawson doesn't feel like she will have the home setup by Monday but could have it ready by Tuesday - she have to find someone to help her move her mother's bed out of the bedroom to prepare for the hospital bed to be delivered. She also has to setup the room for herself to be able to stay with her mother in her home. CM called and spoke to the liaison with [address] [place] [address] and she will reach out to the daughter to schedule equipment delivery and prepare for hospice in the home. Eva Gtz      6/5/22  1:17 PM    CM spoke with [facility] liaison, [facility] following her information session with the patient and her daughter. The patient and her daughter, Anneliese Lawson will discuss further with patients other daughter, Meek Bowden and will let [facility] know what they decide. If patient decides to move forward with hospice, [facility] hosp[facility] can accept. CM continuing to follow.  Eva Gtz

## 2022-06-05 NOTE — PROGRESS NOTES
[facility] Kamryn Taylor 33   Office [phone]  Fax [phone]            Subjective / Objective     24 Hour Events: Recommendation for hospice, consulted yesterday. Subjective: Examined at bedside. Patient alert and oriented. No complaints at this time. Objective:    Respiratory:   O2 Device: None (Room air)   Visit Vitals  BP (!) 166/75 (BP 1 Location: Left upper arm, BP Patient Position: At rest)   Pulse 65   Temp 97.5 °F (36.4 °C)   Resp 16   Ht 5' (1.524 m)   Wt 114 lb 3.2 oz (51.8 kg)   SpO2 97%   Breastfeeding No   BMI 22.30 kg/m²     Physical Exam:   General: No acute distress. Frail. Elderly. Respiratory: CTAB, no wheezes or crackles  Cardiovascular: Regular rate and rhythm, clear S1 S2. Pulses present bilaterally. GI: Nondistended. + bowel sounds. Abdomen nontender to palpation   Extremities: No LE edema. Pulses present. Skin: Warm, dry. Neuro: Oriented to person, place, and time. No focal deficits. Hard of hearing. I/O:  Date 06/04/22 0700 - 06/05/22 0659 06/05/22 0700 - 06/06/22 0659   Shift 2285-0266 1235-7706 24 Hour Total 5859-8090 9499-9320 24 Hour Total   INTAKE   P.O. 0 0 0        P. O. 0 0 0      Shift Total(mL/kg) 0(0) 0(0) 0(0)      OUTPUT   Urine(mL/kg/hr) 1000(1.6) 550(0.9) 1550(1.2)        Urine Voided 9081 358 2863        Urine Output (mL) (Urinary Catheter 06/01/22 Pichardo)  200 200      Stool           Stool Occurrence(s)  1 x 1 x      Shift Total(mL/kg) 1000(19.2) 550(10.6) 1550(29.9)      NET -1000 -550 -1550      Weight (kg) 52 51.8 51.8 51.8 51.8 51.8       Inpatient Medications  Current Facility-Administered Medications   Medication Dose Route Frequency    potassium bicarb-citric acid (EFFER-K) tablet 20 mEq  20 mEq Oral BID    labetaloL (NORMODYNE;TRANDATE) 20 mg/4 mL (5 mg/mL) injection 10 mg  10 mg IntraVENous Q6H PRN    hydrALAZINE (APRESOLINE) tablet 50 mg  50 mg Oral TID    metoprolol succinate (TOPROL-XL) XL tablet 25 mg  25 mg Oral DAILY    OLANZapine (ZyPREXA zydis) disintegrating tablet 5 mg  5 mg Oral Q12H PRN    0.9% sodium chloride infusion 250 mL  250 mL IntraVENous PRN    nitroglycerin (NITROBID) 2 % ointment 1 Inch  1 Inch Topical DAILY    sodium chloride (NS) flush 5-40 mL  5-40 mL IntraVENous Q8H    sodium chloride (NS) flush 5-40 mL  5-40 mL IntraVENous PRN    acetaminophen (TYLENOL) tablet 650 mg  650 mg Oral Q6H PRN    Or    acetaminophen (TYLENOL) suppository 650 mg  650 mg Rectal Q6H PRN    polyethylene glycol (MIRALAX) packet 17 g  17 g Oral DAILY PRN    ondansetron (ZOFRAN ODT) tablet 4 mg  4 mg Oral Q8H PRN    Or    ondansetron (ZOFRAN) injection 4 mg  4 mg IntraVENous Q6H PRN    levothyroxine (SYNTHROID) tablet 88 mcg  88 mcg Oral ACB    heparin (porcine) injection 5,000 Units  5,000 Units SubCUTAneous Q12H    ferrous sulfate tablet 325 mg  325 mg Oral ACB    latanoprost (XALATAN) 0.005 % ophthalmic solution 1 Drop  1 Drop Both Eyes QHS    sucralfate (CARAFATE) tablet 1 g  1 g Oral AC&HS    cyanocobalamin (VITAMIN B12) tablet 1,000 mcg  1,000 mcg Oral DAILY    amLODIPine (NORVASC) tablet 10 mg  10 mg Oral DAILY       Allergies  Allergies   Allergen Reactions    Celebrex [Celecoxib] Hives    Crestor [Rosuvastatin] Myalgia       CBC:  Recent Labs     06/04/22  0043 06/03/22  0354 06/02/22  1535   WBC 8.9 6.7  --    HGB 9.7* 9.1* 8.9*   HCT 27.9* 26.8* 26.1*    292  --        Metabolic Panel:  Recent Labs     06/05/22  0106 06/04/22  0043 06/03/22  1745 06/03/22  0354 06/03/22  0354    133* 134*   < > 135*   K 2.4* 2.9* 3.0*   < > 2.9*   * 108 106   < > 107   CO2 20* 19* 19*   < > 18*   BUN 48* 43* 45*   < > 38*   CREA 1.79* 1.88* 1.94*   < > 1.89*   GLU 81 153* 132*   < > 73   CA 9.0 9.9 10.0   < > 10.1   PHOS  --   --  1.4*  --   --    ALB 1.9* 2.1* 2.1*   < > 1.9*   ALT 12 13  --   --  10*    < > = values in this interval not displayed.             Assessment and Plan     Inge GAMEZ Lana Whalen is a 80 y.o. female with a PMHx of recently diagnosed Multiple Myeloma, hypercalcemia, HTN, Macrocytic anemia, GERD, Badycardia  who is admitted for altered mental status in the setting of hyponatremia and hypercalcemia.     Encephalopathy likely 2/2 multiple metabolic derangements in s/o MM:  Most likely 2/2 marked hypercalcemia vs pseudohyponatremia. MM diagnosed 4/2022. Prior head and C-spine imaging negative. AMS labs negative. FeNa 0.4%. F/b Dr. Edgardo Burris outpatient. Given patient status and not candidate for MM treatment, supportive/comfort care recommended. - Hospice meeting with family today at 12:00pm.   - Heme/onc consulted, appreciate rec's  - Palliative consulted, appreciate rec's  - Nephrology consulted, appreciate recs       Uncontrolled HTN: /68, with elevated troponin (flat on recheck). BP uncontrolled. Will adjust regimen as needed pending trend. - Cont Amlodipine 10mg daily, IV Hydral 50 TID, Metoprolol 25mg daily    - Nitrobid paste daily   - Continue to hold Losartan 100 mg daily given poor renal function    Macrocytic Anemia: Hgb stable s/p 1u pRBCs (6/3)   - daily cbc   - Heme/Onc consulted, appreciate recs  - home cyanocobalamin 1,000 daily   - Transfusion threshold Hgb 7.0 per H/O      Hypercalcemia: Correcting for low albumin, at 11.4, improving. Improving s/p zometa per H/O and calcitonin x1 dose (5/31). - Stopped mIVF   - Daily CMP     DOMINIK: Likely due to dehydration vs hypercalcemia. - DC'ed IV fluids per nephro given limited improvement in renal function   - Daily CMP  - Nephrology consulted, appreciate recs       Abdominal Pain: Chronic issues, prior workup including EGD and colonoscopy negative. Prior CT showing hiatal hernia. Likely secondary to hypercalcemia, less likely hernia. UA negative for UTI, pelvic xray showing no acute process.    - Expect to improve with treatment of underlying hypercalcemia.   - Continue home Carafate   - Hold home protonix as possible contribution to HypoNa  - If fails to improve, consider further imaging. Malnutrition: Patient with decreased po intake. Likely secondary to enncephalopathy and MM.  - Nutrition following, appreciate rec's   - continue oral nutrition supplement, favor avoiding high protein supplement given MM and elevated total protein     Elevated Troponin (Resolved): Trop initially 56, repeat flat at 56. No chest pain, EKG with no ischemia. Tele with NSR 60s-70s and PVCs. - Continue to monitor on tele     Elevated pro-BNP: POA BnP 2833. Prior Echo in February EF 60-65%. Lungs clear on exam, no edema. Likely secondary to HTN and anemia. - repeat Echo w/ EF 55-60 and G1DD.   - cautious with fluids at this time, no evidence of fluid overload      Hypokalemia: Chronic. Will continue to follow on BMP trends. - Repleted prn   - Home Effer K 20 BID   - Daily metabolic panel, replete as needed    Hyponatremia: Improving. Holding Losartan and protonix given possible contributing factors. Likely 2/2 IVVD vs pseudohyponatremia given elevated total protein. - s/p mIVF, Na improving  - trend daily      DM2: Last A1c <3.8 (L) on 4/28/2022. Not on any home medications. Glucose range stable. - Monitor on daily metabolic panel, no SSI at this time      HLD: Last lipid panel 12/6/21: Tchol 122, HDL 67, LDL 45.6, trig 47. No home medications.     Hypothyroidism: TSH 6.26 (5/1/2022). - Continue home Synthroid 88 mcg daily before breakfast  - rTSH 1.55     CAD: MI (during surgery in 1970s) had multiple caths and stress tests. Records of cardiology visit w/ Dr. Kristian Daily on 3/22/17. Exercise Cardiolite 7/23/15 - walked 4:39 (6.3 METS), normal stress EKG. Normal MPI. LVEF 71%. Echo 2/25/22 - LVEF 60-65%.     Rectal prolapse: Occurred x 1 during prior hospitalization, manual reduction. No acute complications.   - Refer to Colorectal surgery for outpatient follow-up     GERD: Chronic.  - Hold home Protonix 40 mg daily, possible worsening of Hyponatremia  - restart home Sucralfate      Bradycardia: Chronic. EKG showing 1st deg AV block, chronic.   - hold home Metoprolol XL 25mg daily. Substitute IV Metoprolol 5mg BID      Hyperammoniemia (stable): Ammonia stable. S/p lactulose. - Will stop trending daily      FEN/GI - Easy to chew. Activity - Ambulate with assistance  DVT prophylaxis - Heparin   GI prophylaxis - Holding home Protonix for hyponatremia  Fall prophylaxis - Fall precautions ordered. Disposition - Plan to d/c to TBD. Consulting PT, OT and CM  Code Status - DNR. Discussed with patient / caregivers.   Next of Rajni [address] Name and Sandra Nicholas (Child)   [phone]    Dolores Lopez MD  Family Medicine Resident       For Billing    Chief Complaint   Patient presents with   [facility] [place] Problems  Date Reviewed: 5/31/2022          Codes Class Noted POA    Hypomagnesemia ICD-10-CM: E83.42  ICD-9-CM: 275.2  5/31/2022 Unknown        Hyponatremia ICD-10-CM: E87.1  ICD-9-CM: 276.1  5/31/2022 Unknown        Elevated brain natriuretic peptide (BNP) level ICD-10-CM: R79.89  ICD-9-CM: 790.99  5/31/2022 Unknown        * (Principal) Encephalopathy ICD-10-CM: G93.40  ICD-9-CM: 348.30  5/30/2022 Unknown        Multiple myeloma not having achieved remission ([facility] [address] ICD-10-CM: C90.00  ICD-9-CM: 203.00  5/13/2022 Yes        Hypercalcemia ICD-10-CM: N39.16  ICD-9-CM: 275.42  5/13/2022 Yes        Hypertensive emergency ICD-10-CM: I16.1  ICD-9-CM: 401.9  2/25/2022 Yes        Hypokalemia ICD-10-CM: E87.6  ICD-9-CM: 276.8  2/24/2022 Yes        Elevated troponin ICD-10-CM: R77.8  ICD-9-CM: 790.6  2/24/2022 Yes        Nodule of left lobe of thyroid gland ICD-10-CM: E04.1  ICD-9-CM: 241.0  9/8/2020 Yes        Macrocytic anemia ICD-10-CM: D53.9  ICD-9-CM: 281.9  8/29/2018 Yes        Type 2 diabetes mellitus without complication, without long-term current use of insulin ([facility] ICD-10-CM: E11.9  ICD-9-CM: 250.00  4/24/2017 Yes        HTN (hypertension) ICD-10-CM: I10  ICD-9-CM: 401.9  Unknown Yes        Hypothyroidism, iatrogenic ICD-10-CM: E03.2  ICD-9-CM: 707. 3  Unknown Yes    Overview Signed 7/13/2015  3:16 PM by Kostas Casas MD     radioiodine             Hypercholesteremia ICD-10-CM: E78.00  ICD-9-CM: 272.0  Unknown Yes

## 2022-06-06 PROBLEM — E43 UNSPECIFIED SEVERE PROTEIN-CALORIE MALNUTRITION (HCC): Status: ACTIVE | Noted: 2022-01-01

## 2022-06-06 NOTE — PROGRESS NOTES
Patient is transition to hospice. Serum creatinine continue to improve. Potassium is low. I will give potassium supplement 40 mg daily in the hosptial.  We will sign off.   Please call us with question

## 2022-06-06 NOTE — PROGRESS NOTES
Comprehensive Nutrition Assessment    Type and Reason for Visit: Positive nutrition screen for wt loss >24 lbs    Nutrition Recommendations/Plan:   1. Add oral nutritional supplement Ensure HP BID (provides: 320 kcals, 32g protein, 38g carb)  2. Assist with meals and feeding when safe and accepting of PO     Malnutrition Assessment:  Malnutrition Status:  Severe malnutrition (06/01/22 1137)    Context:  Acute illness     Findings of the 6 clinical characteristics of malnutrition:   Energy Intake:  50% or less of est energy requirements for 5 or more days  Weight Loss:  Unable to assess     Body Fat Loss: Moderate body fat loss, Triceps,Buccal region,Fat overlying ribs   Muscle Mass Loss:  Mild muscle mass loss, Temples (temporalis),Thigh (quadriceps),Clavicles (pectoralis & deltoids)  Fluid Accumulation:  No significant fluid accumulation,     Strength:  Not performed     Nutrition Assessment:    6/6: follow up. pt with plans for home with hospice care tomorrow. pt was resting comfortably when RD visited, roused easily & asked to use the bathroom - notified RN. no family at bedside at this time. RN reports accepting some PO & some ONS, although some built up at bedside. 101: 80year old female admitted for Encephalopathy [G93.40] who  has a past medical history of Diabetes ([address] GI bleeding, HTN (hypertension), Hypercholesteremia, Hypothyroidism, iatrogenic, and MI (myocardial infarction) ([facility] [address] Pt is familiar to facility with recent admission earlier this month and assessed by RD. Wt this admission appears to not be measured and based on UBW. Nutrition Focused Physical Exam completed with severe malnutrition findings. Pt is not rousing much and hardly opening her eyes during RD assessment. Dentures are in her mouth. RD attended & discussed patient during interdisciplinary rounds on unit and reports awaiting palliative consult for goals of care.      Nutrition Related Findings:      Wound Type: None    Last Bowel Movement Date: 06/05/22  Stool Appearance: Soft  Abdominal Assessment: Soft  Appetite: Fair  Bowel Sounds: Active   Edema:No data recorded    Nutr. Labs:      Lab Results   Component Value Date/Time    GFR est AA 36 (L) 06/06/2022 04:22 AM    GFR est non-AA 30 (L) 06/06/2022 04:22 AM    Creatinine 1.63 (H) 06/06/2022 04:22 AM    BUN 43 (H) 06/06/2022 04:22 AM    Sodium 135 (L) 06/06/2022 04:22 AM    Potassium 2.9 (L) 06/06/2022 04:22 AM    Chloride 105 06/06/2022 04:22 AM    CO2 21 06/06/2022 04:22 AM     Lab Results   Component Value Date/Time    Glucose 76 06/06/2022 04:22 AM    Glucose (POC) 163 (H) 05/05/2022 04:24 PM     Nutr. Meds:  Norvasc, Nitroglycerin, synthroid, lopressor, KCl (replacement), Carafate (prior to meals)  PRN: Miralax     Current Nutrition Intake & Therapies:  Average Meal Intake: 1-25%  Average Supplement Intake: 1-25%  ADULT ORAL NUTRITION SUPPLEMENT Breakfast, Dinner; Clear Liquid  DIET ONE TIME MESSAGE  ADULT DIET Dysphagia - Soft & Bite Sized    Documented Meal intake:  Patient Vitals for the past 168 hrs:   % Diet Eaten   06/04/22 1515 0%   06/04/22 1200 0%   06/04/22 0730 0%   06/03/22 0727 1 - 25%   06/02/22 1851 0%   06/02/22 1158 0%   06/02/22 0900 0%   06/01/22 1819 0%   06/01/22 1518 0%   05/31/22 0915 0%     Documentation of supplement intake:  Patient Vitals for the past 168 hrs:   Supplement intake %   06/04/22 1515 0%   06/04/22 1200 0%   06/04/22 0730 0%   06/03/22 0727 0%     Anthropometric Measures:  Height: 5' (152.4 cm)  Ideal Body Weight (IBW): 100 lbs (45 kg)  Admission Body Weight: 110 lb  Current Body Wt:  49.9 kg (110 lb 0.2 oz), 110 % IBW.  Bed scale  Current BMI (kg/m2): 21.5  Usual Body Weight: 49.9 kg (110 lb)  % Weight Change (Calculated): 0  Weight Adjustment: No adjustment                 BMI Category: Underweight (BMI less than 22) age over 72     Last 3 Recorded Weights in this Encounter    06/03/22 0343 06/04/22 0413 06/05/22 0401 Weight: 55.3 kg (121 lb 14.6 oz) 52 kg (114 lb 10.2 oz) 51.8 kg (114 lb 3.2 oz)     Wt Readings from Last 10 Encounters:   06/05/22 51.8 kg (114 lb 3.2 oz)   05/23/22 49.9 kg (110 lb)   05/13/22 51.5 kg (113 lb 9.6 oz)   05/06/22 58.5 kg (129 lb)   05/03/22 57.7 kg (127 lb 3.3 oz)   04/05/22 53.6 kg (118 lb 3.2 oz)   03/02/22 55.3 kg (122 lb)   02/27/22 57.8 kg (127 lb 6.4 oz)   12/06/21 55.8 kg (123 lb)   05/04/21 57.4 kg (126 lb 9.6 oz)     Estimated Daily Nutrient Needs:  Energy Requirements Based On: Kcal/kg  Weight Used for Energy Requirements: Admission  Energy (kcal/day): 1250 - 1400 kcal/d (25-28 kcal/kg)  Weight Used for Protein Requirements: Admission  Protein (g/day): 40 - 55 g (0.8-1.1 g/kg)  Method Used for Fluid Requirements: 1 ml/kcal  Fluid (ml/day): 1300 mL    Nutrition Diagnosis:   · Inadequate oral intake,Severe malnutrition related to cognitive or neurological impairment as evidenced by intake 0-25%,Criteria as identified in malnutrition assessment,poor intake prior to admission      Nutrition Interventions:   Food and/or Nutrient Delivery: Continue current diet,Continue oral nutrition supplement  Nutrition Education/Counseling: No recommendations at this time  Coordination of Nutrition Care: No recommendation at this time  Plan of Care discussed with: RN and team during IDRs    Goals:  Previous Goal Met: Progressing toward goal(s)  Goals: PO intake 50% or greater,by next RD assessment - changed    6/6: Goal: PO as tolerated and for comfort as desired        Nutrition Monitoring and Evaluation:   Behavioral-Environmental Outcomes: None identified  Food/Nutrient Intake Outcomes: Supplement intake,Food and nutrient intake  Physical Signs/Symptoms Outcomes: Weight,Meal time behavior    Discharge Planning:    Continue oral nutrition supplement,Continue current diet    Augustine Blanchard, MS, RD  Contact via [employer] or office [phone]

## 2022-06-06 NOTE — PALLIATIVE CARE DISCHARGE
The Palliative Medicine team was consulted as part of your / your loved one's care in the hospital. Our team is a supportive service; we strive to relieve suffering and improve quality of life. You identified the following goal(s) as your main focus for healthcare: Patient/Health Care Proxy Stated Goals: Comfort: You will be returning home with [facility] from [facility]. We reviewed advance care planning information, which includes the following:  Advance Care Planning 6/2/2022   Patient's Healthcare Decision Maker is: Named in scanned ACP document   Confirm Advance Directive Yes, on file   Patient Would Like to Complete Advance Directive Already in place    Does the patient have other document types MOST/MOLST/POST/POLST       We reviewed / discussed your code status as: DNR     Full Code means perform CPR in the event of cardiac arrest     [facility] means do NOT perform CPR in the event of cardiac arrest     Partial Code means you have specific preferences, please discuss with your health care team     No Order means this issue was not addressed / resolved during your stay    Because of the importance of this information, we are providing you with a printed copy to share with other healthcare providers after this hospitalization is complete. If any of the above information is incomplete or incorrect, please contact the Palliative Medicine team at [phone].

## 2022-06-06 NOTE — PROGRESS NOTES
Crying loudly, stating she just feels awful all over, and wants to go to sleep. Tylenol 650mg and Zyprexa as ordered given.

## 2022-06-06 NOTE — PROGRESS NOTES
Bedside and Verbal shift change report given to April (oncoming nurse) by Kathy Foote (offgoing nurse). Report included the following information SBAR, Kardex, MAR, Accordion and Recent Results.

## 2022-06-06 NOTE — HOSPICE
[facility]  [phone]    Hospice Nursing PRE-Admission   Discharge Summary  Patient Name: Adam Wilkins  YOB: 1935  Age: 80 y.o. Date of PLANNED Hospice Admission: 6/7/22  Hospice Attending: Dr. Arnav Mccann  Primary Care Physician: Foreign Gross MD     Home Hospice Address:   [id number] [place] [address]    Primary Contact and Phone:  [employer] [phone]  Alex George [phone]    ADVANCE CARE PLANNING    Code Status: DNR  Durable DNR: [x]  Yes  []  No  Advance Care Planning 6/2/2022   Patient's Healthcare Decision Maker is: Named in scanned ACP document   Confirm Advance Directive Yes, on file   Patient Would Like to Complete Advance Directive -   Does the patient have other document types MOST/MOLST/POST/POLST       [employer]: [address]: Daughter has paperwork; pt finalized all arrangements    HOSPICE SUMMARY     Verbal CTI of terminal diagnosis with life expectancy of 6 months or less received from: Dr. Lula Opitz    For the Hospice Diagnosis of: Multiple Myeloma    NCD: Declined      CLINICAL INFORMATION   Allergies: Allergies   Allergen Reactions    Celebrex [Celecoxib] Hives    Crestor [Rosuvastatin] Myalgia         Currently this patient has:  [] Supplemental O2 [x] Peripheral IV  [] PICC    [] PORT   [x] Pichardo Catheter [] NG Tube   [] PEG Tube [] Ostomy    [] AICD: Has ICD been deactivated? [] Yes [] Wounds      COVID Screening: Positive   Negative      ASSESSMENT & PLAN     1. Symptom Issues Identified: weakness,     2. Spiritual Issues Identified: open to  support    3. Psych/ Social/ Emotional Issues Identified: pt has 2 daughters Joel Huntley and Mary Chauhan who share decision making capacity. Joel Huntley had a recent rotator cuff repair but Mary Chauhan plans to move in with her mom.  Pt also has a sister that is traveling out of the country right now and plans to come back July 1st.       CARE COORDINATION     1. Hospice Consents: signed by daughter Zee Toth and scanned    2. DME Ordered/Company/Delivery Plan: Bed, rails, OBT, gel overlay, 5L o2 for delivery by 7PM    3. Symptom Kit and other Medications Needs: full SRK w/Dilaudid and Ativan filled at [facility] and being delivered by [email or web address] tonight    4. Home Admission Reservation: 11AM    5. Transportation by: [facility]   Scheduled for: 9AM       6. Verbal Report/Handoff given to: liaison to call report    7. Phone number to [address] [place] [address] St: [phone]    9.  Supplies/Wound Care: will need chux, briefs

## 2022-06-06 NOTE — MED STUDENT NOTES
*ATTENTION:  This note has been created by a medical student for educational purposes only. Please do not refer to the content of this note for clinical decision-making, billing, or other purposes. Please see attending physicians note to obtain clinical information on this patient. *       General Daily Progress Note    Admit Date: 5/30/2022  Hospital day 2    Subjective:     24hour events: Received Zyprexa overnight for excessive crying; pt denied pain at the time. AM:  Pt seen and evaluated at bedside. Pt's mentation unchanged, no complaints at this time. CM working on medical equipment for home hospice.      Current Facility-Administered Medications   Medication Dose Route Frequency    potassium bicarb-citric acid (EFFER-K) tablet 40 mEq  40 mEq Oral DAILY    labetaloL (NORMODYNE;TRANDATE) 20 mg/4 mL (5 mg/mL) injection 10 mg  10 mg IntraVENous Q6H PRN    hydrALAZINE (APRESOLINE) tablet 50 mg  50 mg Oral TID    metoprolol succinate (TOPROL-XL) XL tablet 25 mg  25 mg Oral DAILY    OLANZapine (ZyPREXA zydis) disintegrating tablet 5 mg  5 mg Oral Q12H PRN    0.9% sodium chloride infusion 250 mL  250 mL IntraVENous PRN    nitroglycerin (NITROBID) 2 % ointment 1 Inch  1 Inch Topical DAILY    sodium chloride (NS) flush 5-40 mL  5-40 mL IntraVENous Q8H    sodium chloride (NS) flush 5-40 mL  5-40 mL IntraVENous PRN    acetaminophen (TYLENOL) tablet 650 mg  650 mg Oral Q6H PRN    Or    acetaminophen (TYLENOL) suppository 650 mg  650 mg Rectal Q6H PRN    polyethylene glycol (MIRALAX) packet 17 g  17 g Oral DAILY PRN    ondansetron (ZOFRAN ODT) tablet 4 mg  4 mg Oral Q8H PRN    Or    ondansetron (ZOFRAN) injection 4 mg  4 mg IntraVENous Q6H PRN    levothyroxine (SYNTHROID) tablet 88 mcg  88 mcg Oral ACB    heparin (porcine) injection 5,000 Units  5,000 Units SubCUTAneous Q12H    ferrous sulfate tablet 325 mg  325 mg Oral ACB    latanoprost (XALATAN) 0.005 % ophthalmic solution 1 Drop  1 Drop Both Eyes QHS    sucralfate (CARAFATE) tablet 1 g  1 g Oral AC&HS    cyanocobalamin (VITAMIN B12) tablet 1,000 mcg  1,000 mcg Oral DAILY    amLODIPine (NORVASC) tablet 10 mg  10 mg Oral DAILY        Review of Systems  Pertinent items are noted in HPI. Objective:     Patient Vitals for the past 8 hrs:   BP Temp Pulse Resp SpO2   06/06/22 0753 (!) 134/99 97.8 °F (36.6 °C) 61 23 97 %   06/06/22 0430 128/61 97 °F (36.1 °C) (!) 58 18 96 %     06/06 0701 - 06/06 1900  In: 120 [P.O.:120]  Out: -   06/04 1901 - 06/06 0700  In: 240 [P.O.:240]  Out: 5374 [Urine:1350]    Physical Exam: General appearance: heard of hearing, cooperative, no distress, appears stated age, mildly confused  Lungs: clear to auscultation bilaterally  Abdomen: soft, dif. Bowel sounds normal. No masses,  no organomegaly  Neurologic: A/Ox2, follows commands, moving all extremities spontaneously. ECG: normal sinus rhythm, rates to 90s with PVCs    Data Review   Recent Results (from the past 24 hour(s))   CBC WITH AUTOMATED DIFF    Collection Time: 06/06/22  4:22 AM   Result Value Ref Range    WBC 5.8 3.6 - 11.0 K/uL    RBC 2.65 (L) 3.80 - 5.20 M/uL    HGB 8.6 (L) 11.5 - 16.0 g/dL    HCT 25.6 (L) 35.0 - 47.0 %    MCV 96.6 80.0 - 99.0 FL    MCH 32.5 26.0 - 34.0 PG    MCHC 33.6 30.0 - 36.5 g/dL    RDW 13.8 11.5 - 14.5 %    PLATELET 547 404 - 501 K/uL    MPV 9.2 8.9 - 12.9 FL    NRBC 0.0 0  WBC    ABSOLUTE NRBC 0.00 0.00 - 0.01 K/uL    NEUTROPHILS 61 32 - 75 %    LYMPHOCYTES 20 12 - 49 %    MONOCYTES 13 5 - 13 %    EOSINOPHILS 4 0 - 7 %    BASOPHILS 1 0 - 1 %    IMMATURE GRANULOCYTES 1 (H) 0.0 - 0.5 %    ABS. NEUTROPHILS 3.5 1.8 - 8.0 K/UL    ABS. LYMPHOCYTES 1.2 0.8 - 3.5 K/UL    ABS. MONOCYTES 0.8 0.0 - 1.0 K/UL    ABS. EOSINOPHILS 0.3 0.0 - 0.4 K/UL    ABS. BASOPHILS 0.0 0.0 - 0.1 K/UL    ABS. IMM.  GRANS. 0.1 (H) 0.00 - 0.04 K/UL    DF AUTOMATED     METABOLIC PANEL, COMPREHENSIVE    Collection Time: 06/06/22  4:22 AM   Result Value Ref Range Sodium 135 (L) 136 - 145 mmol/L    Potassium 2.9 (L) 3.5 - 5.1 mmol/L    Chloride 105 97 - 108 mmol/L    CO2 21 21 - 32 mmol/L    Anion gap 9 5 - 15 mmol/L    Glucose 76 65 - 100 mg/dL    BUN 43 (H) 6 - 20 MG/DL    Creatinine 1.63 (H) 0.55 - 1.02 MG/DL    BUN/Creatinine ratio 26 (H) 12 - 20      GFR est AA 36 (L) >60 ml/min/1.73m2    GFR est non-AA 30 (L) >60 ml/min/1.73m2    Calcium 8.5 8.5 - 10.1 MG/DL    Bilirubin, total 0.4 0.2 - 1.0 MG/DL    ALT (SGPT) 14 12 - 78 U/L    AST (SGOT) 21 15 - 37 U/L    Alk. phosphatase 36 (L) 45 - 117 U/L    Protein, total 9.3 (H) 6.4 - 8.2 g/dL    Albumin 1.7 (L) 3.5 - 5.0 g/dL    Globulin 7.6 (H) 2.0 - 4.0 g/dL    A-G Ratio 0.2 (L) 1.1 - 2.2             Assessment:     Principal Problem:    Encephalopathy (5/30/2022)    Active Problems:    HTN (hypertension) ()      Hypothyroidism, iatrogenic ()      Overview: radioiodine      Hypercholesteremia ()      Type 2 diabetes mellitus without complication, without long-term current use of insulin (HCC) (4/24/2017)      Macrocytic anemia (8/29/2018)      Nodule of left lobe of thyroid gland (9/8/2020)      Hypokalemia (2/24/2022)      Elevated troponin (2/24/2022)      Hypertensive emergency (2/25/2022)      Multiple myeloma not having achieved remission ([facility] (5/13/2022)      Hypercalcemia (5/13/2022)      Hypomagnesemia (5/31/2022)      Hyponatremia (5/31/2022)      Elevated brain natriuretic peptide (BNP) level (5/31/2022)        Plan:   Inge Kenyon is a 80 y. o. female with a PMHx of recently diagnosed Multiple Myeloma, hypercalcemia, HTN, Macrocytic anemia, GERD, Badycardia  who is admitted for altered mental status in the setting of hyponatremia and hypercalcemia.     Encephalopathy in the setting of Acute on Chronic Hyponatremia:   Electrolytes and mentation improving, No focal findings on neuro exam, CT head and CT spine with NAP.  Low Na possible pseudohyponatremia in the setting of Multiple Myeloma.    - AMS labs including TSH, Salicylates, Volatiles unremarkable  -Ammonia downtrending   - Nephrology following  -Plan for home hospice; CM working on medical equipment delivery 6/6.   - Fall Precautions   - 1/2 MIVF with Normal Saline (50cc/hr), gentle due to elevated pBNP  - Strict IO  - Passed bedside dysphagia, easy chew diet      Hyperammoniemia: Possible source of AMS, downtrending  - Lactulose 20g BID      Hypertensive Emergency: /68, with elevated troponin and AMS. Chronic HTN, difficult to control  - target  (goal reduction 25%) in first 24 hours   - Continue Hydralazine IV 20mg q6, metop 5mg IMqd, nitro patch 2% every day  -Given pt's improved mental status, resume home PO blood pressure medications hydral 50mgTID and  norvasc 10 for more optimal BP control   - Hold home Amlodipine 10 mg daily and metoprolol XL 25 mg daily, Losartan 100 mg daily     Hypercalcemia: POA Ca of 14.4, improving, in the setting of recent diagnosis of Multiple Myeloma. Likley cause of current abdominal pain. -s/p Zometa and Calcitonin   - 1/2 MIVF with NS at 50cc/hr  -heme/onc following; recommend hospice     Elevated Creatinine, resolved:  Cr POA 1.64 (bl ~0.9). Likely due to dehydration. - 1/2 MIVF with NS at 50cc/hr, gentle with fluids given elevated BnP. Appears volume down on exam, will consider fluid increase.  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       Abdominal Pain: Chronic issues, prior workup including EGD and colonoscopy negative. Prior CT showing hiatal hernia. Likely secondary to hypercalcemia, less likely hernia. UA negative for UTI, pelvic xray showing no acute process. - Expect to resolve with treatment of underlying hypercalcemia.   - Continue home Carafate   - Hold home protonix as possible contribution to HypoNa  - If fails to improve, consider further imaging.      Elevated Troponin: Trop initially 56, repeat flat at 56.  No chest pain, EKG with no ischemia.        Elevated pro-BNP: POA BnP 2833. Prior Echo in February EF 60-65%. Lungs clear on exam, no edema. Likely secondary to HTN and anemia. - repeat Echo with EF 55-60%  - cautious with fluids at this time, however appears to be fluid down on exam. Consider increasing fluids.     Hypokalemia: Chronic. Will continue to follow on BMP trends. - replete IV at this time due to NPO  - Continue home PO EfferK 20 BID when taking PO  - daily CMP, replete as needed     Multiple Myeloma: Recently diagnosed on prior admission in April. Established with Dr. Cooper Ruiz following; recommending hospice  -Palliative following; ongoing discussions regarding hospice     Macrocytic Anemia: Hgb 7.9 POA (bl ~8). -S/p 1u pRBC  - Heme/Onc consulted, appreciate recs  - restart home cyanocobalamin 1,000 daily      DM2: POA glucose 131. Last A1c <3.8 (L) on 4/28/2022 . Not on any home medications. - Monitor on daily CMP, no SSI at this time      HLD: Last lipid panel 12/6/21: Tchol 122, HDL 67, LDL 45.6, trig 47. No home medications.     Hypothyroidism: TSH 6.26 (5/1/2022). - Continue home Synthroid 88 mcg daily before breakfast  - TSH 1.55     CAD: MI (during surgery in 1970s) had multiple caths and stress tests. Records of cardiology visit w/ Dr. Paulette Briceno on 3/22/17. Exercise Cardiolite 7/23/15 - walked 4:39 (6.3 METS), normal stress EKG. Normal MPI. LVEF 71%. Echo 2/25/22 - LVEF 60-65%.     Rectal prolapse: Occurred x 1 during prior hospitalization, manual reduction. No acute complications. - Refer to Colorectal surgery for outpatient follow-up     GERD: Chronic.  - Hold home Protonix 40 mg daily, possible worsening of Hyponatremia  - restart home Sucralfate      Bradycardia: Chronic.  EKG showing 1st deg AV block, chronic.   - holding home Metoprolol XL 25mg daily         FEN/GI - GI lite. NS at 1/2 MIVF (50cc/hr)  Activity - Ambulate with assistance  DVT prophylaxis - Heparin   GI prophylaxis - Holding home Protonix for hyponatremia  Fall prophylaxis - Fall precautions ordered. Disposition - Home Hospice  Code Status - DNR. Discussed with patient / caregivers. Next of Rajni 69 Name and Anabell Vogel (Child)   [phone]    To be discussed with Dr. Larisa Moffett, M4    *This is a medical student note utilized for learning and not to be used for medical decision making or billing purposes. Please refer to the attending physician's note for further clinical information on this patient. *

## 2022-06-06 NOTE — PROGRESS NOTES
Palliative note:    Chart reviewed. Per notes, patient is planning to pursue home hospice with possible discharge Tuesday. The Palliative team will sign off. Please re-consult if we can be of assistance. Thank you for asking our team to participate in the care of Jaelyn Pan.

## 2022-06-06 NOTE — PROGRESS NOTES
Physical Therapy Note:  Chart reviewed in preparation for intervention. Noted patient is transitioning to hospice. Will defer and sign off as the patient transitions from restorative to comfort care.   Thank you,  Verner Loach, PT, DPT

## 2022-06-06 NOTE — PROGRESS NOTES
Care Management follow up    Patient admitted for encephalopathy, MM, DOMINIK, electrolyte imbalance  History of: HTN, anemia, diabetes, hyperliidemia, hypothyroid, GERD. RUR 22 (Score %) high   Is This a Readmission YES  Is this a Bundle NO    Current status  Patient discussed during interdisciplinary rounds. Patient continues to require medical management including ongoing assessment and monitoring. Patient has been accepted by [facility] with scheduled admission tomorrow at 11am.  Per Addie Liang, Hospice liaison, equipment scheduled for delivery today. Referral sent to [facility] requesting  tomorrow 6/7/22 at 9am.  [facility] confirmed transport for 6/7/22,  at 9am.  [facility] form, face sheet, H&P, POST form attached to [facility] referral.    Transition of Care Plan  1. Monitor patient status and response to treatment. 2. Patient continues to require medical management. 3. Discharge tomorrow, [facility] to follow. 4. [facility] transport. 5. CM to monitor progress and recommendations.     Jose Song RN, MSN/Care manager

## 2022-06-06 NOTE — HOSPICE
Hospice RN Note:     [id number] holding an 11am admission slot Tuesday 6/7/22.  left with CM requesting transport to be set up for 9:30AM tomorrow.  Equipment to be delivered today via [employer] between 4pm-7pm.         Arsenio Schlatter, RN, BSN, [address] [facility] Nurse Liaison  [phone] (kit)

## 2022-06-06 NOTE — PROGRESS NOTES
[address] [facility] [address]   Office [phone]  Fax [phone]            Subjective / Objective     24 Hour Events: Decision made for home hospice by family yesterday evening. CM working on arranging resources for transition. Subjective: Examined at bedside. No complaints at this time. Objective:    Respiratory:   O2 Device: None (Room air)   Visit Vitals  /61   Pulse (!) 58   Temp 97 °F (36.1 °C)   Resp 18   Ht 5' (1.524 m)   Wt 114 lb 3.2 oz (51.8 kg)   SpO2 96%   Breastfeeding No   BMI 22.30 kg/m²     Physical Exam:   General: No acute distress. Frail. Elderly. Respiratory: CTAB, no wheezes or crackles  Cardiovascular: Regular rate and rhythm, clear S1 S2. Pulses present bilaterally. GI: Nondistended. + bowel sounds. Abdomen nontender to palpation   Extremities: No LE edema. Pulses present. Skin: Warm, dry. Neuro: Oriented to person, place, and time. No focal deficits. Hard of hearing. I/O:  Date 06/05/22 0700 - 06/06/22 0659 06/06/22 0700 - 06/07/22 0659   Shift 2118-2008 1946-6020 24 Hour Total 1623-2165 9806-4059 24 Hour Total   INTAKE   P.O.  240 240        P. O.  240 240      Shift Total(mL/kg)  240(4.6) 240(4.6)      OUTPUT   Urine(mL/kg/hr)  800 800        Urine Output (mL) (Urinary Catheter 06/01/22 Pichardo)  800 800      Shift Total(mL/kg)  800(15.4) 800(15.4)      NET  -560 -560      Weight (kg) 51.8 51.8 51.8 51.8 51.8 51.8       Inpatient Medications  Current Facility-Administered Medications   Medication Dose Route Frequency    potassium bicarb-citric acid (EFFER-K) tablet 20 mEq  20 mEq Oral BID    labetaloL (NORMODYNE;TRANDATE) 20 mg/4 mL (5 mg/mL) injection 10 mg  10 mg IntraVENous Q6H PRN    hydrALAZINE (APRESOLINE) tablet 50 mg  50 mg Oral TID    metoprolol succinate (TOPROL-XL) XL tablet 25 mg  25 mg Oral DAILY    OLANZapine (ZyPREXA zydis) disintegrating tablet 5 mg  5 mg Oral Q12H PRN    0.9% sodium chloride infusion 250 mL  250 mL IntraVENous PRN    nitroglycerin (NITROBID) 2 % ointment 1 Inch  1 Inch Topical DAILY    sodium chloride (NS) flush 5-40 mL  5-40 mL IntraVENous Q8H    sodium chloride (NS) flush 5-40 mL  5-40 mL IntraVENous PRN    acetaminophen (TYLENOL) tablet 650 mg  650 mg Oral Q6H PRN    Or    acetaminophen (TYLENOL) suppository 650 mg  650 mg Rectal Q6H PRN    polyethylene glycol (MIRALAX) packet 17 g  17 g Oral DAILY PRN    ondansetron (ZOFRAN ODT) tablet 4 mg  4 mg Oral Q8H PRN    Or    ondansetron (ZOFRAN) injection 4 mg  4 mg IntraVENous Q6H PRN    levothyroxine (SYNTHROID) tablet 88 mcg  88 mcg Oral ACB    heparin (porcine) injection 5,000 Units  5,000 Units SubCUTAneous Q12H    ferrous sulfate tablet 325 mg  325 mg Oral ACB    latanoprost (XALATAN) 0.005 % ophthalmic solution 1 Drop  1 Drop Both Eyes QHS    sucralfate (CARAFATE) tablet 1 g  1 g Oral AC&HS    cyanocobalamin (VITAMIN B12) tablet 1,000 mcg  1,000 mcg Oral DAILY    amLODIPine (NORVASC) tablet 10 mg  10 mg Oral DAILY       Allergies  Allergies   Allergen Reactions    Celebrex [Celecoxib] Hives    Crestor [Rosuvastatin] Myalgia       CBC:  Recent Labs     06/06/22 0422 06/04/22  0043   WBC 5.8 8.9   HGB 8.6* 9.7*   HCT 25.6* 27.9*    285       Metabolic Panel:  Recent Labs     06/06/22  0422 06/05/22  0106 06/04/22  0043 06/03/22  1745 06/03/22  1745   * 138 133*   < > 134*   K 2.9* 2.4* 2.9*   < > 3.0*    109* 108   < > 106   CO2 21 20* 19*   < > 19*   BUN 43* 48* 43*   < > 45*   CREA 1.63* 1.79* 1.88*   < > 1.94*   GLU 76 81 153*   < > 132*   CA 8.5 9.0 9.9   < > 10.0   PHOS  --   --   --   --  1.4*   ALB 1.7* 1.9* 2.1*   < > 2.1*   ALT 14 12 13  --   --     < > = values in this interval not displayed.             Assessment and Plan     Arthur Garcia is a 80 y.o. female with a PMHx of recently diagnosed Multiple Myeloma, hypercalcemia, HTN, Macrocytic anemia, GERD, Badycardia  who is admitted for altered mental status in the setting of hyponatremia and hypercalcemia.     Encephalopathy likely 2/2 multiple metabolic derangements in s/o MM:  Improving, most likely 2/2 marked hypercalcemia vs pseudohyponatremia. MM diagnosed 4/2022. Prior head and C-spine imaging negative. Decision made by family for home hospice. Likely discharge today vs tomorrow pending hospice. - CM and hospice RN consulted and arranging home hospice resources. - Heme/onc consulted, appreciate rec's  - Palliative consulted, appreciate rec's  - Nephrology consulted, appreciate recs       Uncontrolled HTN: /68, with elevated troponin (flat on recheck). BP improving on home meds. - Cont Amlodipine 10mg daily, IV Hydral 50 TID, Metoprolol 25mg daily    - Nitrobid paste daily   - Continue to hold Losartan 100 mg daily given poor renal function    Macrocytic Anemia: Hgb stable s/p 1u pRBCs (6/3)   - daily cbc   - Heme/Onc consulted, appreciate recs  - home cyanocobalamin 1,000 daily   - Transfusion threshold Hgb 7.0 per H/O      Hypercalcemia: Improving s/p zometa per H/O and calcitonin x1 dose (5/31). - Stopped mIVF   - Daily CMP     DOMINIK: Likely due to dehydration vs hypercalcemia. - DC'ed IV fluids per nephro given limited improvement in renal function   - Daily CMP  - Nephrology consulted, appreciate recs       Abdominal Pain: Chronic issues, prior workup including EGD and colonoscopy negative. Prior CT showing hiatal hernia. Likely secondary to hypercalcemia vs constipation.   - Continue bowel regimen with Miralax   - Continue home Carafate   - Hold home protonix as possible contribution to HypoNa    Malnutrition: Patient with decreased po intake with intermittent improvement.  Likely secondary to enncephalopathy and MM.  - Nutrition following, appreciate rec's   - continue oral nutrition supplement, favor avoiding high protein supplement given MM and elevated total protein     Elevated Troponin (Resolved): Trop initially 56, repeat flat at 56. No chest pain, EKG with no ischemia. Tele with NSR 60s-70s and PVCs. - Continue to monitor on tele     Elevated pro-BNP: POA BnP 2833. Prior Echo in February EF 60-65%. Lungs clear on exam, no edema. Likely secondary to HTN and anemia. - repeat Echo w/ EF 55-60 and G1DD.   - cautious with fluids at this time, no evidence of fluid overload      Hypokalemia: Chronic. Will continue to follow on BMP trends. - Repleted prn   - Home Effer K 20 BID   - Daily metabolic panel, replete as needed    Hyponatremia: Improving. Holding Losartan and protonix given possible contributing factors. Likely 2/2 IVVD vs pseudohyponatremia given elevated total protein. - s/p mIVF, Na improving  - trend daily      DM2: Last A1c <3.8 (L) on 4/28/2022. Not on any home medications. Glucose range stable. - Monitor on daily metabolic panel, no SSI at this time      HLD: Last lipid panel 12/6/21: Tchol 122, HDL 67, LDL 45.6, trig 47. No home medications.     Hypothyroidism: TSH 6.26 (5/1/2022). - Continue home Synthroid 88 mcg daily before breakfast  - rTSH 1.55     CAD: MI (during surgery in 1970s) had multiple caths and stress tests. Records of cardiology visit w/ Dr. Rian Balderas on 3/22/17. Exercise Cardiolite 7/23/15 - walked 4:39 (6.3 METS), normal stress EKG. Normal MPI. LVEF 71%. Echo 2/25/22 - LVEF 60-65%.     Rectal prolapse: Occurred x 1 during prior hospitalization, manual reduction. No acute complications. - Refer to Colorectal surgery for outpatient follow-up     GERD: Chronic.  - Hold home Protonix 40 mg daily, possible worsening of Hyponatremia  - restart home Sucralfate      Bradycardia: Chronic. EKG showing 1st deg AV block, chronic.   - hold home Metoprolol XL 25mg daily. Substitute IV Metoprolol 5mg BID      Hyperammoniemia (stable): Ammonia stable. S/p lactulose. - Will stop trending daily      FEN/GI - Easy to chew.    Activity - Ambulate with assistance  DVT prophylaxis - Heparin   GI prophylaxis - Holding home Protonix for hyponatremia  Fall prophylaxis - Fall precautions ordered. Disposition - Plan to d/c to TBD. Consulting PT, OT and CM  Code Status - DNR. Discussed with patient / caregivers. Yusef of Rajni Collazo Name and Yessi Rader (Child)   [phone]    Halina Hashimoto, MD  Family Medicine Resident       For Billing    Chief Complaint   Patient presents with   [facility] [place] Problems  Date Reviewed: 5/31/2022          Codes Class Noted POA    Hypomagnesemia ICD-10-CM: E83.42  ICD-9-CM: 275.2  5/31/2022 Unknown        Hyponatremia ICD-10-CM: E87.1  ICD-9-CM: 276.1  5/31/2022 Unknown        Elevated brain natriuretic peptide (BNP) level ICD-10-CM: R79.89  ICD-9-CM: 790.99  5/31/2022 Unknown        * (Principal) Encephalopathy ICD-10-CM: G93.40  ICD-9-CM: 348.30  5/30/2022 Unknown        [facility] ICD-10-CM: C90.00  ICD-9-CM: 203.00  5/13/2022 Yes        Hypercalcemia ICD-10-CM: E83.52  ICD-9-CM: 275.42  5/13/2022 Yes        Hypertensive emergency ICD-10-CM: I16.1  ICD-9-CM: 401.9  2/25/2022 Yes        Hypokalemia ICD-10-CM: E87.6  ICD-9-CM: 276.8  2/24/2022 Yes        Elevated troponin ICD-10-CM: R77.8  ICD-9-CM: 790.6  2/24/2022 Yes        Nodule of left lobe of thyroid gland ICD-10-CM: E04.1  ICD-9-CM: 241.0  9/8/2020 Yes        Macrocytic anemia ICD-10-CM: D53.9  ICD-9-CM: 281.9  8/29/2018 Yes        Type 2 diabetes mellitus without complication, without long-term current use of insulin ([facility] ICD-10-CM: E11.9  ICD-9-CM: 250.00  4/24/2017 Yes        HTN (hypertension) ICD-10-CM: I10  ICD-9-CM: 401.9  Unknown Yes        Hypothyroidism, iatrogenic ICD-10-CM: E03.2  ICD-9-CM: 244. 3  Unknown Yes    Overview Signed 7/13/2015  3:16 PM by Flory Anaya MD     radioiodine             Hypercholesteremia ICD-10-CM: E78.00  ICD-9-CM: 272.0  Unknown Yes

## 2022-06-07 NOTE — PROGRESS NOTES
Medicare pt has received, reviewed, and 2nd IM letter informing them of their right to appeal the discharge. Signed copy has been placed on pt bedside chart.   Refmaurice Hernandez CMS

## 2022-06-07 NOTE — DISCHARGE INSTRUCTIONS
Patient Discharge Instructions    Yesika Daugherty / [id number] : 1935    Admitted 2022 Discharged: 2022 3:46 PM     Primary care provider: Meghna Jovel MD    Discharging provider:  Ray Weaver DO  - Family Medicine Resident  Bulmaro Cooper MD, MD - Family Medicine, Attending    . . . . . . . . . . . . . . . . . . . . . . . . . . . . . . . . . . . . . . . . . . . . . . . . . . . . . . . . . . . . . . . . . . . . . . . . MEDICATION CHANGES  START None     STOP None    CHANGES None    No other changes were made to your medications, please take all your other home medicines as previously prescribed. . . . . . . . . . . . . . . . . . . . . . . . . . . . . . . . . . . . . . . . . . . . . . . . . . . . . . . . . . . . . . . . . . . . . . . Rometta Favre FINAL DIAGNOSES & HOSPITAL COURSE:    Enrico Kenyon is a 80 y. o. female with a PMHx of recently diagnosed Multiple Myeloma, hypercalcemia, HTN, Macrocytic anemia, GERD, Badycardia  who is admitted for altered mental status in the setting of hyponatremia and hypercalcemia.     Encephalopathy likely 2/2 multiple metabolic derangements in s/o MM:  Most likely 2/2 marked hypercalcemia vs pseudohyponatremia. MM diagnosed 2022. Prior head and C-spine imaging negative. AMS labs negative. FeNa 0.4%. F/b Dr. Moon Miguel outpatient. Mentation significantly improving. Decision made by family to transition to home hospice. - Further management per home hospice     Hyperammoniemia: Ammonia stable. Improving with lactulose. - Further management per home hospice     Hypertensive Urgency: /68, with elevated troponin (flat on recheck). Controlled on home meds Norvasc 10mg daily, Hydral 50mg TID, Nitrobid paste daily. Losartan 100mg held given hyponatremia and renal function   - Further management per home hospice     Macrocytic Anemia: Hgb 7.9 POA (bl ~8). Improved s/p 1u pRBCs.    - Further management per home hospice     Hypercalcemia: Improving s/p zometa per H/O and calcitonin x1 dose (5/31). - Further management per home hospice     DOMINIK: Likely due to dehydration vs hypercalcemia. Minimal improvement with IV fluids     - Further management per home hospice     Abdominal Pain: Chronic issues, prior workup including EGD and colonoscopy negative. Prior CT showing hiatal hernia. Likely secondary to hypercalcemia, less likely hernia. UA negative for UTI, pelvic xray showing no acute process. - Recommend continued carafate, bowel regimen   - Further management per home hospice     Malnutrition: Patient with decreased po intake. Likely secondary to enncephalopathy and MM.  - Further management per home hospice     Elevated Troponin (Resolved): Trop initially 56, repeat flat at 56. No chest pain, EKG with no ischemia. Tele with NSR 60s-70s and PVCs. - Further management per home hospice     Elevated pro-BNP: POA BnP 2833. Prior Echo in February EF 60-65%. Lungs clear on exam, no edema. Likely secondary to HTN and anemia. Repeat Echo w/ EF 55-60 and G1DD.   - Further management per home hospice     Hypokalemia: Chronic. S/p repletion.   - Further management per home hospice     Hyponatremia: Improving. Likely 2/2 IVVD vs pseudohyponatremia given elevated total protein. - Further management per home hospice    DM2: Last A1c <3.8 (L) on 4/28/2022. Not on any home medications. Glucose range stable. - Further management per home hospice     HLD: Last lipid panel 12/6/21: Tchol 122, HDL 67, LDL 45.6, trig 47. No home medications. - Further management per home hospice     Hypothyroidism: TSH 6.26 (5/1/2022). On Synthroid 88 mcg daily before breakfast. rTSH 1.55  - Further management per home hospice     CAD: MI (during surgery in 1970s) had multiple caths and stress tests. Records of cardiology visit w/ Dr. Yessica Kendrick on 3/22/17. Exercise Cardiolite 7/23/15 - walked 4:39 (6.3 METS), normal stress EKG. Normal MPI. LVEF 71%. Echo 2/25/22 - LVEF 60-65%.   - Further management per home hospice     Rectal prolapse: Occurred x 1 during prior hospitalization, manual reduction. No acute complications. - Further management per home hospice     GERD: Chronic. home Protonix 40 mg daily.   - Further management per home hospice     Bradycardia: Chronic. EKG showing 1st deg AV block, chronic. home Metoprolol XL 25mg daily.   - Further management per home hospice    FOLLOW-UP CARE RECOMMENDATIONS:    APPOINTMENTS:  Follow-up Information    None       It is very important that you keep follow-up appointment(s). Bring discharge papers, medication list (and/or medication bottles) to follow-up appointments for review by outpatient provider(s). FOLLOW-UP TESTS RECOMMENDED:   - Further management per home hospice    ONGOING TREATMENT PLAN: - Further management per home hospice    PENDING TEST RESULTS:  At the time of discharge the following test results are still pending: None. Please review these results as they become available. Specific symptoms to watch for: chest pain, shortness of breath, fever, chills, nausea, vomiting, diarrhea, change in mentation, falling, weakness, bleeding. DIET:  Dysphagia diet-pureed    ACTIVITY:  - Further management per home hospice    WOUND CARE: - Further management per home hospice    EQUIPMENT needed:  - Further management per home hospice    INCIDENTAL FINDINGS: - Further management per home hospice    GOALS OF CARE:       [] Eventual return to home/independent/assisted living       [] Long term SNF       [x] Hospice    Patient condition at discharge:   Functional status       [x] Poor       [] Deconditioned       [] Independent  Cognition       [] Lucid       [] Forgetful (Some senescence)       [x] Dementia  Catheters/lines (plus indication)       [] Pichardo       [] PICC           [] PEG       [x] None  Code status       [] Full code       [x] DNR  . . . . . . . . . . . . . . . . . . . . . . . . . . . . . . . . . . . . . . . . . . . . . . . Chepe Meek . . . . . . . . . . . . . . . . . . . . . . . [facility] Records CHRONIC MEDICAL CONDITIONS:  Problem List as of 6/7/2022 Date Reviewed: 5/31/2022          Codes Class Noted - Resolved    Unspecified severe protein-calorie malnutrition ([facility].) ICD-10-CM: E43  ICD-9-CM: 262  6/1/2022 - Present        Hypomagnesemia ICD-10-CM: E83.42  ICD-9-CM: 275.2  5/31/2022 - Present        Hyponatremia ICD-10-CM: E87.1  ICD-9-CM: 276.1  5/31/2022 - Present        Elevated brain natriuretic peptide (BNP) level ICD-10-CM: R79.89  ICD-9-CM: 790.99  5/31/2022 - Present        * (Principal) Encephalopathy ICD-10-CM: G93.40  ICD-9-CM: 348.30  5/30/2022 - Present        High risk medication use ICD-10-CM: Z79.899  ICD-9-CM: V58.69  5/23/2022 - Present        Multiple myeloma not having achieved remission ([facility].) ICD-10-CM: C90.00  ICD-9-CM: 203.00  5/13/2022 - Present        Hypercalcemia ICD-10-CM: E83.52  ICD-9-CM: 275.42  5/13/2022 - Present        Debility ICD-10-CM: R53.81  ICD-9-CM: 799.3  5/13/2022 - Present        UTI (urinary tract infection) ICD-10-CM: N39.0  ICD-9-CM: 599.0  4/28/2022 - Present        Hypertensive emergency ICD-10-CM: I16.1  ICD-9-CM: 401.9  2/25/2022 - Present        Weakness generalized ICD-10-CM: R53.1  ICD-9-CM: 780.79  2/25/2022 - Present        Hypokalemia ICD-10-CM: E87.6  ICD-9-CM: 276.8  2/24/2022 - Present        Elevated troponin ICD-10-CM: R77.8  ICD-9-CM: 790.6  2/24/2022 - Present        Urinary tract infection with hematuria ICD-10-CM: N39.0, R31.9  ICD-9-CM: 599.0, 599.70  2/24/2022 - Present        Type 2 diabetes with nephropathy ([facility] ICD-10-CM: E11.21  ICD-9-CM: 250.40, 583.81  12/1/2020 - Present        S/P right inguinal herniorrhaphy ICD-10-CM: Z98.890, Z87.19  ICD-9-CM: V45.89  10/7/2020 - Present    Overview Signed 10/7/2020  1:00 PM by Chao Pichardo MD     With mesh.              Nodule of left lobe of thyroid gland ICD-10-CM: E04.1  ICD-9-CM: 241.0  9/8/2020 - Present        Macrocytic anemia ICD-10-CM: D53.9  ICD-9-CM: 281.9  8/29/2018 - Present        Type 2 diabetes mellitus without complication, without long-term current use of insulin ([facility] 75.) ICD-10-CM: E11.9  ICD-9-CM: 250.00  4/24/2017 - Present        Advance care planning ICD-10-CM: Z71.89  ICD-9-CM: V65.49  1/12/2017 - Present        DDD (degenerative disc disease), lumbar ICD-10-CM: M51.36  ICD-9-CM: 722.52  9/6/2016 - Present        Gastric ulcer ICD-10-CM: K25.9  ICD-9-CM: 531.90  9/6/2016 - Present        GI bleeding ICD-10-CM: K92.2  ICD-9-CM: 578.9  Unknown - Present    Overview Signed 5/4/2016 11:43 AM by Jeffrey Brown MD     workup in [address] [place] [address] 12/15 was unremarkable (Dr. Anastacio Murillo) - says she had EGD, colonoscopy, and small bowel capsule endoscopy             Coronary artery disease involving native coronary artery of native heart without angina pectoris ICD-10-CM: I25.10  ICD-9-CM: 414.01  5/4/2016 - Present        HTN (hypertension) ICD-10-CM: I10  ICD-9-CM: 401.9  Unknown - Present        Hypothyroidism, iatrogenic ICD-10-CM: E03.2  ICD-9-CM: 244. 3  Unknown - Present    Overview Signed 7/13/2015  3:16 PM by Ilsa Burkitt, MD     radioiodine             Hypercholesteremia ICD-10-CM: E78.00  ICD-9-CM: 272.0  Unknown - Present        RESOLVED: Non-recurrent inguinal hernia of right side without obstruction or gangrene ICD-10-CM: K40.90  ICD-9-CM: 550.90  9/8/2020 - 10/7/2020        RESOLVED: Diabetes ([facility] 75.) ICD-10-CM: E11.9  ICD-9-CM: 250.00  Unknown - 4/24/2017              Information obtained by :   I understand that if any problems occur once I am at home I am to contact my physician. I understand and acknowledge receipt of the instructions indicated above.                                                                                                                                              Physician's or R.N.'s Signature                                                                  Date/Time Patient or Representative Signature                                                          Date/Time

## 2022-06-07 NOTE — PROGRESS NOTES
1915 Bedside and Verbal shift change report given to April (oncoming nurse) by Edith Pennington (offgoing nurse). Report included the following information SBAR, Kardex, MAR, Accordion and Recent Results. Family at bedside. 2330 Pt c/o sore throat. MD notified. Ordered chloraseptic spray PRN. Administered: See MAR    0348 Pt still c/o sore throat. Pt has swelling on L side of neck. Pt states it is more sore on L side.  PRN tylenol suppository given for pain management.

## 2022-06-07 NOTE — PROGRESS NOTES
Problem: Falls - Risk of  Goal: *Absence of Falls  Description: Document Katy Sadler Fall Risk and appropriate interventions in the flowsheet. Outcome: Progressing Towards Goal  Note: Fall Risk Interventions:  Mobility Interventions: Bed/chair exit alarm,PT Consult for mobility concerns    Mentation Interventions: Bed/chair exit alarm,Adequate sleep, hydration, pain control,More frequent rounding,Reorient patient,Room close to nurse's station    Medication Interventions: Bed/chair exit alarm,Teach patient to arise slowly    Elimination Interventions: Call light in reach,Bed/chair exit alarm    History of Falls Interventions: Bed/chair exit alarm,Investigate reason for fall         Problem: Patient Education: Go to Patient Education Activity  Goal: Patient/Family Education  Outcome: Progressing Towards Goal     Problem: Pressure Injury - Risk of  Goal: *Prevention of pressure injury  Description: Document Massimo Scale and appropriate interventions in the flowsheet.   Outcome: Progressing Towards Goal  Note: Pressure Injury Interventions:  Sensory Interventions: Avoid rigorous massage over bony prominences,Discuss PT/OT consult with provider,Keep linens dry and wrinkle-free,Minimize linen layers    Moisture Interventions: Absorbent underpads,Internal/External urinary devices,Minimize layers    Activity Interventions: Increase time out of bed,PT/OT evaluation    Mobility Interventions: HOB 30 degrees or less,PT/OT evaluation    Nutrition Interventions: Document food/fluid/supplement intake    Friction and Shear Interventions: HOB 30 degrees or less,Lift team/patient mobility team                Problem: Patient Education: Go to Patient Education Activity  Goal: Patient/Family Education  Outcome: Progressing Towards Goal     Problem: Hypertension  Goal: *Blood pressure within specified parameters  Outcome: Progressing Towards Goal  Goal: *Fluid volume balance  Outcome: Progressing Towards Goal  Goal: *Labs within defined limits  Outcome: Progressing Towards Goal     Problem: Patient Education: Go to Patient Education Activity  Goal: Patient/Family Education  Outcome: Progressing Towards Goal     Problem: Patient Education: Go to Patient Education Activity  Goal: Patient/Family Education  Outcome: Progressing Towards Goal     Problem: Patient Education: Go to Patient Education Activity  Goal: Patient/Family Education  Outcome: Progressing Towards Goal     Problem: Infection - Risk of, Urinary Catheter-Associated Urinary Tract Infection  Goal: *Absence of infection signs and symptoms  Outcome: Progressing Towards Goal     Problem: Patient Education: Go to Patient Education Activity  Goal: Patient/Family Education  Outcome: Progressing Towards Goal     Problem: Aspiration - Risk of  Goal: *Absence of aspiration  Outcome: Progressing Towards Goal     Problem: Patient Education: Go to Patient Education Activity  Goal: Patient/Family Education  Outcome: Progressing Towards Goal     Problem: Nutrition Deficit  Goal: *Optimize nutritional status  Outcome: Progressing Towards Goal

## 2022-06-08 ENCOUNTER — HOME CARE VISIT (OUTPATIENT)
Dept: HOSPICE | Facility: HOSPICE | Age: 87
End: 2022-06-08
Payer: MEDICARE

## 2022-06-08 NOTE — HOSPICE
Pronounced at 1608 after 1 minute auscultation with no respirations or apical pulse. Daughter and granddaughter at bedside grieving appropriately. Would appreciate  support as patient had drastic change and quick decline. Paradise bauer en route. Call placed to [facility] to cancel delivery of medication refills. Medications wasted per protocol with Dariana Holloway RN as witness. Kadi notified to  equipment. Post mortem care provided. [address] notified to  body.

## 2022-06-08 NOTE — HOSPICE
Name: Amy Sam  : 1935  Age: 80 y.o. Principle Hospice Diagnosis: Multiple Myeloma  Diagnoses RELATED to the terminal prognosis: Encephalopathy  Unrelated Diagnosis: Hypercalcemia, HTN, Marcocytic anemia, GERD, bradycardia, DM, HLD, hypothyroidism, CAD     Date of Hospice Admission: 2022  Hospice Attending Elected by Patient: Dr. Zoya Aranda  Primary Care Physician: Wilberto Rojas. Hayden Adair MD     Admitting RN: Miles FITZGERALD, RN/ Nghia Patton RN  Nurse CM: Bulmaro Black  : Hayley Quinn: Fiona Freitas DNR: yes, on file     Service: unknown      Home: TBD     Direct Observation: RN presents to patients' home for hospice admission. Upon arrival, RN greeted at door by patient's daughter and granddaughter. Patients' granddaughter states that patient is uncomfortable, fidgety in bed and complaining of a sore throat. RN immediately goes to patient to complete an assessment. Patient appears restless and constantly moving in be bed. A rattling sound is heard coming from patient's throat. RN asked about patients' pain, and she points to her throat. RN assess the patient for shortness of breath. RN puts oxygen on patient via nasal cannula. RN completes physically assessment and obtains vital signs. Pichardo in place and draining appropriately. RN gets comfort pack medications from patients' family and begins to medicate the patient appropriately. RN administers initial dose of hydromorphone, lorazepam, and hyoscyamine to patient at different intervals for comfort. Dr. Beni Allison notified of patient's discomfort and inability to make patient comfortable. Dr. Beni Allison gives orders for dose changes and intervals for medication administration. Patient repositioned constantly to alleviate fidgeting and discomfort. Patient sweating and cold rags and fan placed on patient. Soft music turned on for patient and patient massaged on body with lotion for comfort.  Family at bedside constantly to be with patient. Dr. Sandra Vicente arrived at bedside with patient at [phone] to complete assessment of patient and speak with patients' family. New orders for increased dosages on medications order from [address] [place] [address]. Dr. Sandra Vicente remains at bedside and initiates continuous care for patient at home for the next 24-48 hours. At  patient finally seeming more relaxed and breathing has decreased. Respirations appear to show periods of apnea and patient remains with eyes closed. Patient resting comfortably on right side with daughter and granddaughter at the bedside sadden but grieving appropriately. At [phone] patient is pronounced with time of death. (Please see death charting for additional details).      Doses of hydromorphone and times  1059am- 0.5 mg  1140am- 1mg (Dr. Sandra Vicente contacted and dose increased)   1225pm- 1mg (Dr. Sandra Vicente contacted, and dose remains the same, but interval of doses changed to every hour as needed)   1430pm- 1mg  1530pm- 0.5mg (administered by Dr. Sandra Vicente and 1mg dose broken up and separate into two doses)   1545pm- 0.5mg (next dose administered by RN)    Doses of Lorazepam and times  1106am- 0.5mg   1158am- 1 mg (Dr. Sandra Vicente contacted and dose increases)  1250pm- 1mg (Dr. Sandra Vicente contacted earlier at 1225pm, and states dose interval can be increased)  1430pm- 1mg  1600- 0.5mg (only half dose given per Dr. Sandra Vicente, and other half can administer in 15 minutes)    Hyoscyamine  1115am- 1 tab  1235pm 1 tab (Dr. Sandra Vicente contacted at 1225pm, and another dose of hyoscyamine given)    Haldol  1440pm- 1 mg (administered by Dr. Sandra Vicente)      Palliative Performance Scale: 20%        ER Visits/ Hospitalizations in past year: 4  Onset Date of Hospice Diagnosis: 5/4/2022    Summary of Disease Progression Leading to Hospice Diagnosis: Author of note Gerhardt Papas, MD   History of Present Illness Arnetta Shone is a 80 y.o. female with PMHx of recently diagnosed Multiple Myeloma, hypercalcemia, HTN, Macrocytic anemia, GERD, Bradycardia who presents to the ED from [address] [place] [address] home due to recent altered mental status. Due to altered mentation, much of the history was obtained from daughter and granddaughter at bedside. Per family members, patient arrives from [facility] after worsening altered mentation for the past 5 days. Patient began answering questions more slowly with increasing lethargy. She participated in Physical therapy well on Friday, but the next day was very fatigued with decreased appetite and PO intake. She suffered a ground level fall on Sunday, and she presented to the ED where testing ruled out any acute process. She then suffered another fall earlier today, with no visible trauma, but mental status has continued to deteriorate. Family states she is oriented only to self at this point. They state she has not had any chest pain, fevers, recent illness, constipation or trouble breathing. On discussion with patient, she is oriented only to self but able to state she is having abdominal pain and a headache. Denies dysuria, SOB, or chest pain. Use LCD Guidelines and list features:   Cancer Diagnoses     ____X____  A. Disease with distant metastases at presentation OR    ________  B. Progression from an earlier stage of disease to metastatic disease with either:                          ________  1. continued decline in spite of therapy                          ________  2. patient declines further disease directed therapy         SPIRITUAL/Social/Emotional/psych: Patient open to support from [address] [place] [address] Road. Patient has two daughters Geri Rob and Karin Soto) and Karin Soto will be living with patient. Dr. Jose Carter contacted, agrees to serve as attending provider for hospice and provided verbal certification of terminal illness with prognosis of 6 months or less life expectancy. Orders for hospice admission, medications and plan of treatment received. Medication reconciliation completed.         Currently this patient has:  Supplemental O2: 2 L/min continuously for respiratory comfort  Pichardo Catheter: yes, in place and draining appropriately          MEDS:  I have reviewed the patient's medication list with MD and identified the following:  Nonformulary medications: n/a  Unrelated medications: n/a     IDT communication to include MD, , SW, [address] [place] [address] and support team.

## 2022-06-28 ENCOUNTER — APPOINTMENT (OUTPATIENT)
Dept: INFUSION THERAPY | Age: 87
End: 2022-06-28

## 2024-04-22 NOTE — ANESTHESIA POSTPROCEDURE EVALUATION
Procedure(s):  ESOPHAGOGASTRODUODENOSCOPY (EGD)  COLONOSCOPY  COLON BIOPSY. MAC    Anesthesia Post Evaluation      Multimodal analgesia: multimodal analgesia not used between 6 hours prior to anesthesia start to PACU discharge  Patient location during evaluation: PACU  Patient participation: complete - patient participated  Level of consciousness: awake  Pain management: adequate  Airway patency: patent  Anesthetic complications: no  Cardiovascular status: acceptable, blood pressure returned to baseline and hemodynamically stable  Respiratory status: acceptable  Hydration status: acceptable  Post anesthesia nausea and vomiting:  controlled      INITIAL Post-op Vital signs:   Vitals Value Taken Time   /57 05/02/22 1229   Temp 36.7 °C (98 °F) 05/02/22 1220   Pulse 61 05/02/22 1233   Resp 13 05/02/22 1233   SpO2 99 % 05/02/22 1233   Vitals shown include unvalidated device data. Yes

## 2024-09-12 NOTE — PROGRESS NOTES
1. Have you been to the ER, urgent care clinic since your last visit? Hospitalized since your last visit? new patient    2. Have you seen or consulted any other health care providers outside of the [address] [place] [address] since your last visit? Include any pap smears or colon screening. new patient        Vitals:    05/13/22 1331   BP: (!) 172/68   Pulse: (!) 57   Temp: 98.6 °F (37 °C)   SpO2: 98%   Weight: 113 lb 9.6 oz (51.5 kg)   Height: 5' (1.524 m)   She denies lightheadedness or dizziness currently. She has lightheadedness in the morning sometimes. Chief Complaint   Patient presents with    New Patient     New patient here for anemia. She denies pain. Yes

## 2024-12-12 NOTE — PROGRESS NOTES
"Kassi Shaikh (80 y.o. Female)       Date of Birth   1944    Social Security Number       Address   [address] [place] [address]    Home Phone   [phone]    MRN   [id number]       [facility]    Marital Status                               Admission Date   12/11/24    Admission Type   Emergency    Admitting Provider   Jorge Luis Calle MD    Attending Provider   Jb Lu MD    Department, Room/Bed   [address], P586/1       Discharge Date       Discharge Disposition       Discharge Destination                                 Attending Provider: Jb Lu MD    Allergies: Enalapril, Memantine Hcl, Enalapril Maleate, Penicillins    Isolation: None   Infection: None   Code Status: No CPR    Ht: 160 cm (63\")   Wt: 78.2 kg (172 lb 6.4 oz)    Admission Cmt: None   Principal Problem: New onset seizure [R56.9]                   Active Insurance as of 12/11/2024       Primary Coverage       Payor Plan Insurance Group Employer/Plan Group    ANTHEM MEDICARE REPLACEMENT ANTHEM MED ADV PPO [id number]       Payor Plan Address Payor Plan Phone Number Payor Plan Fax Number Effective Dates    [address] [phone]  1/1/2024 - None Entered    [facility] [id number]         Subscriber Name Subscriber Birth Date Member ID       KASSI SHAIKH 1944 [id number]                     Emergency Contacts        (Rel.) Home Phone Work Phone Mobile Phone    Flaca Shaikh (Daughter) [phone] -- [phone]    Meg Shaikh (Son) [phone] -- [phone]                " 1815: Patient complains of \"blight bloody smear\" when she wiped her rectum in hte bathroom after a bowel movement. After assessment, patient appears to be having potential rectal prolapse. 1820: [employer], spoke with Fam Armando MD. MD states that this happened a few days ago and was resolved by reducing it. MD states that he will come see patient to assess. 1851: Fam Armando MD sts that he assessed the patient, no rectal prolapse, just external hemorrhoids. Sts there is nothing new to do for patient as long as she is not in pain. Patient sts that she is comfortable and not in pain.

## (undated) DEVICE — STRAP,POSITIONING,KNEE/BODY,FOAM,4X60": Brand: MEDLINE

## (undated) DEVICE — STERILE POLYISOPRENE POWDER-FREE SURGICAL GLOVES: Brand: PROTEXIS

## (undated) DEVICE — PREP SKN CHLRAPRP APL 26ML STR --

## (undated) DEVICE — FORCEPS BX L240CM JAW DIA2.8MM L CAP W/ NDL MIC MESH TOOTH

## (undated) DEVICE — BLUNTFILL WITH FILTER: Brand: MONOJECT

## (undated) DEVICE — SUTURE MCRYL SZ 4-0 L27IN ABSRB UD L19MM PS-2 1/2 CIR PRIM Y426H

## (undated) DEVICE — SET ADMIN 16ML TBNG L100IN 2 Y INJ SITE IV PIGGY BK DISP

## (undated) DEVICE — BAG BELONG PT PERS CLEAR HANDL

## (undated) DEVICE — COVER LT HNDL PLAS RIG 1 PER PK

## (undated) DEVICE — ELECTRODE,RADIOTRANSLUCENT,FOAM,3PK: Brand: MEDLINE

## (undated) DEVICE — REM POLYHESIVE ADULT PATIENT RETURN ELECTRODE: Brand: VALLEYLAB

## (undated) DEVICE — SUTURE VCRL SZ 2-0 L27IN ABSRB UD L26MM SH 1/2 CIR J417H

## (undated) DEVICE — KIT COLON W/ 1.1OZ LUB AND 2 END

## (undated) DEVICE — 3M™ MEDIPORE™ H SOFT CLOTH TAPE SHORT ROLL TAPE 6INCHES X 2 YARDS 16 ROLLS/CASE 2866S: Brand: 3M™ MEDIPORE™

## (undated) DEVICE — SOL IRRIGATION INJ NACL 0.9% 500ML BTL

## (undated) DEVICE — BLUNTFILL: Brand: MONOJECT

## (undated) DEVICE — SUTURE PERMAHAND SZ 3-0 L30IN NONABSORBABLE BLK SILK BRAID A304H

## (undated) DEVICE — Device

## (undated) DEVICE — STRIP,CLOSURE,WOUND,MEDI-STRIP,1/2X4: Brand: MEDLINE

## (undated) DEVICE — 1200 GUARD II KIT W/5MM TUBE W/O VAC TUBE: Brand: GUARDIAN

## (undated) DEVICE — SOLIDIFIER MEDC 1200ML -- CONVERT TO 356117

## (undated) DEVICE — ROCKER SWITCH PENCIL BLADE ELECTRODE, HOLSTER: Brand: EDGE

## (undated) DEVICE — SYR 5ML 1/5 GRAD LL NSAF LF --

## (undated) DEVICE — BAG SPEC BIOHZRD 10 X 10 IN --

## (undated) DEVICE — SURGICAL PROCEDURE PACK BASIN MAJ SET CUST NO CAUT

## (undated) DEVICE — INFECTION CONTROL KIT SYS

## (undated) DEVICE — SUTURE VCRL SZ 3-0 L27IN ABSRB UD L26MM SH 1/2 CIR J416H

## (undated) DEVICE — SYR 10ML LUER LOK 1/5ML GRAD --

## (undated) DEVICE — BITEBLOCK ENDOSCP 60FR MAXI WHT POLYETH STURDY W/ VELC WVN

## (undated) DEVICE — NEEDLE HYPO 22GA L1.5IN BLK S STL HUB POLYPR SHLD REG BVL

## (undated) DEVICE — TOWEL SURG W17XL27IN STD BLU COT NONFENESTRATED PREWASHED

## (undated) DEVICE — CANNULA CUSH AD W/ 14FT TBG

## (undated) DEVICE — DRAIN PENR 0.25X18IN LTX STRL -- PENROSE LATEX

## (undated) DEVICE — BASIN EMSIS 16OZ GRAPHITE PLAS KID SHP MOLD GRAD FOR ORAL

## (undated) DEVICE — INTENDED FOR TISSUE SEPARATION, AND OTHER PROCEDURES THAT REQUIRE A SHARP SURGICAL BLADE TO PUNCTURE OR CUT.: Brand: BARD-PARKER ® CARBON RIB-BACK BLADES

## (undated) DEVICE — CATH IV AUTOGRD BC BLU 22GA 25 -- INSYTE

## (undated) DEVICE — SYR 3ML LL TIP 1/10ML GRAD --

## (undated) DEVICE — CONTAINER SPEC 20 ML LID NEUT BUFF FORMALIN 10 % POLYPR STS

## (undated) DEVICE — DRAPE,REIN 53X77,STERILE: Brand: MEDLINE

## (undated) DEVICE — DBD-PACK,LAPAROTOMY,2 REINFORCED GOWNS: Brand: MEDLINE

## (undated) DEVICE — PAD,NON-ADHERENT,3X8,STERILE,LF,1/PK: Brand: MEDLINE

## (undated) DEVICE — DEVICE TRNSF SPIK STL 2008S] MICROTEK MEDICAL INC]